# Patient Record
Sex: FEMALE | Race: WHITE | NOT HISPANIC OR LATINO | Employment: OTHER | URBAN - METROPOLITAN AREA
[De-identification: names, ages, dates, MRNs, and addresses within clinical notes are randomized per-mention and may not be internally consistent; named-entity substitution may affect disease eponyms.]

---

## 2018-11-25 PROBLEM — E87.29 INCREASED ANION GAP METABOLIC ACIDOSIS: Status: ACTIVE | Noted: 2018-11-25

## 2018-11-25 PROBLEM — J45.998 SEASONAL ASTHMA: Status: ACTIVE | Noted: 2018-11-25

## 2018-11-25 PROBLEM — J18.9 PNEUMONIA OF RIGHT LUNG DUE TO INFECTIOUS ORGANISM: Status: ACTIVE | Noted: 2018-11-25

## 2018-12-03 ENCOUNTER — NURSE TRIAGE (OUTPATIENT)
Dept: ADMINISTRATIVE | Facility: CLINIC | Age: 48
End: 2018-12-03

## 2018-12-03 ENCOUNTER — PATIENT OUTREACH (OUTPATIENT)
Dept: ADMINISTRATIVE | Facility: CLINIC | Age: 48
End: 2018-12-03

## 2018-12-03 NOTE — PATIENT INSTRUCTIONS
Treating Pneumonia  Pneumonia is an infection of one or both of the lungs. Pneumonia:  · Is usually caused by either a virus or a bacteria  · Can be very serious, especially in infants, young children, and older adults. Its also serious for those with other long-term health problems or weakened immune systems.  · Is sometimes treated at home and sometimes in the hospital    Antibiotic medicines  Antibiotics may be prescribed for pneumonia caused by bacteria. They may be pills (oral medicines), or shots (injections). Or they may be given by IV (intravenously) into a vein. If you are taking oral medicines at home:  · Fill your prescription and start taking your medicine as soon as you can.  · You will likely start to feel better in a day or 2, but dont stop taking the antibiotic.  · Use a pill organizer to help you remember to take your medicine.  · Let your healthcare provider know if you have side effects.  · Take your medicine exactly as directed on the label. Talk to your provider or pharmacist if you have any questions.  Antiviral medicines  Antiviral medicine may be prescribed for pneumonia caused by a virus. For example, antiviral medicine may be prescribed for pneumonia caused by the flu virus. Antibiotics do not work against viruses. If you are taking antiviral medicine at home:  · Fill your prescription and start taking your medicine as soon as you can.  · Talk with your provider or pharmacist about possible side effects.  · Take the medicine exactly as instructed.  To relieve symptoms  There are many medicines that can help relieve symptoms of pneumonia. Some are prescription and some are over-the-counter.  Your healthcare provider may recommend:  · Acetaminophen or ibuprofen to lower your fever and to lessen headache or other pain  · Cough medicine to loosen mucus or to reduce coughing  Make sure you check with your healthcare provider or pharmacist before taking any over-the-counter medicines.  Special  treatments  If you are hospitalized for pneumonia, you may have other therapies, including:  · Inhaled medicines to help with breathing or chest congestion  · Supplemental oxygen to increase low oxygen levels  Drink fluids and eat healthy  You should eat healthy to help your body fight the infection. Drinking a lot of fluids helps to replace fluids lost from fever and to loosen mucus in your chest.  · Diet. Make healthy food choices, including fruits and vegetables, lean meats and other proteins, 100% whole grain and low- or no-fat dairy products.  · Fluids. Drink at least 6 to 8 tall glasses a day. Water and 100% fruit or vegetable juice are best.  Get plenty of rest and sleep  You may be more tired than usual for a while. It is important to get enough sleep at night. Its also important to rest during the day. Talk with your healthcare provider if coughing or other symptoms are interfering with your sleep.  Preventing the spread of germs  The best thing you can do to prevent spreading germs is to wash your hands often. You should:  · Rub your hand with soap and water for 20 to 30 seconds.  · Clean in between your fingers, the backs of your hands, and around your nails.  · Dry your hands on a separate towel or use paper towels.  You should also:  · Keep alcohol-based hand  nearby.  · Make sure you also clean surfaces that you touch. Use a product that kills all types of germs.  · Stay away from others until you are feeling better.  When to call your healthcare provider  Call your healthcare provider if you have any of the following:  · Symptoms get worse  · Fever continues  · Shortness of breath gets worse  · Increased mucus or mucus that is darker in color  · Coughing gets worse  · Lips or fingers are bluish in color  · Side effects from your medicine   Date Last Reviewed: 12/1/2016  © 9343-1561 [employer]. [address] [place] [address], [place] [address]. All rights reserved. This information is  not intended as a substitute for professional medical care. Always follow your healthcare professional's instructions.

## 2018-12-03 NOTE — PROGRESS NOTES
[phone] [facility]  [phone] [facility]  C3 nurse attempted to contact patient. No answer. The following message was left for the patient to return the call:  Good morning  I am a nurse calling on behalf of [facility] from the [facility].  This is a Transitional Care Call for Gina Coto. When you have a moment please contact us at [phone] or [phone] Monday through Friday, between the hours of 8 am to 4 pm. We look forward to speaking with you. On behalf of [facility] have a nice day.    The patient has a scheduled HOSFU appointment with Dr Tang on 12/11/18 @ 1310 hrs.

## 2018-12-03 NOTE — TELEPHONE ENCOUNTER
Reason for Disposition   Nursing judgment   Pharmacy calling with prescription questions and triager unable to answer question    Protocols used: ST NO PROTOCOL AVAILABLE - INFORMATION ONLY-A-OH, ST MEDICATION QUESTION CALL-A-    Pt states she was told she would need to reschedule appointment with Dr. Prescott. Pt states she was supposed to be told if she needs port placement that is scheduled for Wednesday. Pt also states she needs cough medication refill. Pt advised per protocol and pt verbalizes understanding.

## 2018-12-04 NOTE — TELEPHONE ENCOUNTER
Patient called to report the following:     -patient states she is returning a call  -want to know if she will have port placed on 12/5  -patient needs a refill on acetaminophen with codeine   -advised to f/u with provider     Reason for Disposition   [1] Caller requesting NON-URGENT health information AND [2] PCP's office is the best resource    Protocols used: ST INFORMATION ONLY CALL-A-AH

## 2018-12-04 NOTE — TELEPHONE ENCOUNTER
LM appt is to scheduled port placement. Will sign consent, pick sx date, have pre-op testing done(if needed), and pre-admit appt will be scheduled. Will not be given pain medication until after procedure, need to FU with PCP. Any questions please call [phone].

## 2018-12-11 ENCOUNTER — TELEPHONE (OUTPATIENT)
Dept: CARDIOTHORACIC SURGERY | Facility: CLINIC | Age: 48
End: 2018-12-11

## 2018-12-11 NOTE — TELEPHONE ENCOUNTER
Spoke with patient regarding referral to Thoracic surgery for endobronchial lesion.  Appointment scheduled for 12/19/2018.  Reminder mailed.    ----- Message from Juan Jose Miles MD sent at 12/11/2018  8:51 AM CST -----  Regarding: RE: Carcinoid  My office will contact her to schedule an appointment.    p  ----- Message -----  From: Omar Prescott MD  Sent: 12/10/2018   9:46 AM  To: Juan Jose Miles MD  Subject: RE: Carcinoid                                    Yes sir    Thank you kindly     I will schedule the complete PFTs and Dobutamine ASAP    I will speak to Radiology today regarding the VQ scan     If we can get the studies this week, may I ask for an appt next week?    Unfortunately she has no insurance so again, any guidance is appreciated.    Regards,     AE  ----- Message -----  From: Juan Jose Miles MD  Sent: 12/10/2018   7:51 AM  To: Omar Prescott MD, #  Subject: RE: Carcinoid                                    The patient needs a right lower bilobectomy.  This  is a carcinoid tumor.  Resection with mediastinal LN dissection is the mainstay form of treatment.  I can have my office set up an appointment.  She will need PFTs, split lung V/Q scan and a dobutamine stress test.  I agree that she does NOT need to have a port placed.  I have included my clinical coordinator on this thread.  She can facilitate an office visit.    [facility]  ----- Message -----  From: Omar Prescott MD  Sent: 12/7/2018   9:55 AM  To: Juan Jose Miles MD, #  Subject: Carcinoid                                          Hi Dr. Miles,    I am wondering if I can ask your thoughts regarding this case of endobronchial carcinoid..    I will forward the case to the surgical team who saw her last week in anticipation of port placement..       Path has returned:  SPECIMEN  1) Right Main Stem; Lung Bx     Endobronchial lesion with post obstructive pneumonia             FINAL PATHOLOGIC DIAGNOSIS  Right main stem,  lung, biopsy:  -Low grade neuroendocrine tumor, most consistent with carcinoid (See comment)  Comment: Due to sampling and heterogeneity of the tumor, a higher grade component of the tumor may be  present which cannot be ruled out.     Microscopic Examination  Immunohistochemical stain results in the tumor cells are as follows:  CD45- negative  CD68 negative  CD56 positive  Synaptophysin positive  Ki-67 - ~2%  1 H & E, 1 CD45, 1 CD56, 1 CD68, 1 synaptophysin, 1 KI67     Called patient last night (12/6) LONG discussion with her and her      Both verbalize understanding and plan to keep Surgery appt to plan for evaluation for resection  Obviously, will NOT need plan for lidya [facility] Board discussion 12/10, consideration of resection either here or main campus  Patient has no insurance, unable to obtain PETCT  Will plan staging CT abd/pelvis       Thanks,     AE

## 2018-12-20 ENCOUNTER — TELEPHONE (OUTPATIENT)
Dept: HEMATOLOGY/ONCOLOGY | Facility: CLINIC | Age: 48
End: 2018-12-20

## 2018-12-20 NOTE — TELEPHONE ENCOUNTER
Called and spoke with pt to inquire about application for medicaid.  Pt has not applied for medicaid yet and will do so once she has all needed information at hand.  Instructed to seek out  while here on 12/21, agreeable.

## 2018-12-21 ENCOUNTER — OFFICE VISIT (OUTPATIENT)
Dept: CARDIOTHORACIC SURGERY | Facility: CLINIC | Age: 48
End: 2018-12-21

## 2018-12-21 VITALS
OXYGEN SATURATION: 97 % | HEIGHT: 67 IN | WEIGHT: 185.63 LBS | SYSTOLIC BLOOD PRESSURE: 130 MMHG | HEART RATE: 71 BPM | BODY MASS INDEX: 29.13 KG/M2 | DIASTOLIC BLOOD PRESSURE: 84 MMHG

## 2018-12-21 DIAGNOSIS — C7A.090: Primary | ICD-10-CM

## 2018-12-21 PROCEDURE — 99204 OFFICE O/P NEW MOD 45 MIN: CPT | Mod: S$PBB,,, | Performed by: THORACIC SURGERY (CARDIOTHORACIC VASCULAR SURGERY)

## 2018-12-21 PROCEDURE — 99214 OFFICE O/P EST MOD 30 MIN: CPT | Mod: PBBFAC | Performed by: THORACIC SURGERY (CARDIOTHORACIC VASCULAR SURGERY)

## 2018-12-21 PROCEDURE — 99999 PR PBB SHADOW E&M-EST. PATIENT-LVL IV: CPT | Mod: PBBFAC,,, | Performed by: THORACIC SURGERY (CARDIOTHORACIC VASCULAR SURGERY)

## 2018-12-21 NOTE — PROGRESS NOTES
History & Physical    SUBJECTIVE:     History of Present Illness:  Patient is a 48 y.o. 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 Presented to ED for 10 days of fever, chills, cough, and body aches. CXR prompted chest CT which showed endobronchial BI lesion. Work-up also included PFTS, V/Q, and DSE. Active at home. Recently quit smoking. Fatigue and weight loss. Denies fever, chills, CP, SOB, hemoptysis, changes in bowel function. Not on blood thinners.     Quit smoking 2 weeks ago. 15 pack years. Occasional etoh and marijuana.    PSH: diagnostic laparoscopy, R shoulder cyst excision   Self employed     Chief Complaint   Patient presents with    Consult       Review of patient's allergies indicates:  No Known Allergies    Current Outpatient Medications   Medication Sig Dispense Refill    acetaminophen-codeine 120-12 mg/5 mL suspension Take 5 mLs by mouth every 6 (six) hours as needed for Pain.      albuterol-ipratropium (DUO-NEB) 2.5 mg-0.5 mg/3 mL nebulizer solution Take 3 mLs by nebulization every 6 (six) hours as needed for Wheezing. 1 Box 11    ALPRAZolam (XANAX) 0.5 MG tablet Take 1 tablet (0.5 mg total) by mouth 3 (three) times daily as needed for Anxiety. 90 tablet 0     Current Facility-Administered Medications   Medication Dose Route Frequency Provider Last Rate Last Dose    0.9%  NaCl infusion   Intravenous Continuous Gavino Muñoz MD   Stopped at 12/19/18 1213    atropine injection 0.2 mg  0.2 mg Intravenous PRN Gavino Muñoz MD   0.2 mg at 12/19/18 1202    DOBUTamine 500mg in D5W 250mL infusion (premix)  10 mcg/kg/min Intravenous Continuous Gavino Muñoz MD   Stopped at 12/19/18 1207    esmolol injection 20 mg  20 mg Intravenous PRN Gavino Muñoz MD   10 mg at 12/19/18 1209    nitroGLYCERIN SL tablet 0.4 mg  0.4 mg Sublingual Q5 Min PRN Gavino Muñoz MD           Past Medical History:   Diagnosis Date    Seasonal asthma      Past  "Surgical History:   Procedure Laterality Date    BRONCHOSCOPY N/A 2018    Performed by Omar Prescott MD at [facility]     Family History   Problem Relation Age of Onset    Cancer Maternal Grandmother     Cancer Paternal Grandfather      Social History     Tobacco Use    Smoking status: Former Smoker     Packs/day: 0.25     Types: Cigarettes     Last attempt to quit: 2018     Years since quittin.0    Smokeless tobacco: Former User   Substance Use Topics    Alcohol use: No    Drug use: No        Review of Systems:  Review of Systems   Constitutional: Negative for activity change and fatigue.   HENT: Negative for congestion.    Respiratory: Positive for cough. Negative for shortness of breath.    Cardiovascular: Negative for chest pain and palpitations.   Gastrointestinal: Negative for abdominal distention and abdominal pain.   Genitourinary: Negative for difficulty urinating.   Musculoskeletal: Negative for arthralgias.   Skin: Negative for color change and rash.   Neurological: Negative for dizziness and syncope.   Psychiatric/Behavioral: Negative for agitation. The patient is not nervous/anxious.        OBJECTIVE:     Vital Signs (Most Recent)  Pulse: 71 (18 1107)  BP: 130/84 (18 1107)  SpO2: 97 % (18 1107)  5' 7" (1.702 m)  84.2 kg (185 lb 10 oz)     Physical Exam:  Physical Exam   Constitutional: She is oriented to person, place, and time. She appears well-developed and well-nourished.   HENT:   Head: Normocephalic and atraumatic.   Eyes: EOM are normal.   Neck: Normal range of motion. Neck supple. No tracheal deviation present.   Cardiovascular: Normal rate, regular rhythm, normal heart sounds and intact distal pulses.   Pulmonary/Chest: Effort normal and breath sounds normal.   Abdominal: Soft.   Musculoskeletal: Normal range of motion. She exhibits no edema.   Neurological: She is alert and oriented to person, place, and time.   Skin: Skin is warm and dry. "   Psychiatric: She has a normal mood and affect. Thought content normal.   Vitals reviewed.      PFTS  FEV 1 1.98L 67%    VQ 12/12/18  Differential perfusion of 70.4% to the left lung and 29.6% to the right lung.    Chest CT 11/26/18:  1.  1.3 cm mass within the bronchus intermedius with associated right middle and lower lobe atelectasis and regions of consolidation, moderate size right pleural effusion, and multiple bilateral focal regions of ground-glass opacification raising possibility of embolic foci    Pathology 11/28/18:  Right main stem, lung, biopsy:  -Low grade neuroendocrine tumor, most consistent with carcinoid    DSE 12/19/18:   · Negative dobutamine stress echocardiogram for myocardial ischemia.   · The left ventricle (LV) is normal in size, thickness and function.   · Resting LV ejection fraction is 55%.  · The right ventricle is normal in size and function.  · The EKG portion of this study is negative for myocardial ischemia.  · Arrhythmia during stress: none  · Hemodynamic data during stress: appropriate blood pressure and heart rate response to dobutamine.  · No symptoms reported.    ASSESSMENT/PLAN:     Patient is a 48 y.o. 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     PLAN:Plan     Need Chest CT with IV contrast. Stress reviewed.   Pending above proceed to OR for bronchoscopy and right thoracotomy with lower bilobectomy, MLND, possible pneumonectomy on 1/24/18. APS consult for assistance with neal-operative pain management - epidural vs erector spinae catheter  Pre-operative octreotide.   Appropriate patient education regarding the neal-operative period as well as intraoperative details were discussed. Risks, including but not limited to, bleeding, infection, pain and anesthetic complication were discussed. Patient was given the opportunity to ask questions and to have those questions answered to their satisfaction. Patient verbalized understanding to  both procedure and associated risks. Consent was obtained.

## 2019-01-02 ENCOUNTER — ANESTHESIA EVENT (OUTPATIENT)
Dept: SURGERY | Facility: HOSPITAL | Age: 49
End: 2019-01-02

## 2019-01-02 DIAGNOSIS — Z01.818 PREOP TESTING: Primary | ICD-10-CM

## 2019-01-02 NOTE — ANESTHESIA PREPROCEDURE EVALUATION
Anesthesia Assessment: Preoperative EQUATION    Planned Procedure: Procedure(s) (LRB):  THORACOTOMY (Right)  VATS, WITH WEDGE RESECTION, LUNG (Right)  BRONCHOSCOPY, RIGID (N/A)  LYMPHADENECTOMY (Right)  Requested Anesthesia Type:General  Surgeon: Juan Jose Miles MD  Service: Thoracic  Known or anticipated Date of Surgery:1/24/2019    Surgeon notes: reviewed and Bronchial adenoma, carcinoid, right    Previous anesthesia records:None-no anesthesia records found in Epic system    Last PCP note: No PCP @ present  Subspecialty notes: GYN  Tests already available:  Results have been reviewed. Labs- 12/12/18-Lipid Panel/ 12/5/18-Toxicology screen, urine/ 11/29/18-POCT TB CMP/CBC/Phosphorus/Uayytphza08/28/18-CMP/CBC/Phosphorus/Magnesium/11/25/18-UA/HIV-1 & HIV-2 Antibodies Quant./CXR-11/25/18/EKG-11/25/18/Stress Test-12/19/18              Plan:    Testing:  T&S       Patient  has previously scheduled Medical Appointment:    Navigation: Tests Scheduled. Lab-T&S on 1/16/19 @ 12:30p             Consults scheduled.POC & Perioperative IM Consult on 1/16/19 @ 10a & 11a             Results will be tracked by Preop Clinic.                Amy Dan RN  1/2/19 01/02/2019  Gina Coto is a 48 y.o., female.    Pre-op Assessment         Review of Systems  Anesthesia Hx:  History of prior surgery of interest to airway management or planning: Previous anesthesia: General laprascopic procedure 1989 with general anesthesia.  Denies Family Hx of Anesthesia complications.   Denies Personal Hx of Anesthesia complications.   Social:  Former Smoker Quit 12/2018; 0.5 ppd x 10 yrs; occasional drink socially  Last marijuana 11/2018; currently uses CBD oil under tongue   Hematology/Oncology:         -- Anemia: Current/Recent Cancer. (bronchus NET carcinoid)   EENT/Dental:   Reading glasses   Cardiovascular:   Denies  Hypertension.   Functional Capacity good / => 4 METS, walking; climbs stairs [place] [employer] without SOB, denies CP    Pulmonary:   Pneumonia (11/2018) Asthma (seasonal; first event at age 33; never used inhaler) asymptomatic Chest CT 11/26/18:  1.3 cm mass within the bronchus intermedius with associated right middle and lower lobe atelectasis and regions of consolidation, moderate size right pleural effusion, and multiple bilateral focal regions of ground-glass opacification raising possibility of embolic foci    Repeat CT done 12/31/18  See report    Pathology 11/28/18:  Right main stem, lung, biopsy:  -Low grade neuroendocrine tumor, most consistent with carcinoid     Renal/:  Renal/ Normal     Hepatic/GI:   Denies GERD.    Musculoskeletal:  Musculoskeletal Normal    OB/GYN/PEDS:  LMP: 12/24/18   Neurological:   Denies CVA. Denies Seizures.    Endocrine:   Denies Diabetes.        Physical Exam  General:  Well nourished    Airway/Jaw/Neck:  Airway Findings: Mouth Opening: Normal Tongue: Normal  General Airway Assessment: Adult  Jaw/Neck Findings:     Neck ROM: Normal ROM      Dental:  Dental Findings: In tact         Mental Status:  Mental Status Findings:  Cooperative, Alert and Oriented       Pt was seen in POC 1/16/19; per surgeon - epidural vs erector spinal catheter for pain management; PRE-OP OCTREOTIDE to be given (refer to anesthesia guidelines-carcinoid crisis prophylaxis); discussed with Dr Shelby;  Medical optimization: please see EPIC notes for recommendations of pre-op medical consultant, Dr Sanz, for perioperative medical management. /Clarisse Duckworth RN

## 2019-01-03 ENCOUNTER — TELEPHONE (OUTPATIENT)
Dept: PREADMISSION TESTING | Facility: HOSPITAL | Age: 49
End: 2019-01-03

## 2019-01-03 NOTE — TELEPHONE ENCOUNTER
----- Message from Amy Dan RN sent at 1/2/2019  5:12 PM CST -----  Regarding: Preop appts.  Jace Centeno, this patient will need a POC/OPOC/Lab-T&S(ordered) prior to surgery date of 1/24/19. Patient is having  a Thoracic surgery with Dr. Miles. Thank you. MIKO

## 2019-01-07 ENCOUNTER — PATIENT MESSAGE (OUTPATIENT)
Dept: SURGERY | Facility: HOSPITAL | Age: 49
End: 2019-01-07

## 2019-01-11 PROBLEM — D3A.00 CARCINOID TUMOR: Status: ACTIVE | Noted: 2019-01-11

## 2019-01-16 ENCOUNTER — INITIAL CONSULT (OUTPATIENT)
Dept: INTERNAL MEDICINE | Facility: CLINIC | Age: 49
End: 2019-01-16

## 2019-01-16 ENCOUNTER — HOSPITAL ENCOUNTER (OUTPATIENT)
Dept: PREADMISSION TESTING | Facility: HOSPITAL | Age: 49
Discharge: HOME | End: 2019-01-16
Attending: ANESTHESIOLOGY

## 2019-01-16 VITALS
TEMPERATURE: 98 F | BODY MASS INDEX: 29.72 KG/M2 | SYSTOLIC BLOOD PRESSURE: 163 MMHG | DIASTOLIC BLOOD PRESSURE: 79 MMHG | HEIGHT: 67 IN | HEART RATE: 91 BPM | SYSTOLIC BLOOD PRESSURE: 163 MMHG | BODY MASS INDEX: 29.72 KG/M2 | OXYGEN SATURATION: 97 % | HEIGHT: 67 IN | WEIGHT: 189.38 LBS | OXYGEN SATURATION: 97 % | WEIGHT: 189.38 LBS | DIASTOLIC BLOOD PRESSURE: 79 MMHG | HEART RATE: 91 BPM | TEMPERATURE: 98 F

## 2019-01-16 DIAGNOSIS — Z01.818 PREOP EXAMINATION: Primary | ICD-10-CM

## 2019-01-16 DIAGNOSIS — Z87.01 HISTORY OF PNEUMONIA: ICD-10-CM

## 2019-01-16 DIAGNOSIS — J45.998 SEASONAL ASTHMA: ICD-10-CM

## 2019-01-16 DIAGNOSIS — D3A.00 CARCINOID TUMOR: ICD-10-CM

## 2019-01-16 PROBLEM — E87.29 INCREASED ANION GAP METABOLIC ACIDOSIS: Status: RESOLVED | Noted: 2018-11-25 | Resolved: 2019-01-16

## 2019-01-16 PROCEDURE — 99214 OFFICE O/P EST MOD 30 MIN: CPT | Mod: S$PBB,,, | Performed by: HOSPITALIST

## 2019-01-16 PROCEDURE — 99213 OFFICE O/P EST LOW 20 MIN: CPT | Mod: PBBFAC | Performed by: HOSPITALIST

## 2019-01-16 PROCEDURE — 99214 PR OFFICE/OUTPT VISIT, EST, LEVL IV, 30-39 MIN: ICD-10-PCS | Mod: S$PBB,,, | Performed by: HOSPITALIST

## 2019-01-16 PROCEDURE — 99999 PR PBB SHADOW E&M-EST. PATIENT-LVL III: CPT | Mod: PBBFAC,,, | Performed by: HOSPITALIST

## 2019-01-16 PROCEDURE — 99999 PR PBB SHADOW E&M-EST. PATIENT-LVL III: ICD-10-PCS | Mod: PBBFAC,,, | Performed by: HOSPITALIST

## 2019-01-16 NOTE — OUTPATIENT SUBJECTIVE & OBJECTIVE
Outpatient Subjective & Objective     Chief complaint-Preoperative evaluation, Perioperative Medical management, complication reduction plan     Active cardiac conditions- none    Revised cardiac risk index predictors- high-risk type of surgery    Functional capacity -Examples of physical activity , was very active , and 2 weeks before she Was sick, went up the stadium at [place] ,  house work, can take 1 flight of stairs, cutting the grass with a mower, raking lawn, painting, weeding garden, swimming, dancing and digging----- She can undertake all the above activities without  chest pain,chest tightness, Shortness of breath ,dizziness,lightheadedness making her exercise tolerance more than 4 Mets.   Winded on heavier exertion, not new ,stable over time     Review of Systems   Constitutional: Negative for chills and fever.          Weight loss-12-- pounds -with pneumonia --gained 4 back   Cut back on white sugar       HENT:        STOPBANG score  1/ 8      Age over 50      Eyes:        No new visual changes   Respiratory:        No longer having cough  Mostly non productive     No Hemoptysis   Cardiovascular:        As noted   Gastrointestinal:        No overt GI/ blood losses  Bowel movements- Regular   LMP- 12 /24 / 2018   Cannot have children( Had infertility resting , both her fallopian tubes are blocked )   Not on contraception   Sexually active   Endocrine:        Prednisone use > 20 mg daily for 3 weeks- None   Genitourinary: Negative for dysuria.        No urinary hesitancy    Musculoskeletal:          No unusual, muscle, joint pains   Skin: Negative for rash.   Neurological: Negative for syncope.        No unilateral weakness   Hematological:        Current use of Anticoagulants  Current use of Antiplatelet agents  None   Psychiatric/Behavioral:        Bipolar , Major Depressive disorder   Not on Medication   On Xanax, since cancer diagnosis, using mostly once a day   No SI/HI   no vascular stenting  "    No past medical history pertinent negatives.        No anesthesia, bleeding, cardiac problems, PONV with previous surgeries/procedures.  Medications and Allergies reviewed in epic.   FH- No anesthesia,bleeding / venous thrombosis ,  in family     Physical Exam  Blood pressure (!) 163/79, pulse 91, temperature 98.1 °F (36.7 °C), temperature source Oral, height 5' 7" (1.702 m), weight 85.9 kg (189 lb 6.4 oz), last menstrual period 12/25/2018, SpO2 97 %.  BP usually 130-140/80's  Due for colonoscopy   Lives with  , 2 pets       Physical Exam  Constitutional- Vitals - Body mass index is 29.66 kg/m².,   Vitals:    01/16/19 1039   BP: (!) 163/79   Pulse: 91   Temp: 98.1 °F (36.7 °C)     General appearance-Conscious,Coherent  Eyes- No conjunctival icterus,pupils  round  and reactive to light   ENT-Oral cavity- moist  , Hearing grossly normal   Neck- No thyromegaly ,Trachea -central, No jugular venous distension,   No Carotid Bruit   Cardiovascular -Heart Sounds-sitting, reclining ,  Normal  and  no murmur   , No gallop rhythm   Respiratory - Decreased vocal fremitus, resonance Rt lower zone posteriorly . Normal percussion note , Normal Respiratory Effort,  no wheeze  and  no forced expiratory wheeze    Peripheral pitting pedal edema-- none , no calf pain   Gastrointestinal -Soft abdomen, No palpable masses, Non Tender,Liver,Spleen not palpable. No-- free fluid and shifting dullness  Musculoskeletal- No finger Clubbing. Strength grossly normal   Lymphatic-No Palpable cervical, axillary,Inguinal lymphadenopathy   Psychiatric - normal effect,Orientation  Rt Dorsalis pedis pulses-palpable    Lt Dorsalis pedis pulses- palpable   Rt Posterior tibial pulses -palpable   Left posterior tibial pulses -palpable   Miscellaneous -  no renal bruit  Investigations  Lab and Imaging have been reviewed in [facility].    Review of Medicine tests    EKG- I had independently reviewed the EKG from--11/25/2018   It was reported to be " showing     Normal sinus rhythm  Biatrial enlargement  Right bundle branch block  Left anterior fascicular block  LVH  No previous ECGs available    Dec 2019     · Negative dobutamine stress echocardiogram for myocardial ischemia.   · The left ventricle (LV) is normal in size, thickness and function.   · Resting LV ejection fraction is 55%.  · The right ventricle is normal in size and function.  · The EKG portion of this study is negative for myocardial ischemia.  · Arrhythmia during stress: none  · Hemodynamic data during stress: appropriate blood pressure and heart rate response to dobutamine.  · No symptoms reported.    11/29/2019     · Normal left ventricular systolic function. The estimated ejection fraction is 55%  · No wall motion abnormalities.  · Normal LV diastolic function.  · Normal right ventricular systolic function.  · Normal atrial size.  · Trace mitral regurgitation.  · Trace tricuspid regurgitation.    Dec 2018     DATE OF PROCEDURE:  12/11/2018     1.  Study meets validity.  2.  Spirometry reveals moderate obstruction.  3.  No response to bronchodilation; however, this does not preclude clinical   utility.  4.  Lung volumes reveal moderate restriction.  5.  DLCO mildly decreased.  6.  ABG reveals mild hypoxemia based on the patient's age.  Please correlate   clinically in this patient with history of endobronchial carcinoid.        Review of clinical lab tests:  Lab Results   Component Value Date    CREATININE 0.6 11/29/2018    HGB 11.0 (L) 11/29/2018     (H) 11/29/2018         Review of old records- Was done and information gathered regards to events leading to surgery and health conditions of significance in the perioperative period.    Outpatient Subjective & Objective

## 2019-01-16 NOTE — ASSESSMENT & PLAN NOTE
For surgery    I suggest that the perioperative team be aware of this so that appropriate intra preventive care can be planned

## 2019-01-16 NOTE — ASSESSMENT & PLAN NOTE
History of asthmatic bronchitis , about once a year   Has 2 pets, one dog, cat   Dog sheds   Care suggested     Currently Duo neb Breathing treatment twice a day due to up coming surgery   No recent wheezing   asthma is controlled . I suggest consideration of inhaled bronchodilator use if the patient has perioperative bronchospasm

## 2019-01-16 NOTE — HPI
History of present illness- I had the pleasure of meeting this pleasant 48 y.o. lady in the pre op clinic prior to her elective  chest surgery. The patient is new to me .     I have obtained the history by speaking to the patient and by reviewing the electronic health records.    Events leading up to surgery / History of presenting illness -    Bronchial adenoma, carcinoid, right  Was well until 11/15 /2018   Took Mother in law out one day ( 2 days prior )to get her things done ( [employer], Social security office, Court house )   [place] feverish 2 days later . Given that she was out , felt that he had flu   Had fever for about 10 days   Tried her Mother's inhaler ( who has COPD )  , stayed hydrated   Had cough , started vomiting, lost appetite ( lost 12 pounds )    Went to ER   Was diagnosed with Pneumonia, collapsed lung ,Consolidation   11/26/2018 - 1.3 cm mass within the bronchus intermedius with associated right middle and lower lobe atelectasis and regions of consolidation, moderate size right pleural effusion, and multiple bilateral focal regions of ground-glass opacification raising possibility of embolic foci  · Echo showed -Normal left ventricular systolic function. The estimated ejection fraction is 55%.No wall motion abnormalities.Normal LV diastolic function.Normal right ventricular systolic function.Normal atrial size.Trace mitral regurgitation.  Trace tricuspid regurgitation.   Not known to have bacteremia   Furter evaluation lead to the diagnosis of Carcinoid   No flushing or diarrhea  CT from 12/31 showed - 1.8 cm soft tissue density mass in the bronchus intermedius compatible with an obstructing mass and consistent with the patient's history of a carcinoid tumor  No pain from this .No aggravating factors. No relieving factors   No SOB, No cough , phlegm    Relevant health conditions of significance for the perioperative period/ History of presenting illness -    Subjectively describes health as excellent     Health conditions of significance for the perioperative period asthma, Carcinoid    Patient Active Problem List    Diagnosis Date Noted    Carcinoid tumor 01/11/2019         History of pneumonia- Nov 2018 11/25/2018    Seasonal asthma 11/25/2018     Not known to have heart disease , HTN,Diabetes Mellitus

## 2019-01-16 NOTE — DISCHARGE INSTRUCTIONS
Your surgery has been scheduled for:__________________________________________    You should report to:  ____[facility], located on the [place] side of the first floor of the           [facility] ([phone])  ____[facility], located on the [facility] side of the            Second floor of the [facility] ([phone])  ____3rd Floor [facility] located on the [facility] side of the [facility] [phone]  Please Note   - Tell your doctor if you take Aspirin, products containing Aspirin, herbal medications  or blood thinners, such as Coumadin, Ticlid, or Plavix.  (Consult your provider regarding holding or stopping before surgery).  - Arrange for someone to drive you home following surgery.  You will not be allowed to leave the surgical facility alone or drive yourself home following sedation and anesthesia.  Before Surgery  - Stop taking all herbal medications 14days prior to surgery  - No Motrin/Advil (Ibuprofen) 7 days before surgery  - No Aleve (Naproxen) 7 days before surgery  - Stop Taking Asprin, products containing Asprin _____days before surgery  - Stop taking blood thinners_______days before surgery  - No Goody's/BC  Powder 7 days before surgery  - Refrain from drinking alcoholic beverages for 24hours before and after surgery  - Stop or limit smoking _________days before surgery  - You may take Tylenol for pain    Night before Surgery  NOTHING TO EAT OR DRINK AFTER MIDNIGHT OR FOLLOW SURGEON'S INSTRUCTIONS  - Take a shower or bath (shower is recommended).  Bathe with Hibiclens soap or an antibacterial soap from the neck down.  If not supplied by your surgeon, hibiclens soap will need to be purchased over the counter in pharmacy.  Rinse soap off thoroughly.  - Shampoo your hair with your regular shampoo  The Day of Surgery  ·  If you are told to take medication on the morning of surgery, it may be taken with  a sip of water.   - Take another bath or shower with hibiclens or any antibacterial soap, to reduce the chance of infection.  - Take heart and blood pressure medications with a small sip of water, as advised by the perioperative team.  - Do not take fluid pills  - You may brush your teeth and rinse your mouth, but do not swall any additional water.   - Do not apply perfumes, powder, body lotions or deodorant on the day of surgery.  - Nail polish should be removed.  - Do not wear makeup or moisturizer  - Wear comfortable clothes, such as a button front shirt and loose fitting pants.  - Leave all jewelry, including body piercings, and valuables at home.    - Bring any devices you will neeed after surgery such as crutches or canes.  - If you have sleep apnea, please bring your CPAP machine  In the event that your physical condition changes including the onset of a cold or respiratory illness, or if you have to delay or cancel your surgery, please notify your surgeon.      Anesthesia: General Anesthesia  Youre due to have surgery. During surgery, youll be given medication called anesthesia. (It is also called anesthetic.) This will keep you comfortable and pain-free. Your anesthesia provider will use general anesthesia. This sheet tells you more about it.  What is general anesthesia?     You are watched continuously during your procedure by the anesthesia provider   General anesthesia puts you into a state like deep sleep. It goes into the bloodstream (IV anesthetics), into the lungs (gas anesthetics), or both. You feel nothing during the procedure. You will not remember it. During the procedure, the anesthesia provider monitors you continuously. He or she checks your heart rate and rhythm, blood pressure, breathing, and blood oxygen.  · IV Anesthetics. IV anesthetics are given through an IV line in your arm. Theyre often given first. This is so you are asleep before a gas anesthetic is started. Some kinds of IV  anesthetics relieve pain. Others relax you. Your doctor will decide which kind is best in your case.  · Gas Anesthetics. Gas anesthetics are breathed into the lungs. They are often used to keep you asleep. They can be given through a facemask or a tube placed in your larynx or trachea (breathing tube).  ? If you have a facemask, your anesthesia provider will most likely place it over your nose and mouth while youre still awake. Youll breathe oxygen through the mask as your IV anesthetic is started. Gas anesthetic may be added through the mask.  ? If you have a tube in the larynx or trachea, it will be inserted into your throat after youre asleep.  Anesthesia tools and medications  You will likely have:  · IV anesthetics. These are put into an IV line into your bloodstream.  · Gas anesthetics. You breathe these anesthetics into your lungs, where they pass into your bloodstream.  · Pulse oximeter. This is a small clip that is attached to the end of your finger. This measures your blood oxygen level.  · Electrocardiography leads (electrodes). These are small sticky pads that are placed on your chest. They record your heart rate and rhythm.  · Blood pressure cuff. This reads your blood pressure.  Risks and possible complications  General anesthesia has some risks. These include:  · Breathing problems  · Nausea and vomiting  · Sore throat or hoarseness (usually temporary)  · Allergic reaction to the anesthetic  · Irregular heartbeat (rare)  · Cardiac arrest (rare)   Anesthesia safety  · Follow all instructions you are given for how long not to eat or drink before your procedure.  · Be sure your doctor knows what medications and drugs you take. This includes over-the-counter medications, herbs, supplements, alcohol or other drugs. You will be asked when those were last taken.  · Have an adult family member or friend drive you home after the procedure.  · For the first 24 hours after your surgery:  ? Do not drive or use  heavy equipment.  ? Have a trusted family member or spouse make important decisions or sign documents.  ? Avoid alcohol.  ? Have a responsible adult stay with you. He or she can watch for problems and help keep you safe.  Date Last Reviewed: 10/16/2014  © 8439-9214 [employer]. [address] [place] [id number]. All rights reserved. This information is not intended as a substitute for professional medical care. Always follow your healthcare professional's instructions.

## 2019-01-16 NOTE — ASSESSMENT & PLAN NOTE
Had mild chest discomfort , rt posterior after she returned home from Pneumonia , Dec 1 st 2018  Lasted one day, resolved with heating pad. No Radiation. No associated Nausea, vomiting , diarrhea , SOB  No recurrence . No calf pains, calf swelling      Given the endobronchial lesion, likely post obstructive   clinically no suggestion of recurrence

## 2019-01-16 NOTE — PROGRESS NOTES
[facility] - Pre Op Consult  Progress Note    Patient Name: Gina Coto  MRN: [id number]  Date of Evaluation- 01/23/2019  PCP- Claire Frias MD    Future cases for Gina Coto [[id number]]     Case ID Status Date Time Trey Procedure Provider Location    [id number] [facility] 1/24/2019  9:30  THORACOTOMY Juan Jose Miles MD [[id number]] [facility] OR 2ND FLR          HPI:  History of present illness- I had the pleasure of meeting this pleasant 48 y.o. lady in the pre op clinic prior to her elective  chest surgery. The patient is new to me .     I have obtained the history by speaking to the patient and by reviewing the electronic health records.    Events leading up to surgery / History of presenting illness -    Bronchial adenoma, carcinoid, right  Was well until 11/15 /2018   Took Mother in law out one day ( 2 days prior )to get her things done ( DMV, Social security office, Court house )   [place] feverish 2 days later . Given that she was out , felt that he had flu   Had fever for about 10 days   Tried her Mother's inhaler ( who has COPD )  , stayed hydrated   Had cough , started vomiting, lost appetite ( lost 12 pounds )    Went to ER   Was diagnosed with Pneumonia, collapsed lung ,Consolidation   11/26/2018 - 1.3 cm mass within the bronchus intermedius with associated right middle and lower lobe atelectasis and regions of consolidation, moderate size right pleural effusion, and multiple bilateral focal regions of ground-glass opacification raising possibility of embolic foci  · Echo showed -Normal left ventricular systolic function. The estimated ejection fraction is 55%.No wall motion abnormalities.Normal LV diastolic function.Normal right ventricular systolic function.Normal atrial size.Trace mitral regurgitation.  Trace tricuspid regurgitation.   Not known to have bacteremia   Furter evaluation lead to the diagnosis of Carcinoid   No flushing or diarrhea  CT from 12/31 showed - 1.8 cm soft tissue density mass in the  bronchus intermedius compatible with an obstructing mass and consistent with the patient's history of a carcinoid tumor  No pain from this .No aggravating factors. No relieving factors   No SOB, No cough , phlegm    Relevant health conditions of significance for the perioperative period/ History of presenting illness -    Subjectively describes health as excellent    Health conditions of significance for the perioperative period asthma, Carcinoid    Patient Active Problem List    Diagnosis Date Noted    Carcinoid tumor 01/11/2019         History of pneumonia- Nov 2018 11/25/2018    Seasonal asthma 11/25/2018     Not known to have heart disease , HTN,Diabetes Mellitus      Subjective/ Objective:          Chief complaint-Preoperative evaluation, Perioperative Medical management, complication reduction plan     Active cardiac conditions- none    Revised cardiac risk index predictors- high-risk type of surgery    Functional capacity -Examples of physical activity , was very active , and 2 weeks before she Was sick, went up the stadium at [place] ,  house work, can take 1 flight of stairs, cutting the grass with a mower, raking lawn, painting, weeding garden, swimming, dancing and digging----- She can undertake all the above activities without  chest pain,chest tightness, Shortness of breath ,dizziness,lightheadedness making her exercise tolerance more than 4 Mets.   Winded on heavier exertion, not new ,stable over time     Review of Systems   Constitutional: Negative for chills and fever.          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        No urinary hesitancy    Musculoskeletal:          No unusual, muscle, joint pains   Skin: Negative for rash.   Neurological: Negative for syncope.        No unilateral weakness   Hematological:        Current use of Anticoagulants  Current use of Antiplatelet agents  None   Psychiatric/Behavioral:        Bipolar , Major Depressive disorder   Not on Medication   On Xanax, since cancer diagnosis, using mostly once a day   No SI/HI   no vascular stenting     No past medical history pertinent negatives.        No anesthesia, bleeding, cardiac problems, PONV with previous surgeries/procedures.  Medications and Allergies reviewed in epic.   FH- No anesthesia,bleeding / venous thrombosis ,  in family     Physical Exam  Blood pressure (!) 163/79, pulse 91, temperature 98.1 °F (36.7 °C), temperature source Oral, height 5' 7" (1.702 m), weight 85.9 kg (189 lb 6.4 oz), last menstrual period 12/25/2018, SpO2 97 %.  BP usually 130-140/80's  Due for colonoscopy   Lives with  , 2 pets       Physical Exam  Constitutional- Vitals - Body mass index is 29.66 kg/m².,   Vitals:    01/16/19 1039   BP: (!) 163/79   Pulse: 91   Temp: 98.1 °F (36.7 °C)     General appearance-Conscious,Coherent  Eyes- No conjunctival icterus,pupils  round  and reactive to light   ENT-Oral cavity- moist  , Hearing grossly normal   Neck- No thyromegaly ,Trachea -central, No jugular venous distension,   No Carotid Bruit   Cardiovascular -Heart Sounds-sitting, reclining ,  Normal  and  no murmur   , No gallop rhythm   Respiratory - Decreased vocal fremitus, resonance Rt lower zone posteriorly . Normal percussion note , Normal Respiratory Effort,  no wheeze  and  no forced expiratory wheeze    Peripheral pitting pedal edema-- none " , no calf pain   Gastrointestinal -Soft abdomen, No palpable masses, Non Tender,Liver,Spleen not palpable. No-- free fluid and shifting dullness  Musculoskeletal- No finger Clubbing. Strength grossly normal   Lymphatic-No Palpable cervical, axillary,Inguinal lymphadenopathy   Psychiatric - normal effect,Orientation  Rt Dorsalis pedis pulses-palpable    Lt Dorsalis pedis pulses- palpable   Rt Posterior tibial pulses -palpable   Left posterior tibial pulses -palpable   Miscellaneous -  no renal bruit  Investigations  Lab and Imaging have been reviewed in epic.    Review of Medicine tests    EKG- I had independently reviewed the EKG from--11/25/2018   It was reported to be showing     Normal sinus rhythm  Biatrial enlargement  Right bundle branch block  Left anterior fascicular block  LVH  No previous ECGs available    Dec 2019     · Negative dobutamine stress echocardiogram for myocardial ischemia.   · The left ventricle (LV) is normal in size, thickness and function.   · Resting LV ejection fraction is 55%.  · The right ventricle is normal in size and function.  · The EKG portion of this study is negative for myocardial ischemia.  · Arrhythmia during stress: none  · Hemodynamic data during stress: appropriate blood pressure and heart rate response to dobutamine.  · No symptoms reported.    11/29/2019     · Normal left ventricular systolic function. The estimated ejection fraction is 55%  · No wall motion abnormalities.  · Normal LV diastolic function.  · Normal right ventricular systolic function.  · Normal atrial size.  · Trace mitral regurgitation.  · Trace tricuspid regurgitation.    Dec 2018     DATE OF PROCEDURE:  12/11/2018     1.  Study meets validity.  2.  Spirometry reveals moderate obstruction.  3.  No response to bronchodilation; however, this does not preclude clinical   utility.  4.  Lung volumes reveal moderate restriction.  5.  DLCO mildly decreased.  6.  ABG reveals mild hypoxemia based on the  patient's age.  Please correlate   clinically in this patient with history of endobronchial carcinoid.        Review of clinical lab tests:  Lab Results   Component Value Date    CREATININE 0.6 11/29/2018    HGB 11.0 (L) 11/29/2018     (H) 11/29/2018         Review of old records- Was done and information gathered regards to events leading to surgery and health conditions of significance in the perioperative period.        Preoperative cardiac risk assessment-  The patient does not have any active cardiac conditions . Revised cardiac risk index predictors- 1---.Functional capacity is more than 4 Mets. She will be undergoing a chest  procedure that carries a high risk     The estimated risk of the rate of adverse cardiac outcomes  0.9%    No further cardiac work up is indicated prior to proceeding with the surgery          American Society of Anesthesiologists Physical status classification ( ASA ) class: 3     Postoperative pulmonary complication risk assessment:      ARISCAT ( [place]) risk index- risk class -  Low, if duration of surgery is under 2 hours, intermediate, if duration of surgery is 2- 3 hours , higher, if over 3 hours          Assessment/Plan:     Carcinoid tumor  For surgery    I suggest that the perioperative team be aware of this so that appropriate intra preventive care can be planned       Seasonal asthma  History of asthmatic bronchitis , about once a year   Has 2 pets, one dog, cat   Dog sheds   Care suggested     Currently Duo neb Breathing treatment twice a day due to up coming surgery   No recent wheezing   asthma is controlled . I suggest consideration of inhaled bronchodilator use if the patient has perioperative bronchospasm       History of pneumonia- Nov 2018   Had mild chest discomfort , rt posterior after she returned home from Pneumonia , Dec 1 st 2018  Lasted one day, resolved with heating pad. No Radiation. No associated Nausea, vomiting , diarrhea , SOB  No recurrence . No  calf pains, calf swelling      Given the endobronchial lesion, likely post obstructive   clinically no suggestion of recurrence         Preventive perioperative care    Thromboembolic prophylaxis:  Her risk factors for thrombosis include surgical procedure and age.I suggest  thromboembolic prophylaxis ( mechanical/pharmacological, weighing the risk benefits of pharmacological agent use considering neal procedural bleeding )  during the perioperative period.I suggested being active in the post operative period.      Postoperative pulmonary complication prophylaxis-Risk factors for post operative pulmonary complications include ASA class >2 and proximity of the surgical site to the lungs- I suggest incentive spirometry use, early ambulation, end tidal carbon dioxide monitoring and pain control so as to avoid diaphragmatic splinting  , oral care , head end of bed elevation     Renal complication prophylaxis . I suggest keeping her well hydrated .I suggested drinking 2 litre's of water a day      Surgical site Infection Prophylaxis-I  suggest appropriate antibiotic for Prophylaxis against Surgical site infections     Delirium prophylaxis-Risk factors - benzodiazepine use - I suggest avoidance / minimizing the use of  Benzodiazepines ( unless the patient has been taking it on a regular basis ),Anticholinergic medication,Antihistamines ( like  Benadryl).I suggest minimizing the use of opioid medication and use of IV tylenol,if it is appropriate. I suggest using the lowest possible dose of opioids for the shortest duration possible in the perioperative period. I suggest to Keep shades/blinds open during the day, lights off and shades closed at night to encourage normal sleep/wake cycle.I encourage the presence of the family member with the patient at all times, if at all possible as mental status changes can be picked up early by the family members and they help with reorientation. I encouraged the presence of family to  help with orientation in the perioperative period. Benadryl avoidance suggested     This visit was focused on Preoperative evaluation, Perioperative Medical management, complication reduction plans. I suggest that the patient follows up with primary care or relevant sub specialists for ongoing health care.    I appreciate the opportunity to be involved in this patients care. Please feel free to contact me if there were any questions about this consultation.    Patient is optimized     Patient  was instructed to call and update me about any changes to health,  medication, office visits ,testing out side of the neal operative care center , hospitalizations between now and surgery     Goldie Sanz MD  Perioperative Medicine  [facility]   Pager [phone]  ---  1/16    13 03     Tattoariana brown  ---  1/23- 7 48     Called to follow up , to address any concerns with the up coming surgery or any questions on Medication instructions   Unable to speak ,Left a message to call, if needed

## 2019-01-23 ENCOUNTER — PATIENT MESSAGE (OUTPATIENT)
Dept: CARDIOTHORACIC SURGERY | Facility: CLINIC | Age: 49
End: 2019-01-23

## 2019-01-23 NOTE — TELEPHONE ENCOUNTER
Called to discuss rescheduling patient's surgery. Informed by the financial office patient is currently still uninsured. Per financial office the medicaid paperwork was never submitted by the patient. After speaking with patient she was going to attempt to complete and submit paperwork today. Unfortunately, we will need to reschedule her elective surgery but will offer her multiple dates in the upcoming weeks once insurance status is updated.     KI

## 2019-01-24 ENCOUNTER — ANESTHESIA (OUTPATIENT)
Dept: SURGERY | Facility: HOSPITAL | Age: 49
End: 2019-01-24

## 2019-02-01 ENCOUNTER — PATIENT MESSAGE (OUTPATIENT)
Dept: CARDIOTHORACIC SURGERY | Facility: HOSPITAL | Age: 49
End: 2019-02-01

## 2019-03-15 ENCOUNTER — PATIENT MESSAGE (OUTPATIENT)
Dept: CARDIOTHORACIC SURGERY | Facility: HOSPITAL | Age: 49
End: 2019-03-15

## 2019-04-03 ENCOUNTER — PATIENT MESSAGE (OUTPATIENT)
Dept: CARDIOTHORACIC SURGERY | Facility: HOSPITAL | Age: 49
End: 2019-04-03

## 2019-04-03 DIAGNOSIS — D3A.00 CARCINOID TUMOR: Primary | ICD-10-CM

## 2019-04-09 ENCOUNTER — PATIENT OUTREACH (OUTPATIENT)
Dept: ADMINISTRATIVE | Facility: HOSPITAL | Age: 49
End: 2019-04-09

## 2019-04-16 NOTE — H&P (VIEW-ONLY)
History & Physical    SUBJECTIVE:     History of Present Illness:  49 year old female with PMH of asthma returns to schedule bronchus intermedius carcinoid resection. Previously schedule for surgery in January 2019; however, she was uninsured and it took her until early February to secure Medicaid. History dates to November 2018 when she resented to ED for 10 days of fever, chills, cough, and body aches. CXR prompted chest CT which showed endobronchial BI lesion. Work-up also included PFTS, V/Q, and DSE. Active at home. No history of prior cardiac or thoracic procedures. Not on blood thinners. Today she reports no major complaints.  Denies fever, chills, CP, SOB, hemoptysis, changes in bowel function. Returns today with updated chest CT and PFTs.     Quit smoking December 2018. 15 pack years. Occasional etoh and marijuana. Self employed.   PSH: diagnostic laparoscopy, R shoulder cyst excision     No chief complaint on file.    Review of patient's allergies indicates:  No Known Allergies    Current Outpatient Medications   Medication Sig Dispense Refill    albuterol-ipratropium (DUO-NEB) 2.5 mg-0.5 mg/3 mL nebulizer solution Take 3 mLs by nebulization every 6 (six) hours as needed for Wheezing. 1 Box 11    ALPRAZolam (XANAX) 0.5 MG tablet Take 1 tablet (0.5 mg total) by mouth nightly as needed for Anxiety. 20 tablet 0    ginkgo biloba 40 mg Tab Take by mouth once daily.       multivitamin (ONE DAILY MULTIVITAMIN) per tablet Take 1 tablet by mouth once daily.      MAXX'S WORT ORAL Take by mouth.      TURMERIC ORAL Take by mouth.      vitamin E 200 UNIT capsule Take 200 Units by mouth once daily.       No current facility-administered medications for this visit.        Past Medical History:   Diagnosis Date    Seasonal asthma      Past Surgical History:   Procedure Laterality Date    BRONCHOSCOPY N/A 11/28/2018    [facility]    PELVIC LAPAROSCOPY       Family  History   Problem Relation Age of Onset    Cancer Maternal Grandmother         colon     Cancer Paternal Grandfather         lung    Arthritis Mother     COPD Mother     Hypertension Mother     Stroke Mother         vascular dementia    Hypertension Father     Pacemaker/defibrilator Father         sleep apnea    Anesthesia problems Neg Hx      Social History     Tobacco Use    Smoking status: Former Smoker     Packs/day: 0.50     Years: 15.00     Pack years: 7.50     Types: Cigarettes     Last attempt to quit: 2018     Years since quittin.4    Smokeless tobacco: Never Used   Substance Use Topics    Alcohol use: No    Drug use: No        Review of Systems:  Review of Systems   Constitutional: Negative for activity change and fatigue.   HENT: Negative for congestion.    Respiratory: Positive for cough. Negative for shortness of breath.    Cardiovascular: Negative for chest pain and palpitations.   Gastrointestinal: Negative for abdominal distention and abdominal pain.   Genitourinary: Negative for difficulty urinating.   Musculoskeletal: Negative for arthralgias.   Skin: Negative for color change and rash.   Neurological: Negative for dizziness and syncope.   Psychiatric/Behavioral: Negative for agitation. The patient is not nervous/anxious.        OBJECTIVE:     Vital Signs (Most Recent)              Physical Exam:  Physical Exam   Constitutional: She is oriented to person, place, and time. She appears well-developed and well-nourished.   HENT:   Head: Normocephalic and atraumatic.   Eyes: EOM are normal.   Neck: Normal range of motion. Neck supple. No tracheal deviation present.   Cardiovascular: Normal rate, regular rhythm, normal heart sounds and intact distal pulses.   Pulmonary/Chest: Effort normal and breath sounds normal.   Abdominal: Soft.   Musculoskeletal: Normal range of motion. She exhibits no edema.   Neurological: She is alert and oriented to person, place, and time.   Skin: Skin  is warm and dry.   Psychiatric: She has a normal mood and affect. Thought content normal.   Vitals reviewed.      PFTS  12/11/19- FEV 1 1.98L 67%  4/15/19-     VQ 12/12/18  Differential perfusion of 70.4% to the left lung and 29.6% to the right lung.    Chest CT   11/26/18:  1.  1.3 cm mass within the bronchus intermedius with associated right middle and lower lobe atelectasis and regions of consolidation, moderate size right pleural effusion, and multiple bilateral focal regions of ground-glass opacification raising possibility of embolic foci    4/15/19:   Show stable chronic changes in right apical region.  The central airways remain widely patent.  Calcified mass is again identified in the bronchus intermedius with associated right middle and lower lobe regions of atelectasis.  There is improved aeration in both right middle and lower lobes in comparison to 12/30 January 2018.  The left lung is stable. Mediastinum: Mediastinal windows show no mediastinal adenopathy.  The heart size is normal and there are no pleural effusions. Scans of the upper abdomen show both adrenal glands to be normal in size.    Pathology 11/28/18:  Right main stem, lung, biopsy:  -Low grade neuroendocrine tumor, most consistent with carcinoid    DSE 12/19/18:   · Negative dobutamine stress echocardiogram for myocardial ischemia.   · The left ventricle (LV) is normal in size, thickness and function.   · Resting LV ejection fraction is 55%.  · The right ventricle is normal in size and function.  · The EKG portion of this study is negative for myocardial ischemia.  · Arrhythmia during stress: none  · Hemodynamic data during stress: appropriate blood pressure and heart rate response to dobutamine.  · No symptoms reported.    ASSESSMENT/PLAN:     Patient is a 48 y.o. 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     PLAN:Plan     Proceed to OR for bronchoscopy and right thoracotomy with lower  bilobectomy, MLND, possible pneumonectomy on 1/24/18. APS consult for assistance with neal-operative pain management - epidural vs erector spinae catheter  Pre-operative octreotide.   Appropriate patient education regarding the neal-operative period as well as intraoperative details were discussed. Risks, including but not limited to, bleeding, infection, pain and anesthetic complication were discussed. Patient was given the opportunity to ask questions and to have those questions answered to their satisfaction. Patient verbalized understanding to both procedure and associated risks. Consent was obtained.

## 2019-04-16 NOTE — H&P (VIEW-ONLY)
History & Physical    SUBJECTIVE:     History of Present Illness:  49 year old female with PMH of asthma returns to schedule bronchus intermedius carcinoid resection. Previously schedule for surgery in January 2019; however, she was uninsured and it took her until early February to secure Medicaid. History dates to November 2018 when she resented to ED for 10 days of fever, chills, cough, and body aches. CXR prompted chest CT which showed endobronchial BI lesion. Work-up also included PFTS, V/Q, and DSE. Active at home. No history of prior cardiac or thoracic procedures. Not on blood thinners. Today she reports no major complaints.  Denies fever, chills, CP, SOB, hemoptysis, changes in bowel function. Returns today with updated chest CT and PFTs.     Quit smoking December 2018. 15 pack years. Occasional etoh and marijuana. Self employed.   PSH: diagnostic laparoscopy, R shoulder cyst excision     No chief complaint on file.    Review of patient's allergies indicates:  No Known Allergies    Current Outpatient Medications   Medication Sig Dispense Refill    albuterol-ipratropium (DUO-NEB) 2.5 mg-0.5 mg/3 mL nebulizer solution Take 3 mLs by nebulization every 6 (six) hours as needed for Wheezing. 1 Box 11    ALPRAZolam (XANAX) 0.5 MG tablet Take 1 tablet (0.5 mg total) by mouth nightly as needed for Anxiety. 20 tablet 0    ginkgo biloba 40 mg Tab Take by mouth once daily.       multivitamin (ONE DAILY MULTIVITAMIN) per tablet Take 1 tablet by mouth once daily.      MAXX'S WORT ORAL Take by mouth.      TURMERIC ORAL Take by mouth.      vitamin E 200 UNIT capsule Take 200 Units by mouth once daily.       No current facility-administered medications for this visit.        Past Medical History:   Diagnosis Date    Seasonal asthma      Past Surgical History:   Procedure Laterality Date    BRONCHOSCOPY N/A 11/28/2018    [facility]    PELVIC LAPAROSCOPY       Family  History   Problem Relation Age of Onset    Cancer Maternal Grandmother         colon     Cancer Paternal Grandfather         lung    Arthritis Mother     COPD Mother     Hypertension Mother     Stroke Mother         vascular dementia    Hypertension Father     Pacemaker/defibrilator Father         sleep apnea    Anesthesia problems Neg Hx      Social History     Tobacco Use    Smoking status: Former Smoker     Packs/day: 0.50     Years: 15.00     Pack years: 7.50     Types: Cigarettes     Last attempt to quit: 2018     Years since quittin.4    Smokeless tobacco: Never Used   Substance Use Topics    Alcohol use: No    Drug use: No        Review of Systems:  Review of Systems   Constitutional: Negative for activity change and fatigue.   HENT: Negative for congestion.    Respiratory: Positive for cough. Negative for shortness of breath.    Cardiovascular: Negative for chest pain and palpitations.   Gastrointestinal: Negative for abdominal distention and abdominal pain.   Genitourinary: Negative for difficulty urinating.   Musculoskeletal: Negative for arthralgias.   Skin: Negative for color change and rash.   Neurological: Negative for dizziness and syncope.   Psychiatric/Behavioral: Negative for agitation. The patient is not nervous/anxious.        OBJECTIVE:     Vital Signs (Most Recent)              Physical Exam:  Physical Exam   Constitutional: She is oriented to person, place, and time. She appears well-developed and well-nourished.   HENT:   Head: Normocephalic and atraumatic.   Eyes: EOM are normal.   Neck: Normal range of motion. Neck supple. No tracheal deviation present.   Cardiovascular: Normal rate, regular rhythm, normal heart sounds and intact distal pulses.   Pulmonary/Chest: Effort normal and breath sounds normal.   Abdominal: Soft.   Musculoskeletal: Normal range of motion. She exhibits no edema.   Neurological: She is alert and oriented to person, place, and time.   Skin: Skin  is warm and dry.   Psychiatric: She has a normal mood and affect. Thought content normal.   Vitals reviewed.      PFTS  12/11/19- FEV 1 1.98L 67%  4/15/19-     VQ 12/12/18  Differential perfusion of 70.4% to the left lung and 29.6% to the right lung.    Chest CT   11/26/18:  1.  1.3 cm mass within the bronchus intermedius with associated right middle and lower lobe atelectasis and regions of consolidation, moderate size right pleural effusion, and multiple bilateral focal regions of ground-glass opacification raising possibility of embolic foci    4/15/19:   Show stable chronic changes in right apical region.  The central airways remain widely patent.  Calcified mass is again identified in the bronchus intermedius with associated right middle and lower lobe regions of atelectasis.  There is improved aeration in both right middle and lower lobes in comparison to 12/30 January 2018.  The left lung is stable. Mediastinum: Mediastinal windows show no mediastinal adenopathy.  The heart size is normal and there are no pleural effusions. Scans of the upper abdomen show both adrenal glands to be normal in size.    Pathology 11/28/18:  Right main stem, lung, biopsy:  -Low grade neuroendocrine tumor, most consistent with carcinoid    DSE 12/19/18:   · Negative dobutamine stress echocardiogram for myocardial ischemia.   · The left ventricle (LV) is normal in size, thickness and function.   · Resting LV ejection fraction is 55%.  · The right ventricle is normal in size and function.  · The EKG portion of this study is negative for myocardial ischemia.  · Arrhythmia during stress: none  · Hemodynamic data during stress: appropriate blood pressure and heart rate response to dobutamine.  · No symptoms reported.    ASSESSMENT/PLAN:     Patient is a 48 y.o. 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     PLAN:Plan     Proceed to OR for bronchoscopy and right thoracotomy with lower  bilobectomy, MLND, possible pneumonectomy on 1/24/18. APS consult for assistance with neal-operative pain management - epidural vs erector spinae catheter  Pre-operative octreotide.   Appropriate patient education regarding the neal-operative period as well as intraoperative details were discussed. Risks, including but not limited to, bleeding, infection, pain and anesthetic complication were discussed. Patient was given the opportunity to ask questions and to have those questions answered to their satisfaction. Patient verbalized understanding to both procedure and associated risks. Consent was obtained.

## 2019-04-16 NOTE — PROGRESS NOTES
History & Physical    SUBJECTIVE:     History of Present Illness:  49 year old female with PMH of asthma returns to schedule bronchus intermedius carcinoid resection. Previously schedule for surgery in January 2019; however, she was uninsured and it took her until early February to secure Medicaid. History dates to November 2018 when she resented to ED for 10 days of fever, chills, cough, and body aches. CXR prompted chest CT which showed endobronchial BI lesion. Work-up also included PFTS, V/Q, and DSE. Active at home. No history of prior cardiac or thoracic procedures. Not on blood thinners. Today she reports no major complaints.  Denies fever, chills, CP, SOB, hemoptysis, changes in bowel function. Returns today with updated chest CT and PFTs.     Quit smoking December 2018. 15 pack years. Occasional etoh and marijuana. Self employed.   PSH: diagnostic laparoscopy, R shoulder cyst excision     No chief complaint on file.    Review of patient's allergies indicates:  No Known Allergies    Current Outpatient Medications   Medication Sig Dispense Refill    albuterol-ipratropium (DUO-NEB) 2.5 mg-0.5 mg/3 mL nebulizer solution Take 3 mLs by nebulization every 6 (six) hours as needed for Wheezing. 1 Box 11    ALPRAZolam (XANAX) 0.5 MG tablet Take 1 tablet (0.5 mg total) by mouth nightly as needed for Anxiety. 20 tablet 0    ginkgo biloba 40 mg Tab Take by mouth once daily.       multivitamin (ONE DAILY MULTIVITAMIN) per tablet Take 1 tablet by mouth once daily.      MAXX'S WORT ORAL Take by mouth.      TURMERIC ORAL Take by mouth.      vitamin E 200 UNIT capsule Take 200 Units by mouth once daily.       No current facility-administered medications for this visit.        Past Medical History:   Diagnosis Date    Seasonal asthma      Past Surgical History:   Procedure Laterality Date    BRONCHOSCOPY N/A 11/28/2018    [facility] at [facility]    PELVIC LAPAROSCOPY       Family  History   Problem Relation Age of Onset    Cancer Maternal Grandmother         colon     Cancer Paternal Grandfather         lung    Arthritis Mother     COPD Mother     Hypertension Mother     Stroke Mother         vascular dementia    Hypertension Father     Pacemaker/defibrilator Father         sleep apnea    Anesthesia problems Neg Hx      Social History     Tobacco Use    Smoking status: Former Smoker     Packs/day: 0.50     Years: 15.00     Pack years: 7.50     Types: Cigarettes     Last attempt to quit: 2018     Years since quittin.4    Smokeless tobacco: Never Used   Substance Use Topics    Alcohol use: No    Drug use: No        Review of Systems:  Review of Systems   Constitutional: Negative for activity change and fatigue.   HENT: Negative for congestion.    Respiratory: Positive for cough. Negative for shortness of breath.    Cardiovascular: Negative for chest pain and palpitations.   Gastrointestinal: Negative for abdominal distention and abdominal pain.   Genitourinary: Negative for difficulty urinating.   Musculoskeletal: Negative for arthralgias.   Skin: Negative for color change and rash.   Neurological: Negative for dizziness and syncope.   Psychiatric/Behavioral: Negative for agitation. The patient is not nervous/anxious.        OBJECTIVE:     Vital Signs (Most Recent)              Physical Exam:  Physical Exam   Constitutional: She is oriented to person, place, and time. She appears well-developed and well-nourished.   HENT:   Head: Normocephalic and atraumatic.   Eyes: EOM are normal.   Neck: Normal range of motion. Neck supple. No tracheal deviation present.   Cardiovascular: Normal rate, regular rhythm, normal heart sounds and intact distal pulses.   Pulmonary/Chest: Effort normal and breath sounds normal.   Abdominal: Soft.   Musculoskeletal: Normal range of motion. She exhibits no edema.   Neurological: She is alert and oriented to person, place, and time.   Skin: Skin  is warm and dry.   Psychiatric: She has a normal mood and affect. Thought content normal.   Vitals reviewed.      PFTS  12/11/19- FEV 1 1.98L 67%  4/15/19-     VQ 12/12/18  Differential perfusion of 70.4% to the left lung and 29.6% to the right lung.    Chest CT   11/26/18:  1.  1.3 cm mass within the bronchus intermedius with associated right middle and lower lobe atelectasis and regions of consolidation, moderate size right pleural effusion, and multiple bilateral focal regions of ground-glass opacification raising possibility of embolic foci    4/15/19:   Show stable chronic changes in right apical region.  The central airways remain widely patent.  Calcified mass is again identified in the bronchus intermedius with associated right middle and lower lobe regions of atelectasis.  There is improved aeration in both right middle and lower lobes in comparison to 12/30 January 2018.  The left lung is stable. Mediastinum: Mediastinal windows show no mediastinal adenopathy.  The heart size is normal and there are no pleural effusions. Scans of the upper abdomen show both adrenal glands to be normal in size.    Pathology 11/28/18:  Right main stem, lung, biopsy:  -Low grade neuroendocrine tumor, most consistent with carcinoid    DSE 12/19/18:   · Negative dobutamine stress echocardiogram for myocardial ischemia.   · The left ventricle (LV) is normal in size, thickness and function.   · Resting LV ejection fraction is 55%.  · The right ventricle is normal in size and function.  · The EKG portion of this study is negative for myocardial ischemia.  · Arrhythmia during stress: none  · Hemodynamic data during stress: appropriate blood pressure and heart rate response to dobutamine.  · No symptoms reported.    ASSESSMENT/PLAN:     Patient is a 48 y.o. 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     PLAN:Plan     Proceed to OR for bronchoscopy and right thoracotomy with lower  bilobectomy, MLND, possible pneumonectomy on 1/24/18. APS consult for assistance with neal-operative pain management - epidural vs erector spinae catheter  Pre-operative octreotide.   Appropriate patient education regarding the neal-operative period as well as intraoperative details were discussed. Risks, including but not limited to, bleeding, infection, pain and anesthetic complication were discussed. Patient was given the opportunity to ask questions and to have those questions answered to their satisfaction. Patient verbalized understanding to both procedure and associated risks. Consent was obtained.

## 2019-04-17 ENCOUNTER — OFFICE VISIT (OUTPATIENT)
Dept: CARDIOTHORACIC SURGERY | Facility: CLINIC | Age: 49
End: 2019-04-17
Payer: MEDICAID

## 2019-04-17 ENCOUNTER — ANESTHESIA EVENT (OUTPATIENT)
Dept: SURGERY | Facility: HOSPITAL | Age: 49
DRG: 165 | End: 2019-04-17
Payer: MEDICAID

## 2019-04-17 VITALS
BODY MASS INDEX: 30.93 KG/M2 | HEIGHT: 67 IN | SYSTOLIC BLOOD PRESSURE: 144 MMHG | HEART RATE: 80 BPM | OXYGEN SATURATION: 98 % | WEIGHT: 197.06 LBS | DIASTOLIC BLOOD PRESSURE: 87 MMHG

## 2019-04-17 DIAGNOSIS — C7A.090 CARCINOID BRONCHIAL ADENOMA OF RIGHT LUNG: Primary | ICD-10-CM

## 2019-04-17 DIAGNOSIS — D3A.00 CARCINOID TUMOR: Primary | ICD-10-CM

## 2019-04-17 DIAGNOSIS — C34.31 MALIGNANT NEOPLASM OF LOWER LOBE OF RIGHT LUNG: ICD-10-CM

## 2019-04-17 PROCEDURE — 99213 PR OFFICE/OUTPT VISIT, EST, LEVL III, 20-29 MIN: ICD-10-PCS | Mod: S$PBB,,, | Performed by: THORACIC SURGERY (CARDIOTHORACIC VASCULAR SURGERY)

## 2019-04-17 PROCEDURE — 99213 OFFICE O/P EST LOW 20 MIN: CPT | Mod: PBBFAC | Performed by: THORACIC SURGERY (CARDIOTHORACIC VASCULAR SURGERY)

## 2019-04-17 PROCEDURE — 99213 OFFICE O/P EST LOW 20 MIN: CPT | Mod: S$PBB,,, | Performed by: THORACIC SURGERY (CARDIOTHORACIC VASCULAR SURGERY)

## 2019-04-17 PROCEDURE — 99999 PR PBB SHADOW E&M-EST. PATIENT-LVL III: ICD-10-PCS | Mod: PBBFAC,,, | Performed by: THORACIC SURGERY (CARDIOTHORACIC VASCULAR SURGERY)

## 2019-04-17 PROCEDURE — 99999 PR PBB SHADOW E&M-EST. PATIENT-LVL III: CPT | Mod: PBBFAC,,, | Performed by: THORACIC SURGERY (CARDIOTHORACIC VASCULAR SURGERY)

## 2019-04-18 ENCOUNTER — TELEPHONE (OUTPATIENT)
Dept: CARDIOTHORACIC SURGERY | Facility: CLINIC | Age: 49
End: 2019-04-18

## 2019-04-18 RX ORDER — GARLIC 200 MG
200 TABLET ORAL DAILY
COMMUNITY
End: 2022-08-11

## 2019-04-21 NOTE — ANESTHESIA PREPROCEDURE EVALUATION
"                                                                                                             04/21/2019  Pre-operative evaluation for Procedure(s) (LRB):  THORACOTOMY (Right)  LOBECTOMY, SLEEVE, LUNG, THORACOTOMY APPROACH (Right)  BRONCHOSCOPY, WITH BIOPSY (N/A)    Gina Coto is a 49 y.o. female former smoker (15 pack years; quit December 2018) and diagnosis of Right Bronchus Intermedius Carcinoid Tumor who is scheduled for above procedure. Chest CT shows "1.3 cm mass within the bronchus intermedius with associated right middle and lower lobe atelectasis and regions of consolidation, moderate size right pleural effusion, and multiple bilateral focal regions of ground-glass opacification raising possibility of embolic foci."     Prev airway: None on file    Patient Active Problem List   Diagnosis    History of pneumonia- Nov 2018     Seasonal asthma    Abnormal chest CT    Carcinoid tumor       Review of patient's allergies indicates:  No Known Allergies     No current facility-administered medications on file prior to encounter.      Current Outpatient Medications on File Prior to Encounter   Medication Sig Dispense Refill    albuterol-ipratropium (DUO-NEB) 2.5 mg-0.5 mg/3 mL nebulizer solution Take 3 mLs by nebulization every 6 (six) hours as needed for Wheezing. 1 Box 11    ALPRAZolam (XANAX) 0.5 MG tablet Take 1 tablet (0.5 mg total) by mouth nightly as needed for Anxiety. 20 tablet 0    garlic 200 mg Tab Take 200 mg by mouth once daily.      ginkgo biloba 40 mg Tab Take by mouth once daily.       multivitamin (ONE DAILY MULTIVITAMIN) per tablet Take 1 tablet by mouth once daily.      MAXX'S WORT ORAL Take by mouth.      TURMERIC ORAL Take by mouth.      vitamin E 200 UNIT capsule Take 200 Units by mouth once daily.         Past Surgical History:   Procedure Laterality Date    BRONCHOSCOPY N/A 11/28/2018    Performed by [facility] at [facility] C[facility]    PELVIC " LAPAROSCOPY         Social History     Socioeconomic History    Marital status:      Spouse name: Not on file    Number of children: Not on file    Years of education: Not on file    Highest education level: Not on file   Occupational History    Not on file   Social Needs    Financial resource strain: Not on file    Food insecurity:     Worry: Not on file     Inability: Not on file    Transportation needs:     Medical: Not on file     Non-medical: Not on file   Tobacco Use    Smoking status: Former Smoker     Packs/day: 0.50     Years: 15.00     Pack years: 7.50     Types: Cigarettes     Last attempt to quit: 2018     Years since quittin.4    Smokeless tobacco: Never Used   Substance and Sexual Activity    Alcohol use: No    Drug use: No    Sexual activity: Not Currently   Lifestyle    Physical activity:     Days per week: Not on file     Minutes per session: Not on file    Stress: Not on file   Relationships    Social connections:     Talks on phone: Not on file     Gets together: Not on file     Attends Christianity service: Not on file     Active member of club or organization: Not on file     Attends meetings of clubs or organizations: Not on file     Relationship status: Not on file   Other Topics Concern    Not on file   Social History Narrative    Not on file         Vital Signs Range (Last 24H):         CBC: No results for input(s): WBC, RBC, HGB, HCT, PLT, MCV, MCH, MCHC in the last 72 hours.    CMP: No results for input(s): NA, K, CL, CO2, BUN, CREATININE, GLU, MG, PHOS, CALCIUM, ALBUMIN, PROT, ALKPHOS, ALT, AST, BILITOT in the last 72 hours.    INR  No results for input(s): PT, INR, PROTIME, APTT in the last 72 hours.    EKG:  Normal sinus rhythm  Biatrial enlargement  Right bundle branch block  Left anterior fascicular block  LVH  No previous ECGs available  Confirmed by Esteban Young MD ([id number]) on 2018 8:45:33 AM    2D Echo 18:  · Normal left ventricular  "systolic function. The estimated ejection fraction is 55%  · No wall motion abnormalities.  · Normal LV diastolic function.  · Normal right ventricular systolic function.  · Normal atrial size.  · Trace mitral regurgitation.  · Trace tricuspid regurgitation.    Last 3 sets of Vitals    Vitals - 1 value per visit 4/15/2019 4/17/2019 4/22/2019   SYSTOLIC - 144 150   DIASTOLIC - 87 74   PULSE - 80 88   TEMPERATURE - - 98.6   RESPIRATIONS - - 16   SPO2 99 98 100   Weight (lb) - 197.09 194   Weight (kg) - 89.4 87.998   HEIGHT - 5' 7" 5' 7"   BODY MASS INDEX - 30.87 30.38   VISIT REPORT - - -   Waist Measurements - - -   Pain Score  - 0 -         Anesthesia Evaluation    I have reviewed the Patient Summary Reports.    I have reviewed the Nursing Notes.   I have reviewed the Medications.     Review of Systems  Anesthesia Hx:  No problems with previous Anesthesia  Denies Family Hx of Anesthesia complications.   Denies Personal Hx of Anesthesia complications.   Social:  Non-Smoker, No Alcohol Use    Hematology/Oncology:        Denies Current/Recent Cancer   EENT/Dental:   denies chronic allergic rhinitis   Cardiovascular:   Denies Hypertension.  Denies MI.  Denies CAD.    Denies CABG/stent.  Denies Dysrhythmias.             Pulmonary:   Denies COPD.  Denies Asthma.  Denies Recent URI.  Denies Sleep Apnea.    Renal/:   Denies Chronic Renal Disease.     Hepatic/GI:   Denies GERD. Denies Liver Disease.    Neurological:   Denies TIA. Denies CVA. Denies Seizures.    Endocrine:   Denies Diabetes.    Psych:   Denies Psychiatric History.          Physical Exam  General:  Well nourished    Airway/Jaw/Neck:  Airway Findings: Mouth Opening: Normal Tongue: Normal  General Airway Assessment: Adult  Mallampati: II  Improves to II with phonation.  TM Distance: Normal, at least 6 cm  Jaw/Neck Findings:  Neck ROM: Normal ROM      Dental:  Dental Findings: In tact   Chest/Lungs:  Chest/Lungs Findings: Clear to auscultation, Normal " Respiratory Rate     Heart/Vascular:  Heart Findings: Rate: Normal  Rhythm: Regular Rhythm  Sounds: Normal     Abdomen:  Abdomen Findings:  Normal, Soft, Nontender     Musculoskeletal:  Musculoskeletal Findings: Normal   Skin:  Skin Findings: Normal    Mental Status:  Mental Status Findings:  Cooperative, Alert and Oriented         Anesthesia Plan  Type of Anesthesia, risks & benefits discussed:  Anesthesia Type:  general  Patient's Preference: GETA  Intra-op Monitoring Plan: standard ASA monitors and arterial line  Intra-op Monitoring Plan Comments:   Post Op Pain Control Plan: per primary service following discharge from PACU, multimodal analgesia, IV/PO Opioids PRN and peripheral nerve block  Post Op Pain Control Plan Comments:   Induction:   IV  Beta Blocker:  Patient is not currently on a Beta-Blocker (No further documentation required).       Informed Consent: Patient understands risks and agrees with Anesthesia plan.  Questions answered. Anesthesia consent signed with patient.  ASA Score: 3     Day of Surgery Review of History & Physical:    H&P update referred to the surgeon.     Anesthesia Plan Notes: Plan for arterial line, double lumen ETT         Ready For Surgery From Anesthesia Perspective.

## 2019-04-22 ENCOUNTER — HOSPITAL ENCOUNTER (INPATIENT)
Facility: HOSPITAL | Age: 49
LOS: 5 days | Discharge: HOME | DRG: 165 | End: 2019-04-27
Attending: THORACIC SURGERY (CARDIOTHORACIC VASCULAR SURGERY) | Admitting: THORACIC SURGERY (CARDIOTHORACIC VASCULAR SURGERY)
Payer: MEDICAID

## 2019-04-22 ENCOUNTER — ANESTHESIA (OUTPATIENT)
Dept: SURGERY | Facility: HOSPITAL | Age: 49
DRG: 165 | End: 2019-04-22
Payer: MEDICAID

## 2019-04-22 DIAGNOSIS — D3A.090 CARCINOID TUMOR OF RIGHT LUNG: Primary | ICD-10-CM

## 2019-04-22 LAB
ABO + RH BLD: NORMAL
B-HCG UR QL: NEGATIVE
BLD GP AB SCN CELLS X3 SERPL QL: NORMAL
CTP QC/QA: YES

## 2019-04-22 PROCEDURE — 63600175 PHARM REV CODE 636 W HCPCS: Performed by: ANESTHESIOLOGY

## 2019-04-22 PROCEDURE — C1751 CATH, INF, PER/CENT/MIDLINE: HCPCS | Performed by: ANESTHESIOLOGY

## 2019-04-22 PROCEDURE — 38746 REMOVE THORACIC LYMPH NODES: CPT | Mod: ,,, | Performed by: THORACIC SURGERY (CARDIOTHORACIC VASCULAR SURGERY)

## 2019-04-22 PROCEDURE — 25000003 PHARM REV CODE 250: Performed by: THORACIC SURGERY (CARDIOTHORACIC VASCULAR SURGERY)

## 2019-04-22 PROCEDURE — 27000221 HC OXYGEN, UP TO 24 HOURS

## 2019-04-22 PROCEDURE — 88331 TISSUE SPECIMEN TO PATHOLOGY - SURGERY: ICD-10-PCS | Mod: 26,,, | Performed by: PATHOLOGY

## 2019-04-22 PROCEDURE — 63600175 PHARM REV CODE 636 W HCPCS: Performed by: STUDENT IN AN ORGANIZED HEALTH CARE EDUCATION/TRAINING PROGRAM

## 2019-04-22 PROCEDURE — 94761 N-INVAS EAR/PLS OXIMETRY MLT: CPT

## 2019-04-22 PROCEDURE — 63600175 PHARM REV CODE 636 W HCPCS: Performed by: PHYSICIAN ASSISTANT

## 2019-04-22 PROCEDURE — 88305 TISSUE EXAM BY PATHOLOGIST: CPT | Mod: 26,,, | Performed by: PATHOLOGY

## 2019-04-22 PROCEDURE — 88342 IMHCHEM/IMCYTCHM 1ST ANTB: CPT | Mod: 26,,, | Performed by: PATHOLOGY

## 2019-04-22 PROCEDURE — 25000003 PHARM REV CODE 250: Performed by: NURSE ANESTHETIST, CERTIFIED REGISTERED

## 2019-04-22 PROCEDURE — 86901 BLOOD TYPING SEROLOGIC RH(D): CPT

## 2019-04-22 PROCEDURE — 71000033 HC RECOVERY, INTIAL HOUR: Performed by: THORACIC SURGERY (CARDIOTHORACIC VASCULAR SURGERY)

## 2019-04-22 PROCEDURE — 32501: ICD-10-PCS | Mod: ,,, | Performed by: THORACIC SURGERY (CARDIOTHORACIC VASCULAR SURGERY)

## 2019-04-22 PROCEDURE — 88309 TISSUE EXAM BY PATHOLOGIST: CPT | Mod: 26,,, | Performed by: PATHOLOGY

## 2019-04-22 PROCEDURE — 88331 PATH CONSLTJ SURG 1 BLK 1SPC: CPT | Performed by: PATHOLOGY

## 2019-04-22 PROCEDURE — 63600175 PHARM REV CODE 636 W HCPCS: Performed by: NURSE ANESTHETIST, CERTIFIED REGISTERED

## 2019-04-22 PROCEDURE — 25000003 PHARM REV CODE 250: Performed by: ANESTHESIOLOGY

## 2019-04-22 PROCEDURE — 86920 COMPATIBILITY TEST SPIN: CPT

## 2019-04-22 PROCEDURE — 88342 CHG IMMUNOCYTOCHEMISTRY: ICD-10-PCS | Mod: 26,,, | Performed by: PATHOLOGY

## 2019-04-22 PROCEDURE — 88309 TISSUE SPECIMEN TO PATHOLOGY - SURGERY: ICD-10-PCS | Mod: 26,,, | Performed by: PATHOLOGY

## 2019-04-22 PROCEDURE — 88341 IMHCHEM/IMCYTCHM EA ADD ANTB: CPT | Mod: 26,,, | Performed by: PATHOLOGY

## 2019-04-22 PROCEDURE — 64463 PVB THORACIC CONT INFUSION: CPT | Mod: 59,RT,, | Performed by: ANESTHESIOLOGY

## 2019-04-22 PROCEDURE — 36620 INSERTION CATHETER ARTERY: CPT | Mod: 59,,, | Performed by: ANESTHESIOLOGY

## 2019-04-22 PROCEDURE — 32482 PR REMOVAL OF LUNG,BILOBECTOMY: ICD-10-PCS | Mod: ,,, | Performed by: THORACIC SURGERY (CARDIOTHORACIC VASCULAR SURGERY)

## 2019-04-22 PROCEDURE — 37000009 HC ANESTHESIA EA ADD 15 MINS: Performed by: THORACIC SURGERY (CARDIOTHORACIC VASCULAR SURGERY)

## 2019-04-22 PROCEDURE — 25000242 PHARM REV CODE 250 ALT 637 W/ HCPCS: Performed by: THORACIC SURGERY (CARDIOTHORACIC VASCULAR SURGERY)

## 2019-04-22 PROCEDURE — 25000003 PHARM REV CODE 250: Performed by: STUDENT IN AN ORGANIZED HEALTH CARE EDUCATION/TRAINING PROGRAM

## 2019-04-22 PROCEDURE — 32482 BILOBECTOMY: CPT | Mod: ,,, | Performed by: THORACIC SURGERY (CARDIOTHORACIC VASCULAR SURGERY)

## 2019-04-22 PROCEDURE — 25000003 PHARM REV CODE 250: Performed by: PHYSICIAN ASSISTANT

## 2019-04-22 PROCEDURE — 37000008 HC ANESTHESIA 1ST 15 MINUTES: Performed by: THORACIC SURGERY (CARDIOTHORACIC VASCULAR SURGERY)

## 2019-04-22 PROCEDURE — 76942 ECHO GUIDE FOR BIOPSY: CPT | Performed by: STUDENT IN AN ORGANIZED HEALTH CARE EDUCATION/TRAINING PROGRAM

## 2019-04-22 PROCEDURE — 27201423 OPTIME MED/SURG SUP & DEVICES STERILE SUPPLY: Performed by: THORACIC SURGERY (CARDIOTHORACIC VASCULAR SURGERY)

## 2019-04-22 PROCEDURE — 38746 PR REMOVE THOR LYMPH NODES RAD REGNL: ICD-10-PCS | Mod: ,,, | Performed by: THORACIC SURGERY (CARDIOTHORACIC VASCULAR SURGERY)

## 2019-04-22 PROCEDURE — S0020 INJECTION, BUPIVICAINE HYDRO: HCPCS | Performed by: STUDENT IN AN ORGANIZED HEALTH CARE EDUCATION/TRAINING PROGRAM

## 2019-04-22 PROCEDURE — 27201037 HC PRESSURE MONITORING SET UP

## 2019-04-22 PROCEDURE — 27800517 HC TRAY,EPIDURAL-CONTINUOUS: Performed by: STUDENT IN AN ORGANIZED HEALTH CARE EDUCATION/TRAINING PROGRAM

## 2019-04-22 PROCEDURE — P9021 RED BLOOD CELLS UNIT: HCPCS

## 2019-04-22 PROCEDURE — 36000710: Performed by: THORACIC SURGERY (CARDIOTHORACIC VASCULAR SURGERY)

## 2019-04-22 PROCEDURE — 71000039 HC RECOVERY, EACH ADD'L HOUR: Performed by: THORACIC SURGERY (CARDIOTHORACIC VASCULAR SURGERY)

## 2019-04-22 PROCEDURE — 27200651 HC AIRWAY, LMA: Performed by: ANESTHESIOLOGY

## 2019-04-22 PROCEDURE — 81025 URINE PREGNANCY TEST: CPT | Performed by: THORACIC SURGERY (CARDIOTHORACIC VASCULAR SURGERY)

## 2019-04-22 PROCEDURE — 63600175 PHARM REV CODE 636 W HCPCS: Performed by: THORACIC SURGERY (CARDIOTHORACIC VASCULAR SURGERY)

## 2019-04-22 PROCEDURE — D9220A PRA ANESTHESIA: ICD-10-PCS | Mod: CRNA,,, | Performed by: NURSE ANESTHETIST, CERTIFIED REGISTERED

## 2019-04-22 PROCEDURE — 36620 PR INSERT CATH,ART,PERCUT,SHORTTERM: ICD-10-PCS | Mod: 59,,, | Performed by: ANESTHESIOLOGY

## 2019-04-22 PROCEDURE — 27100025 HC TUBING, SET FLUID WARMER: Performed by: ANESTHESIOLOGY

## 2019-04-22 PROCEDURE — 88331 PATH CONSLTJ SURG 1 BLK 1SPC: CPT | Mod: 26,,, | Performed by: PATHOLOGY

## 2019-04-22 PROCEDURE — 20600001 HC STEP DOWN PRIVATE ROOM

## 2019-04-22 PROCEDURE — D9220A PRA ANESTHESIA: Mod: ANES,,, | Performed by: ANESTHESIOLOGY

## 2019-04-22 PROCEDURE — 94640 AIRWAY INHALATION TREATMENT: CPT

## 2019-04-22 PROCEDURE — 32501 REPAIR BRONCHUS ADD-ON: CPT | Mod: ,,, | Performed by: THORACIC SURGERY (CARDIOTHORACIC VASCULAR SURGERY)

## 2019-04-22 PROCEDURE — 36000711: Performed by: THORACIC SURGERY (CARDIOTHORACIC VASCULAR SURGERY)

## 2019-04-22 PROCEDURE — 88305 TISSUE EXAM BY PATHOLOGIST: CPT | Performed by: PATHOLOGY

## 2019-04-22 PROCEDURE — D9220A PRA ANESTHESIA: Mod: CRNA,,, | Performed by: NURSE ANESTHETIST, CERTIFIED REGISTERED

## 2019-04-22 PROCEDURE — D9220A PRA ANESTHESIA: ICD-10-PCS | Mod: ANES,,, | Performed by: ANESTHESIOLOGY

## 2019-04-22 PROCEDURE — 64463 ERECTOR SPINAE PLANE CONTINUOUS CATHETER: ICD-10-PCS | Mod: 59,RT,, | Performed by: ANESTHESIOLOGY

## 2019-04-22 PROCEDURE — 88341 PR IHC OR ICC EACH ADD'L SINGLE ANTIBODY  STAINPR: ICD-10-PCS | Mod: 26,,, | Performed by: PATHOLOGY

## 2019-04-22 PROCEDURE — 88305 TISSUE SPECIMEN TO PATHOLOGY - SURGERY: ICD-10-PCS | Mod: 26,,, | Performed by: PATHOLOGY

## 2019-04-22 RX ORDER — LIDOCAINE HYDROCHLORIDE 10 MG/ML
1 INJECTION, SOLUTION EPIDURAL; INFILTRATION; INTRACAUDAL; PERINEURAL ONCE
Status: DISCONTINUED | OUTPATIENT
Start: 2019-04-22 | End: 2019-04-22

## 2019-04-22 RX ORDER — FENTANYL CITRATE 50 UG/ML
25 INJECTION, SOLUTION INTRAMUSCULAR; INTRAVENOUS EVERY 5 MIN PRN
Status: DISCONTINUED | OUTPATIENT
Start: 2019-04-22 | End: 2019-04-22 | Stop reason: HOSPADM

## 2019-04-22 RX ORDER — BISACODYL 10 MG
10 SUPPOSITORY, RECTAL RECTAL DAILY PRN
Status: DISCONTINUED | OUTPATIENT
Start: 2019-04-22 | End: 2019-04-27 | Stop reason: HOSPADM

## 2019-04-22 RX ORDER — MORPHINE SULFATE 2 MG/ML
2 INJECTION, SOLUTION INTRAMUSCULAR; INTRAVENOUS
Status: DISCONTINUED | OUTPATIENT
Start: 2019-04-22 | End: 2019-04-23

## 2019-04-22 RX ORDER — SODIUM CHLORIDE 9 MG/ML
INJECTION, SOLUTION INTRAVENOUS CONTINUOUS PRN
Status: DISCONTINUED | OUTPATIENT
Start: 2019-04-22 | End: 2019-04-22

## 2019-04-22 RX ORDER — CEFAZOLIN SODIUM 1 G/3ML
2 INJECTION, POWDER, FOR SOLUTION INTRAMUSCULAR; INTRAVENOUS
Status: COMPLETED | OUTPATIENT
Start: 2019-04-22 | End: 2019-04-22

## 2019-04-22 RX ORDER — CEFAZOLIN SODIUM 1 G/3ML
2 INJECTION, POWDER, FOR SOLUTION INTRAMUSCULAR; INTRAVENOUS
Status: DISCONTINUED | OUTPATIENT
Start: 2019-04-22 | End: 2019-04-23

## 2019-04-22 RX ORDER — METHOCARBAMOL 500 MG/1
500 TABLET, FILM COATED ORAL 3 TIMES DAILY
Status: COMPLETED | OUTPATIENT
Start: 2019-04-22 | End: 2019-04-23

## 2019-04-22 RX ORDER — MIDAZOLAM HYDROCHLORIDE 1 MG/ML
0.5 INJECTION INTRAMUSCULAR; INTRAVENOUS
Status: DISCONTINUED | OUTPATIENT
Start: 2019-04-22 | End: 2019-04-22

## 2019-04-22 RX ORDER — POLYETHYLENE GLYCOL 3350 17 G/17G
17 POWDER, FOR SOLUTION ORAL DAILY
Status: DISCONTINUED | OUTPATIENT
Start: 2019-04-23 | End: 2019-04-27 | Stop reason: HOSPADM

## 2019-04-22 RX ORDER — ROPIVACAINE HYDROCHLORIDE 2 MG/ML
2 INJECTION, SOLUTION EPIDURAL; INFILTRATION; PERINEURAL CONTINUOUS
Status: DISCONTINUED | OUTPATIENT
Start: 2019-04-22 | End: 2019-04-25

## 2019-04-22 RX ORDER — ONDANSETRON 2 MG/ML
4 INJECTION INTRAMUSCULAR; INTRAVENOUS EVERY 12 HOURS PRN
Status: DISCONTINUED | OUTPATIENT
Start: 2019-04-22 | End: 2019-04-26

## 2019-04-22 RX ORDER — PANTOPRAZOLE SODIUM 40 MG/1
40 TABLET, DELAYED RELEASE ORAL
Status: DISCONTINUED | OUTPATIENT
Start: 2019-04-23 | End: 2019-04-27 | Stop reason: HOSPADM

## 2019-04-22 RX ORDER — PREGABALIN 150 MG/1
150 CAPSULE ORAL NIGHTLY
Status: DISCONTINUED | OUTPATIENT
Start: 2019-04-22 | End: 2019-04-27 | Stop reason: HOSPADM

## 2019-04-22 RX ORDER — ONDANSETRON 2 MG/ML
INJECTION INTRAMUSCULAR; INTRAVENOUS
Status: DISCONTINUED | OUTPATIENT
Start: 2019-04-22 | End: 2019-04-22

## 2019-04-22 RX ORDER — LIDOCAINE HYDROCHLORIDE 10 MG/ML
1 INJECTION, SOLUTION EPIDURAL; INFILTRATION; INTRACAUDAL; PERINEURAL ONCE
Status: COMPLETED | OUTPATIENT
Start: 2019-04-22 | End: 2019-04-22

## 2019-04-22 RX ORDER — LACTULOSE 10 G/15ML
20 SOLUTION ORAL EVERY 6 HOURS PRN
Status: DISCONTINUED | OUTPATIENT
Start: 2019-04-22 | End: 2019-04-27 | Stop reason: HOSPADM

## 2019-04-22 RX ORDER — OXYCODONE HYDROCHLORIDE 10 MG/1
10 TABLET ORAL
Status: DISCONTINUED | OUTPATIENT
Start: 2019-04-22 | End: 2019-04-24

## 2019-04-22 RX ORDER — PROPOFOL 10 MG/ML
VIAL (ML) INTRAVENOUS
Status: DISCONTINUED | OUTPATIENT
Start: 2019-04-22 | End: 2019-04-22

## 2019-04-22 RX ORDER — ACETAMINOPHEN 500 MG
1000 TABLET ORAL EVERY 6 HOURS
Status: DISCONTINUED | OUTPATIENT
Start: 2019-04-22 | End: 2019-04-24

## 2019-04-22 RX ORDER — LIDOCAINE HCL/PF 100 MG/5ML
SYRINGE (ML) INTRAVENOUS
Status: DISCONTINUED | OUTPATIENT
Start: 2019-04-22 | End: 2019-04-22

## 2019-04-22 RX ORDER — HYDROMORPHONE HYDROCHLORIDE 1 MG/ML
0.2 INJECTION, SOLUTION INTRAMUSCULAR; INTRAVENOUS; SUBCUTANEOUS EVERY 5 MIN PRN
Status: COMPLETED | OUTPATIENT
Start: 2019-04-22 | End: 2019-04-22

## 2019-04-22 RX ORDER — IPRATROPIUM BROMIDE AND ALBUTEROL SULFATE 2.5; .5 MG/3ML; MG/3ML
3 SOLUTION RESPIRATORY (INHALATION) EVERY 6 HOURS
Status: DISCONTINUED | OUTPATIENT
Start: 2019-04-22 | End: 2019-04-27 | Stop reason: HOSPADM

## 2019-04-22 RX ORDER — AMOXICILLIN 250 MG
1 CAPSULE ORAL 2 TIMES DAILY
Status: DISCONTINUED | OUTPATIENT
Start: 2019-04-22 | End: 2019-04-27 | Stop reason: HOSPADM

## 2019-04-22 RX ORDER — OXYCODONE HYDROCHLORIDE 5 MG/1
5 TABLET ORAL
Status: DISCONTINUED | OUTPATIENT
Start: 2019-04-22 | End: 2019-04-24

## 2019-04-22 RX ORDER — ACETAMINOPHEN 10 MG/ML
INJECTION, SOLUTION INTRAVENOUS
Status: DISCONTINUED | OUTPATIENT
Start: 2019-04-22 | End: 2019-04-22

## 2019-04-22 RX ORDER — CELECOXIB 200 MG/1
200 CAPSULE ORAL DAILY
Status: DISCONTINUED | OUTPATIENT
Start: 2019-04-23 | End: 2019-04-22

## 2019-04-22 RX ORDER — ACETAMINOPHEN 500 MG
1000 TABLET ORAL EVERY 6 HOURS
Status: DISCONTINUED | OUTPATIENT
Start: 2019-04-22 | End: 2019-04-22

## 2019-04-22 RX ORDER — DEXAMETHASONE SODIUM PHOSPHATE 4 MG/ML
INJECTION, SOLUTION INTRA-ARTICULAR; INTRALESIONAL; INTRAMUSCULAR; INTRAVENOUS; SOFT TISSUE
Status: DISCONTINUED | OUTPATIENT
Start: 2019-04-22 | End: 2019-04-22

## 2019-04-22 RX ORDER — KETAMINE HCL IN 0.9 % NACL 50 MG/5 ML
SYRINGE (ML) INTRAVENOUS
Status: DISCONTINUED | OUTPATIENT
Start: 2019-04-22 | End: 2019-04-22

## 2019-04-22 RX ORDER — ONDANSETRON 2 MG/ML
8 INJECTION INTRAMUSCULAR; INTRAVENOUS ONCE
Status: DISCONTINUED | OUTPATIENT
Start: 2019-04-22 | End: 2019-04-25

## 2019-04-22 RX ORDER — CELECOXIB 200 MG/1
400 CAPSULE ORAL ONCE
Status: DISCONTINUED | OUTPATIENT
Start: 2019-04-22 | End: 2019-04-22

## 2019-04-22 RX ORDER — MIDAZOLAM HYDROCHLORIDE 1 MG/ML
INJECTION, SOLUTION INTRAMUSCULAR; INTRAVENOUS
Status: DISCONTINUED | OUTPATIENT
Start: 2019-04-22 | End: 2019-04-22

## 2019-04-22 RX ORDER — BUPIVACAINE HYDROCHLORIDE 7.5 MG/ML
INJECTION, SOLUTION EPIDURAL; RETROBULBAR
Status: COMPLETED | OUTPATIENT
Start: 2019-04-22 | End: 2019-04-22

## 2019-04-22 RX ORDER — CEFAZOLIN SODIUM 1 G/3ML
2 INJECTION, POWDER, FOR SOLUTION INTRAMUSCULAR; INTRAVENOUS
Status: DISCONTINUED | OUTPATIENT
Start: 2019-04-22 | End: 2019-04-22

## 2019-04-22 RX ORDER — BUPIVACAINE HYDROCHLORIDE AND EPINEPHRINE 5; 5 MG/ML; UG/ML
INJECTION, SOLUTION EPIDURAL; INTRACAUDAL; PERINEURAL CONTINUOUS PRN
Status: DISCONTINUED | OUTPATIENT
Start: 2019-04-22 | End: 2019-04-22

## 2019-04-22 RX ORDER — OCTREOTIDE ACETATE 1000 UG/ML
500 INJECTION INTRAVENOUS ONCE
Status: DISCONTINUED | OUTPATIENT
Start: 2019-04-22 | End: 2019-04-22

## 2019-04-22 RX ORDER — GLYCOPYRROLATE 0.2 MG/ML
INJECTION INTRAMUSCULAR; INTRAVENOUS
Status: DISCONTINUED | OUTPATIENT
Start: 2019-04-22 | End: 2019-04-22

## 2019-04-22 RX ORDER — ROCURONIUM BROMIDE 10 MG/ML
INJECTION, SOLUTION INTRAVENOUS
Status: DISCONTINUED | OUTPATIENT
Start: 2019-04-22 | End: 2019-04-22

## 2019-04-22 RX ORDER — NEOSTIGMINE METHYLSULFATE 1 MG/ML
INJECTION, SOLUTION INTRAVENOUS
Status: DISCONTINUED | OUTPATIENT
Start: 2019-04-22 | End: 2019-04-22

## 2019-04-22 RX ORDER — SODIUM CHLORIDE 0.9 % (FLUSH) 0.9 %
10 SYRINGE (ML) INJECTION
Status: DISCONTINUED | OUTPATIENT
Start: 2019-04-22 | End: 2019-04-22

## 2019-04-22 RX ORDER — FENTANYL CITRATE 50 UG/ML
INJECTION, SOLUTION INTRAMUSCULAR; INTRAVENOUS
Status: DISCONTINUED | OUTPATIENT
Start: 2019-04-22 | End: 2019-04-22

## 2019-04-22 RX ORDER — ACETAMINOPHEN 10 MG/ML
1000 INJECTION, SOLUTION INTRAVENOUS ONCE
Status: DISCONTINUED | OUTPATIENT
Start: 2019-04-22 | End: 2019-04-22

## 2019-04-22 RX ADMIN — PROMETHAZINE HYDROCHLORIDE 6.25 MG: 25 INJECTION INTRAMUSCULAR; INTRAVENOUS at 09:04

## 2019-04-22 RX ADMIN — ROCURONIUM BROMIDE 50 MG: 10 INJECTION, SOLUTION INTRAVENOUS at 09:04

## 2019-04-22 RX ADMIN — HYDROMORPHONE HYDROCHLORIDE 0.2 MG: 1 INJECTION, SOLUTION INTRAMUSCULAR; INTRAVENOUS; SUBCUTANEOUS at 06:04

## 2019-04-22 RX ADMIN — ROCURONIUM BROMIDE 20 MG: 10 INJECTION, SOLUTION INTRAVENOUS at 12:04

## 2019-04-22 RX ADMIN — LIDOCAINE HYDROCHLORIDE 10 MG: 10 INJECTION, SOLUTION EPIDURAL; INFILTRATION; INTRACAUDAL; PERINEURAL at 08:04

## 2019-04-22 RX ADMIN — CEFAZOLIN 2 G: 330 INJECTION, POWDER, FOR SOLUTION INTRAMUSCULAR; INTRAVENOUS at 02:04

## 2019-04-22 RX ADMIN — Medication 10 MG: at 01:04

## 2019-04-22 RX ADMIN — HYDROMORPHONE HYDROCHLORIDE 0.2 MG: 1 INJECTION, SOLUTION INTRAMUSCULAR; INTRAVENOUS; SUBCUTANEOUS at 04:04

## 2019-04-22 RX ADMIN — STANDARDIZED SENNA CONCENTRATE AND DOCUSATE SODIUM 1 TABLET: 8.6; 5 TABLET, FILM COATED ORAL at 09:04

## 2019-04-22 RX ADMIN — PROPOFOL 20 MG: 10 INJECTION, EMULSION INTRAVENOUS at 03:04

## 2019-04-22 RX ADMIN — FENTANYL CITRATE 50 MCG: 50 INJECTION, SOLUTION INTRAMUSCULAR; INTRAVENOUS at 03:04

## 2019-04-22 RX ADMIN — SODIUM CHLORIDE, SODIUM GLUCONATE, SODIUM ACETATE, POTASSIUM CHLORIDE, MAGNESIUM CHLORIDE, SODIUM PHOSPHATE, DIBASIC, AND POTASSIUM PHOSPHATE: .53; .5; .37; .037; .03; .012; .00082 INJECTION, SOLUTION INTRAVENOUS at 09:04

## 2019-04-22 RX ADMIN — Medication 30 MG: at 09:04

## 2019-04-22 RX ADMIN — SODIUM CHLORIDE, SODIUM GLUCONATE, SODIUM ACETATE, POTASSIUM CHLORIDE, MAGNESIUM CHLORIDE, SODIUM PHOSPHATE, DIBASIC, AND POTASSIUM PHOSPHATE: .53; .5; .37; .037; .03; .012; .00082 INJECTION, SOLUTION INTRAVENOUS at 01:04

## 2019-04-22 RX ADMIN — OXYCODONE HYDROCHLORIDE 10 MG: 10 TABLET ORAL at 04:04

## 2019-04-22 RX ADMIN — BUPIVACAINE HYDROCHLORIDE 15 ML: 7.5 INJECTION, SOLUTION EPIDURAL; RETROBULBAR at 08:04

## 2019-04-22 RX ADMIN — ROCURONIUM BROMIDE 10 MG: 10 INJECTION, SOLUTION INTRAVENOUS at 11:04

## 2019-04-22 RX ADMIN — FENTANYL CITRATE 100 MCG: 50 INJECTION INTRAMUSCULAR; INTRAVENOUS at 08:04

## 2019-04-22 RX ADMIN — CEFAZOLIN 2 G: 330 INJECTION, POWDER, FOR SOLUTION INTRAMUSCULAR; INTRAVENOUS at 10:04

## 2019-04-22 RX ADMIN — ACETAMINOPHEN 1000 MG: 10 INJECTION, SOLUTION INTRAVENOUS at 10:04

## 2019-04-22 RX ADMIN — PROPOFOL 200 MG: 10 INJECTION, EMULSION INTRAVENOUS at 09:04

## 2019-04-22 RX ADMIN — HYDROMORPHONE HYDROCHLORIDE 0.2 MG: 1 INJECTION, SOLUTION INTRAMUSCULAR; INTRAVENOUS; SUBCUTANEOUS at 05:04

## 2019-04-22 RX ADMIN — SODIUM CHLORIDE: 0.9 INJECTION, SOLUTION INTRAVENOUS at 08:04

## 2019-04-22 RX ADMIN — ACETAMINOPHEN 1000 MG: 500 TABLET ORAL at 04:04

## 2019-04-22 RX ADMIN — METHOCARBAMOL TABLETS 500 MG: 500 TABLET, COATED ORAL at 08:04

## 2019-04-22 RX ADMIN — ROCURONIUM BROMIDE 20 MG: 10 INJECTION, SOLUTION INTRAVENOUS at 02:04

## 2019-04-22 RX ADMIN — PREGABALIN 150 MG: 150 CAPSULE ORAL at 08:04

## 2019-04-22 RX ADMIN — MIDAZOLAM HYDROCHLORIDE 2 MG: 1 INJECTION, SOLUTION INTRAMUSCULAR; INTRAVENOUS at 09:04

## 2019-04-22 RX ADMIN — METHOCARBAMOL TABLETS 500 MG: 500 TABLET, COATED ORAL at 04:04

## 2019-04-22 RX ADMIN — MIDAZOLAM HYDROCHLORIDE 2 MG: 1 INJECTION, SOLUTION INTRAMUSCULAR; INTRAVENOUS at 08:04

## 2019-04-22 RX ADMIN — PROPOFOL 30 MG: 10 INJECTION, EMULSION INTRAVENOUS at 03:04

## 2019-04-22 RX ADMIN — ROPIVACAINE HYDROCHLORIDE 2 ML/HR: 2 INJECTION, SOLUTION EPIDURAL; INFILTRATION at 04:04

## 2019-04-22 RX ADMIN — OCTREOTIDE ACETATE 50 ML: 1000 INJECTION, SOLUTION INTRAVENOUS; SUBCUTANEOUS at 10:04

## 2019-04-22 RX ADMIN — MORPHINE SULFATE 2 MG: 2 INJECTION, SOLUTION INTRAMUSCULAR; INTRAVENOUS at 08:04

## 2019-04-22 RX ADMIN — BUPIVACAINE HYDROCHLORIDE AND EPINEPHRINE BITARTRATE 6 ML/HR: 5; .0091 INJECTION, SOLUTION EPIDURAL; INTRACAUDAL; PERINEURAL at 10:04

## 2019-04-22 RX ADMIN — ONDANSETRON 4 MG: 2 INJECTION INTRAMUSCULAR; INTRAVENOUS at 02:04

## 2019-04-22 RX ADMIN — GLYCOPYRROLATE 0.6 MG: 0.2 INJECTION, SOLUTION INTRAMUSCULAR; INTRAVENOUS at 03:04

## 2019-04-22 RX ADMIN — NEOSTIGMINE METHYLSULFATE 5 MG: 1 INJECTION INTRAVENOUS at 03:04

## 2019-04-22 RX ADMIN — Medication 10 MG: at 12:04

## 2019-04-22 RX ADMIN — PROPOFOL 5 MG: 10 INJECTION, EMULSION INTRAVENOUS at 09:04

## 2019-04-22 RX ADMIN — ONDANSETRON 4 MG: 2 INJECTION INTRAMUSCULAR; INTRAVENOUS at 08:04

## 2019-04-22 RX ADMIN — FENTANYL CITRATE 50 MCG: 50 INJECTION, SOLUTION INTRAMUSCULAR; INTRAVENOUS at 10:04

## 2019-04-22 RX ADMIN — ROCURONIUM BROMIDE 20 MG: 10 INJECTION, SOLUTION INTRAVENOUS at 01:04

## 2019-04-22 RX ADMIN — DEXAMETHASONE SODIUM PHOSPHATE 4 MG: 4 INJECTION, SOLUTION INTRAMUSCULAR; INTRAVENOUS at 12:04

## 2019-04-22 RX ADMIN — ROCURONIUM BROMIDE 10 MG: 10 INJECTION, SOLUTION INTRAVENOUS at 10:04

## 2019-04-22 RX ADMIN — LIDOCAINE HYDROCHLORIDE 100 MG: 20 INJECTION, SOLUTION INTRAVENOUS at 09:04

## 2019-04-22 RX ADMIN — IPRATROPIUM BROMIDE AND ALBUTEROL SULFATE 3 ML: .5; 3 SOLUTION RESPIRATORY (INHALATION) at 08:04

## 2019-04-22 NOTE — ANESTHESIA POSTPROCEDURE EVALUATION
Anesthesia Post Evaluation    Patient: Gina Coto    Procedure(s) Performed: Procedure(s) (LRB):  THORACOTOMY (Right)  LOBECTOMY, SLEEVE, LUNG, THORACOTOMY APPROACH (Right)  BRONCHOSCOPY (N/A)    Final Anesthesia Type: general  Patient location during evaluation: PACU  Patient participation: Yes- Able to Participate  Level of consciousness: awake and alert  Post-procedure vital signs: reviewed and stable  Pain management: adequate  Airway patency: patent  PONV status at discharge: No PONV  Anesthetic complications: no      Cardiovascular status: blood pressure returned to baseline  Respiratory status: spontaneous ventilation and nasal cannula  Hydration status: euvolemic  Follow-up not needed.          Vitals Value Taken Time   /81 4/22/2019  4:47 PM   Temp 37.2 °C (98.9 °F) 4/22/2019  4:12 PM   Pulse 72 4/22/2019  4:55 PM   Resp 19 4/22/2019  4:55 PM   SpO2 98 % 4/22/2019  4:55 PM   Vitals shown include unvalidated device data.      No case tracking events are documented in the log.      Pain/Saundra Score: Pain Rating Prior to Med Admin: 7 (4/22/2019  4:50 PM)  Saundra Score: 10 (4/22/2019  9:15 AM)

## 2019-04-22 NOTE — ANESTHESIA PROCEDURE NOTES
Arterial    Diagnosis: Carcinoid Tumor    Patient location during procedure: done in OR  Procedure start time: 4/22/2019 9:30 AM  Timeout: 4/22/2019 9:30 AM  Procedure end time: 4/22/2019 9:35 AM  Staffing  Anesthesiologist: RICCI Snyder MD  Resident/CRNA: Ranulfo Moya MD  Performed: resident/CRNA   Anesthesiologist was present at the time of the procedure.  Preanesthetic Checklist  Completed: patient identified, site marked, surgical consent, pre-op evaluation, timeout performed, IV checked, risks and benefits discussed, monitors and equipment checked and anesthesia consent givenArterial  Skin Prep: chlorhexidine gluconate  Local Infiltration: none  Orientation: left  Location: radial  Catheter Size: 20 G  Catheter placement by Anatomical landmarks. Heme positive aspiration all ports.Insertion Attempts: 1  Assessment  Dressing: secured with tape and tegaderm  Patient: Tolerated well

## 2019-04-22 NOTE — ANESTHESIA PROCEDURE NOTES
Erector Spinae Plane Continuous Catheter    Patient location during procedure: pre-op   Block not for primary anesthetic.  Reason for block: at surgeon's request and post-op pain management   Post-op Pain Location: Right chest pain  Start time: 4/22/2019 8:35 AM  Timeout: 4/22/2019 8:30 AM   End time: 4/22/2019 8:55 AM  Staffing  Anesthesiologist: Dk Flaherty MD  Resident/CRNA: Roberto Matias MD  Performed: resident/CRNA   Preanesthetic Checklist  Completed: patient identified, site marked, surgical consent, pre-op evaluation, timeout performed, IV checked, risks and benefits discussed and monitors and equipment checked  Peripheral Block  Patient position: prone  Prep: ChloraPrep  Patient monitoring: heart rate, cardiac monitor, continuous pulse ox, continuous capnometry and frequent blood pressure checks  Block type: erector spinae plane (Erector Spinae Plane)  Laterality: right  Injection technique: continuous  Location: T3-4  Needle  Needle type: Tuohy   Needle gauge: 17 G  Needle length: 3.5 in  Needle localization: anatomical landmarks and ultrasound guidance  Catheter type: spring wound  Catheter size: 19 G  Test dose: lidocaine 1.5% with Epi 1-to-200,000 and negative   -ultrasound image captured on disc.  Assessment  Injection assessment: negative aspiration, negative parasthesia and local visualized surrounding nerve  Paresthesia pain: none  Heart rate change: no  Slow fractionated injection: yes  Additional Notes  15 mL of 0.75% bupivacaine diluted to 30 mL of 0.375% bupivacaine w/ epinephrine. Patient tolerated very well. Vital signs stable. See [facility] RN charting for vital signs.

## 2019-04-22 NOTE — TRANSFER OF CARE
"Anesthesia Transfer of Care Note    Patient: Gina Coto    Procedure(s) Performed: Procedure(s) (LRB):  THORACOTOMY (Right)  LOBECTOMY, SLEEVE, LUNG, THORACOTOMY APPROACH (Right)  BRONCHOSCOPY (N/A)    Patient location: PACU    Anesthesia Type: general    Transport from OR: Transported from OR on 6-10 L/min O2 by face mask with adequate spontaneous ventilation    Post pain: adequate analgesia    Post assessment: no apparent anesthetic complications and tolerated procedure well    Post vital signs: stable    Level of consciousness: awake    Nausea/Vomiting: no nausea/vomiting    Complications: none    Transfer of care protocol was followed      Last vitals:   Visit Vitals  /76   Pulse 78   Temp 37.2 °C (98.9 °F) (Temporal)   Resp 16   Ht 5' 7" (1.702 m)   Wt 88 kg (194 lb)   LMP 04/01/2019   SpO2 100%   Breastfeeding? No   BMI 30.38 kg/m²     "

## 2019-04-22 NOTE — OP NOTE
[facility]  Cardiothoracic Surgery  Operative Note    SUMMARY     Patient Identification: Gina Coto; MRN: [id number]; : 1970    Date of Procedure: 2019     Procedure: Procedure(s) (LRB):  RIGHT THORACOTOMY WITH RIGHT LOWER BILOBECTOMY AND BRONCHOPLASTY  BRONCHOSCOPY  MEDIASTINAL LYMPH NODE DISSECTION  EXTENSIVE ADHESIOLYSIS    Surgeon(s) and Role:     * Juan Jose Miles MD - Primary     * Noah Malagon MD - Resident - Assisting     * Palemr May MD - Fellow     * Kayley Bennett MD - Fellow    Pre-Operative Diagnosis: Carcinoid tumor [D3A.00]    Post-Operative Diagnosis: Post-Op Diagnosis Codes:     * Carcinoid tumor [D3A.00]    Anesthesia: General    Medications: 2 grams IV Ancef, 500mcg IV octreotide    Indications: This is a 49 year-old woman with an 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  The tumor was discovered during workup for a cough.    Operative Findings: Carcinoid tumor in bronchus intermedius.  Right lower lobe bilobectomy.  Bronchial stump closed with 4-0 PDS under direct bronchoscopic vision.  Intercostal muscle flap between bronchus and PA.    Description of the Procedure: A preoperative erector spinae pain catheter was placed by the regional anesthesia pain service.  The patient was then brought to the operating room.  General anesthesia was induced.  A laryngeal mask airway was placed.  The bronchoscope was inserted.  The vocal cords were anesthetized with 4% lidocaine.  The left sided airways were normal.  The right upper lobe airways were normal.  The endobronchial mass nearly occluded the bronchus intermedius and appeared to originate just distal to the right upper lobe takeoff.  The LMA was then exchanged for a double lumen endotracheal tube.  The patient was placed in the left lateral decubitus position.  The right chest was prepped and draped in the usual sterile fashion.  A lateral thoracotomy was made.   Entry into the chest was gained through the 5th interspace.  A segment of 6th rib was removed.  An intercostal muscle flap was harvested.  There were extensive adhesions of the lung to the chest wall and the diaphragm.  These were lysed with a mix of blunt, sharp and electrocautery dissection.  There was a significant fibrotic reaction in the hilum.  The anterior and posterior mediastinal pleura was opened.  In the fissure we identified the interlobar pulmonary artery.  We were able to complete the horizontal fissure with a blue load echelon stapler.  Anteriorly the oblique fissure was completed in the same fashion.  The ongoing pulmonary artery to the lower lobe and the middle lobe pulmonary artery were circumferentially dissected, isolated and then divided with white load staplers.  The middle lobe vein and the inferior pulmonary vein were isolated and divided with white load staplers.  An extra vein branch to the middle lobe was clipped and divided.  Under direct bronchoscopic vision we placed a needle into the bronchus intermedius to identify the cut line.  The bronchus intermedius was then cut sharply taking care to not cut into the mass.  The lower bilobectomy was then passed off the table and sent to pathology for bronchial margins, which returned as negative.  The open bronchus was then closed with interrupted mattress and simple 4-0 PDS sutures also using bronchoscopic guidance to not narrow the right upper lobe bronchus.  At conclusion there was no air leak.  We opened the right paratracheal space.  There were no apparent nodes.  Nodes at level 7 and 9 were obtained.  The intercostal muscle flap was secured to the bronchus with 2-0 Vicryl placing the flap between the bronchus and PA.  Hemostasis was noted.  A 24Fr Alcides drain was directed to the apex.  A 28Fr angled chest tube was directed over the diaphragm.  The ribs were secured with #2 Vicryl.  The lung was reexpanded under direct vision.  The chest  was closed in multiple layers with absorbable suture.  Sterile dressings were applied.  The patient tolerated the procedure well and was transferred to the post anesthesia care unit in stable condition.    Wound Classification: 2, Clean contaminated    Complications: None apparent    Estimated Blood Loss (EBL): 50mL           Drains:  One 24Fr Alcides drain and one 28Fr angled chest tube    Implants: None    Specimens:   Specimen (12h ago, onward)    Start     Ordered    04/22/19 1429  Specimen to Pathology - Surgery  Once     Comments:  1. Right lower lobe bilobectomy(check bronchial margins) FS2. Level 11 Lymph node - Perm.3. Level 7 lymph node - Perm.4. Level 9 Lymph node - Perm.     Start Status     04/22/19 1429 Collected (04/22/19 1511) Order ID: [id number]       04/22/19 1500                Condition: Good    Disposition: PACU - hemodynamically stable.    Postoperative Plan: The patient will be admitted to the thoracic surgical bhandari.    Attestation: Dr. Miles was present and scrubbed for the critical portions of the case and otherwise immediately available.      Palmer May MD  Thoracic Surgery Resident, PGY7  General Thoracic Surgery  [facility]

## 2019-04-22 NOTE — BRIEF OP NOTE
[facility]  Brief Operative Note    SUMMARY     Surgery Date: 4/22/2019     Surgeon(s) and Role:     * Juan Jose Miles MD - Primary     * Noah Malagon MD - Resident - Assisting     * Palmer May MD - Fellow     * Kayley Bennett MD - Fellow    Pre-op Diagnosis:  Carcinoid tumor [D3A.00]    Post-op Diagnosis:  Post-Op Diagnosis Codes:     * Carcinoid tumor [D3A.00]    Procedure(s) (LRB):  RIGHT THORACOTOMY WITH RIGHT LOWER LOBE BILOBECTOMY AND BRONCHOPLASTY  BRONCHOSCOPY  MEDIASTINAL LYMPH NODE DISSECTION    Anesthesia: General    Description of Procedure: Carcinoid tumor in bronchus intermedius.  Right lower lobe bilobectomy.  Bronchial stump closed with 4-0 PDS under direct bronchoscopic vision.  Intercostal muscle flap between bronchus and PA.    Description of the findings of the procedure: One 24Fr Alcides drain and one 28Fr angled chest tube.    Estimated Blood Loss: 50mL         Specimens:   Specimen (12h ago, onward)    Start     Ordered    04/22/19 1429  Specimen to Pathology - Surgery  Once     Comments:  1. Right lower lobe bilobectomy(check bronchial margins) FS2. Level 11 Lymph node - Perm.3. Level 7 lymph node - Perm.4. Level 9 Lymph node - Perm.     Start Status     04/22/19 1429 Collected (04/22/19 1511) Order ID: [id number]       04/22/19 1500        Palmer May MD  Thoracic Surgery Resident, PGY7  General Thoracic Surgery  [facility]

## 2019-04-22 NOTE — INTERVAL H&P NOTE
The patient has been examined and the H&P has been reviewed:    I concur with the findings and no changes have occurred since H&P was written.    Anesthesia/Surgery risks, benefits and alternative options discussed and understood by patient/family.      Active Hospital Problems    Diagnosis  POA    Carcinoid tumor of right lung [D3A.090]  Yes      Resolved Hospital Problems   No resolved problems to display.     Palmer May MD  Thoracic Surgery Resident, PGY7  General Thoracic Surgery  [facility]

## 2019-04-23 PROCEDURE — 94640 AIRWAY INHALATION TREATMENT: CPT

## 2019-04-23 PROCEDURE — 99900035 HC TECH TIME PER 15 MIN (STAT)

## 2019-04-23 PROCEDURE — 25000003 PHARM REV CODE 250: Performed by: THORACIC SURGERY (CARDIOTHORACIC VASCULAR SURGERY)

## 2019-04-23 PROCEDURE — 25000242 PHARM REV CODE 250 ALT 637 W/ HCPCS: Performed by: THORACIC SURGERY (CARDIOTHORACIC VASCULAR SURGERY)

## 2019-04-23 PROCEDURE — 63600175 PHARM REV CODE 636 W HCPCS: Performed by: THORACIC SURGERY (CARDIOTHORACIC VASCULAR SURGERY)

## 2019-04-23 PROCEDURE — 20600001 HC STEP DOWN PRIVATE ROOM

## 2019-04-23 PROCEDURE — 63600175 PHARM REV CODE 636 W HCPCS: Performed by: PHYSICIAN ASSISTANT

## 2019-04-23 PROCEDURE — 94799 UNLISTED PULMONARY SVC/PX: CPT

## 2019-04-23 PROCEDURE — 94761 N-INVAS EAR/PLS OXIMETRY MLT: CPT

## 2019-04-23 PROCEDURE — 99231 PR SUBSEQUENT HOSPITAL CARE,LEVL I: ICD-10-PCS | Mod: ,,, | Performed by: ANESTHESIOLOGY

## 2019-04-23 PROCEDURE — 25000003 PHARM REV CODE 250: Performed by: PHYSICIAN ASSISTANT

## 2019-04-23 PROCEDURE — 99231 SBSQ HOSP IP/OBS SF/LOW 25: CPT | Mod: ,,, | Performed by: ANESTHESIOLOGY

## 2019-04-23 RX ORDER — MORPHINE SULFATE 2 MG/ML
2 INJECTION, SOLUTION INTRAMUSCULAR; INTRAVENOUS
Status: DISCONTINUED | OUTPATIENT
Start: 2019-04-23 | End: 2019-04-24

## 2019-04-23 RX ORDER — LIDOCAINE 50 MG/G
1 PATCH TOPICAL
Status: DISCONTINUED | OUTPATIENT
Start: 2019-04-23 | End: 2019-04-27 | Stop reason: HOSPADM

## 2019-04-23 RX ORDER — CEFAZOLIN SODIUM 1 G/3ML
2 INJECTION, POWDER, FOR SOLUTION INTRAMUSCULAR; INTRAVENOUS
Status: DISCONTINUED | OUTPATIENT
Start: 2019-04-23 | End: 2019-04-24

## 2019-04-23 RX ORDER — CEFAZOLIN SODIUM 1 G/3ML
2 INJECTION, POWDER, FOR SOLUTION INTRAMUSCULAR; INTRAVENOUS ONCE
Status: DISCONTINUED | OUTPATIENT
Start: 2019-04-23 | End: 2019-04-23

## 2019-04-23 RX ADMIN — OXYCODONE HYDROCHLORIDE 10 MG: 10 TABLET ORAL at 08:04

## 2019-04-23 RX ADMIN — IPRATROPIUM BROMIDE AND ALBUTEROL SULFATE 3 ML: .5; 3 SOLUTION RESPIRATORY (INHALATION) at 01:04

## 2019-04-23 RX ADMIN — OXYCODONE HYDROCHLORIDE 10 MG: 10 TABLET ORAL at 07:04

## 2019-04-23 RX ADMIN — ACETAMINOPHEN 1000 MG: 500 TABLET ORAL at 12:04

## 2019-04-23 RX ADMIN — MORPHINE SULFATE 2 MG: 2 INJECTION, SOLUTION INTRAMUSCULAR; INTRAVENOUS at 12:04

## 2019-04-23 RX ADMIN — IPRATROPIUM BROMIDE AND ALBUTEROL SULFATE 3 ML: .5; 3 SOLUTION RESPIRATORY (INHALATION) at 09:04

## 2019-04-23 RX ADMIN — ACETAMINOPHEN 1000 MG: 500 TABLET ORAL at 06:04

## 2019-04-23 RX ADMIN — OXYCODONE HYDROCHLORIDE 10 MG: 10 TABLET ORAL at 12:04

## 2019-04-23 RX ADMIN — ROPIVACAINE HYDROCHLORIDE 2 ML/HR: 2 INJECTION, SOLUTION EPIDURAL; INFILTRATION at 08:04

## 2019-04-23 RX ADMIN — ROPIVACAINE HYDROCHLORIDE 2 ML/HR: 2 INJECTION, SOLUTION EPIDURAL; INFILTRATION at 04:04

## 2019-04-23 RX ADMIN — ACETAMINOPHEN 1000 MG: 500 TABLET ORAL at 11:04

## 2019-04-23 RX ADMIN — MORPHINE SULFATE 2 MG: 2 INJECTION, SOLUTION INTRAMUSCULAR; INTRAVENOUS at 02:04

## 2019-04-23 RX ADMIN — MORPHINE SULFATE 2 MG: 2 INJECTION, SOLUTION INTRAMUSCULAR; INTRAVENOUS at 06:04

## 2019-04-23 RX ADMIN — MORPHINE SULFATE 2 MG: 2 INJECTION, SOLUTION INTRAMUSCULAR; INTRAVENOUS at 05:04

## 2019-04-23 RX ADMIN — PREGABALIN 150 MG: 150 CAPSULE ORAL at 08:04

## 2019-04-23 RX ADMIN — LIDOCAINE 1 PATCH: 50 PATCH TOPICAL at 02:04

## 2019-04-23 RX ADMIN — OXYCODONE HYDROCHLORIDE 10 MG: 10 TABLET ORAL at 10:04

## 2019-04-23 RX ADMIN — IPRATROPIUM BROMIDE AND ALBUTEROL SULFATE 3 ML: .5; 3 SOLUTION RESPIRATORY (INHALATION) at 08:04

## 2019-04-23 RX ADMIN — ACETAMINOPHEN 1000 MG: 500 TABLET ORAL at 05:04

## 2019-04-23 RX ADMIN — STANDARDIZED SENNA CONCENTRATE AND DOCUSATE SODIUM 1 TABLET: 8.6; 5 TABLET, FILM COATED ORAL at 08:04

## 2019-04-23 RX ADMIN — ROPIVACAINE HYDROCHLORIDE 2 ML/HR: 2 INJECTION, SOLUTION EPIDURAL; INFILTRATION at 12:04

## 2019-04-23 RX ADMIN — PANTOPRAZOLE SODIUM 40 MG: 40 TABLET, DELAYED RELEASE ORAL at 06:04

## 2019-04-23 RX ADMIN — MORPHINE SULFATE 2 MG: 2 INJECTION, SOLUTION INTRAMUSCULAR; INTRAVENOUS at 08:04

## 2019-04-23 RX ADMIN — CEFAZOLIN 2 G: 330 INJECTION, POWDER, FOR SOLUTION INTRAMUSCULAR; INTRAVENOUS at 03:04

## 2019-04-23 RX ADMIN — MORPHINE SULFATE 2 MG: 2 INJECTION, SOLUTION INTRAMUSCULAR; INTRAVENOUS at 03:04

## 2019-04-23 RX ADMIN — CEFAZOLIN 2 G: 330 INJECTION, POWDER, FOR SOLUTION INTRAMUSCULAR; INTRAVENOUS at 11:04

## 2019-04-23 RX ADMIN — METHOCARBAMOL TABLETS 500 MG: 500 TABLET, COATED ORAL at 08:04

## 2019-04-23 RX ADMIN — MORPHINE SULFATE 2 MG: 2 INJECTION, SOLUTION INTRAMUSCULAR; INTRAVENOUS at 11:04

## 2019-04-23 RX ADMIN — METHOCARBAMOL TABLETS 500 MG: 500 TABLET, COATED ORAL at 02:04

## 2019-04-23 RX ADMIN — OXYCODONE HYDROCHLORIDE 10 MG: 10 TABLET ORAL at 03:04

## 2019-04-23 RX ADMIN — POLYETHYLENE GLYCOL 3350 17 G: 17 POWDER, FOR SOLUTION ORAL at 08:04

## 2019-04-23 NOTE — ADDENDUM NOTE
Addendum  created 04/23/19 1004 by Adolph Clark MD    Charge Capture section accepted, Cosign clinical note with attestation

## 2019-04-23 NOTE — PLAN OF CARE
Problem: Adult Inpatient Plan of Care  Goal: Plan of Care Review  Outcome: Ongoing (interventions implemented as appropriate)  Pain controlled with ropivicaine infusion and PRN morphine ; VSS; pt is A x O x 4, MAEW; moving well - bedpan for urination; sats > 95 % on RA, lungs clear with some inspiratory weezing noted; pt refused neb treatments after midnight per respiratory therapy; reinforced chest tube insertion site dressing for minor leakage of serosanguinous fluid around site; see assessment for chest tube OP; instructed on POC and progression toward goals; tolerating diet well ; continue to monitor.

## 2019-04-23 NOTE — NURSING
Plan of care discussed with pt. Pt verbalizes understanding. Pt tolerating regular diet with no complaints of nausea. Pain managed with PRN pain medications, ropivacaine, and heat packs. Pt utilizing bedside commode. Educated Pt on importance of I.S. and splinting. Pt cont to perform I.S. RN changed CT dressing 2x this shift. Pt positions independently. VS stable. Pt remains free of falls and injury. No acute events this shift. Will continue to monitor.

## 2019-04-23 NOTE — SUBJECTIVE & OBJECTIVE
Interval History: NAEON. On room air, about 350 cc in atrium from each chest tube, no air leak without suction. Urinating on own. Pain controlled just on spinal catheter. No orals taken overnight.     Medications:  Continuous Infusions:   ropivacaine (PF) 2 mg/ml (0.2%) 2 mL/hr (04/23/19 0057)     Scheduled Meds:   acetaminophen  1,000 mg Oral Q6H    albuterol-ipratropium  3 mL Nebulization Q6H    ceFAZolin (ANCEF) IVPB  2 g Intravenous Q8H    methocarbamol  500 mg Oral TID    ondansetron  8 mg Intravenous Once    pantoprazole  40 mg Oral Before breakfast    polyethylene glycol  17 g Oral Daily    pregabalin  150 mg Oral QHS    senna-docusate 8.6-50 mg  1 tablet Oral BID     PRN Meds:bisacodyl, lactulose, morphine, ondansetron, oxyCODONE, oxyCODONE, promethazine (PHENERGAN) IVPB     Review of patient's allergies indicates:  No Known Allergies  Objective:     Vital Signs (Most Recent):  Temp: 97.8 °F (36.6 °C) (04/23/19 0541)  Pulse: 86 (04/23/19 0830)  Resp: 18 (04/23/19 0830)  BP: (!) 141/65 (04/23/19 0541)  SpO2: 98 % (04/23/19 0830) Vital Signs (24h Range):  Temp:  [97.8 °F (36.6 °C)-98.9 °F (37.2 °C)] 97.8 °F (36.6 °C)  Pulse:  [] 86  Resp:  [11-29] 18  SpO2:  [92 %-100 %] 98 %  BP: (123-156)/(52-90) 141/65     Intake/Output - Last 3 Shifts       04/21 0700 - 04/22 0659 04/22 0700 - 04/23 0659 04/23 0700 - 04/24 0659    P.O.  800     I.V. (mL/kg)  2177.8 (24.7)     IV Piggyback  50     Total Intake(mL/kg)  3027.8 (34.4)     Urine (mL/kg/hr)  300 (0.1)     Stool  0     Chest Tube  525     Total Output  825     Net  +2202.8            Urine Occurrence  5 x     Stool Occurrence  0 x           SpO2: 98 %  O2 Device (Oxygen Therapy): room air    Physical Exam   Well developed. Well nourished. NAD. On room air  RRR  Some wheezing on right. Right thoracotomy dressing is clean and dry. Chest tubes, placed to waterseal, serosanguinous output, no air leak when off suction      Significant  Labs:  None    Significant Diagnostics:  CXR: I have reviewed all pertinent results/findings within the past 24 hours    VTE Risk Mitigation (From admission, onward)        Ordered     Place PAM hose  Until discontinued      04/22/19 0735     Place sequential compression device  Until discontinued      04/22/19 0735

## 2019-04-23 NOTE — ASSESSMENT & PLAN NOTE
50 yo female with endobronchial carcinoid now POD1 right thoracotomy, right lower bilobectomy    - ERS in place. Continue per anesthesia. Oral oxy prn. Well pain controlled. Lyrica, tylenol and robaxin scheduled  - chest tubes placed to waterseal, continue both today. Repeat CXR  - daily CXR  - pulm toilet -  IS, monica murdock. On room air  - bowel regimen - colace/miralax  - regular diet  - ambulate  - dispo: progressing well, leave chest tubes to waterseal

## 2019-04-23 NOTE — PLAN OF CARE
Problem: Adult Inpatient Plan of Care  Goal: Plan of Care Review  Outcome: Ongoing (interventions implemented as appropriate)  Patient is alert and oriented. Able to make needs known to staff. PRN Morphine given for pain control. Ropivicaine drip remains. No s/s of respiratory/cardiac distress noted. Right chest incision dressing remains clean, dry, and intact. R chest tubes x 2 remain and are patent and remain to water seal. PIVs are patent and flush well. Patient remains on a regular diet and is tolerating it well. VS stable. No issues or concerns at this time. Continue to monitor.

## 2019-04-23 NOTE — PROGRESS NOTES
[facility]  Thoracic Surgery  Progress Note    Subjective:     History of Present Illness:  No notes on file    Post-Op Info:  Procedure(s) (LRB):  THORACOTOMY (Right)  LOBECTOMY, SLEEVE, LUNG, THORACOTOMY APPROACH (Right)  BRONCHOSCOPY (N/A)   1 Day Post-Op     Interval History: NAEON. On room air, about 350 cc in atrium from each chest tube, no air leak without suction. Urinating on own. Pain controlled just on spinal catheter. No orals taken overnight.     Medications:  Continuous Infusions:   ropivacaine (PF) 2 mg/ml (0.2%) 2 mL/hr (04/23/19 0057)     Scheduled Meds:   acetaminophen  1,000 mg Oral Q6H    albuterol-ipratropium  3 mL Nebulization Q6H    ceFAZolin (ANCEF) IVPB  2 g Intravenous Q8H    methocarbamol  500 mg Oral TID    ondansetron  8 mg Intravenous Once    pantoprazole  40 mg Oral Before breakfast    polyethylene glycol  17 g Oral Daily    pregabalin  150 mg Oral QHS    senna-docusate 8.6-50 mg  1 tablet Oral BID     PRN Meds:bisacodyl, lactulose, morphine, ondansetron, oxyCODONE, oxyCODONE, promethazine (PHENERGAN) IVPB     Review of patient's allergies indicates:  No Known Allergies  Objective:     Vital Signs (Most Recent):  Temp: 97.8 °F (36.6 °C) (04/23/19 0541)  Pulse: 86 (04/23/19 0830)  Resp: 18 (04/23/19 0830)  BP: (!) 141/65 (04/23/19 0541)  SpO2: 98 % (04/23/19 0830) Vital Signs (24h Range):  Temp:  [97.8 °F (36.6 °C)-98.9 °F (37.2 °C)] 97.8 °F (36.6 °C)  Pulse:  [] 86  Resp:  [11-29] 18  SpO2:  [92 %-100 %] 98 %  BP: (123-156)/(52-90) 141/65     Intake/Output - Last 3 Shifts       04/21 0700 - 04/22 0659 04/22 0700 - 04/23 0659 04/23 0700 - 04/24 0659    P.O.  800     I.V. (mL/kg)  2177.8 (24.7)     IV Piggyback  50     Total Intake(mL/kg)  3027.8 (34.4)     Urine (mL/kg/hr)  300 (0.1)     Stool  0     Chest Tube  525     Total Output  825     Net  +2202.8            Urine Occurrence  5 x     Stool Occurrence  0 x           SpO2: 98 %  O2 Device (Oxygen  Therapy): room air    Physical Exam   Well developed. Well nourished. NAD. On room air  RRR  Some wheezing on right. Right thoracotomy dressing is clean and dry. Chest tubes, placed to waterseal, serosanguinous output, no air leak when off suction      Significant Labs:  None    Significant Diagnostics:  CXR: I have reviewed all pertinent results/findings within the past 24 hours    VTE Risk Mitigation (From admission, onward)        Ordered     Place PAM hose  Until discontinued      04/22/19 0735     Place sequential compression device  Until discontinued      04/22/19 0735        Assessment/Plan:     * Carcinoid tumor of right lung  50 yo female with endobronchial carcinoid now POD1 right thoracotomy, right lower bilobectomy    - ERS in place. Continue per anesthesia. Oral oxy prn. Well pain controlled. Lyrica, tylenol and robaxin scheduled  - chest tubes placed to waterseal, continue both today. Repeat CXR  - daily CXR  - pulm toilet -  IS, acapella, duonebs. On room air  - bowel regimen - colace/miralax  - regular diet  - ambulate  - dispo: progressing well, leave chest tubes to waterseal         Noah Malagon MD  Thoracic Surgery  [facility]

## 2019-04-23 NOTE — ANESTHESIA POST-OP PAIN MANAGEMENT
Acute Pain Service Progress Note    Gina Coto is a 49 y.o., female, [id number].    Surgery:  Thoracotomy R, sleeve lobectomy     Post Op Day #: 1    Catheter type: perineural  WILFREDO    Infusion type: Ropivacaine 0.2%  2cc/hr with 10cc/hr intermittent    Problem List:    Active Hospital Problems    Diagnosis  POA    *Carcinoid tumor of right lung [D3A.090]  Yes      Resolved Hospital Problems   No resolved problems to display.       Subjective:     General appearance of alert, oriented, no complaints   Pain with rest: 6    Numbers   Pain with movement: 6    Numbers   Side Effects    1. Pruritis No    2. Nausea No    3. Motor Blockade No, 0=Ability to raise lower extremities off bed    4. Sedation No, 1=awake and alert    Objective:     Catheter site clean, dry, intact      Vitals   Vitals:    04/23/19 0541   BP: (!) 141/65   Pulse: 84   Resp: 18   Temp: 36.6 °C (97.8 °F)        Labs    Admission on 04/22/2019   Component Date Value Ref Range Status    Group & Rh 04/22/2019 A NEG   Final    Indirect Hector 04/22/2019 NEG   Final    UNIT NUMBER 04/22/2019 [id number]   Final    PRODUCT CODE 04/22/2019 [id number]   Final    DISPENSE STATUS 04/22/2019 TRANSFUSED   Final    CODING SYSTEM 04/22/2019 [id number]   Final    Unit Blood Type Code 04/22/2019 0600   Final    Unit Blood Type 04/22/2019 A NEG   Final    Unit Expiration 04/22/2019 [id number]   Final    UNIT NUMBER 04/22/2019 [id number]   Final    PRODUCT CODE 04/22/2019 [id number]   Final    DISPENSE STATUS 04/22/2019 TRANSFUSED   Final    CODING SYSTEM 04/22/2019 [id number]   Final    Unit Blood Type Code 04/22/2019 0600   Final    Unit Blood Type 04/22/2019 A NEG   Final    Unit Expiration 04/22/2019 [id number]   Final    POC Preg Test, Ur 04/22/2019 Negative  Negative Final     Acceptable 04/22/2019 Yes   Final    UNIT NUMBER 04/22/2019 [id number]   Preliminary    PRODUCT CODE 04/22/2019 [id number]   Preliminary    DISPENSE  STATUS 04/22/2019 CROSSMATCHED   Preliminary    CODING SYSTEM 04/22/2019 [id number]   Preliminary    Unit Blood Type Code 04/22/2019 0600   Preliminary    Unit Blood Type 04/22/2019 A NEG   Preliminary    Unit Expiration 04/22/2019 [id number]   Preliminary    UNIT NUMBER 04/22/2019 [id number]   Preliminary    PRODUCT CODE 04/22/2019 [id number]   Preliminary    DISPENSE STATUS 04/22/2019 CROSSMATCHED   Preliminary    CODING SYSTEM 04/22/2019 [id number]   Preliminary    Unit Blood Type Code 04/22/2019 0600   Preliminary    Unit Blood Type 04/22/2019 A NEG   Preliminary    Unit Expiration 04/22/2019 [id number]   Preliminary        Meds   Current Facility-Administered Medications   Medication Dose Route Frequency Provider Last Rate Last Dose    acetaminophen tablet 1,000 mg  1,000 mg Oral Q6H Palmer May MD   1,000 mg at 04/23/19 0615    albuterol-ipratropium 2.5 mg-0.5 mg/3 mL nebulizer solution 3 mL  3 mL Nebulization Q6H Palmer May MD   3 mL at 04/22/19 2010    bisacodyl suppository 10 mg  10 mg Rectal Daily PRN Palmer May MD        ceFAZolin injection 2 g  2 g Intravenous Q8H Palmer May MD        lactulose 20 gram/30 mL solution Soln 20 g  20 g Oral Q6H PRN Palmer May MD        methocarbamol tablet 500 mg  500 mg Oral TID Palmer May MD   500 mg at 04/22/19 2050    morphine injection 2 mg  2 mg Intravenous Q1H PRN Palmer May MD   2 mg at 04/23/19 0615    ondansetron injection 4 mg  4 mg Intravenous Q12H PRN Palmer May MD   4 mg at 04/22/19 2049    ondansetron injection 8 mg  8 mg Intravenous Once APARNA Key        oxyCODONE immediate release tablet 10 mg  10 mg Oral Q3H PRN Palmer May MD   10 mg at 04/22/19 1649    oxyCODONE immediate release tablet 5 mg  5 mg Oral Q3H PRN Palmer May MD        pantoprazole EC tablet 40 mg  40 mg Oral Before breakfast Palmer May MD   40 mg at  04/23/19 0613    polyethylene glycol packet 17 g  17 g Oral Daily Palmer May MD        pregabalin capsule 150 mg  150 mg Oral QHS Palmer May MD   150 mg at 04/22/19 2049    promethazine (PHENERGAN) 6.25 mg in dextrose 5 % 50 mL IVPB  6.25 mg Intravenous Q6H PRN Palmer May  mL/hr at 04/22/19 2156 6.25 mg at 04/22/19 2156    ropivacaine (PF) 2 mg/ml (0.2%) infusion  2 mL/hr Perineural Continuous Palmer May MD 2 mL/hr at 04/23/19 0057 2 mL/hr at 04/23/19 0057    senna-docusate 8.6-50 mg per tablet 1 tablet  1 tablet Oral BID Palmer May MD   1 tablet at 04/22/19 2100         Assessment:     Pain control adequate    Plan:     Patient doing well, continue present treatment. Recommend utilizing po oxycodone over IV morphine.     Evaluator Scottie Juan

## 2019-04-24 PROCEDURE — 63600175 PHARM REV CODE 636 W HCPCS: Performed by: THORACIC SURGERY (CARDIOTHORACIC VASCULAR SURGERY)

## 2019-04-24 PROCEDURE — 27000221 HC OXYGEN, UP TO 24 HOURS

## 2019-04-24 PROCEDURE — 25000003 PHARM REV CODE 250: Performed by: PHYSICIAN ASSISTANT

## 2019-04-24 PROCEDURE — 20600001 HC STEP DOWN PRIVATE ROOM

## 2019-04-24 PROCEDURE — 99900035 HC TECH TIME PER 15 MIN (STAT)

## 2019-04-24 PROCEDURE — 27100171 HC OXYGEN HIGH FLOW UP TO 24 HOURS

## 2019-04-24 PROCEDURE — 97165 OT EVAL LOW COMPLEX 30 MIN: CPT

## 2019-04-24 PROCEDURE — 94761 N-INVAS EAR/PLS OXIMETRY MLT: CPT

## 2019-04-24 PROCEDURE — 25000003 PHARM REV CODE 250: Performed by: THORACIC SURGERY (CARDIOTHORACIC VASCULAR SURGERY)

## 2019-04-24 PROCEDURE — 25000003 PHARM REV CODE 250: Performed by: STUDENT IN AN ORGANIZED HEALTH CARE EDUCATION/TRAINING PROGRAM

## 2019-04-24 PROCEDURE — 25000242 PHARM REV CODE 250 ALT 637 W/ HCPCS: Performed by: THORACIC SURGERY (CARDIOTHORACIC VASCULAR SURGERY)

## 2019-04-24 PROCEDURE — 63600175 PHARM REV CODE 636 W HCPCS: Performed by: PHYSICIAN ASSISTANT

## 2019-04-24 PROCEDURE — 94640 AIRWAY INHALATION TREATMENT: CPT

## 2019-04-24 PROCEDURE — 97161 PT EVAL LOW COMPLEX 20 MIN: CPT

## 2019-04-24 RX ORDER — OXYCODONE HYDROCHLORIDE 10 MG/1
10 TABLET ORAL
Status: DISCONTINUED | OUTPATIENT
Start: 2019-04-24 | End: 2019-04-25

## 2019-04-24 RX ORDER — ACETAMINOPHEN 500 MG
1000 TABLET ORAL EVERY 6 HOURS
Status: DISCONTINUED | OUTPATIENT
Start: 2019-04-24 | End: 2019-04-27 | Stop reason: HOSPADM

## 2019-04-24 RX ORDER — CELECOXIB 200 MG/1
400 CAPSULE ORAL ONCE
Status: COMPLETED | OUTPATIENT
Start: 2019-04-24 | End: 2019-04-24

## 2019-04-24 RX ORDER — OXYCODONE HYDROCHLORIDE 5 MG/1
5 TABLET ORAL
Status: DISCONTINUED | OUTPATIENT
Start: 2019-04-24 | End: 2019-04-25

## 2019-04-24 RX ORDER — CELECOXIB 200 MG/1
200 CAPSULE ORAL DAILY
Status: DISCONTINUED | OUTPATIENT
Start: 2019-04-25 | End: 2019-04-27 | Stop reason: HOSPADM

## 2019-04-24 RX ORDER — MORPHINE SULFATE 2 MG/ML
2 INJECTION, SOLUTION INTRAMUSCULAR; INTRAVENOUS EVERY 4 HOURS PRN
Status: DISCONTINUED | OUTPATIENT
Start: 2019-04-24 | End: 2019-04-24

## 2019-04-24 RX ADMIN — ACETAMINOPHEN 1000 MG: 500 TABLET ORAL at 12:04

## 2019-04-24 RX ADMIN — OXYCODONE HYDROCHLORIDE 10 MG: 10 TABLET ORAL at 06:04

## 2019-04-24 RX ADMIN — POLYETHYLENE GLYCOL 3350 17 G: 17 POWDER, FOR SOLUTION ORAL at 08:04

## 2019-04-24 RX ADMIN — OXYCODONE HYDROCHLORIDE 10 MG: 10 TABLET ORAL at 04:04

## 2019-04-24 RX ADMIN — CELECOXIB 400 MG: 200 CAPSULE ORAL at 12:04

## 2019-04-24 RX ADMIN — STANDARDIZED SENNA CONCENTRATE AND DOCUSATE SODIUM 1 TABLET: 8.6; 5 TABLET, FILM COATED ORAL at 08:04

## 2019-04-24 RX ADMIN — IPRATROPIUM BROMIDE AND ALBUTEROL SULFATE 3 ML: .5; 3 SOLUTION RESPIRATORY (INHALATION) at 07:04

## 2019-04-24 RX ADMIN — ROPIVACAINE HYDROCHLORIDE 2 ML/HR: 2 INJECTION, SOLUTION EPIDURAL; INFILTRATION at 09:04

## 2019-04-24 RX ADMIN — ACETAMINOPHEN 1000 MG: 500 TABLET ORAL at 06:04

## 2019-04-24 RX ADMIN — OXYCODONE HYDROCHLORIDE 10 MG: 10 TABLET ORAL at 08:04

## 2019-04-24 RX ADMIN — MORPHINE SULFATE 2 MG: 2 INJECTION, SOLUTION INTRAMUSCULAR; INTRAVENOUS at 12:04

## 2019-04-24 RX ADMIN — PANTOPRAZOLE SODIUM 40 MG: 40 TABLET, DELAYED RELEASE ORAL at 06:04

## 2019-04-24 RX ADMIN — IPRATROPIUM BROMIDE AND ALBUTEROL SULFATE 3 ML: .5; 3 SOLUTION RESPIRATORY (INHALATION) at 01:04

## 2019-04-24 RX ADMIN — ROPIVACAINE HYDROCHLORIDE 2 ML/HR: 2 INJECTION, SOLUTION EPIDURAL; INFILTRATION at 04:04

## 2019-04-24 RX ADMIN — OXYCODONE HYDROCHLORIDE 10 MG: 10 TABLET ORAL at 10:04

## 2019-04-24 RX ADMIN — PREGABALIN 150 MG: 150 CAPSULE ORAL at 08:04

## 2019-04-24 RX ADMIN — LIDOCAINE 1 PATCH: 50 PATCH TOPICAL at 03:04

## 2019-04-24 RX ADMIN — ROPIVACAINE HYDROCHLORIDE 2 ML/HR: 2 INJECTION, SOLUTION EPIDURAL; INFILTRATION at 12:04

## 2019-04-24 NOTE — ADDENDUM NOTE
Addendum  created 04/24/19 1341 by Adolph Clark MD    Charge Capture section accepted, Cosign clinical note with attestation

## 2019-04-24 NOTE — PLAN OF CARE
Problem: Physical Therapy Goal  Goal: Physical Therapy Goal  Outcome: Outcome(s) achieved Date Met: 04/24/19  PT evaluation complete. No goals established as pt is baseline with mobility and has no acute PT needs at this time. D/C from PT services.    Gina Kellogg, PT, DPT   4/24/2019  [phone]

## 2019-04-24 NOTE — ASSESSMENT & PLAN NOTE
50 yo female with endobronchial carcinoid now POD1 right thoracotomy, right lower bilobectomy    - ERS in place. Ideally would like breakthrough medications to be PO instead of IV Morphine. Continue multimodal pain control per anesthesia-  Lyrica, tylenol and robaxin scheduled  - Remove Alcides drain today. Angled chest tube to water seal.   - Daily CXR  - Pulm toilet -  IS, acapella, duonebs. On room air  - Bowel regimen - colace/miralax  - Regular diet  - Ambulate- PT/OT  - Dispo: Progressing well. PT/OT. Possibly home on Friday.

## 2019-04-24 NOTE — PLAN OF CARE
Problem: Occupational Therapy Goal  Goal: Occupational Therapy Goal  Outcome: Outcome(s) achieved Date Met: 04/24/19  Eval completed; no OT needs    Comments: D/C OT 4/24/2019    Gina Buckley, OT

## 2019-04-24 NOTE — PT/OT/SLP EVAL
Occupational Therapy   Evaluation and Discharge Note    Name: Gina Coto  MRN: [id number]  Admitting Diagnosis:  Carcinoid tumor of right lung 2 Days Post-Op    Recommendations:     Discharge Recommendations: home  Discharge Equipment Recommendations:  none  Barriers to discharge:  None    Assessment:     Gina Coto is a 49 y.o. female with a medical diagnosis of Carcinoid tumor of right lung. At this time, patient is functioning at their prior level of function and does not require further acute OT services.     Plan:     During this hospitalization, patient does not require further acute OT services.  Please re-consult if situation changes.    · Plan of Care Reviewed with: patient    Subjective     Chief Complaint: pain  Patient/Family Comments/goals: return to PLOF    Occupational Profile:  Living Environment: Pt lives with her  in 1SH with 0STE. Home has walk-in shower  Previous level of function: PTA, pt reports independence with all ADL and IADL, including driving. Pt was not using any AD for ambulation or ADL. 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  Roles and Routines: wife, self-employed  Equipment Used at home:  none  Assistance upon Discharge:  to assist as needed following d/c     Pain/Comfort:  · Pain Rating 1: 8/10  · Location - Side 1: Right  · Location 1: scapula  · Pain Addressed 1: Reposition, Distraction, Cessation of Activity, Nurse notified  · Pain Rating Post-Intervention 1: 8/10    Patients cultural, spiritual, Church conflicts given the current situation: no    Objective:     Communicated with: RN prior to session.  Patient found HOB elevated with perineural catheter, chest tube, SCD upon OT entry to room.    General Precautions: Standard, fall   Orthopedic Precautions:N/A   Braces: N/A     Occupational Performance:    Bed Mobility:    · Patient completed Supine to Sit with modified independence and with side rail    Functional  Mobility/Transfers:  · Patient completed Sit <> Stand Transfer with independence  with  no assistive device   · Patient completed Bed <> Chair Transfer using Step Transfer technique with supervision with no assistive device  · Functional Mobility: Pt ambulate 300 ft with supervision using no AD    Activities of Daily Living:  · Upper Body Dressing: setup assistance to don backward gown while standing  · Lower Body Dressing: setup assistance to don B socks while seated EOB    Cognitive/Visual Perceptual:  Cognitive/Psychosocial Skills:     -       Oriented to: Person, Place, Time and Situation   -       Follows Commands/attention:Follows multistep  commands  -       Communication: clear/fluent  -       Memory: No Deficits noted  -       Safety awareness/insight to disability: intact   -       Mood/Affect/Coping skills/emotional control: Appropriate to situation  Visual/Perceptual:      -Pt wears reading glasses; hearing intact    Physical Exam:  Balance:    -       good sitting/standing balance  Postural examination/scapula alignment:    -       No postural abnormalities identified  Skin integrity: Visible skin intact  Edema:  None noted  Sensation:    -       Intact  Dominant hand:    -       R hand  Upper Extremity Range of Motion:     -       Right Upper Extremity: WFL  -       Left Upper Extremity: WFL  Upper Extremity Strength:    -       Right Upper Extremity: WFL  -       Left Upper Extremity: WFL   Strength:    -       Right Upper Extremity: WFL  -       Left Upper Extremity: WFL  Fine Motor Coordination:    -       Intact  Gross motor coordination:   WFL    AMPAC 6 Click ADL:  AMPAC Total Score: 24    Treatment & Education:  Pt educated on role of OT/POC  Pt educated on importance of ambulation/UIC  Pt educated on and in agreement with d/c from OT  White board/communication board updated  Education:    Patient left up in chair with all lines intact, call button in reach and RN notified    GOALS:    Multidisciplinary Problems     Occupational Therapy Goals     Not on file          Multidisciplinary Problems (Resolved)        Problem: Occupational Therapy Goal    Goal Priority Disciplines Outcome Interventions   Occupational Therapy Goal   (Resolved)     OT, PT/OT Outcome(s) achieved                    History:     Past Medical History:   Diagnosis Date    Seasonal asthma        Past Surgical History:   Procedure Laterality Date    BRONCHOSCOPY N/A 4/22/2019    [facility] 2ND FLR    BRONCHOSCOPY N/A 11/28/2018    Performed by [facility] at [facility] CATH LAB    LOBECTOMY, SLEEVE, LUNG, THORACOTOMY APPROACH Right 4/22/2019    [facility]    PELVIC LAPAROSCOPY      THORACOTOMY Right 4/22/2019    Performed by [facility] OR [facility] FLR       Time Tracking:     OT Date of Treatment: 04/24/19  OT Start Time: 1028  OT Stop Time: 1050  OT Total Time (min): 22 min    Billable Minutes:Evaluation 22    Gina Buckley OT  4/24/2019

## 2019-04-24 NOTE — PROGRESS NOTES
[facility]  Thoracic Surgery  Progress Note    Subjective:     History of Present Illness:  49 year old female with PMH of asthma returns to schedule bronchus intermedius carcinoid resection. Previously schedule for surgery in January 2019; however, she was uninsured and it took her until early February to secure Medicaid. History dates to November 2018 when she resented to ED for 10 days of fever, chills, cough, and body aches. CXR prompted chest CT which showed endobronchial BI lesion. Work-up also included PFTS, V/Q, and DSE. Active at home. No history of prior cardiac or thoracic procedures. Not on blood thinners. Today she reports no major complaints.  Denies fever, chills, CP, SOB, hemoptysis, changes in bowel function. Returns today with updated chest CT and PFTs.      Quit smoking December 2018. 15 pack years. Occasional etoh and marijuana. Self employed.   PSH: diagnostic laparoscopy, R shoulder cyst excision         Post-Op Info:  Procedure(s) (LRB):  THORACOTOMY (Right)  LOBECTOMY, SLEEVE, LUNG, THORACOTOMY APPROACH (Right)  BRONCHOSCOPY (N/A)   2 Days Post-Op     Interval History: NAEON. Stable on room air. Pain well controlled but she is requiring 2mg IV Morphine every 3 hours. Not seen by PT/OT yesterday. Tolerating diet.     Medications:  Continuous Infusions:   ropivacaine (PF) 2 mg/ml (0.2%) 2 mL/hr (04/24/19 0026)     Scheduled Meds:   acetaminophen  1,000 mg Oral Q6H    albuterol-ipratropium  3 mL Nebulization Q6H    ceFAZolin (ANCEF) IVPB  2 g Intravenous Q8H    lidocaine  1 patch Transdermal Q24H    ondansetron  8 mg Intravenous Once    pantoprazole  40 mg Oral Before breakfast    polyethylene glycol  17 g Oral Daily    pregabalin  150 mg Oral QHS    senna-docusate 8.6-50 mg  1 tablet Oral BID     PRN Meds:bisacodyl, lactulose, morphine, ondansetron, oxyCODONE, oxyCODONE, promethazine (PHENERGAN) IVPB     Review of patient's allergies indicates:  No Known  Allergies  Objective:     Vital Signs (Most Recent):  Temp: 97.9 °F (36.6 °C) (04/24/19 0723)  Pulse: 87 (04/24/19 0723)  Resp: 18 (04/24/19 0723)  BP: 125/73 (04/24/19 0723)  SpO2: 95 % (04/24/19 0723) Vital Signs (24h Range):  Temp:  [96.9 °F (36.1 °C)-98.6 °F (37 °C)] 97.9 °F (36.6 °C)  Pulse:  [82-97] 87  Resp:  [14-20] 18  SpO2:  [93 %-100 %] 95 %  BP: (117-140)/(71-89) 125/73     Intake/Output - Last 3 Shifts       04/22 0700 - 04/23 0659 04/23 0700 - 04/24 0659 04/24 0700 - 04/25 0659    P.O. 800 1940 480    I.V. (mL/kg) 2177.8 (24.7) 21.5 (0.2) 28.1 (0.3)    IV Piggyback 50      Total Intake(mL/kg) 3027.8 (34.4) 1961.5 (22.3) 508.1 (5.8)    Urine (mL/kg/hr) 300 (0.1) 0 (0)     Stool 0      Chest Tube 525 390 330    Total Output 825 390 330    Net +2202.8 +1571.5 +178.1           Urine Occurrence 5 x 6 x     Stool Occurrence 0 x            SpO2: 95 %  O2 Device (Oxygen Therapy): room air(Simultaneous filing. User may not have seen previous data.)    Physical Exam   Constitutional: She is oriented to person, place, and time. She appears well-developed and well-nourished.   HENT:   Head: Normocephalic.   Eyes: Pupils are equal, round, and reactive to light.   Neck: Normal range of motion. Neck supple. No tracheal deviation present.   Cardiovascular: Normal rate, regular rhythm, normal heart sounds and intact distal pulses.   Pulmonary/Chest: Effort normal and breath sounds normal. She exhibits no tenderness.   Chest tubes with ss output to water seal. Airleak in angled chest tube.      Abdominal: Soft. Bowel sounds are normal. She exhibits no distension.   Musculoskeletal: She exhibits no edema.   Neurological: She is alert and oriented to person, place, and time.   Skin: Skin is warm and dry.   Psychiatric: She has a normal mood and affect.     Significant Labs:  None    Significant Diagnostics:  CXR: I have reviewed all pertinent results/findings within the past 24 hours    VTE Risk Mitigation (From  admission, onward)        Ordered     Place PAM hose  Until discontinued      04/22/19 0735     Place sequential compression device  Until discontinued      04/22/19 0735        Assessment/Plan:     * Carcinoid tumor of right lung  50 yo female with endobronchial carcinoid now POD1 right thoracotomy, right lower bilobectomy    - ERS in place. Ideally would like breakthrough medications to be PO instead of IV Morphine. Continue multimodal pain control per anesthesia-  Lyrica, tylenol and robaxin scheduled  - Remove Alcides drain today. Angled chest tube to water seal.   - Daily CXR  - Pulm toilet -  IS, monica murdock. On room air  - Bowel regimen - colace/miralax  - Regular diet  - Ambulate- PT/OT  - Dispo: Progressing well. PT/OT. Possibly home on Friday.         APARNA Fleming  Thoracic Surgery  [facility]

## 2019-04-24 NOTE — PT/OT/SLP EVAL
Physical Therapy Evaluation and Discharge Note    Patient Name:  Gina Coto   MRN:  [id number]    Recommendations:     Discharge Recommendations:  home   Discharge Equipment Recommendations: none   Barriers to discharge: None    Assessment:     Gina Coto is a 49 y.o. female admitted with a medical diagnosis of Carcinoid tumor of right lung. Pt completing functional mobility without physical assist or use of DME. Ambulated greater than household distance without LOB or SOB. Mild dizziness reported x1 during gait, but quickly resolved with standing rest.  At this time, patient is functioning at their prior level of function and does not require further acute PT services.     Recent Surgery: Procedure(s) (LRB):  THORACOTOMY (Right)  LOBECTOMY, SLEEVE, LUNG, THORACOTOMY APPROACH (Right)  BRONCHOSCOPY (N/A) 2 Days Post-Op    Plan:     During this hospitalization, patient does not require further acute PT services.  Please re-consult if situation changes.      Subjective     Chief Complaint: back/scapular pain  Patient/Family Comments/goals: return home  Pain/Comfort:  · Pain Rating 1: 8/10  · Location - Side 1: Right  · Location 1: (scapula and thoracic area)  · Pain Addressed 1: Reposition, Distraction, Nurse notified, pain meds given by RN, heat packs applied    Patients cultural, spiritual, Yarsanism conflicts given the current situation: no    Living Environment:  Pt lives with her  in a 1SH with 0 CIARRA.   Prior to admission, patients level of function was independent.  Equipment used at home: none.  DME owned (not currently used): none.  Upon discharge, patient will have assistance from .    Objective:     Communicated with RN prior to session.  Patient found supine with chest tube, PCA upon PT entry to room.    General Precautions: Standard,     Orthopedic Precautions:N/A   Braces: N/A     Exams:  · Cognitive Exam:  Patient is oriented to Person, Place, Time and Situation  · Sensation:     · -       Intact  · RLE ROM: WFL  · RLE Strength: WFL  · LLE ROM: WFL  · LLE Strength: WFL    Functional Mobility:  · Bed Mobility:     · Supine to Sit: modified independence with HOB elevated  · Transfers:     · Sit to Stand:  independence with no AD  · Gait: ~300 ft. with no AD with supervision  · No major gait deviations noted  · Episode of dizziness x1; improved with standing rest and cues for breathing technique     AM-PAC 6 CLICK MOBILITY  Total Score:23       Therapeutic Activities and Exercises:   Pt educated on role of PT and PT POC, including plan to d/c IP PT services at this time. Pt verbalized understanding.   Pt educated on continuing ambulation with nursing assist. Pt v/u.     AM-PAC 6 CLICK MOBILITY  Total Score:23     Patient left up in chair with all lines intact, call button in reach and RN notified.    GOALS:   Multidisciplinary Problems     Physical Therapy Goals     Not on file          Multidisciplinary Problems (Resolved)        Problem: Physical Therapy Goal    Goal Priority Disciplines Outcome Goal Variances Interventions   Physical Therapy Goal   (Resolved)     PT, PT/OT Outcome(s) achieved                     History:     Past Medical History:   Diagnosis Date    Seasonal asthma        Past Surgical History:   Procedure Laterality Date    BRONCHOSCOPY N/A 4/22/2019    [facility] 2ND FLR    BRONCHOSCOPY N/A 11/28/2018    Performed by [facility] CATH LAB    LOBECTOMY, SLEEVE, LUNG, THORACOTOMY APPROACH Right 4/22/2019    [facility] OR 2ND FLR    PELVIC LAPAROSCOPY      THORACOTOMY Right 4/22/2019    Per[facility] by [facility] at [facility] OR 2ND FLR       Time Tracking:     PT Received On: 04/24/19  PT Start Time: 1031     PT Stop Time: 1053  PT Total Time (min): 22 min     Billable Minutes: Evaluation 22  (co-eval with OT)    Gina Kellogg, PT, DPT   4/24/2019   [phone]

## 2019-04-24 NOTE — SUBJECTIVE & OBJECTIVE
Interval History: NAEON. Stable on room air. Pain well controlled but she is requiring 2mg IV Morphine every 3 hours. Not seen by PT/OT yesterday. Tolerating diet.     Medications:  Continuous Infusions:   ropivacaine (PF) 2 mg/ml (0.2%) 2 mL/hr (04/24/19 0026)     Scheduled Meds:   acetaminophen  1,000 mg Oral Q6H    albuterol-ipratropium  3 mL Nebulization Q6H    ceFAZolin (ANCEF) IVPB  2 g Intravenous Q8H    lidocaine  1 patch Transdermal Q24H    ondansetron  8 mg Intravenous Once    pantoprazole  40 mg Oral Before breakfast    polyethylene glycol  17 g Oral Daily    pregabalin  150 mg Oral QHS    senna-docusate 8.6-50 mg  1 tablet Oral BID     PRN Meds:bisacodyl, lactulose, morphine, ondansetron, oxyCODONE, oxyCODONE, promethazine (PHENERGAN) IVPB     Review of patient's allergies indicates:  No Known Allergies  Objective:     Vital Signs (Most Recent):  Temp: 97.9 °F (36.6 °C) (04/24/19 0723)  Pulse: 87 (04/24/19 0723)  Resp: 18 (04/24/19 0723)  BP: 125/73 (04/24/19 0723)  SpO2: 95 % (04/24/19 0723) Vital Signs (24h Range):  Temp:  [96.9 °F (36.1 °C)-98.6 °F (37 °C)] 97.9 °F (36.6 °C)  Pulse:  [82-97] 87  Resp:  [14-20] 18  SpO2:  [93 %-100 %] 95 %  BP: (117-140)/(71-89) 125/73     Intake/Output - Last 3 Shifts       04/22 0700 - 04/23 0659 04/23 0700 - 04/24 0659 04/24 0700 - 04/25 0659    P.O. 800 1940 480    I.V. (mL/kg) 2177.8 (24.7) 21.5 (0.2) 28.1 (0.3)    IV Piggyback 50      Total Intake(mL/kg) 3027.8 (34.4) 1961.5 (22.3) 508.1 (5.8)    Urine (mL/kg/hr) 300 (0.1) 0 (0)     Stool 0      Chest Tube 525 390 330    Total Output 825 390 330    Net +2202.8 +1571.5 +178.1           Urine Occurrence 5 x 6 x     Stool Occurrence 0 x            SpO2: 95 %  O2 Device (Oxygen Therapy): room air(Simultaneous filing. User may not have seen previous data.)    Physical Exam   Constitutional: She is oriented to person, place, and time. She appears well-developed and well-nourished.   HENT:   Head:  Normocephalic.   Eyes: Pupils are equal, round, and reactive to light.   Neck: Normal range of motion. Neck supple. No tracheal deviation present.   Cardiovascular: Normal rate, regular rhythm, normal heart sounds and intact distal pulses.   Pulmonary/Chest: Effort normal and breath sounds normal. She exhibits no tenderness.   Chest tubes with ss output to water seal. Airleak in angled chest tube.      Abdominal: Soft. Bowel sounds are normal. She exhibits no distension.   Musculoskeletal: She exhibits no edema.   Neurological: She is alert and oriented to person, place, and time.   Skin: Skin is warm and dry.   Psychiatric: She has a normal mood and affect.     Significant Labs:  None    Significant Diagnostics:  CXR: I have reviewed all pertinent results/findings within the past 24 hours    VTE Risk Mitigation (From admission, onward)        Ordered     Place PAM hose  Until discontinued      04/22/19 0735     Place sequential compression device  Until discontinued      04/22/19 0765

## 2019-04-24 NOTE — HOSPITAL COURSE
4/22/19-  Bronchoscopy, Right Thoracotomy with Right Lower Bilobectomy with Bronchoplasty, Adhesiolysis and MLND, 4/23/19-  Regular diet. RA. Chest tubes to water seal. Erector spinae for pain control.   4/24/19 - rachelle removed. Regular diet. D/c morphine, celecoxib added. Ambulated in halls. BM x 1.   4/25/19 - erector spinae removed. Lasix for high chest tube output. Regular diet. RA.   4/26/19 -

## 2019-04-24 NOTE — HPI
49 year old female with PMH of asthma returns to schedule bronchus intermedius carcinoid resection. Previously schedule for surgery in January 2019; however, she was uninsured and it took her until early February to secure Medicaid. History dates to November 2018 when she resented to ED for 10 days of fever, chills, cough, and body aches. CXR prompted chest CT which showed endobronchial BI lesion. Work-up also included PFTS, V/Q, and DSE. Active at home. No history of prior cardiac or thoracic procedures. Not on blood thinners. Today she reports no major complaints.  Denies fever, chills, CP, SOB, hemoptysis, changes in bowel function. Returns today with updated chest CT and PFTs.      Quit smoking December 2018. 15 pack years. Occasional etoh and marijuana. Self employed.   PSH: diagnostic laparoscopy, R shoulder cyst excision

## 2019-04-24 NOTE — ANESTHESIA POST-OP PAIN MANAGEMENT
Acute Pain Service Progress Note    Gina Coto is a 49 y.o., female, [id number].    Surgery:  Thoracotomy R, sleeve lobectomy     Post Op Day #: 2    Catheter type: perineural  WILFREDO    Infusion type: Ropivacaine 0.2%  2cc/hr with 10cc/hr intermittent    Problem List:    Active Hospital Problems    Diagnosis  POA    *Carcinoid tumor of right lung [D3A.090]  Yes      Resolved Hospital Problems   No resolved problems to display.       Subjective:     General appearance of alert, oriented, no complaints   Pain with rest: 5   Pain with movement: 5   Side Effects    1. Pruritis No    2. Nausea No    3. Motor Blockade No, 0=Ability to raise lower extremities off bed    4. Sedation No, 1=awake and alert    Objective:     Catheter site clean, dry, intact      Vitals   Vitals:    04/24/19 0723   BP: 125/73   Pulse: 87   Resp: 18   Temp: 36.6 °C (97.9 °F)        Labs    Admission on 04/22/2019   Component Date Value Ref Range Status    Group & Rh 04/22/2019 A NEG   Final    Indirect Hector 04/22/2019 NEG   Final    UNIT NUMBER 04/22/2019 [id number]   Preliminary    PRODUCT CODE 04/22/2019 [id number]   Preliminary    DISPENSE STATUS 04/22/2019 CROSSMATCHED   Preliminary    CODING SYSTEM 04/22/2019 [id number]   Preliminary    Unit Blood Type Code 04/22/2019 0600   Preliminary    Unit Blood Type 04/22/2019 A NEG   Preliminary    Unit Expiration 04/22/2019 [id number]   Preliminary    UNIT NUMBER 04/22/2019 [id number]   Preliminary    PRODUCT CODE 04/22/2019 [id number]   Preliminary    DISPENSE STATUS 04/22/2019 CROSSMATCHED   Preliminary    CODING SYSTEM 04/22/2019 [id number]   Preliminary    Unit Blood Type Code 04/22/2019 0600   Preliminary    Unit Blood Type 04/22/2019 A NEG   Preliminary    Unit Expiration 04/22/2019 [id number]   Preliminary    POC Preg Test, Ur 04/22/2019 Negative  Negative Final     Acceptable 04/22/2019 Yes   Final    UNIT NUMBER 04/22/2019 [id number]   Preliminary     PRODUCT CODE 04/22/2019 [id number]   Preliminary    DISPENSE STATUS 04/22/2019 CROSSMATCHED   Preliminary    CODING SYSTEM 04/22/2019 [id number]   Preliminary    Unit Blood Type Code 04/22/2019 0600   Preliminary    Unit Blood Type 04/22/2019 A NEG   Preliminary    Unit Expiration 04/22/2019 [id number]   Preliminary    UNIT NUMBER 04/22/2019 [id number]   Preliminary    PRODUCT CODE 04/22/2019 [id number]   Preliminary    DISPENSE STATUS 04/22/2019 CROSSMATCHED   Preliminary    CODING SYSTEM 04/22/2019 [id number]   Preliminary    Unit Blood Type Code 04/22/2019 0600   Preliminary    Unit Blood Type 04/22/2019 A NEG   Preliminary    Unit Expiration 04/22/2019 [id number]   Preliminary        Meds   Current Facility-Administered Medications   Medication Dose Route Frequency Provider Last Rate Last Dose    acetaminophen tablet 1,000 mg  1,000 mg Oral Q6H Scottie Juan MD        albuterol-ipratropium 2.5 mg-0.5 mg/3 mL nebulizer solution 3 mL  3 mL Nebulization Q6H Palmer May MD   3 mL at 04/24/19 0723    bisacodyl suppository 10 mg  10 mg Rectal Daily PRN Palmer May MD        celecoxib capsule 400 mg  400 mg Oral Once Scottie Juan MD        And    [START ON 4/25/2019] celecoxib capsule 200 mg  200 mg Oral Daily Scottie Juan MD        lactulose 20 gram/30 mL solution Soln 20 g  20 g Oral Q6H PRN Palmer May MD        lidocaine 5 % patch 1 patch  1 patch Transdermal Q24H Norma Tucker PA-C   1 patch at 04/23/19 1412    morphine injection 2 mg  2 mg Intravenous Q4H PRN Scottie Juan MD        ondansetron injection 4 mg  4 mg Intravenous Q12H PRN Palmer May MD   4 mg at 04/22/19 2049    ondansetron injection 8 mg  8 mg Intravenous Once APARNA Key        oxyCODONE immediate release tablet 5 mg  5 mg Oral Q3H PRN Scottie Juan MD        oxyCODONE immediate release tablet Tab 10 mg  10 mg Oral Q3H PRN Scottie uJan MD         pantoprazole EC tablet 40 mg  40 mg Oral Before breakfast Palmer May MD   40 mg at 04/24/19 0615    polyethylene glycol packet 17 g  17 g Oral Daily Palmer May MD   17 g at 04/24/19 0817    pregabalin capsule 150 mg  150 mg Oral QHS Palmer May MD   150 mg at 04/23/19 2045    promethazine (PHENERGAN) 6.25 mg in dextrose 5 % 50 mL IVPB  6.25 mg Intravenous Q6H PRN Palmer May  mL/hr at 04/22/19 2156 6.25 mg at 04/22/19 2156    ropivacaine (PF) 2 mg/ml (0.2%) infusion  2 mL/hr Perineural Continuous Palmer May MD 2 mL/hr at 04/24/19 0926 2 mL/hr at 04/24/19 0926    senna-docusate 8.6-50 mg per tablet 1 tablet  1 tablet Oral BID Palmer May MD   1 tablet at 04/24/19 0817         Assessment:     Pain control adequate    Plan:     Pain control improved today. Will discourage IV morphine, encourage po pain meds.   Continue po lyrica, celebrex and tylenol    Evaluator Scottie Juan

## 2019-04-24 NOTE — PLAN OF CARE
04/23/19 1419   Discharge Assessment   Assessment Type Discharge Planning Assessment   Confirmed/corrected address and phone number on facesheet? Yes   Assessment information obtained from? Medical Record   Expected Length of Stay (days) 2   Communicated expected length of stay with patient/caregiver yes   Prior to hospitilization cognitive status: Alert/Oriented   Prior to hospitalization functional status: Independent   Current cognitive status: Alert/Oriented   Current Functional Status: Assistive Equipment;Needs Assistance   Facility Arrived From: home for planned surgery   Lives With spouse   Able to Return to Prior Arrangements yes   Is patient able to care for self after discharge? Yes   Who are your caregiver(s) and their phone number(s)? spouse Michael Sun [phone]   Patient's perception of discharge disposition home or selfcare   Readmission Within the Last 30 Days no previous admission in last 30 days   Patient currently being followed by outpatient case management? No   Equipment Currently Used at Home none   Do you have any problems affording any of your prescribed medications? No   Is the patient taking medications as prescribed? yes   Does the patient have transportation home? Yes   Does the patient receive services at the [facility]? No   Discharge Plan A Home with family   Discharge Plan B Home with family;Home Health   DME Needed Upon Discharge  none   Patient/Family in Agreement with Plan yes     Pt admitted for Thoracotomy R, sleeve lobectomy. Pain controlled w/epidural and CT to water seal. PT/OT consulted. recs pending. Possible discharge in the next two days. Lives w/spouse in [place], [place]. Independent with ADL's and has transportation.     .  OREN CLAYTON  [facility] - [address] [facility]  [address] [facility]  [place] [address]  Phone: [phone] Fax: [phone]    [address] [facility]  [address] [facility] [address]  Phone:  [phone] Fax: [phone]    [id number] - [place] - [address]  [address]  [place] [address]  Phone: [phone] Fax: [phone]      Claire Frias MD  [phone] phone  [phone] fax  Payor: MEDICAID / Plan: [facility] ([facility]) / Product Type: Managed Medicaid /

## 2019-04-24 NOTE — PLAN OF CARE
Problem: Adult Inpatient Plan of Care  Goal: Plan of Care Review  Outcome: Ongoing (interventions implemented as appropriate)  Pt alert and oriented medicated for pain management x3 with prn medications Pt also continues ropivacaine for pain control. Changed dressing to chest tubes and pt tolerated it well up to [facility] SCDs in place Pt encouraged to use IS while awake to help expand her lungs and she was also encouraged to be more active

## 2019-04-25 LAB — POTASSIUM SERPL-SCNC: 4.4 MMOL/L (ref 3.5–5.1)

## 2019-04-25 PROCEDURE — 25000242 PHARM REV CODE 250 ALT 637 W/ HCPCS: Performed by: THORACIC SURGERY (CARDIOTHORACIC VASCULAR SURGERY)

## 2019-04-25 PROCEDURE — 36415 COLL VENOUS BLD VENIPUNCTURE: CPT

## 2019-04-25 PROCEDURE — 20600001 HC STEP DOWN PRIVATE ROOM

## 2019-04-25 PROCEDURE — 63600175 PHARM REV CODE 636 W HCPCS: Performed by: PHYSICIAN ASSISTANT

## 2019-04-25 PROCEDURE — 84132 ASSAY OF SERUM POTASSIUM: CPT

## 2019-04-25 PROCEDURE — 94640 AIRWAY INHALATION TREATMENT: CPT

## 2019-04-25 PROCEDURE — 94761 N-INVAS EAR/PLS OXIMETRY MLT: CPT

## 2019-04-25 PROCEDURE — 99900035 HC TECH TIME PER 15 MIN (STAT)

## 2019-04-25 PROCEDURE — 94799 UNLISTED PULMONARY SVC/PX: CPT

## 2019-04-25 PROCEDURE — 25000003 PHARM REV CODE 250: Performed by: STUDENT IN AN ORGANIZED HEALTH CARE EDUCATION/TRAINING PROGRAM

## 2019-04-25 PROCEDURE — 63600175 PHARM REV CODE 636 W HCPCS: Performed by: THORACIC SURGERY (CARDIOTHORACIC VASCULAR SURGERY)

## 2019-04-25 PROCEDURE — 25000003 PHARM REV CODE 250: Performed by: THORACIC SURGERY (CARDIOTHORACIC VASCULAR SURGERY)

## 2019-04-25 PROCEDURE — 25000003 PHARM REV CODE 250: Performed by: PHYSICIAN ASSISTANT

## 2019-04-25 RX ORDER — ENOXAPARIN SODIUM 100 MG/ML
40 INJECTION SUBCUTANEOUS DAILY
Status: DISCONTINUED | OUTPATIENT
Start: 2019-04-25 | End: 2019-04-27 | Stop reason: HOSPADM

## 2019-04-25 RX ORDER — SYRING-NEEDL,DISP,INSUL,0.3 ML 29 G X1/2"
296 SYRINGE, EMPTY DISPOSABLE MISCELLANEOUS ONCE AS NEEDED
Status: DISCONTINUED | OUTPATIENT
Start: 2019-04-25 | End: 2019-04-27 | Stop reason: HOSPADM

## 2019-04-25 RX ORDER — FUROSEMIDE 10 MG/ML
40 INJECTION INTRAMUSCULAR; INTRAVENOUS ONCE
Status: COMPLETED | OUTPATIENT
Start: 2019-04-25 | End: 2019-04-25

## 2019-04-25 RX ORDER — OXYCODONE HYDROCHLORIDE 10 MG/1
10 TABLET ORAL EVERY 4 HOURS PRN
Status: DISCONTINUED | OUTPATIENT
Start: 2019-04-25 | End: 2019-04-27 | Stop reason: HOSPADM

## 2019-04-25 RX ORDER — OXYCODONE HYDROCHLORIDE 5 MG/1
5 TABLET ORAL EVERY 4 HOURS PRN
Status: DISCONTINUED | OUTPATIENT
Start: 2019-04-25 | End: 2019-04-27 | Stop reason: HOSPADM

## 2019-04-25 RX ORDER — ENOXAPARIN SODIUM 100 MG/ML
40 INJECTION SUBCUTANEOUS EVERY 24 HOURS
Status: DISCONTINUED | OUTPATIENT
Start: 2019-04-25 | End: 2019-04-25

## 2019-04-25 RX ORDER — POTASSIUM CHLORIDE 20 MEQ/15ML
20 SOLUTION ORAL ONCE
Status: COMPLETED | OUTPATIENT
Start: 2019-04-25 | End: 2019-04-25

## 2019-04-25 RX ADMIN — ENOXAPARIN SODIUM 40 MG: 100 INJECTION SUBCUTANEOUS at 09:04

## 2019-04-25 RX ADMIN — ACETAMINOPHEN 1000 MG: 500 TABLET ORAL at 05:04

## 2019-04-25 RX ADMIN — OXYCODONE HYDROCHLORIDE 5 MG: 5 TABLET ORAL at 12:04

## 2019-04-25 RX ADMIN — POTASSIUM CHLORIDE 20 MEQ: 20 SOLUTION ORAL at 09:04

## 2019-04-25 RX ADMIN — STANDARDIZED SENNA CONCENTRATE AND DOCUSATE SODIUM 1 TABLET: 8.6; 5 TABLET, FILM COATED ORAL at 09:04

## 2019-04-25 RX ADMIN — IPRATROPIUM BROMIDE AND ALBUTEROL SULFATE 3 ML: .5; 3 SOLUTION RESPIRATORY (INHALATION) at 09:04

## 2019-04-25 RX ADMIN — ROPIVACAINE HYDROCHLORIDE 2 ML/HR: 2 INJECTION, SOLUTION EPIDURAL; INFILTRATION at 12:04

## 2019-04-25 RX ADMIN — ACETAMINOPHEN 1000 MG: 500 TABLET ORAL at 01:04

## 2019-04-25 RX ADMIN — OXYCODONE HYDROCHLORIDE 5 MG: 5 TABLET ORAL at 09:04

## 2019-04-25 RX ADMIN — ACETAMINOPHEN 1000 MG: 500 TABLET ORAL at 12:04

## 2019-04-25 RX ADMIN — FUROSEMIDE 40 MG: 10 INJECTION, SOLUTION INTRAMUSCULAR; INTRAVENOUS at 09:04

## 2019-04-25 RX ADMIN — POLYETHYLENE GLYCOL 3350 17 G: 17 POWDER, FOR SOLUTION ORAL at 09:04

## 2019-04-25 RX ADMIN — IPRATROPIUM BROMIDE AND ALBUTEROL SULFATE 3 ML: .5; 3 SOLUTION RESPIRATORY (INHALATION) at 01:04

## 2019-04-25 RX ADMIN — OXYCODONE HYDROCHLORIDE 10 MG: 10 TABLET ORAL at 03:04

## 2019-04-25 RX ADMIN — LIDOCAINE 1 PATCH: 50 PATCH TOPICAL at 03:04

## 2019-04-25 RX ADMIN — PREGABALIN 150 MG: 150 CAPSULE ORAL at 09:04

## 2019-04-25 RX ADMIN — PANTOPRAZOLE SODIUM 40 MG: 40 TABLET, DELAYED RELEASE ORAL at 05:04

## 2019-04-25 RX ADMIN — OXYCODONE HYDROCHLORIDE 10 MG: 10 TABLET ORAL at 07:04

## 2019-04-25 RX ADMIN — OXYCODONE HYDROCHLORIDE 5 MG: 5 TABLET ORAL at 05:04

## 2019-04-25 RX ADMIN — CELECOXIB 200 MG: 200 CAPSULE ORAL at 09:04

## 2019-04-25 RX ADMIN — OXYCODONE HYDROCHLORIDE 10 MG: 10 TABLET ORAL at 10:04

## 2019-04-25 NOTE — PROGRESS NOTES
Pt resting comfortably.  Right WILFREDO PNC has been paused. Dressing CDI.  Catheter discontinued, tip intact.  Pt tolerated well.  Educated regarding continued pain management.  Understanding verbalized.

## 2019-04-25 NOTE — ADDENDUM NOTE
Addendum  created 04/25/19 1046 by Gisselle Castanon RN    LDA properties accepted, Order list changed, Sign clinical note

## 2019-04-25 NOTE — ASSESSMENT & PLAN NOTE
48 yo female with endobronchial carcinoid now POD1 right thoracotomy, right lower bilobectomy    - ERS in place. Aim for removal today. PO pain meds. Continue multimodal pain control per anesthesia-  Lyrica, tylenol and robaxin scheduled  - Continue Angled chest tube to water seal for high output. If output decreased tomorrow with perform clamp trial.   - K today. Lasix 40mg IV x 1 for high CT output.   - Daily CXR  - Pulm toilet -  IS, monica murdock. On room air  - Bowel regimen - colace/miralax  - Regular diet  - Ambulate- PT/OT  - lovenox  - Dispo: Progressing well. Needs to ambulate in halls 4+ times today. Possibly home on Friday.

## 2019-04-25 NOTE — PLAN OF CARE
Problem: Adult Inpatient Plan of Care  Goal: Plan of Care Review  Outcome: Ongoing (interventions implemented as appropriate)  Pt alert and oriented medicated with both scheduled and prn medications for pain management. Chest tube remains intact  To water seal with small amount of output the incision is free of infection. The pt uses her inspirometer at will and continues to cough and do breathing exercises independently she's also ambulating to the BR and had a lg BM yesterday she's hoping to go home soon and her independence is encouraged

## 2019-04-25 NOTE — PROGRESS NOTES
[facility]  Thoracic Surgery  Progress Note    Subjective:     History of Present Illness:  49 year old female with PMH of asthma returns to schedule bronchus intermedius carcinoid resection. Previously schedule for surgery in January 2019; however, she was uninsured and it took her until early February to secure Medicaid. History dates to November 2018 when she resented to ED for 10 days of fever, chills, cough, and body aches. CXR prompted chest CT which showed endobronchial BI lesion. Work-up also included PFTS, V/Q, and DSE. Active at home. No history of prior cardiac or thoracic procedures. Not on blood thinners. Today she reports no major complaints.  Denies fever, chills, CP, SOB, hemoptysis, changes in bowel function. Returns today with updated chest CT and PFTs.      Quit smoking December 2018. 15 pack years. Occasional etoh and marijuana. Self employed.   PSH: diagnostic laparoscopy, R shoulder cyst excision         Post-Op Info:  Procedure(s) (LRB):  THORACOTOMY (Right)  LOBECTOMY, SLEEVE, LUNG, THORACOTOMY APPROACH (Right)  BRONCHOSCOPY (N/A)   3 Days Post-Op     Interval History: NAEON. Stable on RA. Voiding. Walked with PT yesterday. bm x 1. Chest tube output not recorded but apx 600 cc ss output, on water seal. Turbulence in CT. Urinating.      Medications:  Continuous Infusions:   ropivacaine (PF) 2 mg/ml (0.2%) 2 mL/hr (04/25/19 0034)     Scheduled Meds:   acetaminophen  1,000 mg Oral Q6H    albuterol-ipratropium  3 mL Nebulization Q6H    celecoxib  200 mg Oral Daily    enoxaparin  40 mg Subcutaneous Daily    lidocaine  1 patch Transdermal Q24H    pantoprazole  40 mg Oral Before breakfast    polyethylene glycol  17 g Oral Daily    pregabalin  150 mg Oral QHS    senna-docusate 8.6-50 mg  1 tablet Oral BID     PRN Meds:bisacodyl, lactulose, ondansetron, oxyCODONE, oxyCODONE, promethazine (PHENERGAN) IVPB     Review of patient's allergies indicates:  No Known  Allergies  Objective:     Vital Signs (Most Recent):  Temp: 98.2 °F (36.8 °C) (04/25/19 0723)  Pulse: 87 (04/25/19 0723)  Resp: 17 (04/25/19 0723)  BP: (!) 141/75 (04/25/19 0723)  SpO2: 96 % (04/25/19 0723) Vital Signs (24h Range):  Temp:  [96.2 °F (35.7 °C)-98.4 °F (36.9 °C)] 98.2 °F (36.8 °C)  Pulse:  [81-94] 87  Resp:  [16-20] 17  SpO2:  [94 %-100 %] 96 %  BP: (112-141)/(60-75) 141/75     Intake/Output - Last 3 Shifts       04/23 0700 - 04/24 0659 04/24 0700 - 04/25 0659 04/25 0700 - 04/26 0659    P.O. 1940 1470     I.V. (mL/kg) 21.5 (0.2) 28.1 (0.3) 47.9 (0.5)    IV Piggyback       Total Intake(mL/kg) 1961.5 (22.3) 1498.1 (17) 47.9 (0.5)    Urine (mL/kg/hr) 0 (0) 0 (0)     Stool       Chest Tube 390 330     Total Output 390 330     Net +1571.5 +1168.1 +47.9           Urine Occurrence 6 x 3 x     Stool Occurrence  1 x           SpO2: 96 %  O2 Device (Oxygen Therapy): room air    Physical Exam   Constitutional: She is oriented to person, place, and time. She appears well-developed and well-nourished.   HENT:   Head: Normocephalic.   Eyes: Pupils are equal, round, and reactive to light.   Neck: Normal range of motion. Neck supple. No tracheal deviation present.   Cardiovascular: Normal rate, regular rhythm, normal heart sounds and intact distal pulses.   Pulmonary/Chest: Effort normal and breath sounds normal. She exhibits no tenderness.   Chest tube with ss output to water seal. Air leak vs turbulence in chest tube   Abdominal: Soft. Bowel sounds are normal. She exhibits no distension.   Musculoskeletal: She exhibits no edema.   Neurological: She is alert and oriented to person, place, and time.   Skin: Skin is warm and dry.   Psychiatric: She has a normal mood and affect.       Significant Labs:  None    Significant Diagnostics:  CXR: I have reviewed all pertinent results/findings within the past 24 hours    VTE Risk Mitigation (From admission, onward)        Ordered     enoxaparin injection 40 mg  Daily       04/25/19 0724     Place PAM hose  Until discontinued      04/22/19 0735     Place sequential compression device  Until discontinued      04/22/19 0735        Assessment/Plan:     * Carcinoid tumor of right lung  48 yo female with endobronchial carcinoid now POD1 right thoracotomy, right lower bilobectomy    - ERS in place. Aim for removal today. PO pain meds. Continue multimodal pain control per anesthesia-  Lyrica, tylenol and robaxin scheduled  - Continue Angled chest tube to water seal for high output. If output decreased tomorrow with perform clamp trial.   - K today. Lasix 40mg IV x 1 for high CT output.   - Daily CXR  - Pulm toilet -  IS, acapella, duonebs. On room air  - Bowel regimen - colace/miralax  - Regular diet  - Ambulate- PT/OT  - lovenox  - Dispo: Progressing well. Needs to ambulate in halls 4+ times today. Possibly home on Friday.         Norma Tucker PA-C  Thoracic Surgery  [facility]

## 2019-04-25 NOTE — ANESTHESIA POST-OP PAIN MANAGEMENT
Acute Pain Service Progress Note    Gina Coto is a 49 y.o., female, [id number].    Surgery:  Thoracotomy R, sleeve lobectomy     Post Op Day #: 3    Catheter type: perineural  WILFREDO    Infusion type: Ropivacaine 0.2%  2cc/hr with 10cc/hr intermittent    Problem List:    Active Hospital Problems    Diagnosis  POA    *Carcinoid tumor of right lung [D3A.090]  Yes      Resolved Hospital Problems   No resolved problems to display.       Subjective:     General appearance of alert, oriented, no complaints   Pain with rest: 5   Pain with movement: 5   Side Effects    1. Pruritis No    2. Nausea No    3. Motor Blockade No, 0=Ability to raise lower extremities off bed    4. Sedation No, 1=awake and alert    Objective:     Catheter site clean, dry, intact      Vitals   Vitals:    04/25/19 0723   BP: (!) 141/75   Pulse: 87   Resp: 17   Temp: 36.8 °C (98.2 °F)        Labs    Admission on 04/22/2019   Component Date Value Ref Range Status    Group & Rh 04/22/2019 A NEG   Final    Indirect Hector 04/22/2019 NEG   Final    UNIT NUMBER 04/22/2019 [id number]   Preliminary    PRODUCT CODE 04/22/2019 [id number]   Preliminary    DISPENSE STATUS 04/22/2019 CROSSMATCHED   Preliminary    CODING SYSTEM 04/22/2019 [id number]   Preliminary    Unit Blood Type Code 04/22/2019 0600   Preliminary    Unit Blood Type 04/22/2019 A NEG   Preliminary    Unit Expiration 04/22/2019 [id number]   Preliminary    UNIT NUMBER 04/22/2019 [id number]   Preliminary    PRODUCT CODE 04/22/2019 [id number]   Preliminary    DISPENSE STATUS 04/22/2019 CROSSMATCHED   Preliminary    CODING SYSTEM 04/22/2019 [id number]   Preliminary    Unit Blood Type Code 04/22/2019 0600   Preliminary    Unit Blood Type 04/22/2019 A NEG   Preliminary    Unit Expiration 04/22/2019 [id number]   Preliminary    POC Preg Test, Ur 04/22/2019 Negative  Negative Final     Acceptable 04/22/2019 Yes   Final    UNIT NUMBER 04/22/2019 [id number]    Preliminary    PRODUCT CODE 04/22/2019 [id number]   Preliminary    DISPENSE STATUS 04/22/2019 CROSSMATCHED   Preliminary    CODING SYSTEM 04/22/2019 [id number]   Preliminary    Unit Blood Type Code 04/22/2019 0600   Preliminary    Unit Blood Type 04/22/2019 A NEG   Preliminary    Unit Expiration 04/22/2019 [id number]   Preliminary    UNIT NUMBER 04/22/2019 [id number]   Preliminary    PRODUCT CODE 04/22/2019 [id number]   Preliminary    DISPENSE STATUS 04/22/2019 CROSSMATCHED   Preliminary    CODING SYSTEM 04/22/2019 [id number]   Preliminary    Unit Blood Type Code 04/22/2019 0600   Preliminary    Unit Blood Type 04/22/2019 A NEG   Preliminary    Unit Expiration 04/22/2019 [id number]   Preliminary    Potassium 04/25/2019 4.4  3.5 - 5.1 mmol/L Final        Meds   Current Facility-Administered Medications   Medication Dose Route Frequency Provider Last Rate Last Dose    acetaminophen tablet 1,000 mg  1,000 mg Oral Q6H Scottie Juan MD   1,000 mg at 04/25/19 0522    albuterol-ipratropium 2.5 mg-0.5 mg/3 mL nebulizer solution 3 mL  3 mL Nebulization Q6H Palmer May MD   3 mL at 04/24/19 1919    bisacodyl suppository 10 mg  10 mg Rectal Daily PRN Palmer May MD        celecoxib capsule 200 mg  200 mg Oral Daily Scottie Juan MD        enoxaparin injection 40 mg  40 mg Subcutaneous Daily Norma Tucker PA-C        furosemide injection 40 mg  40 mg Intravenous Once Norma Tucker PA-C        lactulose 20 gram/30 mL solution Soln 20 g  20 g Oral Q6H PRN Palmer May MD        lidocaine 5 % patch 1 patch  1 patch Transdermal Q24H Norma Tucker PA-C   1 patch at 04/24/19 1524    magnesium citrate solution 296 mL  296 mL Oral Once PRN Norma Tucker PA-C        ondansetron injection 4 mg  4 mg Intravenous Q12H PRN Palmer May MD   4 mg at 04/22/19 2049    oxyCODONE immediate release tablet 5 mg  5 mg Oral Q3H PRN Scottie Juan MD   5 mg  at 04/25/19 0033    oxyCODONE immediate release tablet Tab 10 mg  10 mg Oral Q3H PRN Scottie Juan MD   10 mg at 04/25/19 0717    pantoprazole EC tablet 40 mg  40 mg Oral Before breakfast Palmer May MD   40 mg at 04/25/19 0523    polyethylene glycol packet 17 g  17 g Oral Daily Palmer May MD   17 g at 04/24/19 0817    potassium chloride 10% oral solution 20 mEq  20 mEq Oral Once Norma Tucker PA-C        pregabalin capsule 150 mg  150 mg Oral QHS Palmer May MD   150 mg at 04/24/19 2034    promethazine (PHENERGAN) 6.25 mg in dextrose 5 % 50 mL IVPB  6.25 mg Intravenous Q6H PRN Palmer May  mL/hr at 04/22/19 2156 6.25 mg at 04/22/19 2156    ropivacaine (PF) 2 mg/ml (0.2%) infusion  2 mL/hr Perineural Continuous Palmer May MD 2 mL/hr at 04/25/19 0034 2 mL/hr at 04/25/19 0034    senna-docusate 8.6-50 mg per tablet 1 tablet  1 tablet Oral BID Palmer May MD   1 tablet at 04/24/19 2034         Assessment:     Pain control adequate    Plan:     Pain well controlled. PNC paused this am with plan to pull later this morning if pain control remains adequate.     Evaluator Scottie Juan      I have reviewed and concur with the resident's history, physical, assessment, and plan.  I have personally interviewed and examined the patient at bedside.  See below addendum for my evaluation and additional findings.  Pain tolerable with PNC paused.  Up walking around room comfortably.  PNC discontinued per primary team.  Continue multimodal meds and prn opioids. Will sign off.  Please call with questions.

## 2019-04-25 NOTE — ADDENDUM NOTE
Addendum  created 04/25/19 1113 by Adolph Clark MD    Charge Capture section accepted, Intraprocedure Event edited, Sign clinical note

## 2019-04-26 LAB
BLD PROD TYP BPU: NORMAL
BLOOD UNIT EXPIRATION DATE: NORMAL
BLOOD UNIT TYPE CODE: 600
BLOOD UNIT TYPE: NORMAL
CODING SYSTEM: NORMAL
DISPENSE STATUS: NORMAL
TRANS ERYTHROCYTES VOL PATIENT: NORMAL ML

## 2019-04-26 PROCEDURE — 25000242 PHARM REV CODE 250 ALT 637 W/ HCPCS: Performed by: THORACIC SURGERY (CARDIOTHORACIC VASCULAR SURGERY)

## 2019-04-26 PROCEDURE — 25000003 PHARM REV CODE 250: Performed by: PHYSICIAN ASSISTANT

## 2019-04-26 PROCEDURE — 25000003 PHARM REV CODE 250: Performed by: STUDENT IN AN ORGANIZED HEALTH CARE EDUCATION/TRAINING PROGRAM

## 2019-04-26 PROCEDURE — 94640 AIRWAY INHALATION TREATMENT: CPT

## 2019-04-26 PROCEDURE — 94799 UNLISTED PULMONARY SVC/PX: CPT

## 2019-04-26 PROCEDURE — 94761 N-INVAS EAR/PLS OXIMETRY MLT: CPT

## 2019-04-26 PROCEDURE — 25000003 PHARM REV CODE 250: Performed by: THORACIC SURGERY (CARDIOTHORACIC VASCULAR SURGERY)

## 2019-04-26 PROCEDURE — 63600175 PHARM REV CODE 636 W HCPCS: Performed by: THORACIC SURGERY (CARDIOTHORACIC VASCULAR SURGERY)

## 2019-04-26 PROCEDURE — 20600001 HC STEP DOWN PRIVATE ROOM

## 2019-04-26 RX ORDER — DEXTROMETHORPHAN HYDROBROMIDE, GUAIFENESIN 5; 100 MG/5ML; MG/5ML
650 LIQUID ORAL EVERY 8 HOURS
Refills: 0 | COMMUNITY
Start: 2019-04-26 | End: 2019-05-02 | Stop reason: DRUGHIGH

## 2019-04-26 RX ORDER — ONDANSETRON 4 MG/1
4 TABLET, ORALLY DISINTEGRATING ORAL EVERY 8 HOURS PRN
Status: DISCONTINUED | OUTPATIENT
Start: 2019-04-26 | End: 2019-04-27 | Stop reason: HOSPADM

## 2019-04-26 RX ORDER — OXYCODONE HYDROCHLORIDE 10 MG/1
5-10 TABLET ORAL
Qty: 28 TABLET | Refills: 0 | Status: SHIPPED | OUTPATIENT
Start: 2019-04-26 | End: 2019-05-02 | Stop reason: ALTCHOICE

## 2019-04-26 RX ORDER — IBUPROFEN 400 MG/1
400 TABLET ORAL EVERY 6 HOURS PRN
COMMUNITY
Start: 2019-04-26 | End: 2019-05-02

## 2019-04-26 RX ADMIN — OXYCODONE HYDROCHLORIDE 10 MG: 10 TABLET ORAL at 01:04

## 2019-04-26 RX ADMIN — OXYCODONE HYDROCHLORIDE 10 MG: 10 TABLET ORAL at 09:04

## 2019-04-26 RX ADMIN — OXYCODONE HYDROCHLORIDE 10 MG: 10 TABLET ORAL at 05:04

## 2019-04-26 RX ADMIN — ACETAMINOPHEN 1000 MG: 500 TABLET ORAL at 12:04

## 2019-04-26 RX ADMIN — PREGABALIN 150 MG: 150 CAPSULE ORAL at 09:04

## 2019-04-26 RX ADMIN — STANDARDIZED SENNA CONCENTRATE AND DOCUSATE SODIUM 1 TABLET: 8.6; 5 TABLET, FILM COATED ORAL at 09:04

## 2019-04-26 RX ADMIN — IPRATROPIUM BROMIDE AND ALBUTEROL SULFATE 3 ML: .5; 3 SOLUTION RESPIRATORY (INHALATION) at 08:04

## 2019-04-26 RX ADMIN — PANTOPRAZOLE SODIUM 40 MG: 40 TABLET, DELAYED RELEASE ORAL at 05:04

## 2019-04-26 RX ADMIN — ONDANSETRON 4 MG: 2 INJECTION INTRAMUSCULAR; INTRAVENOUS at 02:04

## 2019-04-26 RX ADMIN — OXYCODONE HYDROCHLORIDE 5 MG: 5 TABLET ORAL at 05:04

## 2019-04-26 RX ADMIN — ACETAMINOPHEN 500 MG: 500 TABLET ORAL at 05:04

## 2019-04-26 RX ADMIN — IPRATROPIUM BROMIDE AND ALBUTEROL SULFATE 3 ML: .5; 3 SOLUTION RESPIRATORY (INHALATION) at 02:04

## 2019-04-26 RX ADMIN — IPRATROPIUM BROMIDE AND ALBUTEROL SULFATE 3 ML: .5; 3 SOLUTION RESPIRATORY (INHALATION) at 09:04

## 2019-04-26 RX ADMIN — ACETAMINOPHEN 1000 MG: 500 TABLET ORAL at 05:04

## 2019-04-26 RX ADMIN — POLYETHYLENE GLYCOL 3350 17 G: 17 POWDER, FOR SOLUTION ORAL at 09:04

## 2019-04-26 RX ADMIN — CELECOXIB 200 MG: 200 CAPSULE ORAL at 09:04

## 2019-04-26 RX ADMIN — LIDOCAINE 1 PATCH: 50 PATCH TOPICAL at 01:04

## 2019-04-26 RX ADMIN — OXYCODONE HYDROCHLORIDE 10 MG: 10 TABLET ORAL at 12:04

## 2019-04-26 NOTE — PROGRESS NOTES
[facility]  Thoracic Surgery  Progress Note    Subjective:     History of Present Illness:  49 year old female with PMH of asthma returns to schedule bronchus intermedius carcinoid resection. Previously schedule for surgery in January 2019; however, she was uninsured and it took her until early February to secure Medicaid. History dates to November 2018 when she resented to ED for 10 days of fever, chills, cough, and body aches. CXR prompted chest CT which showed endobronchial BI lesion. Work-up also included PFTS, V/Q, and DSE. Active at home. No history of prior cardiac or thoracic procedures. Not on blood thinners. Today she reports no major complaints.  Denies fever, chills, CP, SOB, hemoptysis, changes in bowel function. Returns today with updated chest CT and PFTs.      Quit smoking December 2018. 15 pack years. Occasional etoh and marijuana. Self employed.   PSH: diagnostic laparoscopy, R shoulder cyst excision         Post-Op Info:  Procedure(s) (LRB):  THORACOTOMY (Right)  LOBECTOMY, SLEEVE, LUNG, THORACOTOMY APPROACH (Right)  BRONCHOSCOPY (N/A)   4 Days Post-Op     Interval History: NAEON. Pain controlled on oral meds, only 200 cc from chest tube, serous. VSS. On room air.   ERS removed yesterday by anesthesia     Medications:  Continuous Infusions:  Scheduled Meds:   acetaminophen  1,000 mg Oral Q6H    albuterol-ipratropium  3 mL Nebulization Q6H    celecoxib  200 mg Oral Daily    enoxaparin  40 mg Subcutaneous Daily    lidocaine  1 patch Transdermal Q24H    pantoprazole  40 mg Oral Before breakfast    polyethylene glycol  17 g Oral Daily    pregabalin  150 mg Oral QHS    senna-docusate 8.6-50 mg  1 tablet Oral BID     PRN Meds:bisacodyl, lactulose, magnesium citrate, ondansetron, oxyCODONE, oxyCODONE, promethazine (PHENERGAN) IVPB     Review of patient's allergies indicates:  No Known Allergies  Objective:     Vital Signs (Most Recent):  Temp: 98.3 °F (36.8 °C) (04/26/19  0459)  Pulse: 81 (04/26/19 0459)  Resp: 18 (04/26/19 0459)  BP: (!) 118/58 (04/26/19 0459)  SpO2: (!) 91 % (04/26/19 0459) Vital Signs (24h Range):  Temp:  [97.7 °F (36.5 °C)-99.2 °F (37.3 °C)] 98.3 °F (36.8 °C)  Pulse:  [] 81  Resp:  [12-20] 18  SpO2:  [91 %-99 %] 91 %  BP: (118-141)/(58-76) 118/58     Intake/Output - Last 3 Shifts       04/24 0700 - 04/25 0659 04/25 0700 - 04/26 0659 04/26 0700 - 04/27 0659    P.O. 1470      I.V. (mL/kg) 28.1 (0.3) 47.9 (0.5)     Total Intake(mL/kg) 1498.1 (17) 47.9 (0.5)     Urine (mL/kg/hr) 0 (0)      Chest Tube 330 125     Total Output 330 125     Net +1168.1 -77.1            Urine Occurrence 3 x      Stool Occurrence 1 x            SpO2: (!) 91 %  O2 Device (Oxygen Therapy): room air    Physical Exam   Constitutional: She is oriented to person, place, and time. She appears well-developed and well-nourished.   HENT:   Head: Normocephalic.   Eyes: Pupils are equal, round, and reactive to light.   Neck: Normal range of motion. Neck supple. No tracheal deviation present.   Cardiovascular: Normal rate, regular rhythm, normal heart sounds and intact distal pulses.   Pulmonary/Chest: Effort normal and breath sounds normal. She exhibits no tenderness.   Chest tube with ss output to water seal. Air leak vs turbulence in chest tube   Abdominal: Soft. Bowel sounds are normal. She exhibits no distension.   Musculoskeletal: She exhibits no edema.   Neurological: She is alert and oriented to person, place, and time.   Skin: Skin is warm and dry.   Psychiatric: She has a normal mood and affect.         Significant Labs:  None    Significant Diagnostics:  CXR: pending     VTE Risk Mitigation (From admission, onward)        Ordered     enoxaparin injection 40 mg  Daily      04/25/19 0724     Place PAM hose  Until discontinued      04/22/19 0735     Place sequential compression device  Until discontinued      04/22/19 0735        Assessment/Plan:     * Carcinoid tumor of right lung  50 yo  female with endobronchial carcinoid now POD4 right thoracotomy, right lower bilobectomy    - ERS removed 4/25. PO pain meds. Continue multimodal pain control per anesthesia-  Oxycodone, Lyrica, tylenol and robaxin and celebrex.   - clamp chest tube this morning.   - Daily CXR  - Pulm toilet -  IS, acapemonica [place]. On room air  - Bowel regimen - colace/miralax  - Regular diet  - Ambulate- PT/OT  - lovenox  - Dispo: clamp trial, possible removal of chest tube today and d/c home          Noah Malagon MD  Thoracic Surgery  [facility]

## 2019-04-26 NOTE — PLAN OF CARE
Problem: Adult Inpatient Plan of Care  Goal: Plan of Care Review  Outcome: Ongoing (interventions implemented as appropriate)  POC reviewed with pt and family.  Pt A & O x 4, adequate urine output via commode. Vital signs stable on room air. Pain controlled with prn pain meds until this afternoon, then was uncontrolled. Ropivacaine discontinued this morning.  Pt NSR on telemetry.

## 2019-04-26 NOTE — PLAN OF CARE
Problem: Adult Inpatient Plan of Care  Goal: Plan of Care Review  Outcome: Ongoing (interventions implemented as appropriate)  Pt alert and oriented medicated with scheduled and prn medications with moderate results chest tube to the right chest remains in place to water seal her  remained at the bedside during the night

## 2019-04-26 NOTE — SUBJECTIVE & OBJECTIVE
Interval History: NAEON. Pain controlled on oral meds, only 200 cc from chest tube, serous. VSS. On room air.   ERS removed yesterday by anesthesia     Medications:  Continuous Infusions:  Scheduled Meds:   acetaminophen  1,000 mg Oral Q6H    albuterol-ipratropium  3 mL Nebulization Q6H    celecoxib  200 mg Oral Daily    enoxaparin  40 mg Subcutaneous Daily    lidocaine  1 patch Transdermal Q24H    pantoprazole  40 mg Oral Before breakfast    polyethylene glycol  17 g Oral Daily    pregabalin  150 mg Oral QHS    senna-docusate 8.6-50 mg  1 tablet Oral BID     PRN Meds:bisacodyl, lactulose, magnesium citrate, ondansetron, oxyCODONE, oxyCODONE, promethazine (PHENERGAN) IVPB     Review of patient's allergies indicates:  No Known Allergies  Objective:     Vital Signs (Most Recent):  Temp: 98.3 °F (36.8 °C) (04/26/19 0459)  Pulse: 81 (04/26/19 0459)  Resp: 18 (04/26/19 0459)  BP: (!) 118/58 (04/26/19 0459)  SpO2: (!) 91 % (04/26/19 0459) Vital Signs (24h Range):  Temp:  [97.7 °F (36.5 °C)-99.2 °F (37.3 °C)] 98.3 °F (36.8 °C)  Pulse:  [] 81  Resp:  [12-20] 18  SpO2:  [91 %-99 %] 91 %  BP: (118-141)/(58-76) 118/58     Intake/Output - Last 3 Shifts       04/24 0700 - 04/25 0659 04/25 0700 - 04/26 0659 04/26 0700 - 04/27 0659    P.O. 1470      I.V. (mL/kg) 28.1 (0.3) 47.9 (0.5)     Total Intake(mL/kg) 1498.1 (17) 47.9 (0.5)     Urine (mL/kg/hr) 0 (0)      Chest Tube 330 125     Total Output 330 125     Net +1168.1 -77.1            Urine Occurrence 3 x      Stool Occurrence 1 x            SpO2: (!) 91 %  O2 Device (Oxygen Therapy): room air    Physical Exam   Constitutional: She is oriented to person, place, and time. She appears well-developed and well-nourished.   HENT:   Head: Normocephalic.   Eyes: Pupils are equal, round, and reactive to light.   Neck: Normal range of motion. Neck supple. No tracheal deviation present.   Cardiovascular: Normal rate, regular rhythm, normal heart sounds and intact distal  pulses.   Pulmonary/Chest: Effort normal and breath sounds normal. She exhibits no tenderness.   Chest tube with ss output to water seal. Air leak vs turbulence in chest tube   Abdominal: Soft. Bowel sounds are normal. She exhibits no distension.   Musculoskeletal: She exhibits no edema.   Neurological: She is alert and oriented to person, place, and time.   Skin: Skin is warm and dry.   Psychiatric: She has a normal mood and affect.         Significant Labs:  None    Significant Diagnostics:  CXR: pending     VTE Risk Mitigation (From admission, onward)        Ordered     enoxaparin injection 40 mg  Daily      04/25/19 0724     Place PAM hose  Until discontinued      04/22/19 0735     Place sequential compression device  Until discontinued      04/22/19 0735

## 2019-04-26 NOTE — ASSESSMENT & PLAN NOTE
48 yo female with endobronchial carcinoid now POD4 right thoracotomy, right lower bilobectomy    - ERS removed 4/25. PO pain meds. Continue multimodal pain control per anesthesia-  Oxycodone, Lyrica, tylenol and robaxin and celebrex.   - clamp chest tube this morning.   - Daily CXR  - Pulm toilet -  IS, monica murdock. On room air  - Bowel regimen - colace/miralax  - Regular diet  - Ambulate- PT/OT  - lovenox  - Dispo: clamp trial, possible removal of chest tube today and d/c home

## 2019-04-27 VITALS
RESPIRATION RATE: 20 BRPM | OXYGEN SATURATION: 95 % | WEIGHT: 194 LBS | HEIGHT: 67 IN | DIASTOLIC BLOOD PRESSURE: 79 MMHG | SYSTOLIC BLOOD PRESSURE: 136 MMHG | BODY MASS INDEX: 30.45 KG/M2 | TEMPERATURE: 98 F | HEART RATE: 112 BPM

## 2019-04-27 PROCEDURE — 25000003 PHARM REV CODE 250: Performed by: STUDENT IN AN ORGANIZED HEALTH CARE EDUCATION/TRAINING PROGRAM

## 2019-04-27 PROCEDURE — 94640 AIRWAY INHALATION TREATMENT: CPT

## 2019-04-27 PROCEDURE — 99900035 HC TECH TIME PER 15 MIN (STAT)

## 2019-04-27 PROCEDURE — 25000242 PHARM REV CODE 250 ALT 637 W/ HCPCS: Performed by: THORACIC SURGERY (CARDIOTHORACIC VASCULAR SURGERY)

## 2019-04-27 PROCEDURE — 94799 UNLISTED PULMONARY SVC/PX: CPT

## 2019-04-27 PROCEDURE — 25000003 PHARM REV CODE 250: Performed by: PHYSICIAN ASSISTANT

## 2019-04-27 PROCEDURE — 25000003 PHARM REV CODE 250: Performed by: THORACIC SURGERY (CARDIOTHORACIC VASCULAR SURGERY)

## 2019-04-27 RX ADMIN — ACETAMINOPHEN 1000 MG: 500 TABLET ORAL at 05:04

## 2019-04-27 RX ADMIN — CELECOXIB 200 MG: 200 CAPSULE ORAL at 09:04

## 2019-04-27 RX ADMIN — OXYCODONE HYDROCHLORIDE 10 MG: 10 TABLET ORAL at 05:04

## 2019-04-27 RX ADMIN — OXYCODONE HYDROCHLORIDE 10 MG: 10 TABLET ORAL at 09:04

## 2019-04-27 RX ADMIN — IPRATROPIUM BROMIDE AND ALBUTEROL SULFATE 3 ML: .5; 3 SOLUTION RESPIRATORY (INHALATION) at 07:04

## 2019-04-27 RX ADMIN — OXYCODONE HYDROCHLORIDE 10 MG: 10 TABLET ORAL at 01:04

## 2019-04-27 RX ADMIN — PANTOPRAZOLE SODIUM 40 MG: 40 TABLET, DELAYED RELEASE ORAL at 05:04

## 2019-04-27 RX ADMIN — ACETAMINOPHEN 1000 MG: 500 TABLET ORAL at 12:04

## 2019-04-27 RX ADMIN — STANDARDIZED SENNA CONCENTRATE AND DOCUSATE SODIUM 1 TABLET: 8.6; 5 TABLET, FILM COATED ORAL at 09:04

## 2019-04-27 RX ADMIN — POLYETHYLENE GLYCOL 3350 17 G: 17 POWDER, FOR SOLUTION ORAL at 09:04

## 2019-04-27 NOTE — DISCHARGE SUMMARY
[facility]  General Surgery  Discharge Summary      Patient Name: Gina Coto  MRN: [id number]  Admission Date: 4/22/2019  Hospital Length of Stay: 5 days  Discharge Date and Time:  04/27/2019 9:04 AM  Attending Physician: Juan Jose Miles MD   Discharging Provider: Noah Malagon MD  Primary Care Provider: Claire Frias MD     HPI: 49 year old female with PMH of asthma returns to schedule bronchus intermedius carcinoid resection. Previously schedule for surgery in January 2019; however, she was uninsured and it took her until early February to secure Medicaid. History dates to November 2018 when she resented to ED for 10 days of fever, chills, cough, and body aches. CXR prompted chest CT which showed endobronchial BI lesion. Work-up also included PFTS, V/Q, and DSE. Active at home. No history of prior cardiac or thoracic procedures. Not on blood thinners. Today she reports no major complaints.  Denies fever, chills, CP, SOB, hemoptysis, changes in bowel function. Returns today with updated chest CT and PFTs.      Quit smoking December 2018. 15 pack years. Occasional etoh and marijuana. Self employed.   PSH: diagnostic laparoscopy, R shoulder cyst excision         Procedure(s) (LRB):  THORACOTOMY (Right)  LOBECTOMY, SLEEVE, LUNG, THORACOTOMY APPROACH (Right)  BRONCHOSCOPY (N/A)     Hospital Course:  4/22/19-  Bronchoscopy, Right Thoracotomy with Right Lower Bilobectomy with Bronchoplasty, Adhesiolysis and MLND, 4/23/19-  Regular diet. RA. Chest tubes to water seal. Erector spinae for pain control.   4/24/19 - rachelle removed. Regular diet. D/c morphine, celecoxib added. Ambulated in halls. BM x 1.   4/25/19 - erector spinae removed. Lasix for high chest tube output. Regular diet. RA.   4/26/19 - tube clamped, repeat CXR showing no increasing pneumothorax. 2 BMs. Remains on room air.   4/27/19: when tube unclamped, air leak remained and >100cc serous fluid drained. 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    Specialty:  Cardiothoracic Surgery  Why:  For wound re-check, remove chest tube  Contact information:  [address] [facility] [address]  [phone]                 Patient Instructions:      Diet Adult Regular     Notify your health care provider if you experience any of the following:  increased confusion or weakness     Notify your health care provider if you experience any of the following:  persistent dizziness, light-headedness, or visual disturbances     Notify your health care provider if you experience any of the following:  worsening rash     Notify your health care provider if you experience any of the following:  severe persistent headache     Notify your health care provider if you experience any of the following:  difficulty breathing or increased cough     Notify your health care provider if you experience any of the following:  redness, tenderness, or signs of infection (pain, swelling, redness, odor or green/yellow discharge around incision site)     Notify your health care provider if you experience any of the following:  severe uncontrolled pain     Notify your health care provider if you experience any of the following:  persistent nausea and vomiting or diarrhea     Notify your health care provider if you experience any of the following:   temperature >100.4     Change dressing (specify)   Order Comments: Dressing change: Change chest tube dressing every day with dry gauze and tape.     Okay to shower but leave chest tube atrium outside of shower. Cover chest tube site with occlusive dressing such as tegaderm to prevent water around site. All other sites are okay to get wet.     No driving until:   Order Comments: Off narcotics     Change dressing (specify)   Order Comments: Chest tube instructions:  Keep unclamped. Keep mini atrium upright as much as possible. If tubing becomes disconnected, please reconnect and call office.     Activity as tolerated     Medications:  Reconciled Home Medications:      Medication List      START taking these medications    acetaminophen 650 MG Tbsr  Commonly known as:  TYLENOL  Take 1 tablet (650 mg total) by mouth every 8 (eight) hours.     ibuprofen 400 MG tablet  Commonly known as:  ADVIL,MOTRIN  Take 1 tablet (400 mg total) by mouth every 6 (six) hours as needed for Other.     oxyCODONE 10 mg Tab immediate release tablet  Commonly known as:  ROXICODONE  Take 0.5-1 tablets (5-10 mg total) by mouth every 4 to 6 hours as needed for Pain (pain related to thoracotomy, No xanax while taking oxycodone).        CONTINUE taking these medications    albuterol-ipratropium 2.5 mg-0.5 mg/3 mL nebulizer solution  Commonly known as:  DUO-NEB  Take 3 mLs by nebulization every 6 (six) hours as needed for Wheezing.     ALPRAZolam 0.5 MG tablet  Commonly known as:  XANAX  Take 1 tablet (0.5 mg total) by mouth nightly as needed for Anxiety.     garlic 200 mg Tab  Take 200 mg by mouth once daily.     ginkgo biloba 40 mg Tab  Take by mouth once daily.     ONE DAILY MULTIVITAMIN per tablet  Generic drug:  multivitamin  Take 1 tablet by mouth once daily.     MAXX'S WORT ORAL  Take by mouth.     TURMERIC ORAL  Take by mouth.     vitamin E 200 UNIT capsule  Take 200 Units by mouth once daily.            Noah Malagon,  MD  General Surgery  [facility]

## 2019-04-27 NOTE — ASSESSMENT & PLAN NOTE
50 yo female with endobronchial carcinoid now POD5 right thoracotomy, right lower bilobectomy    - ERS removed 4/25. PO pain meds. Continue multimodal pain control per anesthesia-  Oxycodone, Lyrica, tylenol and robaxin and celebrex.   - chest tube clamped >24 hours. Possible d/c today and repeat CXR post pull.   - Daily CXR  - Pulm toilet -  IS, monica murdock. On room air  - Bowel regimen - colace/miralax. Had 2 BMs yesterday.   - Regular diet  - Ambulate- PT/OT  - lovenox  - Dispo: d/c chest tube and hopeful discharge home in PM

## 2019-04-27 NOTE — NURSING
Discharge instructions reviewed with patient. Dressing to R. Chest tube changed. PIV removed. Pt. Left unit via wheelchair with transport personnel and family.

## 2019-04-27 NOTE — SUBJECTIVE & OBJECTIVE
Interval History: NAEON. VSS. Clamped right chest tube for 24 hours. She unclamped herself due to pain after xray in PM. Education with patient, and patient placed back to clamped. Otherwise, having BMs, ambulating, voiding and having a regular diet. On room air.     Medications:  Continuous Infusions:  Scheduled Meds:   acetaminophen  1,000 mg Oral Q6H    albuterol-ipratropium  3 mL Nebulization Q6H    celecoxib  200 mg Oral Daily    enoxaparin  40 mg Subcutaneous Daily    lidocaine  1 patch Transdermal Q24H    pantoprazole  40 mg Oral Before breakfast    polyethylene glycol  17 g Oral Daily    pregabalin  150 mg Oral QHS    senna-docusate 8.6-50 mg  1 tablet Oral BID     PRN Meds:bisacodyl, lactulose, magnesium citrate, ondansetron, oxyCODONE, oxyCODONE, promethazine (PHENERGAN) IVPB     Review of patient's allergies indicates:  No Known Allergies  Objective:     Vital Signs (Most Recent):  Temp: 98.3 °F (36.8 °C) (04/27/19 0500)  Pulse: 85 (04/27/19 0721)  Resp: 18 (04/27/19 0721)  BP: (!) 140/76 (04/27/19 0500)  SpO2: 98 % (04/27/19 0721) Vital Signs (24h Range):  Temp:  [98.1 °F (36.7 °C)-99.4 °F (37.4 °C)] 98.3 °F (36.8 °C)  Pulse:  [85-95] 85  Resp:  [16-20] 18  SpO2:  [95 %-98 %] 98 %  BP: (108-140)/(59-76) 140/76     Intake/Output - Last 3 Shifts       04/25 0700 - 04/26 0659 04/26 0700 - 04/27 0659 04/27 0700 - 04/28 0659    P.O.  720     I.V. (mL/kg) 47.9 (0.5)      Total Intake(mL/kg) 47.9 (0.5) 720 (8.2)     Urine (mL/kg/hr)  0 (0)     Chest Tube 125 0     Total Output 125 0     Net -77.1 +720            Urine Occurrence  5 x     Stool Occurrence  2 x           SpO2: 98 %  O2 Device (Oxygen Therapy): room air    Physical Exam   Constitutional: She is oriented to person, place, and time. She appears well-developed and well-nourished.   HENT:   Head: Normocephalic.   Eyes: Pupils are equal, round, and reactive to light.   Neck: Normal range of motion. Neck supple. No tracheal deviation present.    Cardiovascular: Normal rate, regular rhythm, normal heart sounds and intact distal pulses.   Pulmonary/Chest: Effort normal and breath sounds normal. She exhibits no tenderness.   Chest tube clamped, minimal serous fluid in tubing  Abdominal: Soft. Bowel sounds are normal. She exhibits no distension.   Musculoskeletal: She exhibits no edema.   Neurological: She is alert and oriented to person, place, and time.   Skin: Skin is warm and dry.   Psychiatric: She has a normal mood and affect.            Significant Labs:  None    Significant Diagnostics:  CXR: Right sided pneumothorax consistent with space present s/p bilobectomy, appears slightly increased since yesterday.     VTE Risk Mitigation (From admission, onward)        Ordered     enoxaparin injection 40 mg  Daily      04/25/19 0724     Place PAM hose  Until discontinued      04/22/19 0735     Place sequential compression device  Until discontinued      04/22/19 0735

## 2019-04-27 NOTE — PLAN OF CARE
Problem: Adult Inpatient Plan of Care  Goal: Plan of Care Review  Outcome: Ongoing (interventions implemented as appropriate)  POC is reviewed and understood by patient. VS stable while on room air. Oriented x4. Pt on a regular diet. Up ad kriss to the toilet. Receives oxycodone IR PRN, and scheduled tylenol for pain control. No signs of distress noted. Call bell in reach.

## 2019-04-27 NOTE — PROGRESS NOTES
[facility]  Thoracic Surgery  Progress Note    Subjective:     History of Present Illness:  49 year old female with PMH of asthma returns to schedule bronchus intermedius carcinoid resection. Previously schedule for surgery in January 2019; however, she was uninsured and it took her until early February to secure Medicaid. History dates to November 2018 when she resented to ED for 10 days of fever, chills, cough, and body aches. CXR prompted chest CT which showed endobronchial BI lesion. Work-up also included PFTS, V/Q, and DSE. Active at home. No history of prior cardiac or thoracic procedures. Not on blood thinners. Today she reports no major complaints.  Denies fever, chills, CP, SOB, hemoptysis, changes in bowel function. Returns today with updated chest CT and PFTs.      Quit smoking December 2018. 15 pack years. Occasional etoh and marijuana. Self employed.   PSH: diagnostic laparoscopy, R shoulder cyst excision         Post-Op Info:  Procedure(s) (LRB):  THORACOTOMY (Right)  LOBECTOMY, SLEEVE, LUNG, THORACOTOMY APPROACH (Right)  BRONCHOSCOPY (N/A)   5 Days Post-Op     Interval History: ARTURO. VSS. Clamped right chest tube for 24 hours. She unclamped herself due to pain after xray in PM. Education with patient, and patient placed back to clamped. Otherwise, having BMs, ambulating, voiding and having a regular diet. On room air.     Medications:  Continuous Infusions:  Scheduled Meds:   acetaminophen  1,000 mg Oral Q6H    albuterol-ipratropium  3 mL Nebulization Q6H    celecoxib  200 mg Oral Daily    enoxaparin  40 mg Subcutaneous Daily    lidocaine  1 patch Transdermal Q24H    pantoprazole  40 mg Oral Before breakfast    polyethylene glycol  17 g Oral Daily    pregabalin  150 mg Oral QHS    senna-docusate 8.6-50 mg  1 tablet Oral BID     PRN Meds:bisacodyl, lactulose, magnesium citrate, ondansetron, oxyCODONE, oxyCODONE, promethazine (PHENERGAN) IVPB     Review of patient's  allergies indicates:  No Known Allergies  Objective:     Vital Signs (Most Recent):  Temp: 98.3 °F (36.8 °C) (04/27/19 0500)  Pulse: 85 (04/27/19 0721)  Resp: 18 (04/27/19 0721)  BP: (!) 140/76 (04/27/19 0500)  SpO2: 98 % (04/27/19 0721) Vital Signs (24h Range):  Temp:  [98.1 °F (36.7 °C)-99.4 °F (37.4 °C)] 98.3 °F (36.8 °C)  Pulse:  [85-95] 85  Resp:  [16-20] 18  SpO2:  [95 %-98 %] 98 %  BP: (108-140)/(59-76) 140/76     Intake/Output - Last 3 Shifts       04/25 0700 - 04/26 0659 04/26 0700 - 04/27 0659 04/27 0700 - 04/28 0659    P.O.  720     I.V. (mL/kg) 47.9 (0.5)      Total Intake(mL/kg) 47.9 (0.5) 720 (8.2)     Urine (mL/kg/hr)  0 (0)     Chest Tube 125 0     Total Output 125 0     Net -77.1 +720            Urine Occurrence  5 x     Stool Occurrence  2 x           SpO2: 98 %  O2 Device (Oxygen Therapy): room air    Physical Exam   Constitutional: She is oriented to person, place, and time. She appears well-developed and well-nourished.   HENT:   Head: Normocephalic.   Eyes: Pupils are equal, round, and reactive to light.   Neck: Normal range of motion. Neck supple. No tracheal deviation present.   Cardiovascular: Normal rate, regular rhythm, normal heart sounds and intact distal pulses.   Pulmonary/Chest: Effort normal and breath sounds normal. She exhibits no tenderness.   Chest tube clamped, minimal serous fluid in tubing  Abdominal: Soft. Bowel sounds are normal. She exhibits no distension.   Musculoskeletal: She exhibits no edema.   Neurological: She is alert and oriented to person, place, and time.   Skin: Skin is warm and dry.   Psychiatric: She has a normal mood and affect.            Significant Labs:  None    Significant Diagnostics:  CXR: stable.     VTE Risk Mitigation (From admission, onward)        Ordered     enoxaparin injection 40 mg  Daily      04/25/19 0724     Place PAM hose  Until discontinued      04/22/19 0735     Place sequential compression device  Until discontinued      04/22/19 0735         Assessment/Plan:     * Carcinoid tumor of right lung  48 yo female with endobronchial carcinoid now POD5 right thoracotomy, right lower bilobectomy    - ERS removed 4/25. PO pain meds. Continue multimodal pain control per anesthesia-  Oxycodone, Lyrica, tylenol and robaxin and celebrex.   - chest tube clamped >24 hours. When unclamped, continued to have leak and >100 cc drainage.   - Daily CXR  - Pulm toilet -  IS, monica murdock. On room air  - Bowel regimen - colace/miralax. Had 2 BMs yesterday.   - Regular diet  - Ambulate- PT/OT  - lovenox  - Dispo: will discharge home today with mini atrium and chest tube in place.         Noah Malagon MD  Thoracic Surgery  [facility]

## 2019-04-27 NOTE — PLAN OF CARE
Problem: Adult Inpatient Plan of Care  Goal: Plan of Care Review  Outcome: Ongoing (interventions implemented as appropriate)  Patient reports pain is not well-controlled with current PRN analgesics.  She also reports fever-like symptoms throughout this shift, and she estimates 2 hours of sleep overnight.  VSS and max temp=99.4 oral.  Use of heat packs to assist with pain control with moderate relief obtained.  Patient uses incentive spirometer frequently and able to teach back appropriate technique.  Chest tube remains clamped overnight, and dressing remains dry and intact with minimal serous drainage noted.  Plans for discharge discussed, including description of signs and symptoms that indicate she should seek immediate medical attention.  She expressed anxiety and apprehension regarding discharge today, given lacking pain control and mild fever-like symptoms.  Will continue to monitor and give bedside report to oncoming shift.

## 2019-04-28 ENCOUNTER — TELEPHONE (OUTPATIENT)
Dept: SURGERY | Facility: HOSPITAL | Age: 49
End: 2019-04-28

## 2019-04-28 NOTE — TELEPHONE ENCOUNTER
Received phone call from patient; recently discharged home on 4/27 with chest tube/mini atrium in place. Patient now noting new onset shortness of breath. Feeling as if there is a new pressure on her chest making it difficult to breath. Can only get volumes of 500 on IS when before she was able to get >1000. States she she is also have new pains radiating under right breast that she has not experienced before, but her greatest concern is her new onset shortness of breath. States she has not had any new fevers/chills/coughing episodes. Has had 300 cc of output that is sanguinous in character to mini atrium since discharge; states that this character is different from CT character before discharge (as it was previously more serosanguinous). I discussed that in the setting of new onset shortness of breath patient would benefit from getting a chest xray. She currently lives out of town and is closer to [facility]. I therefore advised her to present to [facility] for chest xray and further evaluation. Patient acknowledged understanding.       Gina Nair MD  General Surgery, PGYI [facility]

## 2019-04-29 ENCOUNTER — TELEPHONE (OUTPATIENT)
Dept: CARDIOTHORACIC SURGERY | Facility: CLINIC | Age: 49
End: 2019-04-29

## 2019-04-29 DIAGNOSIS — D3A.00 CARCINOID TUMOR: Primary | ICD-10-CM

## 2019-04-29 NOTE — TELEPHONE ENCOUNTER
Made post discharge phone call to patient.  Patient states that she had labored breathing. Went to the ER over the week end.  Bronchodilar given in the ER.   Patient states that she is doing well.  Follow-up discussed.  Patient will be seen by Dr. Miles Wednesday, 05/01/2019.

## 2019-05-01 ENCOUNTER — PATIENT MESSAGE (OUTPATIENT)
Dept: ADMINISTRATIVE | Facility: OTHER | Age: 49
End: 2019-05-01

## 2019-05-01 ENCOUNTER — TELEPHONE (OUTPATIENT)
Dept: SURGERY | Facility: HOSPITAL | Age: 49
End: 2019-05-01

## 2019-05-01 DIAGNOSIS — C34.31 MALIGNANT NEOPLASM OF LOWER LOBE OF RIGHT LUNG: Primary | ICD-10-CM

## 2019-05-01 NOTE — TELEPHONE ENCOUNTER
Gina called. Her chest tube came out. She immediately covered it with tegaderm. She is due to get CXR today and see Dr. Miles in clinic.    No shortness of breath. No signs that air entered into the chest.     I advised her to go to the nearest emergency room to be evaluated and have CXR. She lives in [place] [facility] [place], and it would be a 45 minute drive to [facility].    EMMA Pvaon MD   General Surgery, PGY-1  Pager: [phone]

## 2019-05-02 ENCOUNTER — TELEPHONE (OUTPATIENT)
Dept: CARDIOTHORACIC SURGERY | Facility: CLINIC | Age: 49
End: 2019-05-02

## 2019-05-02 DIAGNOSIS — M62.838 MUSCLE SPASM: ICD-10-CM

## 2019-05-02 DIAGNOSIS — Z98.890 HISTORY OF THORACOTOMY: ICD-10-CM

## 2019-05-02 DIAGNOSIS — M79.2 NEUROPATHIC PAIN: Primary | ICD-10-CM

## 2019-05-02 RX ORDER — GABAPENTIN 300 MG/1
300 CAPSULE ORAL 3 TIMES DAILY
Qty: 90 CAPSULE | Refills: 1 | Status: SHIPPED | OUTPATIENT
Start: 2019-05-02 | End: 2021-08-11

## 2019-05-02 RX ORDER — OXYCODONE HYDROCHLORIDE 5 MG/1
5 TABLET ORAL EVERY 4 HOURS PRN
Qty: 28 TABLET | Refills: 0 | Status: ON HOLD | OUTPATIENT
Start: 2019-05-02 | End: 2019-05-06 | Stop reason: SDUPTHER

## 2019-05-02 RX ORDER — CYCLOBENZAPRINE HCL 5 MG
5 TABLET ORAL 3 TIMES DAILY PRN
Qty: 21 TABLET | Refills: 1 | Status: SHIPPED | OUTPATIENT
Start: 2019-05-02 | End: 2019-05-12

## 2019-05-02 RX ORDER — DEXTROMETHORPHAN HYDROBROMIDE, GUAIFENESIN 5; 100 MG/5ML; MG/5ML
650 LIQUID ORAL EVERY 8 HOURS
Qty: 30 TABLET | Refills: 1 | Status: SHIPPED | OUTPATIENT
Start: 2019-05-02 | End: 2021-08-11

## 2019-05-02 RX ORDER — IBUPROFEN 400 MG/1
400 TABLET ORAL EVERY 6 HOURS PRN
Qty: 28 TABLET | Refills: 1 | Status: SHIPPED | OUTPATIENT
Start: 2019-05-02 | End: 2019-10-08 | Stop reason: SDUPTHER

## 2019-05-02 NOTE — TELEPHONE ENCOUNTER
Patient instructed to not lift greater than 10 lbs.  Also instructed to not overexert herself.  CXR scheduled for [facility]/03/2019 at [facility].    ----- Message from Vidhya Frey sent at 5/2/2019  8:13 AM CDT -----  Contact: Patient   Needs Advice    Reason for call: Patient is asking to speak to a nurse regarding her physical restrictions and her pain management for her surgery        Communication Preference: [phone]     Additional Information: please contact the patient

## 2019-05-03 DIAGNOSIS — J94.8 HYDROPNEUMOTHORAX: Primary | ICD-10-CM

## 2019-05-03 DIAGNOSIS — D3A.090 CARCINOID TUMOR OF RIGHT LUNG: Primary | ICD-10-CM

## 2019-05-03 DIAGNOSIS — C34.31 MALIGNANT NEOPLASM OF LOWER LOBE OF RIGHT LUNG: Primary | ICD-10-CM

## 2019-05-05 ENCOUNTER — ANESTHESIA EVENT (OUTPATIENT)
Dept: SURGERY | Facility: HOSPITAL | Age: 49
End: 2019-05-05
Payer: MEDICAID

## 2019-05-05 NOTE — ANESTHESIA PREPROCEDURE EVALUATION
05/05/2019  [facility]  Anesthesia Pre-Operative Evaluation         Patient Name: Gina Coto  YOB: 1970  MRN: [id number]    SUBJECTIVE:     Pre-operative evaluation for Procedure(s) (LRB):  BRONCHOSCOPY, WITH BIOPSY (N/A)     05/05/2019    Gina Coto is a 49 y.o. female w/ a significant PMHx of asthma, carcinoid tumor s/p resection presenting for bronchoscopy who presents for the above procedure.      LDA: None documented.    Prev airway: None documented.    Drips: None documented.      Patient Active Problem List   Diagnosis    History of pneumonia- Nov 2018     Seasonal asthma    Abnormal chest CT    Carcinoid tumor    Carcinoid tumor of right lung       Review of patient's allergies indicates:  No Known Allergies    Current Inpatient Medications:      No current facility-administered medications on file prior to encounter.      Current Outpatient Medications on File Prior to Encounter   Medication Sig Dispense Refill    acetaminophen (TYLENOL) 650 MG TbSR Take 1 tablet (650 mg total) by mouth every 8 (eight) hours. 30 tablet 1    albuterol (PROVENTIL/VENTOLIN HFA) 90 mcg/actuation inhaler Inhale 1-2 puffs into the lungs every 6 (six) hours as needed for Wheezing or Shortness of Breath. Rescue 1 Inhaler 6    albuterol-ipratropium (DUO-NEB) 2.5 mg-0.5 mg/3 mL nebulizer solution Take 3 mLs by nebulization every 6 (six) hours as needed for Wheezing. 1 Box 6    ALPRAZolam (XANAX) 0.5 MG tablet Take 1 tablet (0.5 mg total) by mouth nightly as needed for Anxiety. 20 tablet 0    cyclobenzaprine (FLEXERIL) 5 MG tablet Take 1 tablet (5 mg total) by mouth 3 (three) times daily as needed for Muscle spasms. 21 tablet 1    gabapentin (NEURONTIN) 300 MG capsule Take 1 capsule (300 mg total) by mouth 3 (three) times daily. Start by taking 1 pill nightly then slowly  increase to two pills 90 capsule 1    garlic 200 mg Tab Take 200 mg by mouth once daily.      ginkgo biloba 40 mg Tab Take by mouth once daily.       ibuprofen (ADVIL,MOTRIN) 400 MG tablet Take 1 tablet (400 mg total) by mouth every 6 (six) hours as needed for Other. 28 tablet 1    multivitamin (ONE DAILY MULTIVITAMIN) per tablet Take 1 tablet by mouth once daily.      oxyCODONE (ROXICODONE) 5 MG immediate release tablet Take 1 tablet (5 mg total) by mouth every 4 (four) hours as needed for Pain. 28 tablet 0    MAXX'S WORT ORAL Take by mouth.      TURMERIC ORAL Take by mouth.      vitamin E 200 UNIT capsule Take 200 Units by mouth once daily.         Past Surgical History:   Procedure Laterality Date    BRONCHOSCOPY N/A 2019    Performed by [facility] at [facility] OR 2ND FLR    BRONCHOSCOPY N/A 2018    Performed by Omar [facility]cott [facility] at [facility] CATH LAB    LOBECTOMY, SLEEVE, LUNG, THORACOTOMY APPROACH Right 2019    Performed by [facility] at [facility] OR 2ND FLR    PELVIC LAPAROSCOPY      THORACOTOMY Right 2019    Performed by [facility] at [facility] OR 2ND FLR       Social History     Socioeconomic History    Marital status:      Spouse name: Not on file    Number of children: Not on file    Years of education: Not on file    Highest education level: Not on file   Occupational History    Not on file   Social Needs    Financial resource strain: Not on file    Food insecurity:     Worry: Not on file     Inability: Not on file    Transportation needs:     Medical: Not on file     Non-medical: Not on file   Tobacco Use    Smoking status: Former Smoker     Packs/day: 0.50     Years: 15.00     Pack years: 7.50     Types: Cigarettes     Last attempt to quit: 2018     Years since quittin.4    Smokeless tobacco: Never Used   Substance and Sexual Activity    Alcohol use: No    Drug use: No    Sexual activity: Not Currently   Lifestyle     Physical activity:     Days per week: Not on file     Minutes per session: Not on file    Stress: Not on file   Relationships    Social connections:     Talks on phone: Not on file     Gets together: Not on file     Attends [employer] service: Not on file     Active member of club or organization: Not on file     Attends meetings of clubs or organizations: Not on file     Relationship status: Not on file   Other Topics Concern    Not on file   Social History Narrative    Not on file       OBJECTIVE:     Vital Signs Range (Last 24H):  BP: ()/()   Arterial Line BP: ()/()       CBC:   No results for input(s): WBC, RBC, HGB, HCT, PLT, MCV, MCH, MCHC in the last 72 hours.    CMP: No results for input(s): NA, K, CL, CO2, BUN, CREATININE, GLU, MG, PHOS, CALCIUM, ALBUMIN, PROT, ALKPHOS, ALT, AST, BILITOT in the last 72 hours.    INR:  No results for input(s): PT, INR, PROTIME, APTT in the last 72 hours.    Diagnostic Studies: No relevant studies.     EKG:   Normal sinus rhythm  Biatrial enlargement  Right bundle branch block  Left anterior fascicular block  LVH  No previous ECGs available  Confirmed by Esteban Young MD ([id number]) on 11/27/2018 8:45:33 AM     Dobutamine Stress ECHO:  · Negative dobutamine stress echocardiogram for myocardial ischemia.   · The left ventricle (LV) is normal in size, thickness and function.   · Resting LV ejection fraction is 55%.  · The right ventricle is normal in size and function.  · The EKG portion of this study is negative for myocardial ischemia.  · Arrhythmia during stress: none  · Hemodynamic data during stress: appropriate blood pressure and heart rate response to dobutamine.  · No symptoms reported.    ASSESSMENT/PLAN:     Anesthesia Evaluation    I have reviewed the Patient Summary Reports.    I have reviewed the Nursing Notes.      Review of Systems  Anesthesia Hx:  Denies Hx of Anesthetic complications  History of prior surgery of interest to airway management or planning:  lung surgery. Previous anesthesia: General Denies Family Hx of Anesthesia complications.   Denies Personal Hx of Anesthesia complications.   Social:  Former Smoker    Hematology/Oncology:         -- Anemia: Current/Recent Cancer. surgery   Cardiovascular:  Cardiovascular Normal  ECG has been reviewed.    Pulmonary:   Asthma mild    Renal/:  Renal/ Normal     Hepatic/GI:  Hepatic/GI Normal    Musculoskeletal:  Musculoskeletal Normal    Neurological:  Neurology Normal    Endocrine:  Endocrine Normal    Dermatological:  Skin Normal        Physical Exam  General:  Well nourished    Airway/Jaw/Neck:  Airway Findings: Mouth Opening: Normal Tongue: Normal  Mallampati: II  TM Distance: Normal, at least 6 cm       Chest/Lungs:  Chest/Lungs Findings: Normal Respiratory Rate     Heart/Vascular:  Heart Findings: Rate: Normal  Rhythm: Regular Rhythm             Anesthesia Plan  Type of Anesthesia, risks & benefits discussed:  Anesthesia Type:  general  Patient's Preference:   Intra-op Monitoring Plan: standard ASA monitors  Intra-op Monitoring Plan Comments:   Post Op Pain Control Plan: multimodal analgesia and IV/PO Opioids PRN  Post Op Pain Control Plan Comments:   Induction:   IV  Beta Blocker:         Informed Consent: Patient understands risks and agrees with Anesthesia plan.  Questions answered. Anesthesia consent signed with patient.  ASA Score: 3     Day of Surgery Review of History & Physical:            Ready For Surgery From Anesthesia Perspective.

## 2019-05-06 ENCOUNTER — HOSPITAL ENCOUNTER (OUTPATIENT)
Facility: HOSPITAL | Age: 49
Discharge: HOME | End: 2019-05-06
Attending: THORACIC SURGERY (CARDIOTHORACIC VASCULAR SURGERY) | Admitting: THORACIC SURGERY (CARDIOTHORACIC VASCULAR SURGERY)
Payer: MEDICAID

## 2019-05-06 ENCOUNTER — ANESTHESIA (OUTPATIENT)
Dept: SURGERY | Facility: HOSPITAL | Age: 49
End: 2019-05-06
Payer: MEDICAID

## 2019-05-06 VITALS
HEART RATE: 77 BPM | DIASTOLIC BLOOD PRESSURE: 74 MMHG | BODY MASS INDEX: 30.45 KG/M2 | RESPIRATION RATE: 17 BRPM | WEIGHT: 194 LBS | TEMPERATURE: 99 F | HEIGHT: 67 IN | SYSTOLIC BLOOD PRESSURE: 131 MMHG | OXYGEN SATURATION: 95 %

## 2019-05-06 DIAGNOSIS — D3A.090 CARCINOID TUMOR OF RIGHT LUNG: Primary | ICD-10-CM

## 2019-05-06 DIAGNOSIS — Z98.890 HISTORY OF THORACOTOMY: ICD-10-CM

## 2019-05-06 LAB
B-HCG UR QL: NEGATIVE
CTP QC/QA: YES

## 2019-05-06 PROCEDURE — 71000033 HC RECOVERY, INTIAL HOUR: Performed by: THORACIC SURGERY (CARDIOTHORACIC VASCULAR SURGERY)

## 2019-05-06 PROCEDURE — 63600175 PHARM REV CODE 636 W HCPCS: Performed by: ANESTHESIOLOGY

## 2019-05-06 PROCEDURE — 37000008 HC ANESTHESIA 1ST 15 MINUTES: Performed by: THORACIC SURGERY (CARDIOTHORACIC VASCULAR SURGERY)

## 2019-05-06 PROCEDURE — 71000039 HC RECOVERY, EACH ADD'L HOUR: Performed by: THORACIC SURGERY (CARDIOTHORACIC VASCULAR SURGERY)

## 2019-05-06 PROCEDURE — 25000003 PHARM REV CODE 250: Performed by: STUDENT IN AN ORGANIZED HEALTH CARE EDUCATION/TRAINING PROGRAM

## 2019-05-06 PROCEDURE — 27200651 HC AIRWAY, LMA: Performed by: STUDENT IN AN ORGANIZED HEALTH CARE EDUCATION/TRAINING PROGRAM

## 2019-05-06 PROCEDURE — 36000707: Performed by: THORACIC SURGERY (CARDIOTHORACIC VASCULAR SURGERY)

## 2019-05-06 PROCEDURE — 37000009 HC ANESTHESIA EA ADD 15 MINS: Performed by: THORACIC SURGERY (CARDIOTHORACIC VASCULAR SURGERY)

## 2019-05-06 PROCEDURE — 31622 DX BRONCHOSCOPE/WASH: CPT | Mod: 78,,, | Performed by: THORACIC SURGERY (CARDIOTHORACIC VASCULAR SURGERY)

## 2019-05-06 PROCEDURE — 25000003 PHARM REV CODE 250: Performed by: ANESTHESIOLOGY

## 2019-05-06 PROCEDURE — D9220A PRA ANESTHESIA: ICD-10-PCS | Mod: ,,, | Performed by: ANESTHESIOLOGY

## 2019-05-06 PROCEDURE — 00520 ANES CLOSED CHEST PX NOS: CPT | Performed by: THORACIC SURGERY (CARDIOTHORACIC VASCULAR SURGERY)

## 2019-05-06 PROCEDURE — 36000706: Performed by: THORACIC SURGERY (CARDIOTHORACIC VASCULAR SURGERY)

## 2019-05-06 PROCEDURE — 31622 PR BRONCHOSCOPY,DIAGNOSTIC: ICD-10-PCS | Mod: 78,,, | Performed by: THORACIC SURGERY (CARDIOTHORACIC VASCULAR SURGERY)

## 2019-05-06 PROCEDURE — 63600175 PHARM REV CODE 636 W HCPCS: Performed by: STUDENT IN AN ORGANIZED HEALTH CARE EDUCATION/TRAINING PROGRAM

## 2019-05-06 PROCEDURE — 25000003 PHARM REV CODE 250: Performed by: THORACIC SURGERY (CARDIOTHORACIC VASCULAR SURGERY)

## 2019-05-06 PROCEDURE — 71000015 HC POSTOP RECOV 1ST HR: Performed by: THORACIC SURGERY (CARDIOTHORACIC VASCULAR SURGERY)

## 2019-05-06 PROCEDURE — 94761 N-INVAS EAR/PLS OXIMETRY MLT: CPT | Mod: 59

## 2019-05-06 PROCEDURE — 25000242 PHARM REV CODE 250 ALT 637 W/ HCPCS: Performed by: STUDENT IN AN ORGANIZED HEALTH CARE EDUCATION/TRAINING PROGRAM

## 2019-05-06 PROCEDURE — 27000221 HC OXYGEN, UP TO 24 HOURS

## 2019-05-06 PROCEDURE — D9220A PRA ANESTHESIA: Mod: ,,, | Performed by: ANESTHESIOLOGY

## 2019-05-06 PROCEDURE — 25000003 PHARM REV CODE 250: Performed by: PHYSICIAN ASSISTANT

## 2019-05-06 RX ORDER — SUCCINYLCHOLINE CHLORIDE 20 MG/ML
INJECTION INTRAMUSCULAR; INTRAVENOUS
Status: DISCONTINUED | OUTPATIENT
Start: 2019-05-06 | End: 2019-05-06

## 2019-05-06 RX ORDER — ONDANSETRON 2 MG/ML
INJECTION INTRAMUSCULAR; INTRAVENOUS
Status: DISCONTINUED | OUTPATIENT
Start: 2019-05-06 | End: 2019-05-06

## 2019-05-06 RX ORDER — ALBUTEROL SULFATE 90 UG/1
AEROSOL, METERED RESPIRATORY (INHALATION)
Status: DISCONTINUED | OUTPATIENT
Start: 2019-05-06 | End: 2019-05-06

## 2019-05-06 RX ORDER — OXYCODONE HYDROCHLORIDE 5 MG/1
5 TABLET ORAL EVERY 6 HOURS PRN
Qty: 28 TABLET | Refills: 0 | Status: SHIPPED | OUTPATIENT
Start: 2019-05-06 | End: 2019-05-22 | Stop reason: SDUPTHER

## 2019-05-06 RX ORDER — ROCURONIUM BROMIDE 10 MG/ML
INJECTION, SOLUTION INTRAVENOUS
Status: DISCONTINUED | OUTPATIENT
Start: 2019-05-06 | End: 2019-05-06

## 2019-05-06 RX ORDER — FENTANYL CITRATE 50 UG/ML
25 INJECTION, SOLUTION INTRAMUSCULAR; INTRAVENOUS
Status: DISCONTINUED | OUTPATIENT
Start: 2019-05-06 | End: 2019-05-06 | Stop reason: HOSPADM

## 2019-05-06 RX ORDER — OXYCODONE HYDROCHLORIDE 5 MG/1
5 TABLET ORAL EVERY 6 HOURS PRN
Qty: 28 TABLET | Refills: 0 | Status: SHIPPED | OUTPATIENT
Start: 2019-05-06 | End: 2019-05-06 | Stop reason: SDUPTHER

## 2019-05-06 RX ORDER — LIDOCAINE HYDROCHLORIDE 10 MG/ML
INJECTION, SOLUTION EPIDURAL; INFILTRATION; INTRACAUDAL; PERINEURAL
Status: DISCONTINUED | OUTPATIENT
Start: 2019-05-06 | End: 2019-05-06 | Stop reason: HOSPADM

## 2019-05-06 RX ORDER — FENTANYL CITRATE 50 UG/ML
INJECTION, SOLUTION INTRAMUSCULAR; INTRAVENOUS
Status: DISCONTINUED | OUTPATIENT
Start: 2019-05-06 | End: 2019-05-06

## 2019-05-06 RX ORDER — LIDOCAINE HYDROCHLORIDE 10 MG/ML
1 INJECTION, SOLUTION EPIDURAL; INFILTRATION; INTRACAUDAL; PERINEURAL ONCE
Status: DISCONTINUED | OUTPATIENT
Start: 2019-05-06 | End: 2019-05-06

## 2019-05-06 RX ORDER — SODIUM CHLORIDE 0.9 % (FLUSH) 0.9 %
3 SYRINGE (ML) INJECTION
Status: DISCONTINUED | OUTPATIENT
Start: 2019-05-06 | End: 2019-05-06 | Stop reason: HOSPADM

## 2019-05-06 RX ORDER — LIDOCAINE HCL/PF 100 MG/5ML
SYRINGE (ML) INTRAVENOUS
Status: DISCONTINUED | OUTPATIENT
Start: 2019-05-06 | End: 2019-05-06

## 2019-05-06 RX ORDER — OXYCODONE HYDROCHLORIDE 5 MG/1
5 TABLET ORAL ONCE
Status: COMPLETED | OUTPATIENT
Start: 2019-05-06 | End: 2019-05-06

## 2019-05-06 RX ORDER — FENTANYL CITRATE 50 UG/ML
25 INJECTION, SOLUTION INTRAMUSCULAR; INTRAVENOUS EVERY 5 MIN PRN
Status: DISCONTINUED | OUTPATIENT
Start: 2019-05-06 | End: 2019-05-06 | Stop reason: HOSPADM

## 2019-05-06 RX ORDER — LIDOCAINE HYDROCHLORIDE 10 MG/ML
1 INJECTION, SOLUTION EPIDURAL; INFILTRATION; INTRACAUDAL; PERINEURAL ONCE
Status: COMPLETED | OUTPATIENT
Start: 2019-05-06 | End: 2019-05-06

## 2019-05-06 RX ORDER — SODIUM CHLORIDE 9 MG/ML
INJECTION, SOLUTION INTRAVENOUS CONTINUOUS
Status: DISCONTINUED | OUTPATIENT
Start: 2019-05-06 | End: 2019-05-06 | Stop reason: HOSPADM

## 2019-05-06 RX ORDER — PROPOFOL 10 MG/ML
VIAL (ML) INTRAVENOUS CONTINUOUS PRN
Status: DISCONTINUED | OUTPATIENT
Start: 2019-05-06 | End: 2019-05-06

## 2019-05-06 RX ORDER — PROPOFOL 10 MG/ML
VIAL (ML) INTRAVENOUS
Status: DISCONTINUED | OUTPATIENT
Start: 2019-05-06 | End: 2019-05-06

## 2019-05-06 RX ORDER — MIDAZOLAM HYDROCHLORIDE 1 MG/ML
INJECTION, SOLUTION INTRAMUSCULAR; INTRAVENOUS
Status: DISCONTINUED | OUTPATIENT
Start: 2019-05-06 | End: 2019-05-06

## 2019-05-06 RX ORDER — HYDROMORPHONE HYDROCHLORIDE 1 MG/ML
0.2 INJECTION, SOLUTION INTRAMUSCULAR; INTRAVENOUS; SUBCUTANEOUS EVERY 5 MIN PRN
Status: DISCONTINUED | OUTPATIENT
Start: 2019-05-06 | End: 2019-05-06 | Stop reason: HOSPADM

## 2019-05-06 RX ADMIN — FENTANYL CITRATE 25 MCG: 50 INJECTION INTRAMUSCULAR; INTRAVENOUS at 12:05

## 2019-05-06 RX ADMIN — LIDOCAINE HYDROCHLORIDE 100 MG: 20 INJECTION, SOLUTION INTRAVENOUS at 11:05

## 2019-05-06 RX ADMIN — LIDOCAINE HYDROCHLORIDE 10 MG: 10 INJECTION, SOLUTION EPIDURAL; INFILTRATION; INTRACAUDAL; PERINEURAL at 08:05

## 2019-05-06 RX ADMIN — FENTANYL CITRATE 50 MCG: 50 INJECTION, SOLUTION INTRAMUSCULAR; INTRAVENOUS at 11:05

## 2019-05-06 RX ADMIN — MIDAZOLAM HYDROCHLORIDE 2 MG: 1 INJECTION, SOLUTION INTRAMUSCULAR; INTRAVENOUS at 11:05

## 2019-05-06 RX ADMIN — OXYCODONE HYDROCHLORIDE 5 MG: 5 TABLET ORAL at 09:05

## 2019-05-06 RX ADMIN — ONDANSETRON 4 MG: 2 INJECTION INTRAMUSCULAR; INTRAVENOUS at 11:05

## 2019-05-06 RX ADMIN — SODIUM CHLORIDE: 0.9 INJECTION, SOLUTION INTRAVENOUS at 08:05

## 2019-05-06 RX ADMIN — PROPOFOL 50 MG: 10 INJECTION, EMULSION INTRAVENOUS at 11:05

## 2019-05-06 RX ADMIN — ROCURONIUM BROMIDE 150 MG: 10 INJECTION, SOLUTION INTRAVENOUS at 11:05

## 2019-05-06 RX ADMIN — PROPOFOL 150 MCG/KG/MIN: 10 INJECTION, EMULSION INTRAVENOUS at 11:05

## 2019-05-06 RX ADMIN — ALBUTEROL SULFATE 2 PUFF: 90 AEROSOL, METERED RESPIRATORY (INHALATION) at 11:05

## 2019-05-06 RX ADMIN — PROPOFOL 150 MG: 10 INJECTION, EMULSION INTRAVENOUS at 11:05

## 2019-05-06 RX ADMIN — SUCCINYLCHOLINE CHLORIDE 100 MG: 20 INJECTION, SOLUTION INTRAMUSCULAR; INTRAVENOUS at 11:05

## 2019-05-06 NOTE — PROGRESS NOTES
Kayley Benntet paged and asked about chest xray if needed? MD said no. Patient said a chest xray was done in pacu however, no results are available in chart.  Dr. Bennett verified that no xray was needed patient is cleared to go home.

## 2019-05-06 NOTE — INTERVAL H&P NOTE
The patient has been examined and the H&P has been reviewed:      I concur with the findings and changes have been noted since the H&P was written: Discharged with chest tube on 4/27/19 s/p Bronchoscopy, Right Thoracotomy with Right Lower Bilobectomy with Bronchoplasty, Adhesiolysis and MLND on 4/22/19. Chest tube fell out at home on 5/1/19 and presented to ED. Returns today for surveillance bronchoscopy and chest CT.     Anesthesia/Surgery risks, benefits and alternative options discussed and understood by patient/family.          Active Hospital Problems    Diagnosis  POA    Carcinoid tumor of right lung [D3A.090]  Yes      Resolved Hospital Problems   No resolved problems to display.

## 2019-05-06 NOTE — ANESTHESIA POSTPROCEDURE EVALUATION
Anesthesia Post Evaluation    Patient: Gina Coto    Procedure(s) Performed: Procedure(s) (LRB):  BRONCHOSCOPY (N/A)    Final Anesthesia Type: general  Patient location during evaluation: PACU  Patient participation: Yes- Able to Participate  Level of consciousness: awake and alert  Post-procedure vital signs: reviewed and stable  Pain management: adequate  Airway patency: patent  PONV status at discharge: No PONV  Anesthetic complications: no      Cardiovascular status: blood pressure returned to baseline  Respiratory status: unassisted  Hydration status: euvolemic  Follow-up not needed.          Vitals Value Taken Time   /69 5/6/2019  1:17 PM   Temp 36.8 °C (98.2 °F) 5/6/2019 12:06 PM   Pulse 79 5/6/2019  1:27 PM   Resp 19 5/6/2019  1:27 PM   SpO2 94 % 5/6/2019  1:27 PM   Vitals shown include unvalidated device data.      Event Time     Out of Recovery 13:27:15          Pain/Saundra Score: Pain Rating Prior to Med Admin: 6 (5/6/2019 12:43 PM)  Pain Rating Post Med Admin: 5 (5/6/2019 10:00 AM)  Saundra Score: 10 (5/6/2019  1:00 PM)

## 2019-05-06 NOTE — DISCHARGE INSTRUCTIONS
PATIENT INSTRUCTIONS  POST-ANESTHESIA    IMMEDIATELY FOLLOWING SURGERY:  Do not drive or operate machinery for the first twenty four hours after surgery.  Do not make any important decisions for twenty four hours after surgery or while taking narcotic pain medications or sedatives.  If you develop intractable nausea and vomiting or a severe headache please notify your doctor immediately.    FOLLOW-UP:  Please make an appointment with your surgeon as instructed. You do not need to follow up with anesthesia unless specifically instructed to do so.    WOUND CARE INSTRUCTIONS (if applicable):  Keep a dry clean dressing on the anesthesia/puncture wound site if there is drainage.  Once the wound has quit draining you may leave it open to air.  Generally you should leave the bandage intact for twenty four hours unless there is drainage.  If the epidural site drains for more than 36-48 hours please call the anesthesia department.    QUESTIONS?:  Please feel free to call your physician or the hospital  if you have any questions, and they will be happy to assist you.       [place] [facility]  [address] [facility]  [phone]          Flexible Bronchoscopy  A flexible bronchoscopy is an exam of the airways of your lungs. A thin, flexible tube called a bronchoscope is used. It has a light and small camera that allow the healthcare provider to view your airways.    Before your test  · Follow your healthcare provider's instructions carefully. If you dont, the exam may be canceled. Or you may need to take it again.  · If you are taking blood-thinning medicine, ask your healthcare provider if you should stop taking the medicine before this test.  · Have no food or drink for at least 8 hours before the test. Also, avoid smoking for 24 hours before the test.  · You will need to remove any dentures or removable devices from your mouth.  · Right before the test, you will be given sedating  medicines to help you relax. The medicine may be given by an IV (intravenously) into one of your veins. In addition, your nose and throat may be numbed with a special spray to help prevent gagging and coughing.  · If you are having this test as an outpatient, make sure you have an adult friend or family member to drive you home.  During your test  Bronchoscopy takes 45 to 60 minutes and includes the following steps:  · You may be given medicine (anesthesia) so that you are unconscious or asleep during the procedure.  · The healthcare provider inserts the tube into your nose or mouth.  · If you have not been given anesthesia, you might feel a gagging sensation. To help ease this feeling, you will be told to swallow or take deep breaths. Your airway will remain open even with the tube in place. But you wont be able to talk.  · The provider checks your breathing passage. He or she may also remove tiny tissue samples for biopsy.  After your test  · You may have a mild sore throat or cough. Your voice may also be hoarse.  · Don't eat or drink until the anesthesia wears off.  · If you had a biopsy, you might see traces of blood being coughed up.  When to call your healthcare provider  Call your healthcare provider right away if you have any of the following:  · Shortness of breath  · Chest pain  · Bleeding from your nose or throat  · Coughing up a large amount of blood  · A fever above 100.4°F (38°C) for more than 24 hours  Call 911  Call 911 if you have:  · Chest pain  · Severe shortness of breath   Date Last Reviewed: 12/1/2016 © 2000-2017 [employer]. [address] [place] [address], [place] [address]. All rights reserved. This information is not intended as a substitute for professional medical care. Always follow your healthcare professional's instructions.

## 2019-05-06 NOTE — OP NOTE
[facility]  Cardiothoracic Surgery  Operative Note    SUMMARY     Date of Procedure: 5/6/2019     Procedure: Procedure(s) (LRB):  BRONCHOSCOPY (N/A)    Surgeon(s) and Role:     * Juan Jose Miles MD - Primary     * Kayley Bennett MD - Fellow    Assisting Surgeon: None    Pre-Operative Diagnosis: Hydropneumothorax [J94.8]    Post-Operative Diagnosis: Post-Op Diagnosis Codes:     * Hydropneumothorax [J94.8]    Anesthesia: Local MAC    Medications: 10mL 1% lidocaine topically to vocal cords    Indications:   48yo F discharged with chest tube on 4/27/19 s/p Bronchoscopy, Right Thoracotomy with Right Lower Bilobectomy with Bronchoplasty, Adhesiolysis and MLND on 4/22/19. Chest tube fell out at home on 5/1/19 and presented to ED. Returns today for surveillance bronchoscopy and chest CT. CT revealed intact bronchial stump. thus risks, benefits, and alternatives have been discussed and she elected to proceed.    Operative Findings:   R bronchus intermedius stump intact, RUL airway patent    Description of the Procedure:   The patient was brought to the operating room.  General anesthesia was induced.  A laryngeal mask airway was placed by anesthesia. The bronchoscope was inserted. The vocal cords were anesthetized with 10mL 1% Lidocaine.  The bronchus intermedius stump was intact and the RUL bronchus was patent. No copious mucus, purulence, or blood was visualized bilaterally.  The patient tolerated the procedure well and was taken to the post-anesthesia care unit in stable condition.    Wound Classification: 2, Clean contaminated    Complications: None apparent    Estimated Blood Loss (EBL): 0 mL           Drains: None    Implants: * No implants in log *    Specimens:   Specimen (12h ago, onward)    None                  Condition: Good    Disposition: PACU - hemodynamically stable.    Postoperative Plan:   Discharge to home    Attestation: Dr. Miles was present for the entirety of the  case.  Kayley Bennett MD  Thoracic Surgery Resident, PGY6  General Thoracic Surgery  [facility]

## 2019-05-06 NOTE — PROGRESS NOTES
Dr No team paged to get consent signed so that was can admin pain medication. Dr Berger at bedside, getting consents signed and ordering meds.

## 2019-05-06 NOTE — BRIEF OP NOTE
[facility]  Brief Operative Note     SUMMARY     Surgery Date: 5/6/2019     Surgeon(s) and Role:     * Juan Jose Miles MD - Primary     * Kayley Bennett MD - Fellow    Assisting Surgeon: None    Pre-op Diagnosis:  Hydropneumothorax [J94.8]    Post-op Diagnosis:  Post-Op Diagnosis Codes:     * Hydropneumothorax [J94.8]    Procedure(s) (LRB):  BRONCHOSCOPY (N/A)    Anesthesia: Local MAC    Description of the findings of the procedure:   Bronchoscopy    Findings/Key Components:   R bronchial stump intact, RUL airway patent    Estimated Blood Loss: 0mL         Specimens:   Specimen (12h ago, onward)    None          Discharge Note    SUMMARY     Admit Date: 5/6/2019    Discharge Date and Time:  05/06/2019 12:27 PM    Hospital Course (synopsis of major diagnoses, care, treatment, and services provided during the course of the hospital stay):   Bronchoscopy     Final Diagnosis: Post-Op Diagnosis Codes:     * Hydropneumothorax [J94.8]    Disposition: Home or Self Care    Follow Up/Patient Instructions: f/u with Dr. Miles in clinic in 1 month with chest XR    Medications:  Reconciled Home Medications:      Medication List      CHANGE how you take these medications    oxyCODONE 5 MG immediate release tablet  Commonly known as:  ROXICODONE  Take 1 tablet (5 mg total) by mouth every 6 (six) hours as needed for Pain.  What changed:  when to take this        CONTINUE taking these medications    acetaminophen 650 MG Tbsr  Commonly known as:  TYLENOL  Take 1 tablet (650 mg total) by mouth every 8 (eight) hours.     albuterol 90 mcg/actuation inhaler  Commonly known as:  PROVENTIL/VENTOLIN HFA  Inhale 1-2 puffs into the lungs every 6 (six) hours as needed for Wheezing or Shortness of Breath. Rescue     albuterol-ipratropium 2.5 mg-0.5 mg/3 mL nebulizer solution  Commonly known as:  DUO-NEB  Take 3 mLs by nebulization every 6 (six) hours as needed for Wheezing.     ALPRAZolam 0.5 MG  tablet  Commonly known as:  XANAX  Take 1 tablet (0.5 mg total) by mouth nightly as needed for Anxiety.     cyclobenzaprine 5 MG tablet  Commonly known as:  FLEXERIL  Take 1 tablet (5 mg total) by mouth 3 (three) times daily as needed for Muscle spasms.     gabapentin 300 MG capsule  Commonly known as:  NEURONTIN  Take 1 capsule (300 mg total) by mouth 3 (three) times daily. Start by taking 1 pill nightly then slowly increase to two pills     garlic 200 mg Tab  Take 200 mg by mouth once daily.     ginkgo biloba 40 mg Tab  Take by mouth once daily.     ibuprofen 400 MG tablet  Commonly known as:  ADVIL,MOTRIN  Take 1 tablet (400 mg total) by mouth every 6 (six) hours as needed for Other.     ONE DAILY MULTIVITAMIN per tablet  Generic drug:  multivitamin  Take 1 tablet by mouth once daily.     MAXX'S WORT ORAL  Take by mouth.     TURMERIC ORAL  Take by mouth.     vitamin E 200 UNIT capsule  Take 200 Units by mouth once daily.          Discharge Procedure Orders   X-Ray Chest PA And Lateral   Standing Status: Future Standing Exp. Date: 05/06/20     Order Specific Question Answer Comments   Reason for Exam: s/p R VATS lobectomy    Is the patient pregnant? No    May the Radiologist modify the order per protocol to meet the clinical needs of the patient? Yes      Diet Adult Regular     Activity as tolerated     Follow-up Information     Juan Jose Miles MD In 1 month.    Specialty:  Cardiothoracic Surgery  Why:  Follow up after getting XR PA/Lat, s/p R VATS lobectomy  Contact information:  [address] [facility] [address]  [phone]

## 2019-05-18 ENCOUNTER — PATIENT MESSAGE (OUTPATIENT)
Dept: CARDIOTHORACIC SURGERY | Facility: HOSPITAL | Age: 49
End: 2019-05-18

## 2019-05-19 RX ORDER — CYCLOBENZAPRINE HCL 5 MG
5 TABLET ORAL 3 TIMES DAILY PRN
Qty: 31 TABLET | Refills: 0 | Status: CANCELLED | OUTPATIENT
Start: 2019-05-19 | End: 2019-05-29

## 2019-05-20 DIAGNOSIS — Z98.890 HISTORY OF THORACOTOMY: ICD-10-CM

## 2019-05-20 DIAGNOSIS — M62.838 MUSCLE SPASM: Primary | ICD-10-CM

## 2019-05-20 RX ORDER — CYCLOBENZAPRINE HCL 5 MG
5 TABLET ORAL 3 TIMES DAILY PRN
Qty: 30 TABLET | Refills: 1 | Status: SHIPPED | OUTPATIENT
Start: 2019-05-20 | End: 2019-05-30

## 2019-06-12 ENCOUNTER — OFFICE VISIT (OUTPATIENT)
Dept: CARDIOTHORACIC SURGERY | Facility: CLINIC | Age: 49
End: 2019-06-12
Payer: MEDICAID

## 2019-06-12 ENCOUNTER — HOSPITAL ENCOUNTER (OUTPATIENT)
Dept: RADIOLOGY | Facility: HOSPITAL | Age: 49
Discharge: HOME | End: 2019-06-12
Attending: THORACIC SURGERY (CARDIOTHORACIC VASCULAR SURGERY)
Payer: MEDICAID

## 2019-06-12 VITALS
DIASTOLIC BLOOD PRESSURE: 94 MMHG | BODY MASS INDEX: 29.72 KG/M2 | OXYGEN SATURATION: 98 % | HEART RATE: 111 BPM | HEIGHT: 67 IN | SYSTOLIC BLOOD PRESSURE: 141 MMHG | WEIGHT: 189.38 LBS

## 2019-06-12 DIAGNOSIS — D3A.090 CARCINOID TUMOR OF RIGHT LUNG: Primary | ICD-10-CM

## 2019-06-12 DIAGNOSIS — D3A.00 CARCINOID TUMOR: ICD-10-CM

## 2019-06-12 DIAGNOSIS — C34.31 MALIGNANT NEOPLASM OF LOWER LOBE OF RIGHT LUNG: ICD-10-CM

## 2019-06-12 DIAGNOSIS — Z90.2 S/P LOBECTOMY OF LUNG: ICD-10-CM

## 2019-06-12 PROCEDURE — 71046 XR CHEST PA AND LATERAL: ICD-10-PCS | Mod: 26,,, | Performed by: RADIOLOGY

## 2019-06-12 PROCEDURE — 71046 X-RAY EXAM CHEST 2 VIEWS: CPT | Mod: 26,,, | Performed by: RADIOLOGY

## 2019-06-12 PROCEDURE — 99024 POSTOP FOLLOW-UP VISIT: CPT | Mod: ,,, | Performed by: THORACIC SURGERY (CARDIOTHORACIC VASCULAR SURGERY)

## 2019-06-12 PROCEDURE — 99024 PR POST-OP FOLLOW-UP VISIT: ICD-10-PCS | Mod: ,,, | Performed by: THORACIC SURGERY (CARDIOTHORACIC VASCULAR SURGERY)

## 2019-06-12 PROCEDURE — 99999 PR PBB SHADOW E&M-EST. PATIENT-LVL IV: CPT | Mod: PBBFAC,,, | Performed by: THORACIC SURGERY (CARDIOTHORACIC VASCULAR SURGERY)

## 2019-06-12 PROCEDURE — 99214 OFFICE O/P EST MOD 30 MIN: CPT | Mod: PBBFAC,25 | Performed by: THORACIC SURGERY (CARDIOTHORACIC VASCULAR SURGERY)

## 2019-06-12 PROCEDURE — 71046 X-RAY EXAM CHEST 2 VIEWS: CPT | Mod: TC,FY

## 2019-06-12 PROCEDURE — 99999 PR PBB SHADOW E&M-EST. PATIENT-LVL IV: ICD-10-PCS | Mod: PBBFAC,,, | Performed by: THORACIC SURGERY (CARDIOTHORACIC VASCULAR SURGERY)

## 2019-06-12 RX ORDER — CYCLOBENZAPRINE HCL 5 MG
5 TABLET ORAL 3 TIMES DAILY PRN
COMMUNITY
End: 2019-08-21

## 2019-06-12 NOTE — PROGRESS NOTES
Subjective:       Patient ID: Gina Coto is a 49 y.o. female.    Chief Complaint: Post-op Evaluation    Diagnosis:  Carcinoid Tumor    Pre-operative therapy: NA    Procedure(s) and date(s):   4/22/19- Bronchoscopy, right thoracotomy for right lower bilobectomy for carcinoid tumor in bronchus intermedius and MLND.  Bronchial stump closed with 4-0 PDS under direct bronchoscopic vision.  Intercostal muscle flap between bronchus and PA.  5/6/19- Surveillance bronchoscopy     Pathology:  2.8cm well differentiated neuroendocrine tumor. Grade 1 typical carcinoid tumor. Ki-67 stains less than 2% of tumor cell nuclei. Negative margins, 0.5cm from closest margin. 3 benign LNs within the specimen. Levels 7 and 11 LN negative.   pT2 N0    Post-operative therapy: NA    HPI 49 year old female returns for follow up s/p right thoracotomy for right lower bilobectomy for resection of carcinoid tumor. Post op course complicated by persistent air leak. Discharged with chest tube on 4/27/19. Presented to outside ED after chest tube fell out at home with small but stable pneumothorax. We brought her in the next week for a chest CT and surveillance bronchoscopy on 5/6/19. Bronchial stump intact. Returns for 1 month follow up. Today she reports feeling well. She is off all opioids. Just taking one Gabapentin and one muscle relaxer at night. Returned to normal activity and appetite levels.    Review of Systems   Constitutional: Negative for activity change and fatigue.   HENT: Negative for congestion.    Respiratory: Negative for cough, choking and shortness of breath.    Cardiovascular: Negative for chest pain and palpitations.   Gastrointestinal: Negative for abdominal distention and abdominal pain.   Genitourinary: Negative for difficulty urinating.   Musculoskeletal: Negative for arthralgias.   Skin: Negative for color change and rash.   Neurological: Negative for dizziness and syncope.   Psychiatric/Behavioral: Negative for  "agitation. The patient is not nervous/anxious.          Objective:       Vitals:    06/12/19 0940   BP: (!) 141/94   Pulse: (!) 111   SpO2: 98%   Weight: 85.9 kg (189 lb 6 oz)   Height: 5' 7" (1.702 m)   PainSc: 0-No pain     Physical Exam   Constitutional: She is oriented to person, place, and time. She appears well-developed and well-nourished.   HENT:   Head: Normocephalic and atraumatic.   Eyes: EOM are normal.   Neck: Normal range of motion. Neck supple. No tracheal deviation present.   Cardiovascular: Normal rate, regular rhythm, normal heart sounds and intact distal pulses.   Pulmonary/Chest: Effort normal and breath sounds normal.   Right thoracotomy incision well healed. Mild, expected edema.   Abdominal: Soft.   Musculoskeletal: Normal range of motion. She exhibits no edema.   Neurological: She is alert and oriented to person, place, and time.   Skin: Skin is warm and dry.   Psychiatric: She has a normal mood and affect. Thought content normal.   Vitals reviewed.        6/12/19- CXR- Right-sided pleural effusion with elevation RIGHT hemidiaphragm.  LEFT lung is clear.  Volume loss RIGHT hemithorax.  Surgical clips.  S/P resection of RIGHT 5th rib.    Assessment:        49 year old female returns for follow up s/p right thoracotomy for right lower bilobectomy for resection of carcinoid tumor with MLND  Complete resection with negative LN and low Ki-67-->excellent 5 and 10 year prognosis    Plan:       Doing very well. RTC in 6 months with repeat noncontrast chest CT.   "
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
## 2019-07-22 ENCOUNTER — TELEPHONE (OUTPATIENT)
Dept: CARDIOTHORACIC SURGERY | Facility: CLINIC | Age: 49
End: 2019-07-22

## 2019-07-22 NOTE — TELEPHONE ENCOUNTER
Patient c/o pain that lessens but never goes away.  Says that gabapentin  300 mg 1 in am and -2 in the pm  helps with burning, not with throbbing pain.  Patient has a cold sensation to the back of her throat and radiating down center of back to right arm.  Little movements hurt.   Patient has an appointment on Wednesday with PCP at 1:00 pm. Patient instructed to try cool compress or OTC Salonpas.  Instructed to go to the ER if she felt like she couldn't wait until Wednesday.      ----- Message from Brooke Keane sent at 7/22/2019  1:21 PM CDT -----  Contact: Pt.Self  Needs Advice    Reason for call:  Pt. States she would like to speak with nurse in reference to having a lot of pain post surgery and needs to find out what to do        Communication Preference:  [phone]    Additional Information:    Thank You

## 2019-08-08 ENCOUNTER — PATIENT MESSAGE (OUTPATIENT)
Dept: CARDIOTHORACIC SURGERY | Facility: CLINIC | Age: 49
End: 2019-08-08

## 2019-08-08 DIAGNOSIS — D3A.090 CARCINOID TUMOR OF RIGHT LUNG: Primary | ICD-10-CM

## 2019-08-19 ENCOUNTER — TELEPHONE (OUTPATIENT)
Dept: CARDIOTHORACIC SURGERY | Facility: CLINIC | Age: 49
End: 2019-08-19

## 2019-08-19 NOTE — TELEPHONE ENCOUNTER
Explained to patient that she is scheduled for a clinic visit.  ----- Message from Ricardo Altamirano sent at 8/19/2019  9:29 AM CDT -----  Contact: pt  Needs Advice    Reason for call: the pt is calling to speak with Paola. The pt states she has an upcoming on Wednesday. The pt wants to know if she needs someone to drive her to the appt on Wednesday as it will be a small procedure.         Communication Preference: [phone] (please leave a detailed message if pt does not answer)     Additional Information:

## 2019-08-21 ENCOUNTER — HOSPITAL ENCOUNTER (OUTPATIENT)
Dept: RADIOLOGY | Facility: HOSPITAL | Age: 49
Discharge: HOME | End: 2019-08-21
Attending: THORACIC SURGERY (CARDIOTHORACIC VASCULAR SURGERY)
Payer: MEDICAID

## 2019-08-21 ENCOUNTER — OFFICE VISIT (OUTPATIENT)
Dept: CARDIOTHORACIC SURGERY | Facility: CLINIC | Age: 49
End: 2019-08-21
Payer: MEDICAID

## 2019-08-21 VITALS
OXYGEN SATURATION: 97 % | SYSTOLIC BLOOD PRESSURE: 142 MMHG | HEART RATE: 63 BPM | BODY MASS INDEX: 31.31 KG/M2 | HEIGHT: 67 IN | DIASTOLIC BLOOD PRESSURE: 83 MMHG | WEIGHT: 199.5 LBS

## 2019-08-21 DIAGNOSIS — D3A.090 CARCINOID TUMOR OF RIGHT LUNG: ICD-10-CM

## 2019-08-21 DIAGNOSIS — G89.22 PAIN, CHRONIC POST-THORACOTOMY: Primary | ICD-10-CM

## 2019-08-21 DIAGNOSIS — D3A.00 CARCINOID TUMOR: Primary | ICD-10-CM

## 2019-08-21 DIAGNOSIS — S22.31XG: ICD-10-CM

## 2019-08-21 DIAGNOSIS — G89.22 PAIN, CHRONIC POST-THORACOTOMY: ICD-10-CM

## 2019-08-21 PROCEDURE — 71250 CT THORAX DX C-: CPT | Mod: 26,,, | Performed by: RADIOLOGY

## 2019-08-21 PROCEDURE — 71250 CT THORAX DX C-: CPT | Mod: TC

## 2019-08-21 PROCEDURE — 71250 CT CHEST WITHOUT CONTRAST: ICD-10-PCS | Mod: 26,,, | Performed by: RADIOLOGY

## 2019-08-21 PROCEDURE — 99213 PR OFFICE/OUTPT VISIT, EST, LEVL III, 20-29 MIN: ICD-10-PCS | Mod: S$PBB,,, | Performed by: THORACIC SURGERY (CARDIOTHORACIC VASCULAR SURGERY)

## 2019-08-21 PROCEDURE — 99999 PR PBB SHADOW E&M-EST. PATIENT-LVL III: CPT | Mod: PBBFAC,,, | Performed by: THORACIC SURGERY (CARDIOTHORACIC VASCULAR SURGERY)

## 2019-08-21 PROCEDURE — 99213 OFFICE O/P EST LOW 20 MIN: CPT | Mod: S$PBB,,, | Performed by: THORACIC SURGERY (CARDIOTHORACIC VASCULAR SURGERY)

## 2019-08-21 PROCEDURE — 99999 PR PBB SHADOW E&M-EST. PATIENT-LVL III: ICD-10-PCS | Mod: PBBFAC,,, | Performed by: THORACIC SURGERY (CARDIOTHORACIC VASCULAR SURGERY)

## 2019-08-21 PROCEDURE — 99213 OFFICE O/P EST LOW 20 MIN: CPT | Mod: PBBFAC,25 | Performed by: THORACIC SURGERY (CARDIOTHORACIC VASCULAR SURGERY)

## 2019-08-21 NOTE — PROGRESS NOTES
Subjective:       Patient ID: Gina Coto is a 49 y.o. female.    Chief Complaint: Follow-up    Diagnosis:  Carcinoid Tumor    Pre-operative therapy: NA    Procedure(s) and date(s):   4/22/19- Bronchoscopy, right thoracotomy for right lower bilobectomy for carcinoid tumor in bronchus intermedius and MLND.  Bronchial stump closed with 4-0 PDS under direct bronchoscopic vision.  Intercostal muscle flap between bronchus and PA.  5/6/19- Surveillance bronchoscopy     Pathology:  2.8cm well differentiated neuroendocrine tumor. Grade 1 typical carcinoid tumor. Ki-67 stains less than 2% of tumor cell nuclei. Negative margins, 0.5cm from closest margin. 3 benign LNs within the specimen. Levels 7 and 11 LN negative.   pT2 N0    Post-operative therapy: NA    HPI 49 year old female returns for follow up s/p right thoracotomy for right lower bilobectomy for resection of carcinoid tumor. Post op course complicated by persistent air leak. Discharged with chest tube on 4/27/19. She was doing well at 1 month follow-up however she returns today prior to 6 month surveillance follow-up complaining of persistent rib pain/clicking. Reports rolled in bed 2-3 weeks ago and felt acute rib pain and pop. Pain/clicking is exacerbated by reaching with right arm. Chest CT with 3D rib reconstructions today. She is off all opioids. Takes one gabapentin in am and 2 qHS. Difficulty getting established with pain management secondary to medicaid.     Review of Systems   Constitutional: Negative for activity change and fatigue.   HENT: Negative for congestion.    Respiratory: Negative for cough, choking and shortness of breath.         Rib pain, see hpi   Cardiovascular: Negative for chest pain and palpitations.   Gastrointestinal: Negative for abdominal distention and abdominal pain.   Genitourinary: Negative for difficulty urinating.   Musculoskeletal: Negative for arthralgias.   Skin: Negative for color change and rash.   Neurological:  "Negative for dizziness and syncope.   Psychiatric/Behavioral: Negative for agitation. The patient is not nervous/anxious.          Objective:       Vitals:    08/21/19 0914   BP: (!) 142/83   Pulse: 63   SpO2: 97%   Weight: 90.5 kg (199 lb 8.3 oz)   Height: 5' 7" (1.702 m)   PainSc: 0-No pain        Physical Exam   Constitutional: She is oriented to person, place, and time. She appears well-developed and well-nourished.   HENT:   Head: Normocephalic and atraumatic.   Eyes: EOM are normal.   Neck: Normal range of motion. Neck supple. No tracheal deviation present.   Cardiovascular: Normal rate, regular rhythm, normal heart sounds and intact distal pulses.   Pulmonary/Chest: Effort normal.   Right thoracotomy incision well healed.  No scapular winging, unable to reproduce  Tender to palpation superior to thoracotomy incision   Abdominal: Soft.   Musculoskeletal: Normal range of motion. She exhibits no edema.   Neurological: She is alert and oriented to person, place, and time.   Skin: Skin is warm and dry.   Psychiatric: She has a normal mood and affect. Thought content normal.   Vitals reviewed.        6/12/19- CXR- Right-sided pleural effusion with elevation RIGHT hemidiaphragm.  LEFT lung is clear.  Volume loss RIGHT hemithorax.  Surgical clips.  S/P resection of RIGHT 5th rib.    Chest CT 8/21/19:   Postsurgical change of right thoracotomy and right lower bilobectomy.  Small focus of fluid attenuation within the right costophrenic angle surrounded by high attenuation of the vessel and parietal margins.  Findings may represent normal postsurgical change with fluid filling the pleural space.   New 0.9 cm ground-glass opacity in the medial aspect of the right upper lobe and new left lower lobe subcentimeter pulmonary nodule.  Findings may represent inflammatory process, however in this patient with a history of malignancy continued follow-up is recommended with dedicated chest CT in 6-12 months for re-evaluation. "  Further surveillance recommendations may be provided at the time of follow-up examination.    Assessment:        49 year old female returns for follow up s/p right thoracotomy for right lower bilobectomy for resection of carcinoid tumor with MLND. Complete resection with negative LN and low Ki-67-->excellent 5 and 10 year prognosis. Twisted in bed and felt popping deep to prior right thoracotomy incision. CT today with medial displacement of cut 6th rib edge.     Plan:       The RUL nodule is of new onset in the setting of a recent typical carcinoid resection.  No indication for intervention.  Consider repeat chest CT in 6 months as part of a regular monitoring program.    To OR for redo right thoracotomy and rib plating. Appropriate patient education regarding the neal-operative period as well as intraoperative details were discussed. Risks, including but not limited to, bleeding, infection, pain and anesthetic complication were discussed. Patient was given the opportunity to ask questions and to have those questions answered to their satisfaction. Patient verbalized understanding to both procedure and associated risks. Consent was obtained.

## 2019-09-06 ENCOUNTER — DOCUMENTATION ONLY (OUTPATIENT)
Dept: CARDIOTHORACIC SURGERY | Facility: CLINIC | Age: 49
End: 2019-09-06

## 2019-09-06 ENCOUNTER — TELEPHONE (OUTPATIENT)
Dept: CARDIOTHORACIC SURGERY | Facility: CLINIC | Age: 49
End: 2019-09-06

## 2019-09-06 ENCOUNTER — ANESTHESIA EVENT (OUTPATIENT)
Dept: SURGERY | Facility: HOSPITAL | Age: 49
DRG: 168 | End: 2019-09-06
Payer: MEDICAID

## 2019-09-06 ENCOUNTER — PATIENT MESSAGE (OUTPATIENT)
Dept: SURGERY | Facility: CLINIC | Age: 49
End: 2019-09-06

## 2019-09-06 NOTE — PROGRESS NOTES
Hyun MCGINNIS Jd Brian Staff   Caller: Unspecified (Today,  2:07 PM)             Pt says she's received her surgery info and will be here at 6:40am on Monday.

## 2019-09-06 NOTE — TELEPHONE ENCOUNTER
Pre-op Call:   Called pt to informed of surgery start time (0840), arrival time (0640), and location ([address] [place] [address]). Reminded about NPO guidelines (no solids 8 hrs before surgery), clear liquids (water, Gatorade and Powerdade) allowed until 2 hrs before surgery. Reminded to wash with Hibiclens or gold dial soap. Making arranging for someone to drive her home upon discharge. Post-op appt will be mailed,in D/C instructions and available via [facility]. Requested a call back ([phone]) to confirm receipt. Will send message via [facility].

## 2019-09-08 NOTE — ANESTHESIA PREPROCEDURE EVALUATION
[facility]  Anesthesia Pre-Operative Evaluation         Patient Name: Gina oCto  YOB: 1970  MRN: [id number]    SUBJECTIVE:     Pre-operative evaluation for Procedure(s) (LRB):  THORACOTOMY (Right)     09/08/2019    Gina Coto is a 49 y.o. female w/ a significant PMHx of female former smoker (15 pack years; quit December 2018) and Right Bronchus Intermedius Carcinoid Tumor. She is s/p right sleeve lobectomy on 4/22/19 but now with persistent rib pain/clicking. Reports rolled in bed and felt acute rib pain and pop. Pain/clicking is exacerbated by reaching with right arm.    Patient now presents for the above procedure(s).      LDA: None documented.       Prev airway:    04/22/19; Placement Time: 0950; Method of Intubation: Direct laryngoscopy; Inserted by: Anesthesia Resident; Airway Device: OLIVIER 37; Mask Ventilation: Easy; Intubated: Postinduction; Blade: Samaniego #2; Airway Device Size:  (OLIVIER 37); Style: Cuffed; Cuff Inflation: Minimal occlusive pressure; Placement Verified By: Auscultation, Capnometry, ETT Condensation; Grade: Grade I; Complicating Factors: None; Intubation Findings: Positive EtCO2, Bilateral breath sounds    Drips: None documented.      Patient Active Problem List   Diagnosis    History of pneumonia- Nov 2018     Seasonal asthma    Abnormal chest CT    Carcinoid tumor    Carcinoid tumor of right lung       Review of patient's allergies indicates:  No Known Allergies    Current Inpatient Medications:      No current facility-administered medications on file prior to encounter.      Current Outpatient Medications on File Prior to Encounter   Medication Sig Dispense Refill    acetaminophen (TYLENOL) 650 MG TbSR Take 1 tablet (650 mg total) by mouth every 8 (eight) hours. 30 tablet 1    albuterol (PROVENTIL/VENTOLIN HFA) 90 mcg/actuation inhaler Inhale 1-2 puffs into the lungs every 6 (six) hours as needed for Wheezing or Shortness of Breath. Rescue 1  Inhaler 6    albuterol-ipratropium (DUO-NEB) 2.5 mg-0.5 mg/3 mL nebulizer solution Take 3 mLs by nebulization every 6 (six) hours as needed for Wheezing. 1 Box 6    gabapentin (NEURONTIN) 300 MG capsule Take 1 capsule (300 mg total) by mouth 3 (three) times daily. Start by taking 1 pill nightly then slowly increase to two pills 90 capsule 1    garlic 200 mg Tab Take 200 mg by mouth once daily.      ginkgo biloba 40 mg Tab Take by mouth once daily.       ibuprofen (ADVIL,MOTRIN) 400 MG tablet Take 1 tablet (400 mg total) by mouth every 6 (six) hours as needed for Other. 28 tablet 1    multivitamin (ONE DAILY MULTIVITAMIN) per tablet Take 1 tablet by mouth once daily.      MAXX'S WORT ORAL Take by mouth.      TURMERIC ORAL Take by mouth.      vitamin E 200 UNIT capsule Take 200 Units by mouth once daily.         Past Surgical History:   Procedure Laterality Date    BRONCHOSCOPY N/A 5/6/2019    [facility]formed [facility] 2ND F[facility]    BRONCHOSCOPY N/A 4/22/2019    [facility] at [facility] OR 2ND [facility]LR    BRONCHOSCOPY N/A 11/28/2018    Performed by Omar [facility] at [facility] CATH LAB    LOBECTOMY, SLEEVE, LUNG, THORACOTOMY APPROACH Right 4/22/2019    Per[facility] at [facility] OR 2ND FLR    PELVIC LAPAROSCOPY      THORACOTOMY Right 4/22/2019    Per[facility] by [facility] at [facility] OR 2ND FLR       Social History     Socioeconomic History    Marital status:      Spouse name: Not on file    Number of children: Not on file    Years of education: Not on file    Highest education level: Not on file   Occupational History    Not on file   Social Needs    Financial resource strain: Not on file    Food insecurity:     Worry: Not on file     Inability: Not on file    Transportation needs:     Medical: Not on file     Non-medical: Not on file   Tobacco Use    Smoking status: Former Smoker     Packs/day: 0.50     Years: 15.00     Pack  years: 7.50     Types: Cigarettes     Last attempt to quit: 2018     Years since quittin.8    Smokeless tobacco: Never Used   Substance and Sexual Activity    Alcohol use: No    Drug use: No    Sexual activity: Not Currently   Lifestyle    Physical activity:     Days per week: Not on file     Minutes per session: Not on file    Stress: Not on file   Relationships    Social connections:     Talks on phone: Not on file     Gets together: Not on file     Attends Christianity service: Not on file     Active member of club or organization: Not on file     Attends meetings of clubs or organizations: Not on file     Relationship status: Not on file   Other Topics Concern    Not on file   Social History Narrative    Not on file       OBJECTIVE:     Vital Signs Range (Last 24H):         Significant Labs:  Lab Results   Component Value Date    WBC 11.77 2019    HGB 10.9 (L) 2019    HCT 33.4 (L) 2019     (H) 2019    CHOL 249 (H) 2018    TRIG 164 (H) 2018    HDL 47 2018    ALT 14 2019    AST 16 2019     2019    K 3.9 2019    CL 99 2019    CREATININE 0.6 2019    BUN 9 2019    CO2 23 2019       Diagnostic Studies: No relevant studies.    EKD ECHO:  TTE:  Results for orders placed or performed during the hospital encounter of 18   Transthoracic echo (TTE) complete (Cupid Only)   Result Value Ref Range    BSA 1.99 m2    TDI SEPTAL 0.10     LV LATERAL E/E' RATIO 9.25     LV SEPTAL E/E' RATIO 11.10     LA WIDTH 3.20 cm    Right Atrial Pressure (from IVC) 8 mmHg    TDI LATERAL 0.12     PV PEAK VELOCITY 1.14 cm/s    LVIDD 4.54 3.5 - 6.0 cm    IVS 0.82 0.6 - 1.1 cm    PW 0.67 0.6 - 1.1 cm    LVIDS 3.13 2.1 - 4.0 cm    FS 31 28 - 44 %    IVC proximal 2.1 cm    Ascending aorta 3.00 cm    LV mass 105.17 g    LA size 3.78 cm    RVDD 3.06 cm    TAPSE 2.42 cm    RV S' 15.00 m/s    Left Ventricle Relative Wall  Thickness 0.30 cm    AV mean gradient 8.65 mmHg    AV valve area 2.20 cm2    AV index (prosthetic) 0.68     E/A ratio 1.17     Mean e' 0.11     E wave decelartion time 254.36 msec    Pulm vein S/D ratio 0.84     LVOT diameter 2.03 cm    LVOT area 3.23 cm2    LVOT peak VTI 27.63 cm    Ao peak xavi 1.90 m/s    Ao VTI 40.70 cm    LVOT stroke volume 89.38 cm3    AV peak gradient 14.44 mmHg    E/E' ratio 10.09     MV Peak E Xavi 1.11 m/s    MV Peak A Xavi 0.95 m/s    PV Peak S Xavi 0.43 m/s    PV Peak D Xavi 0.51 m/s    LV Systolic Volume 38.86 mL    LV Systolic Volume Index 19.5 mL/m2    LV Diastolic Volume 94.47 mL    LV Diastolic Volume Index 47.47 mL/m2    LV Mass Index 52.8 g/m2    RA Major Axis 4.54 cm    Left Atrium Major Axis 4.80 cm    RA Width 2.80 cm    Narrative    · Normal left ventricular systolic function. The estimated ejection   fraction is 55%  · No wall motion abnormalities.  · Normal LV diastolic function.  · Normal right ventricular systolic function.  · Normal atrial size.  · Trace mitral regurgitation.  · Trace tricuspid regurgitation.          PHAM:  No results found for this or any previous visit.    ASSESSMENT/PLAN:         Anesthesia Evaluation    I have reviewed the Patient Summary Reports.    I have reviewed the Nursing Notes.      Review of Systems  Anesthesia Hx:  Denies Hx of Anesthetic complications  History of prior surgery of interest to airway management or planning: lung surgery. Previous anesthesia: General Denies Family Hx of Anesthesia complications.   Denies Personal Hx of Anesthesia complications.   Social:  Former Smoker    Hematology/Oncology:         -- Anemia: Current/Recent Cancer. surgery   Cardiovascular:  Cardiovascular Normal  ECG has been reviewed.    Pulmonary:   Asthma mild    Renal/:  Renal/ Normal     Hepatic/GI:  Hepatic/GI Normal    Musculoskeletal:  Musculoskeletal Normal    Neurological:  Neurology Normal    Endocrine:  Endocrine Normal    Dermatological:  Skin  Normal        Physical Exam  General:  Well nourished    Airway/Jaw/Neck:  Airway Findings: Mouth Opening: Normal Tongue: Normal  Mallampati: II  TM Distance: Normal, at least 6 cm  Jaw/Neck Findings:  Neck ROM: Normal ROM      Dental:  Dental Findings: In tact   Chest/Lungs:  Chest/Lungs Findings: Normal Respiratory Rate     Heart/Vascular:  Heart Findings: Rate: Normal  Rhythm: Regular Rhythm             Anesthesia Plan  Type of Anesthesia, risks & benefits discussed:  Anesthesia Type:  general  Patient's Preference:   Intra-op Monitoring Plan: standard ASA monitors  Intra-op Monitoring Plan Comments:   Post Op Pain Control Plan: per primary service following discharge from PACU, IV/PO Opioids PRN and multimodal analgesia  Post Op Pain Control Plan Comments:   Induction:    Beta Blocker:         Informed Consent: Patient understands risks and agrees with Anesthesia plan.  Questions answered. Anesthesia consent signed with patient.  ASA Score: 3     Day of Surgery Review of History & Physical:            Ready For Surgery From Anesthesia Perspective.

## 2019-09-09 ENCOUNTER — ANESTHESIA (OUTPATIENT)
Dept: SURGERY | Facility: HOSPITAL | Age: 49
DRG: 168 | End: 2019-09-09
Payer: MEDICAID

## 2019-09-09 ENCOUNTER — HOSPITAL ENCOUNTER (INPATIENT)
Facility: HOSPITAL | Age: 49
LOS: 2 days | Discharge: HOME | DRG: 168 | End: 2019-09-11
Attending: THORACIC SURGERY (CARDIOTHORACIC VASCULAR SURGERY) | Admitting: THORACIC SURGERY (CARDIOTHORACIC VASCULAR SURGERY)
Payer: MEDICAID

## 2019-09-09 DIAGNOSIS — S22.39XA RIB FRACTURE: ICD-10-CM

## 2019-09-09 LAB
B-HCG UR QL: NEGATIVE
CTP QC/QA: YES

## 2019-09-09 PROCEDURE — 25000003 PHARM REV CODE 250: Performed by: PHYSICIAN ASSISTANT

## 2019-09-09 PROCEDURE — 63600175 PHARM REV CODE 636 W HCPCS: Performed by: STUDENT IN AN ORGANIZED HEALTH CARE EDUCATION/TRAINING PROGRAM

## 2019-09-09 PROCEDURE — 36000711: Performed by: THORACIC SURGERY (CARDIOTHORACIC VASCULAR SURGERY)

## 2019-09-09 PROCEDURE — 25000003 PHARM REV CODE 250: Performed by: STUDENT IN AN ORGANIZED HEALTH CARE EDUCATION/TRAINING PROGRAM

## 2019-09-09 PROCEDURE — 76942 ECHO GUIDE FOR BIOPSY: CPT | Performed by: STUDENT IN AN ORGANIZED HEALTH CARE EDUCATION/TRAINING PROGRAM

## 2019-09-09 PROCEDURE — 63600175 PHARM REV CODE 636 W HCPCS: Performed by: THORACIC SURGERY (CARDIOTHORACIC VASCULAR SURGERY)

## 2019-09-09 PROCEDURE — 63600175 PHARM REV CODE 636 W HCPCS

## 2019-09-09 PROCEDURE — 36000710: Performed by: THORACIC SURGERY (CARDIOTHORACIC VASCULAR SURGERY)

## 2019-09-09 PROCEDURE — 64463 ESP CATHETER: ICD-10-PCS | Mod: 59,RT,, | Performed by: ANESTHESIOLOGY

## 2019-09-09 PROCEDURE — 64463 PVB THORACIC CONT INFUSION: CPT | Mod: 59,RT,, | Performed by: ANESTHESIOLOGY

## 2019-09-09 PROCEDURE — 64463 PVB THORACIC CONT INFUSION: CPT | Performed by: STUDENT IN AN ORGANIZED HEALTH CARE EDUCATION/TRAINING PROGRAM

## 2019-09-09 PROCEDURE — 25000003 PHARM REV CODE 250

## 2019-09-09 PROCEDURE — 20600001 HC STEP DOWN PRIVATE ROOM

## 2019-09-09 PROCEDURE — 71000039 HC RECOVERY, EACH ADD'L HOUR: Performed by: THORACIC SURGERY (CARDIOTHORACIC VASCULAR SURGERY)

## 2019-09-09 PROCEDURE — 71000033 HC RECOVERY, INTIAL HOUR: Performed by: THORACIC SURGERY (CARDIOTHORACIC VASCULAR SURGERY)

## 2019-09-09 PROCEDURE — S0020 INJECTION, BUPIVICAINE HYDRO: HCPCS | Performed by: ANESTHESIOLOGY

## 2019-09-09 PROCEDURE — D9220A PRA ANESTHESIA: Mod: ,,, | Performed by: ANESTHESIOLOGY

## 2019-09-09 PROCEDURE — 27201423 OPTIME MED/SURG SUP & DEVICES STERILE SUPPLY: Performed by: THORACIC SURGERY (CARDIOTHORACIC VASCULAR SURGERY)

## 2019-09-09 PROCEDURE — 37000008 HC ANESTHESIA 1ST 15 MINUTES: Performed by: THORACIC SURGERY (CARDIOTHORACIC VASCULAR SURGERY)

## 2019-09-09 PROCEDURE — 27800517 HC TRAY,EPIDURAL-CONTINUOUS: Performed by: STUDENT IN AN ORGANIZED HEALTH CARE EDUCATION/TRAINING PROGRAM

## 2019-09-09 PROCEDURE — 94761 N-INVAS EAR/PLS OXIMETRY MLT: CPT

## 2019-09-09 PROCEDURE — C1713 ANCHOR/SCREW BN/BN,TIS/BN: HCPCS | Performed by: THORACIC SURGERY (CARDIOTHORACIC VASCULAR SURGERY)

## 2019-09-09 PROCEDURE — 21811 PR OPEN TX RIB FRACTURE(S) W/INT FIX UNILAT 1-3 RIBS: ICD-10-PCS | Mod: RT,,, | Performed by: THORACIC SURGERY (CARDIOTHORACIC VASCULAR SURGERY)

## 2019-09-09 PROCEDURE — D9220A PRA ANESTHESIA: ICD-10-PCS | Mod: ,,, | Performed by: ANESTHESIOLOGY

## 2019-09-09 PROCEDURE — 37000009 HC ANESTHESIA EA ADD 15 MINS: Performed by: THORACIC SURGERY (CARDIOTHORACIC VASCULAR SURGERY)

## 2019-09-09 PROCEDURE — 25000003 PHARM REV CODE 250: Performed by: ANESTHESIOLOGY

## 2019-09-09 PROCEDURE — 21811 OPTX OF RIB FX W/FIXJ SCOPE: CPT | Mod: RT,,, | Performed by: THORACIC SURGERY (CARDIOTHORACIC VASCULAR SURGERY)

## 2019-09-09 RX ORDER — OXYCODONE HYDROCHLORIDE 5 MG/1
10 TABLET ORAL
Status: DISCONTINUED | OUTPATIENT
Start: 2019-09-09 | End: 2019-09-09

## 2019-09-09 RX ORDER — OXYCODONE HYDROCHLORIDE 5 MG/1
5 TABLET ORAL
Status: DISCONTINUED | OUTPATIENT
Start: 2019-09-09 | End: 2019-09-09

## 2019-09-09 RX ORDER — CEFAZOLIN SODIUM 1 G/3ML
2 INJECTION, POWDER, FOR SOLUTION INTRAMUSCULAR; INTRAVENOUS
Status: DISCONTINUED | OUTPATIENT
Start: 2019-09-09 | End: 2019-09-09

## 2019-09-09 RX ORDER — OXYCODONE HYDROCHLORIDE 10 MG/1
10 TABLET ORAL
Status: DISCONTINUED | OUTPATIENT
Start: 2019-09-09 | End: 2019-09-11 | Stop reason: HOSPADM

## 2019-09-09 RX ORDER — CELECOXIB 200 MG/1
400 CAPSULE ORAL ONCE
Status: DISCONTINUED | OUTPATIENT
Start: 2019-09-09 | End: 2019-09-11 | Stop reason: HOSPADM

## 2019-09-09 RX ORDER — ROCURONIUM BROMIDE 10 MG/ML
INJECTION, SOLUTION INTRAVENOUS
Status: DISCONTINUED | OUTPATIENT
Start: 2019-09-09 | End: 2019-09-09

## 2019-09-09 RX ORDER — GLYCOPYRROLATE 0.2 MG/ML
INJECTION INTRAMUSCULAR; INTRAVENOUS
Status: DISCONTINUED | OUTPATIENT
Start: 2019-09-09 | End: 2019-09-09

## 2019-09-09 RX ORDER — SODIUM CHLORIDE 0.9 % (FLUSH) 0.9 %
10 SYRINGE (ML) INJECTION
Status: DISCONTINUED | OUTPATIENT
Start: 2019-09-09 | End: 2019-09-09

## 2019-09-09 RX ORDER — CELECOXIB 200 MG/1
200 CAPSULE ORAL DAILY
Status: DISCONTINUED | OUTPATIENT
Start: 2019-09-10 | End: 2019-09-11 | Stop reason: HOSPADM

## 2019-09-09 RX ORDER — OXYCODONE HYDROCHLORIDE 5 MG/1
10 TABLET ORAL EVERY 4 HOURS PRN
Status: DISCONTINUED | OUTPATIENT
Start: 2019-09-09 | End: 2019-09-09

## 2019-09-09 RX ORDER — DEXTROSE MONOHYDRATE, SODIUM CHLORIDE, AND POTASSIUM CHLORIDE 50; 1.49; 4.5 G/1000ML; G/1000ML; G/1000ML
INJECTION, SOLUTION INTRAVENOUS
Status: COMPLETED
Start: 2019-09-09 | End: 2019-09-09

## 2019-09-09 RX ORDER — HYDROMORPHONE HYDROCHLORIDE 1 MG/ML
INJECTION, SOLUTION INTRAMUSCULAR; INTRAVENOUS; SUBCUTANEOUS
Status: COMPLETED
Start: 2019-09-09 | End: 2019-09-09

## 2019-09-09 RX ORDER — DEXAMETHASONE SODIUM PHOSPHATE 4 MG/ML
INJECTION, SOLUTION INTRA-ARTICULAR; INTRALESIONAL; INTRAMUSCULAR; INTRAVENOUS; SOFT TISSUE
Status: DISCONTINUED | OUTPATIENT
Start: 2019-09-09 | End: 2019-09-09

## 2019-09-09 RX ORDER — ACETAMINOPHEN 500 MG
1000 TABLET ORAL EVERY 6 HOURS
Status: DISPENSED | OUTPATIENT
Start: 2019-09-09 | End: 2019-09-11

## 2019-09-09 RX ORDER — MIDAZOLAM HYDROCHLORIDE 1 MG/ML
0.5 INJECTION INTRAMUSCULAR; INTRAVENOUS
Status: DISCONTINUED | OUTPATIENT
Start: 2019-09-09 | End: 2019-09-09

## 2019-09-09 RX ORDER — ONDANSETRON 2 MG/ML
4 INJECTION INTRAMUSCULAR; INTRAVENOUS DAILY PRN
Status: DISCONTINUED | OUTPATIENT
Start: 2019-09-09 | End: 2019-09-09 | Stop reason: HOSPADM

## 2019-09-09 RX ORDER — POLYETHYLENE GLYCOL 3350 17 G/17G
17 POWDER, FOR SOLUTION ORAL DAILY
Status: DISCONTINUED | OUTPATIENT
Start: 2019-09-09 | End: 2019-09-11 | Stop reason: HOSPADM

## 2019-09-09 RX ORDER — AMOXICILLIN 250 MG
1 CAPSULE ORAL 2 TIMES DAILY
Status: DISCONTINUED | OUTPATIENT
Start: 2019-09-09 | End: 2019-09-11 | Stop reason: HOSPADM

## 2019-09-09 RX ORDER — FENTANYL CITRATE 50 UG/ML
25 INJECTION, SOLUTION INTRAMUSCULAR; INTRAVENOUS EVERY 5 MIN PRN
Status: COMPLETED | OUTPATIENT
Start: 2019-09-09 | End: 2019-09-09

## 2019-09-09 RX ORDER — ALBUTEROL SULFATE 90 UG/1
2 AEROSOL, METERED RESPIRATORY (INHALATION) EVERY 6 HOURS PRN
Status: DISCONTINUED | OUTPATIENT
Start: 2019-09-09 | End: 2019-09-11 | Stop reason: HOSPADM

## 2019-09-09 RX ORDER — SODIUM CHLORIDE 9 MG/ML
INJECTION, SOLUTION INTRAVENOUS CONTINUOUS
Status: DISCONTINUED | OUTPATIENT
Start: 2019-09-09 | End: 2019-09-09

## 2019-09-09 RX ORDER — LIDOCAINE HCL/PF 100 MG/5ML
SYRINGE (ML) INTRAVENOUS
Status: DISCONTINUED | OUTPATIENT
Start: 2019-09-09 | End: 2019-09-09

## 2019-09-09 RX ORDER — FAMOTIDINE 20 MG/1
20 TABLET, FILM COATED ORAL 2 TIMES DAILY
Status: DISCONTINUED | OUTPATIENT
Start: 2019-09-09 | End: 2019-09-11 | Stop reason: HOSPADM

## 2019-09-09 RX ORDER — MUPIROCIN 20 MG/G
1 OINTMENT TOPICAL 2 TIMES DAILY
Status: DISCONTINUED | OUTPATIENT
Start: 2019-09-09 | End: 2019-09-09

## 2019-09-09 RX ORDER — DEXTROSE MONOHYDRATE, SODIUM CHLORIDE, AND POTASSIUM CHLORIDE 50; 1.49; 9 G/1000ML; G/1000ML; G/1000ML
INJECTION, SOLUTION INTRAVENOUS CONTINUOUS
Status: DISCONTINUED | OUTPATIENT
Start: 2019-09-09 | End: 2019-09-10

## 2019-09-09 RX ORDER — CEFAZOLIN SODIUM 1 G/3ML
2 INJECTION, POWDER, FOR SOLUTION INTRAMUSCULAR; INTRAVENOUS
Status: COMPLETED | OUTPATIENT
Start: 2019-09-09 | End: 2019-09-10

## 2019-09-09 RX ORDER — FENTANYL CITRATE 50 UG/ML
INJECTION, SOLUTION INTRAMUSCULAR; INTRAVENOUS
Status: COMPLETED
Start: 2019-09-09 | End: 2019-09-09

## 2019-09-09 RX ORDER — OXYCODONE HYDROCHLORIDE 5 MG/1
15 TABLET ORAL
Status: DISCONTINUED | OUTPATIENT
Start: 2019-09-09 | End: 2019-09-09

## 2019-09-09 RX ORDER — FLUOXETINE HYDROCHLORIDE 20 MG/1
20 CAPSULE ORAL DAILY
Status: DISCONTINUED | OUTPATIENT
Start: 2019-09-09 | End: 2019-09-11 | Stop reason: HOSPADM

## 2019-09-09 RX ORDER — KETAMINE HCL IN 0.9 % NACL 50 MG/5 ML
SYRINGE (ML) INTRAVENOUS
Status: DISCONTINUED | OUTPATIENT
Start: 2019-09-09 | End: 2019-09-09

## 2019-09-09 RX ORDER — MORPHINE SULFATE 2 MG/ML
2 INJECTION, SOLUTION INTRAMUSCULAR; INTRAVENOUS
Status: DISCONTINUED | OUTPATIENT
Start: 2019-09-09 | End: 2019-09-09

## 2019-09-09 RX ORDER — BISACODYL 10 MG
10 SUPPOSITORY, RECTAL RECTAL DAILY PRN
Status: DISCONTINUED | OUTPATIENT
Start: 2019-09-09 | End: 2019-09-11 | Stop reason: HOSPADM

## 2019-09-09 RX ORDER — NEOSTIGMINE METHYLSULFATE 1 MG/ML
INJECTION, SOLUTION INTRAVENOUS
Status: DISCONTINUED | OUTPATIENT
Start: 2019-09-09 | End: 2019-09-09

## 2019-09-09 RX ORDER — OXYCODONE HYDROCHLORIDE 5 MG/1
5 TABLET ORAL
Status: DISCONTINUED | OUTPATIENT
Start: 2019-09-09 | End: 2019-09-11 | Stop reason: HOSPADM

## 2019-09-09 RX ORDER — LIDOCAINE HYDROCHLORIDE 10 MG/ML
1 INJECTION, SOLUTION EPIDURAL; INFILTRATION; INTRACAUDAL; PERINEURAL ONCE
Status: COMPLETED | OUTPATIENT
Start: 2019-09-09 | End: 2019-09-09

## 2019-09-09 RX ORDER — MORPHINE SULFATE 2 MG/ML
2 INJECTION, SOLUTION INTRAMUSCULAR; INTRAVENOUS
Status: DISCONTINUED | OUTPATIENT
Start: 2019-09-09 | End: 2019-09-10

## 2019-09-09 RX ORDER — ROPIVACAINE HYDROCHLORIDE 2 MG/ML
2 INJECTION, SOLUTION EPIDURAL; INFILTRATION; PERINEURAL CONTINUOUS
Status: DISCONTINUED | OUTPATIENT
Start: 2019-09-09 | End: 2019-09-10

## 2019-09-09 RX ORDER — BUPIVACAINE HYDROCHLORIDE 7.5 MG/ML
INJECTION, SOLUTION EPIDURAL; RETROBULBAR
Status: COMPLETED | OUTPATIENT
Start: 2019-09-09 | End: 2019-09-09

## 2019-09-09 RX ORDER — PROPOFOL 10 MG/ML
VIAL (ML) INTRAVENOUS
Status: DISCONTINUED | OUTPATIENT
Start: 2019-09-09 | End: 2019-09-09

## 2019-09-09 RX ORDER — ONDANSETRON 2 MG/ML
4 INJECTION INTRAMUSCULAR; INTRAVENOUS EVERY 12 HOURS PRN
Status: DISCONTINUED | OUTPATIENT
Start: 2019-09-09 | End: 2019-09-11 | Stop reason: HOSPADM

## 2019-09-09 RX ORDER — POLYETHYLENE GLYCOL 3350 17 G/17G
17 POWDER, FOR SOLUTION ORAL DAILY
Status: DISCONTINUED | OUTPATIENT
Start: 2019-09-09 | End: 2019-09-09

## 2019-09-09 RX ORDER — CEFAZOLIN SODIUM 1 G/3ML
INJECTION, POWDER, FOR SOLUTION INTRAMUSCULAR; INTRAVENOUS
Status: DISCONTINUED | OUTPATIENT
Start: 2019-09-09 | End: 2019-09-09

## 2019-09-09 RX ORDER — ACETAMINOPHEN 10 MG/ML
1000 INJECTION, SOLUTION INTRAVENOUS ONCE
Status: COMPLETED | OUTPATIENT
Start: 2019-09-09 | End: 2019-09-09

## 2019-09-09 RX ORDER — HYDROMORPHONE HYDROCHLORIDE 1 MG/ML
0.2 INJECTION, SOLUTION INTRAMUSCULAR; INTRAVENOUS; SUBCUTANEOUS EVERY 5 MIN PRN
Status: DISCONTINUED | OUTPATIENT
Start: 2019-09-09 | End: 2019-09-09 | Stop reason: HOSPADM

## 2019-09-09 RX ORDER — GABAPENTIN 300 MG/1
300 CAPSULE ORAL 3 TIMES DAILY
Status: DISCONTINUED | OUTPATIENT
Start: 2019-09-09 | End: 2019-09-10

## 2019-09-09 RX ADMIN — HYDROMORPHONE HYDROCHLORIDE 0.2 MG: 1 INJECTION, SOLUTION INTRAMUSCULAR; INTRAVENOUS; SUBCUTANEOUS at 03:09

## 2019-09-09 RX ADMIN — DEXAMETHASONE SODIUM PHOSPHATE 4 MG: 4 INJECTION, SOLUTION INTRAMUSCULAR; INTRAVENOUS at 10:09

## 2019-09-09 RX ADMIN — ROPIVACAINE HYDROCHLORIDE 2 ML/HR: 2 INJECTION, SOLUTION EPIDURAL; INFILTRATION at 01:09

## 2019-09-09 RX ADMIN — GLYCOPYRROLATE 0.6 MG: 0.2 INJECTION, SOLUTION INTRAMUSCULAR; INTRAVENOUS at 01:09

## 2019-09-09 RX ADMIN — PROPOFOL 150 MG: 10 INJECTION, EMULSION INTRAVENOUS at 10:09

## 2019-09-09 RX ADMIN — FENTANYL CITRATE 25 MCG: 50 INJECTION INTRAMUSCULAR; INTRAVENOUS at 02:09

## 2019-09-09 RX ADMIN — SENNOSIDES,DOCUSATE SODIUM 1 TABLET: 8.6; 5 TABLET, FILM COATED ORAL at 08:09

## 2019-09-09 RX ADMIN — GABAPENTIN 300 MG: 300 CAPSULE ORAL at 02:09

## 2019-09-09 RX ADMIN — CEFAZOLIN 2 G: 330 INJECTION, POWDER, FOR SOLUTION INTRAMUSCULAR; INTRAVENOUS at 10:09

## 2019-09-09 RX ADMIN — FENTANYL CITRATE 50 MCG: 50 INJECTION INTRAMUSCULAR; INTRAVENOUS at 09:09

## 2019-09-09 RX ADMIN — SODIUM CHLORIDE, SODIUM GLUCONATE, SODIUM ACETATE, POTASSIUM CHLORIDE, MAGNESIUM CHLORIDE, SODIUM PHOSPHATE, DIBASIC, AND POTASSIUM PHOSPHATE: .53; .5; .37; .037; .03; .012; .00082 INJECTION, SOLUTION INTRAVENOUS at 10:09

## 2019-09-09 RX ADMIN — SODIUM CHLORIDE: 0.9 INJECTION, SOLUTION INTRAVENOUS at 08:09

## 2019-09-09 RX ADMIN — OXYCODONE HYDROCHLORIDE 10 MG: 10 TABLET ORAL at 11:09

## 2019-09-09 RX ADMIN — GABAPENTIN 300 MG: 300 CAPSULE ORAL at 08:09

## 2019-09-09 RX ADMIN — POLYETHYLENE GLYCOL 3350 17 G: 17 POWDER, FOR SOLUTION ORAL at 02:09

## 2019-09-09 RX ADMIN — FENTANYL CITRATE 25 MCG: 50 INJECTION INTRAMUSCULAR; INTRAVENOUS at 01:09

## 2019-09-09 RX ADMIN — LIDOCAINE HYDROCHLORIDE 1 MG: 10 INJECTION, SOLUTION EPIDURAL; INFILTRATION; INTRACAUDAL; PERINEURAL at 08:09

## 2019-09-09 RX ADMIN — Medication 20 MG: at 01:09

## 2019-09-09 RX ADMIN — ACETAMINOPHEN 1000 MG: 500 TABLET ORAL at 05:09

## 2019-09-09 RX ADMIN — MORPHINE SULFATE 2 MG: 2 INJECTION, SOLUTION INTRAMUSCULAR; INTRAVENOUS at 04:09

## 2019-09-09 RX ADMIN — OXYCODONE HYDROCHLORIDE 10 MG: 10 TABLET ORAL at 04:09

## 2019-09-09 RX ADMIN — CEFAZOLIN 2 G: 1 INJECTION, POWDER, FOR SOLUTION INTRAMUSCULAR; INTRAVENOUS at 11:09

## 2019-09-09 RX ADMIN — Medication 10 MG: at 12:09

## 2019-09-09 RX ADMIN — ROCURONIUM BROMIDE 10 MG: 10 INJECTION, SOLUTION INTRAVENOUS at 12:09

## 2019-09-09 RX ADMIN — ROCURONIUM BROMIDE 30 MG: 10 INJECTION, SOLUTION INTRAVENOUS at 11:09

## 2019-09-09 RX ADMIN — PROPOFOL 50 MG: 10 INJECTION, EMULSION INTRAVENOUS at 01:09

## 2019-09-09 RX ADMIN — OXYCODONE HYDROCHLORIDE 10 MG: 10 TABLET ORAL at 01:09

## 2019-09-09 RX ADMIN — Medication 20 MG: at 11:09

## 2019-09-09 RX ADMIN — MORPHINE SULFATE 2 MG: 2 INJECTION, SOLUTION INTRAMUSCULAR; INTRAVENOUS at 09:09

## 2019-09-09 RX ADMIN — MIDAZOLAM HYDROCHLORIDE 2 MG: 1 INJECTION, SOLUTION INTRAMUSCULAR; INTRAVENOUS at 10:09

## 2019-09-09 RX ADMIN — FENTANYL CITRATE 50 MCG: 50 INJECTION INTRAMUSCULAR; INTRAVENOUS at 12:09

## 2019-09-09 RX ADMIN — ACETAMINOPHEN 1000 MG: 10 INJECTION, SOLUTION INTRAVENOUS at 11:09

## 2019-09-09 RX ADMIN — FAMOTIDINE 20 MG: 20 TABLET, FILM COATED ORAL at 08:09

## 2019-09-09 RX ADMIN — LIDOCAINE HYDROCHLORIDE 75 MG: 20 INJECTION, SOLUTION INTRAVENOUS at 10:09

## 2019-09-09 RX ADMIN — DEXTROSE, SODIUM CHLORIDE, AND POTASSIUM CHLORIDE: 5; .9; .15 INJECTION INTRAVENOUS at 02:09

## 2019-09-09 RX ADMIN — ACETAMINOPHEN 1000 MG: 500 TABLET ORAL at 11:09

## 2019-09-09 RX ADMIN — ROCURONIUM BROMIDE 50 MG: 10 INJECTION, SOLUTION INTRAVENOUS at 10:09

## 2019-09-09 RX ADMIN — NEOSTIGMINE METHYLSULFATE 5 MG: 1 INJECTION INTRAVENOUS at 01:09

## 2019-09-09 RX ADMIN — ROPIVACAINE HYDROCHLORIDE 2 ML/HR: 2 INJECTION, SOLUTION EPIDURAL; INFILTRATION at 08:09

## 2019-09-09 RX ADMIN — FENTANYL CITRATE 50 MCG: 50 INJECTION INTRAMUSCULAR; INTRAVENOUS at 10:09

## 2019-09-09 RX ADMIN — BUPIVACAINE HYDROCHLORIDE 15 ML: 7.5 INJECTION, SOLUTION EPIDURAL; RETROBULBAR at 10:09

## 2019-09-09 RX ADMIN — FLUOXETINE HYDROCHLORIDE 20 MG: 20 CAPSULE ORAL at 03:09

## 2019-09-09 RX ADMIN — POTASSIUM CHLORIDE, DEXTROSE MONOHYDRATE AND SODIUM CHLORIDE 1000 ML: 150; 5; 450 INJECTION, SOLUTION INTRAVENOUS at 01:09

## 2019-09-09 RX ADMIN — FENTANYL CITRATE 100 MCG: 50 INJECTION INTRAMUSCULAR; INTRAVENOUS at 10:09

## 2019-09-09 RX ADMIN — PROPOFOL 50 MG: 10 INJECTION, EMULSION INTRAVENOUS at 10:09

## 2019-09-09 RX ADMIN — PROPOFOL 30 MG: 10 INJECTION, EMULSION INTRAVENOUS at 01:09

## 2019-09-09 NOTE — SUBJECTIVE & OBJECTIVE
No current facility-administered medications on file prior to encounter.      Current Outpatient Medications on File Prior to Encounter   Medication Sig    acetaminophen (TYLENOL) 650 MG TbSR Take 1 tablet (650 mg total) by mouth every 8 (eight) hours.    albuterol (PROVENTIL/VENTOLIN HFA) 90 mcg/actuation inhaler Inhale 1-2 puffs into the lungs every 6 (six) hours as needed for Wheezing or Shortness of Breath. Rescue    gabapentin (NEURONTIN) 300 MG capsule Take 1 capsule (300 mg total) by mouth 3 (three) times daily. Start by taking 1 pill nightly then slowly increase to two pills    garlic 200 mg Tab Take 200 mg by mouth once daily.    ginkgo biloba 40 mg Tab Take by mouth once daily.     ibuprofen (ADVIL,MOTRIN) 400 MG tablet Take 1 tablet (400 mg total) by mouth every 6 (six) hours as needed for Other.    multivitamin (ONE DAILY MULTIVITAMIN) per tablet Take 1 tablet by mouth once daily.    MAXX'S WORT ORAL Take by mouth.    TURMERIC ORAL Take by mouth.    vitamin E 200 UNIT capsule Take 200 Units by mouth once daily.    albuterol-ipratropium (DUO-NEB) 2.5 mg-0.5 mg/3 mL nebulizer solution Take 3 mLs by nebulization every 6 (six) hours as needed for Wheezing.       Review of patient's allergies indicates:  No Known Allergies    Past Medical History:   Diagnosis Date    Seasonal asthma      Past Surgical History:   Procedure Laterality Date    BRONCHOSCOPY N/A 5/6/2019    Performed by [facility] at [facility]    BRONCHOSCOPY N/A 4/22/2019    Performed by [facility] at [facility] OR [facility]    BRONCHOSCOPY N/A 11/28/2018    Performed by [facility]cott [facility] at [facility] CATH LAB    LOBECTOMY, SLEEVE, LUNG, THORACOTOMY APPROACH Right 4/22/2019    Performed by Juan Jose Miles MD at [facility] OR [address] [place] [address]    PELVIC LAPAROSCOPY      THORACOTOMY Right 4/22/2019    Performed by [facility] Jose [facility] MD at [facility] OR [address] [place] [address]     Family History     Problem Relation (Age of Onset)     Arthritis Mother    COPD Mother    Cancer Maternal Grandmother, Paternal Grandfather    Hypertension Mother, Father    Pacemaker/defibrilator Father    Stroke Mother        Tobacco Use    Smoking status: Former Smoker     Packs/day: 0.50     Years: 15.00     Pack years: 7.50     Types: Cigarettes     Last attempt to quit: 2018     Years since quittin.8    Smokeless tobacco: Never Used   Substance and Sexual Activity    Alcohol use: No    Drug use: No    Sexual activity: Not Currently     Review of Systems   Constitutional: Positive for activity change. Negative for appetite change, fatigue and fever.   HENT: Negative for congestion, trouble swallowing and voice change.    Eyes: Negative for visual disturbance.   Respiratory: Positive for chest tightness. Negative for choking, shortness of breath and wheezing.    Cardiovascular: Negative for chest pain, palpitations and leg swelling.   Gastrointestinal: Negative for abdominal distention and abdominal pain.   Genitourinary: Negative for difficulty urinating.   Musculoskeletal: Positive for arthralgias (Right chest).   Neurological: Negative for light-headedness and headaches.     Objective:     Vital Signs (Most Recent):  Temp: 98.6 °F (37 °C) (19)  Pulse: 69 (19)  Resp: 18 (19)  BP: 123/73 (19)  SpO2: 97 % (19) Vital Signs (24h Range):  Temp:  [98.6 °F (37 °C)] 98.6 °F (37 °C)  Pulse:  [69] 69  Resp:  [18] 18  SpO2:  [97 %] 97 %  BP: (123)/(73) 123/73        Body mass index is 32.01 kg/m².      Intake/Output - Last 3 Shifts     None          SpO2: 97 %     Physical Exam   Constitutional: She is oriented to person, place, and time. She appears well-developed and well-nourished.   HENT:   Head: Normocephalic.   Mouth/Throat: Oropharynx is clear and moist.   Eyes: Pupils are equal, round, and reactive to light.   Neck: Normal range of motion. Neck supple.   Cardiovascular: Normal rate, regular rhythm,  normal heart sounds and intact distal pulses.   Pulmonary/Chest: Effort normal and breath sounds normal. She has no wheezes. She exhibits tenderness (Right thoracotomy incision).   Abdominal: Soft. Bowel sounds are normal. She exhibits no distension.   Musculoskeletal: She exhibits no edema.   Lymphadenopathy:     She has no cervical adenopathy.   Neurological: She is alert and oriented to person, place, and time.   Psychiatric: She has a normal mood and affect.       Significant Labs:  BMP: No results for input(s): GLU, NA, K, CL, CO2, BUN, CREATININE, CALCIUM, MG in the last 48 hours.  CBC: No results for input(s): WBC, RBC, HGB, HCT, PLT, MCV, MCH, MCHC in the last 48 hours.  CMP: No results for input(s): GLU, CALCIUM, ALBUMIN, PROT, NA, K, CO2, CL, BUN, CREATININE, ALKPHOS, ALT, AST, BILITOT in the last 48 hours.    Significant Diagnostics:  CXR: I have reviewed all pertinent results/findings within the past 24 hours    VTE Risk Mitigation (From admission, onward)        Ordered     IP VTE LOW RISK PATIENT  Once      09/09/19 0718     Place sequential compression device  Until discontinued      09/09/19 0718     Place PAM hose  Until discontinued      09/09/19 0718

## 2019-09-09 NOTE — ANESTHESIA PROCEDURE NOTES
WILFREDO Catheter    Patient location during procedure: pre-op   Block not for primary anesthetic.  Reason for block: at surgeon's request and post-op pain management   Post-op Pain Location: RIght Thorax  Start time: 9/9/2019 10:02 AM  Timeout: 9/9/2019 10:01 AM   End time: 9/9/2019 10:21 AM    Staffing  Authorizing Provider: Dk Flaherty MD  Performing Provider: Darci Amezquita MD    Preanesthetic Checklist  Completed: patient identified, site marked, surgical consent, pre-op evaluation, timeout performed, IV checked, risks and benefits discussed and monitors and equipment checked  Peripheral Block  Patient position: sitting  Prep: ChloraPrep  Patient monitoring: heart rate, cardiac monitor, continuous pulse ox, continuous capnometry and frequent blood pressure checks  Block type: erector spinae plane (Erector Spinae Plane)  Laterality: right  Injection technique: continuous  Location: T5-6  Needle  Needle type: Tuohy   Needle gauge: 17 G  Needle length: 3.5 in  Needle localization: anatomical landmarks and ultrasound guidance  Catheter type: spring wound  Catheter size: 19 G  Test dose: lidocaine 1.5% with Epi 1-to-200,000 and negative   -ultrasound image captured on disc.  Assessment  Injection assessment: negative aspiration, negative parasthesia and local visualized surrounding nerve  Paresthesia pain: none  Heart rate change: no  Slow fractionated injection: yes  Additional Notes  Patient tolerated very well.  Vital signs stable.  See [facility] RN charting for vital signs. Diluted to 0.375% Bupivacaine 30cc total.

## 2019-09-09 NOTE — HPI
Diagnosis:  Carcinoid Tumor  Pre-operative therapy: NA  Procedure(s) and date(s):   4/22/19- Bronchoscopy, right thoracotomy for right lower bilobectomy for carcinoid tumor in bronchus intermedius and MLND.  Bronchial stump closed with 4-0 PDS under direct bronchoscopic vision.  Intercostal muscle flap between bronchus and PA.  5/6/19- Surveillance bronchoscopy   Pathology:  2.8cm well differentiated neuroendocrine tumor. Grade 1 typical carcinoid tumor. Ki-67 stains less than 2% of tumor cell nuclei. Negative margins, 0.5cm from closest margin. 3 benign LNs within the specimen. Levels 7 and 11 LN negative.   pT2 N0  Post-operative therapy: NA     HPI 49 year old female returns for follow up s/p right thoracotomy for right lower bilobectomy for resection of carcinoid tumor. Post op course complicated by persistent air leak. Discharged with chest tube on 4/27/19. She was doing well at 1 month follow-up however she returns today prior to 6 month surveillance follow-up complaining of persistent rib pain/clicking. Reports rolled in bed 2-3 weeks ago and felt acute rib pain and pop. Pain/clicking is exacerbated by reaching with right arm. Chest CT with 3D rib reconstructions today. She is off all opioids. Takes one gabapentin in am and 2 qHS. Difficulty getting established with pain management secondary to medicaid.

## 2019-09-09 NOTE — ASSESSMENT & PLAN NOTE
To OR for redo right thoracotomy and rib plating. Appropriate patient education regarding the neal-operative period as well as intraoperative details were discussed. Risks, including but not limited to, bleeding, infection, pain and anesthetic complication were discussed. Patient was given the opportunity to ask questions and to have those questions answered to their satisfaction. Patient verbalized understanding to both procedure and associated risks. Consent was obtained.

## 2019-09-09 NOTE — H&P
[facility]  Thoracic Surgery  History & Physical    Patient Name: Gina Coto  MRN: [id number]  Admission Date: 9/9/2019  Attending Physician: Juan Jose Miles MD  Primary Care Provider: Claire Frias MD    Patient information was obtained from patient.     Subjective:     History of Present Illness: Diagnosis:  Carcinoid Tumor  Pre-operative therapy: NA  Procedure(s) and date(s):   4/22/19- Bronchoscopy, right thoracotomy for right lower bilobectomy for carcinoid tumor in bronchus intermedius and MLND.  Bronchial stump closed with 4-0 PDS under direct bronchoscopic vision.  Intercostal muscle flap between bronchus and PA.  5/6/19- Surveillance bronchoscopy   Pathology:  2.8cm well differentiated neuroendocrine tumor. Grade 1 typical carcinoid tumor. Ki-67 stains less than 2% of tumor cell nuclei. Negative margins, 0.5cm from closest margin. 3 benign LNs within the specimen. Levels 7 and 11 LN negative.   pT2 N0  Post-operative therapy: NA     HPI 49 year old female returns for follow up s/p right thoracotomy for right lower bilobectomy for resection of carcinoid tumor. Post op course complicated by persistent air leak. Discharged with chest tube on 4/27/19. She was doing well at 1 month follow-up however she returns today prior to 6 month surveillance follow-up complaining of persistent rib pain/clicking. Reports rolled in bed 2-3 weeks ago and felt acute rib pain and pop. Pain/clicking is exacerbated by reaching with right arm. Chest CT with 3D rib reconstructions today. She is off all opioids. Takes one gabapentin in am and 2 qHS. Difficulty getting established with pain management secondary to medicaid.      No current facility-administered medications on file prior to encounter.      Current Outpatient Medications on File Prior to Encounter   Medication Sig    acetaminophen (TYLENOL) 650 MG TbSR Take 1 tablet (650 mg total) by mouth every 8 (eight) hours.    albuterol  (PROVENTIL/VENTOLIN HFA) 90 mcg/actuation inhaler Inhale 1-2 puffs into the lungs every 6 (six) hours as needed for Wheezing or Shortness of Breath. Rescue    gabapentin (NEURONTIN) 300 MG capsule Take 1 capsule (300 mg total) by mouth 3 (three) times daily. Start by taking 1 pill nightly then slowly increase to two pills    garlic 200 mg Tab Take 200 mg by mouth once daily.    ginkgo biloba 40 mg Tab Take by mouth once daily.     ibuprofen (ADVIL,MOTRIN) 400 MG tablet Take 1 tablet (400 mg total) by mouth every 6 (six) hours as needed for Other.    multivitamin (ONE DAILY MULTIVITAMIN) per tablet Take 1 tablet by mouth once daily.    MAXX'S WORT ORAL Take by mouth.    TURMERIC ORAL Take by mouth.    vitamin E 200 UNIT capsule Take 200 Units by mouth once daily.    albuterol-ipratropium (DUO-NEB) 2.5 mg-0.5 mg/3 mL nebulizer solution Take 3 mLs by nebulization every 6 (six) hours as needed for Wheezing.       Review of patient's allergies indicates:  No Known Allergies    Past Medical History:   Diagnosis Date    Seasonal asthma      Past Surgical History:   Procedure Laterality Date    BRONCHOSCOPY N/A 5/6/2019    Performed by [facility] at [facility] OR [facility]    BRONCHOSCOPY N/A 4/22/2019    Performed by [facility] at [facility]    BRONCHOSCOPY N/A 11/28/2018    [facility] C[facility]    LOBECTOMY, SLEEVE, LUNG, THORACOTOMY APPROACH Right 4/22/2019    Performed by [facility] MD at [facility] OR [facility]    PELVIC LAPAROSCOPY      THORACOTOMY Right 4/22/2019    Performed by Juan Jose Miles MD at [facility] OR [facility]     Family History     Problem Relation (Age of Onset)    Arthritis Mother    COPD Mother    Cancer Maternal Grandmother, Paternal Grandfather    Hypertension Mother, Father    Pacemaker/defibrilator Father    Stroke Mother        Tobacco Use    Smoking status: Former Smoker     Packs/day: 0.50     Years: 15.00     Pack years:  7.50     Types: Cigarettes     Last attempt to quit: 2018     Years since quittin.8    Smokeless tobacco: Never Used   Substance and Sexual Activity    Alcohol use: No    Drug use: No    Sexual activity: Not Currently     Review of Systems   Constitutional: Positive for activity change. Negative for appetite change, fatigue and fever.   HENT: Negative for congestion, trouble swallowing and voice change.    Eyes: Negative for visual disturbance.   Respiratory: Positive for chest tightness. Negative for choking, shortness of breath and wheezing.    Cardiovascular: Negative for chest pain, palpitations and leg swelling.   Gastrointestinal: Negative for abdominal distention and abdominal pain.   Genitourinary: Negative for difficulty urinating.   Musculoskeletal: Positive for arthralgias (Right chest).   Neurological: Negative for light-headedness and headaches.     Objective:     Vital Signs (Most Recent):  Temp: 98.6 °F (37 °C) (19)  Pulse: 69 (19)  Resp: 18 (19)  BP: 123/73 (19)  SpO2: 97 % (19) Vital Signs (24h Range):  Temp:  [98.6 °F (37 °C)] 98.6 °F (37 °C)  Pulse:  [69] 69  Resp:  [18] 18  SpO2:  [97 %] 97 %  BP: (123)/(73) 123/73        Body mass index is 32.01 kg/m².      Intake/Output - Last 3 Shifts     None          SpO2: 97 %     Physical Exam   Constitutional: She is oriented to person, place, and time. She appears well-developed and well-nourished.   HENT:   Head: Normocephalic.   Mouth/Throat: Oropharynx is clear and moist.   Eyes: Pupils are equal, round, and reactive to light.   Neck: Normal range of motion. Neck supple.   Cardiovascular: Normal rate, regular rhythm, normal heart sounds and intact distal pulses.   Pulmonary/Chest: Effort normal and breath sounds normal. She has no wheezes. She exhibits tenderness (Right thoracotomy incision).   Abdominal: Soft. Bowel sounds are normal. She exhibits no distension.   Musculoskeletal:  She exhibits no edema.   Lymphadenopathy:     She has no cervical adenopathy.   Neurological: She is alert and oriented to person, place, and time.   Psychiatric: She has a normal mood and affect.       Significant Labs:  BMP: No results for input(s): GLU, NA, K, CL, CO2, BUN, CREATININE, CALCIUM, MG in the last 48 hours.  CBC: No results for input(s): WBC, RBC, HGB, HCT, PLT, MCV, MCH, MCHC in the last 48 hours.  CMP: No results for input(s): GLU, CALCIUM, ALBUMIN, PROT, NA, K, CO2, CL, BUN, CREATININE, ALKPHOS, ALT, AST, BILITOT in the last 48 hours.    Significant Diagnostics:  CXR: I have reviewed all pertinent results/findings within the past 24 hours    VTE Risk Mitigation (From admission, onward)        Ordered     IP VTE LOW RISK PATIENT  Once      09/09/19 0718     Place sequential compression device  Until discontinued      09/09/19 0718     Place PAM hose  Until discontinued      09/09/19 0718        Assessment/Plan:     Rib fracture  To OR for redo right thoracotomy and rib plating. Appropriate patient education regarding the neal-operative period as well as intraoperative details were discussed. Risks, including but not limited to, bleeding, infection, pain and anesthetic complication were discussed. Patient was given the opportunity to ask questions and to have those questions answered to their satisfaction. Patient verbalized understanding to both procedure and associated risks. Consent was obtained.      APARNA Fleming  Thoracic Surgery  [facility]

## 2019-09-09 NOTE — TRANSFER OF CARE
"Anesthesia Transfer of Care Note    Patient: Gina Coto    Procedure(s) Performed: Procedure(s) (LRB):  THORACOTOMY (Right)    Patient location: PACU    Anesthesia Type: general    Transport from OR: Transported from OR on 6-10 L/min O2 by face mask with adequate spontaneous ventilation    Post pain: adequate analgesia    Post assessment: no apparent anesthetic complications    Post vital signs: stable    Level of consciousness: awake    Nausea/Vomiting: no nausea/vomiting    Complications: none          Last vitals:   Visit Vitals  /80 (BP Location: Left arm, Patient Position: Lying)   Pulse 70   Temp 36.2 °C (97.2 °F) (Temporal)   Resp 18   Ht 5' 6" (1.676 m)   LMP 08/21/2019   SpO2 100%   Breastfeeding? No   BMI 32.01 kg/m²     "

## 2019-09-09 NOTE — PLAN OF CARE
09/09/19 1515   Discharge Assessment   Assessment Type Discharge Planning Assessment   Confirmed/corrected address and phone number on facesheet? Yes   Assessment information obtained from? Medical Record   Expected Length of Stay (days) 2   Communicated expected length of stay with patient/caregiver yes   Prior to hospitilization cognitive status: Alert/Oriented   Prior to hospitalization functional status: Independent   Current cognitive status: Unable to Assess  (Pt is currently in OR/PACU)   Current Functional Status: Needs Assistance;Assistive Equipment   Facility Arrived From: home for planned surgery   Lives With spouse   Able to Return to Prior Arrangements yes   Is patient able to care for self after discharge? No   Who are your caregiver(s) and their phone number(s)? spouse Michael Sun [phone]   Patient's perception of discharge disposition home or selfcare   Readmission Within the Last 30 Days no previous admission in last 30 days   Patient currently being followed by outpatient case management? No   Patient currently receives any other outside agency services? No   Equipment Currently Used at Home none   Do you have any problems affording any of your prescribed medications? No   Is the patient taking medications as prescribed? yes   Does the patient have transportation home? Yes   Transportation Anticipated family or friend will provide   Does the patient receive services at the Coumadin Clinic? No   Discharge Plan A Home with family   Discharge Plan B Home with family   DME Needed Upon Discharge  none   Patient/Family in Agreement with Plan unable to assess  (Pt is currently in the OR/PACU)     Pt admitted for THORACOTOMY (Right)   ORIF Right Rib #6. She is currently in the OR. The patient lives in [place], [place] w/her spouse and has transportation. Pt with Medicaid plan with limited home health benefits. Plan is to discharge to home w/family support and follow up appt in clinic. No anticipated  discharge needs.       [facility] [place] - [address]  [address]  [place] [address]  Phone: [phone] Fax: [phone]    [id number] [place] - [address]  [address]  [place] [address]  Phone: [phone] Fax: [phone]    PCP  Claire Frias MD   [phone] fax    Payor: MEDICAID / Plan: [facility] ([facility]) / Product Type: Managed Medicaid /

## 2019-09-09 NOTE — NURSING TRANSFER
Nursing Transfer Note      9/9/2019     Transfer To: 1058    Transfer via stretcher    Transfer with IVF, PNC    Transported by transport    Medicines sent: none    Chart send with patient: Yes    Notified: spouse

## 2019-09-09 NOTE — OP NOTE
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 Post op course complicated by persistent air leak. She presented prior to her 6 month surveillance follow-up complaining of persistent rib pain/clicking. Reports rolled in bed 2-3 weeks ago and felt acute rib pain and pop. Though she weaned off all opioids, requiring one gabapentin in am and 2 qHS, she has had difficulty getting established with pain management secondary to medicaid. As rib pain secondary to fracture continued to be an issue, she was councelled on rib plating of posterolateral 6th rib and elected to proceed.    Operative Findings:   Downward angulation and marked displacement of posterior segment 6th rib, and well as anterior segment, beneath 7th rib  Good bone quality    Description of the Procedure:   The patient was brought to the operating room and was transferred to the table in the supine position. She was intubated with a double lumen endotracheal tube. Position was confirmed with flexible bronchoscopy by anesthesia. The patient was placed in left lateral decubitus position with all pressure points padded. The right chest was prepped and draped. A time out was performed and all were in agreement.      A redo thoracotomy incision was made through the site of the previous thoracotomy over the 6-7th ribs. The patient was placed on single left lung  ventilation. Latissimus flaps were elevated and mobilized and retracting the serratus anteriorly and dividing it partially for visualization.      The 6 rib was encountered and revealed no healing and no callus formation at the bivalved ends.  Both ends were circumferentially dissected and exposed. Due to adhesions, the pleural space was entered. The rib was fixated using a Synthes titanium MatrixRIB # 6-7 pre-contoured plate.  Three 10mm screws were placed in a bicortical fashion on the anterior side of the fracture plane, and three 12mm screws were used on the posterior side.     Hemostasis was achieved. Pleural space air was evacuated under water seal but due to persistent small airleak, and 9Fr rachelle drain was inserted and sutured to the skin. The incision was closed in 4 layers using Stratafix, Vicryl, and Monocryl sutures. During the case, the patient maintained adequate oxygenation on single left lung ventilation. The incision and drain sites were dressed. The patient extubated uneventfully and was stable to PACU. Dr. Miles was present and scrubbed in for the entirety of the case.    Wound Classification: 1, Clean    Complications: None apparent    Estimated Blood Loss (EBL): 10mL           Drains:  One 9Fr Rachelle drain(s)    Implants:   Implant Name Type Inv. Item Serial No.  Lot No. LRB No. Used   Rib Plate Plate     Right 1   12mm Non-Locking Screws Screw   SYNTHES  Right 1   10mm Non-Locking Screws Screw   SYNTHES  Right 1       Specimens:   Specimen (12h ago, onward)    None                  Condition: Good    Disposition: PACU - hemodynamically stable.    Postoperative Plan:   Immediate PACU CXR  Resume diet when awake and alert without N/V  Continue ropivacaine catheter infusion per anesthesia  Transfer to [facility] when stable    Attestation: Dr. Miles was present and scrubbed for the critical portions of the case and otherwise immediately available.  Kayley Bennett MD  Thoracic  Surgery Resident, PGY7  General Thoracic Surgery  [facility]

## 2019-09-10 PROCEDURE — 25000003 PHARM REV CODE 250: Performed by: STUDENT IN AN ORGANIZED HEALTH CARE EDUCATION/TRAINING PROGRAM

## 2019-09-10 PROCEDURE — 99231 SBSQ HOSP IP/OBS SF/LOW 25: CPT | Mod: ,,, | Performed by: ANESTHESIOLOGY

## 2019-09-10 PROCEDURE — 63600175 PHARM REV CODE 636 W HCPCS: Performed by: STUDENT IN AN ORGANIZED HEALTH CARE EDUCATION/TRAINING PROGRAM

## 2019-09-10 PROCEDURE — 94799 UNLISTED PULMONARY SVC/PX: CPT

## 2019-09-10 PROCEDURE — 99231 PR SUBSEQUENT HOSPITAL CARE,LEVL I: ICD-10-PCS | Mod: ,,, | Performed by: ANESTHESIOLOGY

## 2019-09-10 PROCEDURE — 25000003 PHARM REV CODE 250: Performed by: PHYSICIAN ASSISTANT

## 2019-09-10 PROCEDURE — 20600001 HC STEP DOWN PRIVATE ROOM

## 2019-09-10 PROCEDURE — 25000003 PHARM REV CODE 250: Performed by: ANESTHESIOLOGY

## 2019-09-10 PROCEDURE — 94761 N-INVAS EAR/PLS OXIMETRY MLT: CPT

## 2019-09-10 PROCEDURE — 99900035 HC TECH TIME PER 15 MIN (STAT)

## 2019-09-10 RX ORDER — METHOCARBAMOL 750 MG/1
750 TABLET, FILM COATED ORAL 4 TIMES DAILY
Status: DISCONTINUED | OUTPATIENT
Start: 2019-09-10 | End: 2019-09-11 | Stop reason: HOSPADM

## 2019-09-10 RX ORDER — GABAPENTIN 400 MG/1
400 CAPSULE ORAL 3 TIMES DAILY
Status: DISCONTINUED | OUTPATIENT
Start: 2019-09-10 | End: 2019-09-11 | Stop reason: HOSPADM

## 2019-09-10 RX ADMIN — GABAPENTIN 400 MG: 400 CAPSULE ORAL at 09:09

## 2019-09-10 RX ADMIN — ACETAMINOPHEN 1000 MG: 500 TABLET ORAL at 10:09

## 2019-09-10 RX ADMIN — OXYCODONE HYDROCHLORIDE 10 MG: 10 TABLET ORAL at 07:09

## 2019-09-10 RX ADMIN — SENNOSIDES,DOCUSATE SODIUM 1 TABLET: 8.6; 5 TABLET, FILM COATED ORAL at 09:09

## 2019-09-10 RX ADMIN — OXYCODONE HYDROCHLORIDE 10 MG: 10 TABLET ORAL at 02:09

## 2019-09-10 RX ADMIN — METHOCARBAMOL TABLETS 750 MG: 750 TABLET, COATED ORAL at 02:09

## 2019-09-10 RX ADMIN — MORPHINE SULFATE 2 MG: 2 INJECTION, SOLUTION INTRAMUSCULAR; INTRAVENOUS at 05:09

## 2019-09-10 RX ADMIN — METHOCARBAMOL TABLETS 750 MG: 750 TABLET, COATED ORAL at 07:09

## 2019-09-10 RX ADMIN — CEFAZOLIN 2 G: 1 INJECTION, POWDER, FOR SOLUTION INTRAMUSCULAR; INTRAVENOUS at 08:09

## 2019-09-10 RX ADMIN — OXYCODONE HYDROCHLORIDE 10 MG: 10 TABLET ORAL at 08:09

## 2019-09-10 RX ADMIN — POLYETHYLENE GLYCOL 3350 17 G: 17 POWDER, FOR SOLUTION ORAL at 08:09

## 2019-09-10 RX ADMIN — OXYCODONE HYDROCHLORIDE 10 MG: 10 TABLET ORAL at 11:09

## 2019-09-10 RX ADMIN — GABAPENTIN 400 MG: 400 CAPSULE ORAL at 02:09

## 2019-09-10 RX ADMIN — FAMOTIDINE 20 MG: 20 TABLET, FILM COATED ORAL at 09:09

## 2019-09-10 RX ADMIN — ROPIVACAINE HYDROCHLORIDE 2 ML/HR: 2 INJECTION, SOLUTION EPIDURAL; INFILTRATION at 03:09

## 2019-09-10 RX ADMIN — MORPHINE SULFATE 2 MG: 2 INJECTION, SOLUTION INTRAMUSCULAR; INTRAVENOUS at 01:09

## 2019-09-10 RX ADMIN — CELECOXIB 200 MG: 200 CAPSULE ORAL at 09:09

## 2019-09-10 RX ADMIN — OXYCODONE HYDROCHLORIDE 10 MG: 10 TABLET ORAL at 10:09

## 2019-09-10 RX ADMIN — ACETAMINOPHEN 1000 MG: 500 TABLET ORAL at 05:09

## 2019-09-10 RX ADMIN — FLUOXETINE HYDROCHLORIDE 20 MG: 20 CAPSULE ORAL at 08:09

## 2019-09-10 RX ADMIN — ACETAMINOPHEN 1000 MG: 500 TABLET ORAL at 12:09

## 2019-09-10 RX ADMIN — OXYCODONE HYDROCHLORIDE 10 MG: 10 TABLET ORAL at 03:09

## 2019-09-10 NOTE — SUBJECTIVE & OBJECTIVE
Interval History:   NAEON. Pain increased this morning. CXR without pneumothorax and LIONEL drainage 75mL.      Medications:  Continuous Infusions:   ropivacaine (PF) 2 mg/ml (0.2%) 2 mL/hr (09/10/19 0325)     Scheduled Meds:   acetaminophen  1,000 mg Oral Q6H    celecoxib  400 mg Oral Once    And    celecoxib  200 mg Oral Daily    famotidine  20 mg Oral BID    FLUoxetine  20 mg Oral Daily    gabapentin  400 mg Oral TID    polyethylene glycol  17 g Oral Daily    senna-docusate 8.6-50 mg  1 tablet Oral BID     PRN Meds:albuterol, bisacodyl, ondansetron, oxyCODONE, oxyCODONE, promethazine (PHENERGAN) IVPB     Objective:     Vital Signs (Most Recent):  Temp: 98.3 °F (36.8 °C) (09/10/19 0832)  Pulse: 61 (09/10/19 0832)  Resp: 18 (09/10/19 0832)  BP: 131/83 (09/10/19 0832)  SpO2: 97 % (09/10/19 0832) Vital Signs (24h Range):  Temp:  [97.2 °F (36.2 °C)-98.4 °F (36.9 °C)] 98.3 °F (36.8 °C)  Pulse:  [48-81] 61  Resp:  [10-20] 18  SpO2:  [93 %-100 %] 97 %  BP: (108-158)/(59-91) 131/83        Body mass index is 32.01 kg/m².    SpO2: 97 %  O2 Device (Oxygen Therapy): room air    Intake/Output - Last 3 Shifts       09/08 0700 - 09/09 0659 09/09 0700 - 09/10 0659 09/10 0700 - 09/11 0659    I.V.  13.1     Total Intake  13.1     Urine  2450     Drains  75     Stool  0     Total Output  2525     Net  -2511.9            Stool Occurrence  1 x           Lines/Drains/Airways     Drain                 Closed/Suction Drain 09/09/19 1240 Right Chest Bulb 10 Fr. less than 1 day          Epidural Line                 Perineural Analgesia/Anesthesia Assessment (using dermatomes) 09/09/19 1002 less than 1 day          Peripheral Intravenous Line                 Peripheral IV - Single Lumen 09/09/19 0855 20 G Left 1 day         Peripheral IV - Single Lumen 09/09/19 1058 18 G Right Hand less than 1 day                Physical Exam   Constitutional: She is oriented to person, place, and time. She appears well-developed and well-nourished.  No distress.   HENT:   Head: Normocephalic and atraumatic.   Eyes: Pupils are equal, round, and reactive to light. EOM are normal.   Neck: Normal range of motion.   Cardiovascular: Normal rate and regular rhythm.   Pulmonary/Chest: Effort normal and breath sounds normal. No respiratory distress. She has no wheezes.   R posterolateral thoracotomy incision present with non-soiled dressings overlying. LIONEL drain with 75mL serosanguinous output since surgery. Holding suction. Dressing soiled and changed, LIONEL stripped.   Abdominal: Soft. She exhibits no distension. There is no tenderness.   Musculoskeletal: Normal range of motion. She exhibits no edema.   Neurological: She is alert and oriented to person, place, and time.   Skin: Skin is warm and dry. Capillary refill takes less than 2 seconds. She is not diaphoretic.   Psychiatric: She has a normal mood and affect. Her behavior is normal.       Significant Labs:  All pertinent labs from the last 24 hours have been reviewed.    Significant Diagnostics:  CXR: I have reviewed all pertinent results/findings within the past 24 hours

## 2019-09-10 NOTE — PROGRESS NOTES
[facility]  Cardiothoracic Surgery  Progress Note    Patient Name: Gina Coto  MRN: [id number]  Admission Date: 9/9/2019  Hospital Length of Stay: 1 days  Code Status: Full Code   Attending Physician: Juan Jose Miles MD   Referring Provider: Juan Jose Miles MD  Principal Problem:Rib fracture    Subjective:     Post-Op Info:  Procedure(s) (LRB):  THORACOTOMY (Right)   1 Day Post-Op     Interval History:   NAEON. Pain increased this morning. CXR without pneumothorax and LIONEL drainage 75mL.      Medications:  Continuous Infusions:   ropivacaine (PF) 2 mg/ml (0.2%) 2 mL/hr (09/10/19 0325)     Scheduled Meds:   acetaminophen  1,000 mg Oral Q6H    celecoxib  400 mg Oral Once    And    celecoxib  200 mg Oral Daily    famotidine  20 mg Oral BID    FLUoxetine  20 mg Oral Daily    gabapentin  400 mg Oral TID    polyethylene glycol  17 g Oral Daily    senna-docusate 8.6-50 mg  1 tablet Oral BID     PRN Meds:albuterol, bisacodyl, ondansetron, oxyCODONE, oxyCODONE, promethazine (PHENERGAN) IVPB     Objective:     Vital Signs (Most Recent):  Temp: 98.3 °F (36.8 °C) (09/10/19 0832)  Pulse: 61 (09/10/19 0832)  Resp: 18 (09/10/19 0832)  BP: 131/83 (09/10/19 0832)  SpO2: 97 % (09/10/19 0832) Vital Signs (24h Range):  Temp:  [97.2 °F (36.2 °C)-98.4 °F (36.9 °C)] 98.3 °F (36.8 °C)  Pulse:  [48-81] 61  Resp:  [10-20] 18  SpO2:  [93 %-100 %] 97 %  BP: (108-158)/(59-91) 131/83        Body mass index is 32.01 kg/m².    SpO2: 97 %  O2 Device (Oxygen Therapy): room air    Intake/Output - Last 3 Shifts       09/08 0700 - 09/09 0659 09/09 0700 - 09/10 0659 09/10 0700 - 09/11 0659    I.V.  13.1     Total Intake  13.1     Urine  2450     Drains  75     Stool  0     Total Output  2525     Net  -2511.9            Stool Occurrence  1 x           Lines/Drains/Airways     Drain                 Closed/Suction Drain 09/09/19 1240 Right Chest Bulb 10 Fr. less than 1 day          Epidural Line                  Perineural Analgesia/Anesthesia Assessment (using dermatomes) 09/09/19 1002 less than 1 day          Peripheral Intravenous Line                 Peripheral IV - Single Lumen 09/09/19 0855 20 G Left 1 day         Peripheral IV - Single Lumen 09/09/19 1058 18 G Right Hand less than 1 day                Physical Exam   Constitutional: She is oriented to person, place, and time. She appears well-developed and well-nourished. No distress.   HENT:   Head: Normocephalic and atraumatic.   Eyes: Pupils are equal, round, and reactive to light. EOM are normal.   Neck: Normal range of motion.   Cardiovascular: Normal rate and regular rhythm.   Pulmonary/Chest: Effort normal and breath sounds normal. No respiratory distress. She has no wheezes.   R posterolateral thoracotomy incision present with non-soiled dressings overlying. LIONEL drain with 75mL serosanguinous output since surgery. Holding suction. Dressing soiled and changed, LIONEL stripped.   Abdominal: Soft. She exhibits no distension. There is no tenderness.   Musculoskeletal: Normal range of motion. She exhibits no edema.   Neurological: She is alert and oriented to person, place, and time.   Skin: Skin is warm and dry. Capillary refill takes less than 2 seconds. She is not diaphoretic.   Psychiatric: She has a normal mood and affect. Her behavior is normal.       Significant Labs:  All pertinent labs from the last 24 hours have been reviewed.    Significant Diagnostics:  CXR: I have reviewed all pertinent results/findings within the past 24 hours    Assessment/Plan:     * Rib fracture  49 year old F s/p R thoracotomy R lower bilobectomy of carcinoid tumor now POD #1 s/p R Rib #6 ORIF 9/9/19.    -pain better controlled with staying on top of PO pain medications  -pain catheter d/c today  -clamp LIONEL, interval CXR, if no pneumothorax, will plan to remove  -advance diet to regular now  -restarted home meds  -ambulate TID  -SCDs, lovenox prophylaxis  -likely d/c  tomorrow        Kayley Bennett MD  Cardiothoracic Surgery  [facility]

## 2019-09-10 NOTE — ADDENDUM NOTE
Addendum  created 09/10/19 1204 by Shavonne Gibbs MD    Charge Capture section accepted, Order list changed

## 2019-09-10 NOTE — PLAN OF CARE
Problem: Adult Inpatient Plan of Care  Goal: Plan of Care Review  Outcome: Ongoing (interventions implemented as appropriate)  Plan of care reviewed with patient, all questions and concerns addressed. Patient VSS and within normal limits, no complaints of SOB, headaches, or dizziness. Patient urine output remains adequate, Patient is tolerating clear liquids diet with no complaints of nausea or vomiting. Patient main complaint is with nearly constant pain depressed only with prescribed pain meds per MAR and continuous rapivocaine infusion.  Patient is resting quietly with side rails up and call light with in reach. Will continue to monitor patient status.

## 2019-09-10 NOTE — ASSESSMENT & PLAN NOTE
49 year old F s/p R thoracotomy R lower bilobectomy of carcinoid tumor now POD #1 s/p R Rib #6 ORIF 9/9/19.    -pain better controlled with staying on top of PO pain medications  -pain catheter d/c today  -clamp LIONEL, interval CXR, if no pneumothorax, will plan to remove  -advance diet to regular now  -restarted home meds  -ambulate TID  -SCDs, lovenox prophylaxis  -likely d/c tomorrow

## 2019-09-10 NOTE — ANESTHESIA POSTPROCEDURE EVALUATION
Anesthesia Post Evaluation    Patient: Gina Coto    Procedure(s) Performed: Procedure(s) (LRB):  THORACOTOMY (Right)    Final Anesthesia Type: general  Patient location during evaluation: med/surg floor  Patient participation: Yes- Able to Participate  Level of consciousness: awake and alert and oriented  Post-procedure vital signs: reviewed and stable  Pain management: adequate (WILFREDO catheter)  Airway patency: patent  PONV status at discharge: No PONV  Anesthetic complications: no      Cardiovascular status: blood pressure returned to baseline and hemodynamically stable  Respiratory status: unassisted and spontaneous ventilation  Hydration status: euvolemic  Follow-up not needed.          Vitals Value Taken Time   /83 9/10/2019  8:32 AM   Temp 36.8 °C (98.3 °F) 9/10/2019  8:32 AM   Pulse 61 9/10/2019  8:32 AM   Resp 18 9/10/2019  8:32 AM   SpO2 97 % 9/10/2019  8:32 AM         Event Time     Out of Recovery 18:44:28          Pain/Saundra Score: Pain Rating Prior to Med Admin: 4 (9/10/2019  9:00 AM)  Saundra Score: 10 (9/9/2019  7:30 PM)

## 2019-09-10 NOTE — ADDENDUM NOTE
Addendum  created 09/10/19 1546 by Gisselle Castanon RN    Intraprocedure Event edited, LDA properties accepted, Order list changed, Sign clinical note

## 2019-09-10 NOTE — ANESTHESIA POST-OP PAIN MANAGEMENT
Acute Pain Service Progress Note    Gina Coto is a 49 y.o. female, [id number].    Surgery:  THORACOTOMY (Right Chest) - RIGHT REDO THORACOTOMY   RIB PLATING    Post Op Day #: 1    Catheter type: perineural  interscalene    Infusion type: Ropivacaine 0.2%  2 mL/hr basal with 10 mL/hr intermittent bolus    Problem List:    Active Hospital Problems    Diagnosis  POA    *Rib fracture [S22.39XA]  Yes      Resolved Hospital Problems    Diagnosis Date Resolved POA    Rib fracture [S22.39XA] 09/09/2019 Yes     Interval History:  Pt doing well this morning.  Pain well-controlled with PRN oxycodone and multimodal analgesia.  She c/o some shooting pains in RUE.    Subjective:     General appearance of alert, oriented, no complaints   Pain with rest: 3    Numbers   Pain with movement: 6    Numbers   Side Effects    1. Pruritis No    2. Nausea No    3. Motor Blockade No, 0=Ability to raise lower extremities off bed    4. Sedation No, 1=awake and alert    Objective:     Catheter site clean, dry, intact      Vitals   Vitals:    09/10/19 0832   BP: 131/83   Pulse: 61   Resp: 18   Temp: 36.8 °C (98.3 °F)        Labs    Admission on 09/09/2019   Component Date Value Ref Range Status    POC Preg Test, Ur 09/09/2019 Negative  Negative Final     Acceptable 09/09/2019 Yes   Final        Meds   Current Facility-Administered Medications   Medication Dose Route Frequency Provider Last Rate Last Dose    acetaminophen tablet 1,000 mg  1,000 mg Oral Q6H Kayley Bennett MD   1,000 mg at 09/09/19 2346    albuterol inhaler 2 puff  2 puff Inhalation Q6H PRN Kayley Bennett MD        bisacodyl suppository 10 mg  10 mg Rectal Daily PRN Kayley Bennett MD        celecoxib capsule 400 mg  400 mg Oral Once Kayley Bennett MD        And    celecoxib capsule 200 mg  200 mg Oral Daily Kayley Bennett MD   200 mg at 09/10/19 0900    famotidine tablet 20 mg  20 mg Oral BID Kayley Bennett MD    20 mg at 09/10/19 0900    FLUoxetine capsule 20 mg  20 mg Oral Daily Kayley Bennett MD   20 mg at 09/10/19 0859    gabapentin capsule 400 mg  400 mg Oral TID Norma Tucker PA-C   400 mg at 09/10/19 0920    ondansetron injection 4 mg  4 mg Intravenous Q12H PRN Kayley Bennett MD        oxyCODONE immediate release tablet 10 mg  10 mg Oral Q3H PRN Kayley Bennett MD   10 mg at 09/10/19 0802    oxyCODONE immediate release tablet 5 mg  5 mg Oral Q3H PRN Kayley Bennett MD        polyethylene glycol packet 17 g  17 g Oral Daily Kayley Bennett MD   17 g at 09/10/19 0859    promethazine (PHENERGAN) 6.25 mg in dextrose 5 % 50 mL IVPB  6.25 mg Intravenous Q6H PRN Kayley Bennett MD        ropivacaine (PF) 2 mg/ml (0.2%) infusion  2 mL/hr Perineural Continuous Kayley Bennett MD 2 mL/hr at 09/10/19 0325 2 mL/hr at 09/10/19 0325    senna-docusate 8.6-50 mg per tablet 1 tablet  1 tablet Oral BID Kayley Bennett MD   1 tablet at 09/10/19 0900        Anticoagulant dose N/A.    Assessment:     Pain control adequate.    Plan:    - Patient doing well, continue present treatment.   - Per primary team (Thoracic Surgery), plan for possible d/c later today.  WILFREDO PNC paused around 9 AM.  Will re-assess pt's pain later.  - Continue multimodal analgesia with Tylenol, Celebrex, gabapentin  - Titrate up gabapentin as tolerated  - Consider addition of lidocaine patches    Marlo Dorsey MD  PGY-2 / CA-1  [facility]             No

## 2019-09-11 VITALS
BODY MASS INDEX: 32.01 KG/M2 | HEIGHT: 66 IN | SYSTOLIC BLOOD PRESSURE: 144 MMHG | TEMPERATURE: 98 F | DIASTOLIC BLOOD PRESSURE: 71 MMHG | RESPIRATION RATE: 17 BRPM | OXYGEN SATURATION: 97 % | HEART RATE: 68 BPM

## 2019-09-11 PROCEDURE — 25000003 PHARM REV CODE 250: Performed by: STUDENT IN AN ORGANIZED HEALTH CARE EDUCATION/TRAINING PROGRAM

## 2019-09-11 PROCEDURE — 25000003 PHARM REV CODE 250: Performed by: ANESTHESIOLOGY

## 2019-09-11 PROCEDURE — 25000003 PHARM REV CODE 250: Performed by: PHYSICIAN ASSISTANT

## 2019-09-11 RX ORDER — METHOCARBAMOL 750 MG/1
750 TABLET, FILM COATED ORAL 4 TIMES DAILY
Qty: 40 TABLET | Refills: 0 | Status: SHIPPED | OUTPATIENT
Start: 2019-09-11 | End: 2019-09-21

## 2019-09-11 RX ORDER — METHOCARBAMOL 750 MG/1
750 TABLET, FILM COATED ORAL 4 TIMES DAILY
Qty: 40 TABLET | Refills: 0 | Status: SHIPPED | OUTPATIENT
Start: 2019-09-11 | End: 2019-09-11

## 2019-09-11 RX ORDER — OXYCODONE HYDROCHLORIDE 10 MG/1
10 TABLET ORAL
Qty: 28 TABLET | Refills: 0 | Status: SHIPPED | OUTPATIENT
Start: 2019-09-11 | End: 2019-09-24 | Stop reason: SDUPTHER

## 2019-09-11 RX ADMIN — METHOCARBAMOL TABLETS 750 MG: 750 TABLET, COATED ORAL at 12:09

## 2019-09-11 RX ADMIN — POLYETHYLENE GLYCOL 3350 17 G: 17 POWDER, FOR SOLUTION ORAL at 08:09

## 2019-09-11 RX ADMIN — OXYCODONE HYDROCHLORIDE 10 MG: 10 TABLET ORAL at 05:09

## 2019-09-11 RX ADMIN — GABAPENTIN 400 MG: 400 CAPSULE ORAL at 02:09

## 2019-09-11 RX ADMIN — CELECOXIB 200 MG: 200 CAPSULE ORAL at 08:09

## 2019-09-11 RX ADMIN — METHOCARBAMOL TABLETS 750 MG: 750 TABLET, COATED ORAL at 08:09

## 2019-09-11 RX ADMIN — OXYCODONE HYDROCHLORIDE 10 MG: 10 TABLET ORAL at 12:09

## 2019-09-11 RX ADMIN — OXYCODONE HYDROCHLORIDE 10 MG: 10 TABLET ORAL at 01:09

## 2019-09-11 RX ADMIN — ACETAMINOPHEN 1000 MG: 500 TABLET ORAL at 05:09

## 2019-09-11 RX ADMIN — OXYCODONE HYDROCHLORIDE 10 MG: 10 TABLET ORAL at 03:09

## 2019-09-11 RX ADMIN — OXYCODONE HYDROCHLORIDE 10 MG: 10 TABLET ORAL at 08:09

## 2019-09-11 RX ADMIN — GABAPENTIN 400 MG: 400 CAPSULE ORAL at 08:09

## 2019-09-11 RX ADMIN — SENNOSIDES,DOCUSATE SODIUM 1 TABLET: 8.6; 5 TABLET, FILM COATED ORAL at 08:09

## 2019-09-11 RX ADMIN — FAMOTIDINE 20 MG: 20 TABLET, FILM COATED ORAL at 08:09

## 2019-09-11 RX ADMIN — FLUOXETINE HYDROCHLORIDE 20 MG: 20 CAPSULE ORAL at 08:09

## 2019-09-11 NOTE — PLAN OF CARE
Pt is AAOx3.Pt is ambulatory and independent.Pt able to perform all ADL's without assistance. Pt is in good spirits.  NAD noted.Pt is afebrile, po fluids encouraged, TCBD encouraged, hand hygiene encouragedStandard precautions maintained.  No breakdown noted, po fluids encouraged. Pt turns independently, pt is aware of bony area and pressure reduction positions V/S stable, within normal limits.Pt remains injury and fall free, non skid footwear donned, call light within reach, personal items within reach, bed in low/locked position, pt able to voice needs all needs voiced have been met at this time.

## 2019-09-11 NOTE — HOSPITAL COURSE
9/9/19 - redo right thoracotomy for rib plating. Erector spinae place. CLD. PO pain. BM x 1  9/10/19 - clamped drain. Repeat CXR and subsequent tube removal. Regular diet. Erector spinae removed. Pain controlled.   9/11/19 - stable for discharge.

## 2019-09-11 NOTE — NURSING
Spoke to Norma Tucker PA-C regarding rx for Robaxin and that rx needed to be called into [facility] in [place]. Patient informed to  rx from [facility]. Voiced understanding.

## 2019-09-11 NOTE — DISCHARGE SUMMARY
[facility]  Thoracic Surgery  Discharge Summary    Patient Name: Gina Coto  MRN: [id number]  Admission Date: 9/9/2019  Hospital Length of Stay: 2 days  Discharge Date and Time:  09/11/2019 3:02 PM  Attending Physician: Juan Jose Miles MD   Discharging Provider: Norma Tucker PA-C  Primary Care Provider: Claire Frias MD    HPI:   Diagnosis:  Carcinoid Tumor  Pre-operative therapy: NA  Procedure(s) and date(s):   4/22/19- Bronchoscopy, right thoracotomy for right lower bilobectomy for carcinoid tumor in bronchus intermedius and MLND.  Bronchial stump closed with 4-0 PDS under direct bronchoscopic vision.  Intercostal muscle flap between bronchus and PA.  5/6/19- Surveillance bronchoscopy   Pathology:  2.8cm well differentiated neuroendocrine tumor. Grade 1 typical carcinoid tumor. Ki-67 stains less than 2% of tumor cell nuclei. Negative margins, 0.5cm from closest margin. 3 benign LNs within the specimen. Levels 7 and 11 LN negative.   pT2 N0  Post-operative therapy: NA     HPI 49 year old female returns for follow up s/p right thoracotomy for right lower bilobectomy for resection of carcinoid tumor. Post op course complicated by persistent air leak. Discharged with chest tube on 4/27/19. She was doing well at 1 month follow-up however she returns today prior to 6 month surveillance follow-up complaining of persistent rib pain/clicking. Reports rolled in bed 2-3 weeks ago and felt acute rib pain and pop. Pain/clicking is exacerbated by reaching with right arm. Chest CT with 3D rib reconstructions today. She is off all opioids. Takes one gabapentin in am and 2 qHS. Difficulty getting established with pain management secondary to medicaid.      Procedure(s) (LRB):  THORACOTOMY (Right)      Hospital Course: 9/9/19 - redo right thoracotomy for rib plating. Erector spinae place. CLD. PO pain. BM x 1  9/10/19 - clamped drain. Repeat CXR and subsequent tube removal. Regular diet. Erector  spinae removed. Pain controlled.   9/11/19 - stable for discharge.         Significant Diagnostic Studies: Radiology: X-Ray: CXR: X-Ray Chest 1 View (CXR):   Results for orders placed or performed during the hospital encounter of 09/09/19   X-Ray Chest AP Single View    Narrative    EXAMINATION:  XR CHEST 1 VIEW    CLINICAL HISTORY:  post op thoracic surgery, chest tubes;    TECHNIQUE:  Single frontal view of the chest was performed.    COMPARISON:  Radiograph 09/10/2019.    FINDINGS:  Postoperative change of partial right lower lobectomy and right 6th rib ORIF.  There is persistent volume loss throughout the right lung with stable pleural thickening and possible layering pleural fluid.  Left lung remains clear.  No pneumothorax.  No free air beneath the diaphragm.  Stable subcutaneous emphysema projects over the lateral aspect of the right chest.  No acute osseous abnormalities.      Impression    No interval detrimental change.      Electronically signed by: Dustin Jameson MD  Date:    09/11/2019  Time:    08:15       Pending Diagnostic Studies:     None        Final Active Diagnoses:    Diagnosis Date Noted POA    PRINCIPAL PROBLEM:  Rib fracture [S22.39XA] 09/09/2019 Yes      Problems Resolved During this Admission:    Diagnosis Date Noted Date Resolved POA    Rib fracture [S22.39XA] 09/09/2019 09/09/2019 Yes      Discharged Condition: good    Disposition: Home or Self Care    Follow Up:    Patient Instructions:      Diet Adult Regular     Call MD for:  temperature >100.4     Call MD for:  persistent nausea and vomiting or diarrhea     Call MD for:  severe uncontrolled pain     Call MD for:  redness, tenderness, or signs of infection (pain, swelling, redness, odor or green/yellow discharge around incision site)     Call MD for:  difficulty breathing or increased cough     Call MD for:  severe persistent headache     Call MD for:  worsening rash     Call MD for:  persistent dizziness, light-headedness, or  visual disturbances     Call MD for:  increased confusion or weakness     Remove dressing in 48 hours     Other restrictions (specify):   Order Comments: No lifting more than 10lbs     Activity as tolerated     Shower on day dressing removed (No bath)     Medications:  Reconciled Home Medications:      Medication List      START taking these medications    methocarbamol 750 MG Tab  Commonly known as:  ROBAXIN  Take 1 tablet (750 mg total) by mouth 4 (four) times daily. for 10 days     oxyCODONE 10 mg Tab immediate release tablet  Commonly known as:  ROXICODONE  Take 1 tablet (10 mg total) by mouth every 3 (three) hours as needed.        CONTINUE taking these medications    acetaminophen 650 MG Tbsr  Commonly known as:  TYLENOL  Take 1 tablet (650 mg total) by mouth every 8 (eight) hours.     albuterol 90 mcg/actuation inhaler  Commonly known as:  PROVENTIL/VENTOLIN HFA  Inhale 1-2 puffs into the lungs every 6 (six) hours as needed for Wheezing or Shortness of Breath. Rescue     albuterol-ipratropium 2.5 mg-0.5 mg/3 mL nebulizer solution  Commonly known as:  DUO-NEB  Take 3 mLs by nebulization every 6 (six) hours as needed for Wheezing.     ALPRAZolam 0.5 MG tablet  Commonly known as:  XANAX  Take 0.5 mg by mouth daily as needed.     FLUoxetine 20 MG capsule  Take 20 mg by mouth once daily.     gabapentin 300 MG capsule  Commonly known as:  NEURONTIN  Take 1 capsule (300 mg total) by mouth 3 (three) times daily. Start by taking 1 pill nightly then slowly increase to two pills     garlic 200 mg Tab  Take 200 mg by mouth once daily.     ginkgo biloba 40 mg Tab  Take by mouth once daily.     ibuprofen 400 MG tablet  Commonly known as:  ADVIL,MOTRIN  Take 1 tablet (400 mg total) by mouth every 6 (six) hours as needed for Other.     ONE DAILY MULTIVITAMIN per tablet  Generic drug:  multivitamin  Take 1 tablet by mouth once daily.     MAXX'S WORT ORAL  Take by mouth.     traZODone 50 MG tablet  Commonly known as:   DESYREL  TAKE 1 TABLET BY MOUTH EVERY DAY AT BEDTIME AS NEEDED     TURMERIC ORAL  Take by mouth.     vitamin E 200 UNIT capsule  Take 200 Units by mouth once daily.            Norma Tucker PA-C  Thoracic Surgery  [facility]

## 2019-09-11 NOTE — NURSING
Discharge instructions reviewed with patient, voiced understanding. Instructed to take medications as prescribed. Rx given to patient  for Oxycodone, pt voiced understanding of administration. 20 G  PIV removed from left hand, catheter intact. Patient tolerated well. Patient's sister present in room to transport patient home.

## 2019-09-11 NOTE — PLAN OF CARE
Problem: Adult Inpatient Plan of Care  Goal: Plan of Care Review  Plan of care reviewed with patient. Patient aaox4. Patient tolerated regualr diet well. Dressing clean dry and intact to right lateral chest. LIONEL drain discontinued. Pain managed with PRN pain meds. Bed in low and locked position, Call light in reach. Will continue to monitor.

## 2019-09-12 DIAGNOSIS — S22.31XK CLOSED FRACTURE OF ONE RIB OF RIGHT SIDE WITH NONUNION, SUBSEQUENT ENCOUNTER: Primary | ICD-10-CM

## 2019-09-13 ENCOUNTER — PATIENT OUTREACH (OUTPATIENT)
Dept: ADMINISTRATIVE | Facility: CLINIC | Age: 49
End: 2019-09-13

## 2019-09-13 NOTE — PROGRESS NOTES
C3 nurse attempted to contact patient. No answer. The following message was left for the patient to return the call:  Good Afternoon  I am a nurse calling on behalf of [facility] from the [facility].  This is a Transitional Care Call for Gina Coto . When you have a moment please contact us at [phone] or [phone] Monday through Friday, between the hours of 8 am to 4 pm. We look forward to speaking with you. On behalf of [facility] have a nice day.    The patient does not have a scheduled HOSFU appointment within 7-14 days post hospital discharge date 09/11/2019. Non [facility] PCP.

## 2019-09-27 ENCOUNTER — OFFICE VISIT (OUTPATIENT)
Dept: CARDIOTHORACIC SURGERY | Facility: CLINIC | Age: 49
End: 2019-09-27
Payer: MEDICAID

## 2019-09-27 ENCOUNTER — HOSPITAL ENCOUNTER (OUTPATIENT)
Dept: RADIOLOGY | Facility: HOSPITAL | Age: 49
Discharge: HOME | End: 2019-09-27
Attending: PHYSICIAN ASSISTANT
Payer: MEDICAID

## 2019-09-27 VITALS
HEART RATE: 83 BPM | DIASTOLIC BLOOD PRESSURE: 91 MMHG | WEIGHT: 201.06 LBS | OXYGEN SATURATION: 96 % | HEIGHT: 66 IN | SYSTOLIC BLOOD PRESSURE: 151 MMHG | BODY MASS INDEX: 32.31 KG/M2

## 2019-09-27 DIAGNOSIS — S22.31XG: ICD-10-CM

## 2019-09-27 DIAGNOSIS — C7A.090 CARCINOID BRONCHIAL ADENOMA OF RIGHT LUNG: Primary | ICD-10-CM

## 2019-09-27 DIAGNOSIS — S22.31XK CLOSED FRACTURE OF ONE RIB OF RIGHT SIDE WITH NONUNION, SUBSEQUENT ENCOUNTER: ICD-10-CM

## 2019-09-27 PROCEDURE — 99999 PR PBB SHADOW E&M-EST. PATIENT-LVL III: CPT | Mod: PBBFAC,,, | Performed by: THORACIC SURGERY (CARDIOTHORACIC VASCULAR SURGERY)

## 2019-09-27 PROCEDURE — 99024 POSTOP FOLLOW-UP VISIT: CPT | Mod: ,,, | Performed by: THORACIC SURGERY (CARDIOTHORACIC VASCULAR SURGERY)

## 2019-09-27 PROCEDURE — 99999 PR PBB SHADOW E&M-EST. PATIENT-LVL III: ICD-10-PCS | Mod: PBBFAC,,, | Performed by: THORACIC SURGERY (CARDIOTHORACIC VASCULAR SURGERY)

## 2019-09-27 PROCEDURE — 99024 PR POST-OP FOLLOW-UP VISIT: ICD-10-PCS | Mod: ,,, | Performed by: THORACIC SURGERY (CARDIOTHORACIC VASCULAR SURGERY)

## 2019-09-27 PROCEDURE — 99213 OFFICE O/P EST LOW 20 MIN: CPT | Mod: PBBFAC,25 | Performed by: THORACIC SURGERY (CARDIOTHORACIC VASCULAR SURGERY)

## 2019-09-27 PROCEDURE — 71046 X-RAY EXAM CHEST 2 VIEWS: CPT | Mod: TC,FY

## 2019-09-27 PROCEDURE — 71046 XR CHEST PA AND LATERAL: ICD-10-PCS | Mod: 26,,, | Performed by: RADIOLOGY

## 2019-09-27 PROCEDURE — 71046 X-RAY EXAM CHEST 2 VIEWS: CPT | Mod: 26,,, | Performed by: RADIOLOGY

## 2019-09-27 NOTE — PROGRESS NOTES
"Subjective:       Patient ID: Gina Coto is a 49 y.o. female.    Chief Complaint: Post-op Evaluation    Diagnosis:  Carcinoid Tumor    Pre-operative therapy: NA    Procedure(s) and date(s):   4/22/19- Bronchoscopy, right thoracotomy for right lower bilobectomy for carcinoid tumor in bronchus intermedius and MLND.  Bronchial stump closed with 4-0 PDS under direct bronchoscopic vision.  Intercostal muscle flap between bronchus and PA.  5/6/19- Surveillance bronchoscopy  9/9/19 - re do thoracotomy for rib plating     Pathology:  2.8cm well differentiated neuroendocrine tumor. Grade 1 typical carcinoid tumor. Ki-67 stains less than 2% of tumor cell nuclei. Negative margins, 0.5cm from closest margin. 3 benign LNs within the specimen. Levels 7 and 11 LN negative.   pT2 N0    Post-operative therapy: NA    HPI 49 year old female returns for follow up s/p right thoracotomy for right lower bilobectomy for resection of carcinoid tumor and is now s/p rib plating. Did well post-op. Discharged on gabapentin, tylenol, ibuprofen, flexeril and oxycodone. Weaning use of flexeril and oxycodone. Pain improving. Occasional muscle spasms. No breathing complaints.     Review of Systems   Constitutional: Negative for activity change and fatigue.   HENT: Negative for congestion.    Respiratory: Negative for cough, choking and shortness of breath.    Cardiovascular: Negative for chest pain and palpitations.   Gastrointestinal: Negative for abdominal distention and abdominal pain.   Genitourinary: Negative for difficulty urinating.   Musculoskeletal: Negative for arthralgias.   Skin: Negative for color change and rash.   Neurological: Negative for dizziness and syncope.   Psychiatric/Behavioral: Negative for agitation. The patient is not nervous/anxious.          Objective:       Vitals:    09/27/19 1107   BP: (!) 151/91   Pulse: 83   SpO2: 96%   Weight: 91.2 kg (201 lb 1 oz)   Height: 5' 6" (1.676 m)   PainSc: 0-No pain    "     Physical Exam   Constitutional: She is oriented to person, place, and time. She appears well-developed and well-nourished.   HENT:   Head: Normocephalic and atraumatic.   Eyes: EOM are normal.   Neck: Normal range of motion. Neck supple. No tracheal deviation present.   Cardiovascular: Normal rate, regular rhythm, normal heart sounds and intact distal pulses.   Pulmonary/Chest: Effort normal.   Right thoracotomy incision well healed.     Abdominal: Soft.   Musculoskeletal: Normal range of motion. She exhibits no edema.   Neurological: She is alert and oriented to person, place, and time.   Skin: Skin is warm and dry.   Psychiatric: She has a normal mood and affect. Thought content normal.   Vitals reviewed.        6/12/19- CXR- Right-sided pleural effusion with elevation RIGHT hemidiaphragm.  LEFT lung is clear.  Volume loss RIGHT hemithorax.  Surgical clips.  S/P resection of RIGHT 5th rib.    Chest CT 8/21/19:   Postsurgical change of right thoracotomy and right lower bilobectomy.  Small focus of fluid attenuation within the right costophrenic angle surrounded by high attenuation of the vessel and parietal margins.  Findings may represent normal postsurgical change with fluid filling the pleural space.   New 0.9 cm ground-glass opacity in the medial aspect of the right upper lobe and new left lower lobe subcentimeter pulmonary nodule.  Findings may represent inflammatory process, however in this patient with a history of malignancy continued follow-up is recommended with dedicated chest CT in 6-12 months for re-evaluation.  Further surveillance recommendations may be provided at the time of follow-up examination.    CXR 9/27/19:  There is a right chest wall deformity with a fixation plate of 1 of the ribs.  There is elevation of the right diaphragm, and right pleural thickening and chronic pleural and lung changes.  Left lung is clear.    Assessment:        49 year old female returns for follow up s/p right  thoracotomy for right lower bilobectomy for resection of carcinoid tumor with MLND. Complete resection with negative LN and low Ki-67-->excellent 5 and 10 year prognosis. Here today for post-op from rib plating.     Plan:       Doing well post rib plating. Wean narcotics. CXR reviewed. Incisions healing well.   RTC in 6 months with chest CT from last CT in Aug (apx Feb 2019).

## 2019-09-30 RX ORDER — CYCLOBENZAPRINE HCL 5 MG
5 TABLET ORAL 3 TIMES DAILY PRN
Qty: 31 TABLET | Refills: 0 | Status: SHIPPED | OUTPATIENT
Start: 2019-09-30 | End: 2019-10-10

## 2019-10-03 RX ORDER — HYDROCODONE BITARTRATE AND ACETAMINOPHEN 5; 325 MG/1; MG/1
1 TABLET ORAL EVERY 8 HOURS PRN
Qty: 21 TABLET | Refills: 0 | Status: SHIPPED | OUTPATIENT
Start: 2019-10-03 | End: 2021-08-11

## 2019-10-08 DIAGNOSIS — Z98.890 HISTORY OF THORACOTOMY: ICD-10-CM

## 2019-10-08 DIAGNOSIS — M62.838 MUSCLE SPASM: ICD-10-CM

## 2019-10-08 RX ORDER — IBUPROFEN 400 MG/1
400 TABLET ORAL EVERY 6 HOURS PRN
Qty: 28 TABLET | Refills: 1 | Status: SHIPPED | OUTPATIENT
Start: 2019-10-08 | End: 2020-10-09

## 2019-10-14 ENCOUNTER — PATIENT MESSAGE (OUTPATIENT)
Dept: CARDIOTHORACIC SURGERY | Facility: CLINIC | Age: 49
End: 2019-10-14

## 2019-10-14 DIAGNOSIS — M62.838 MUSCLE SPASM: Primary | ICD-10-CM

## 2019-10-14 DIAGNOSIS — Z87.81 HISTORY OF RIB FRACTURE: ICD-10-CM

## 2019-10-14 RX ORDER — CYCLOBENZAPRINE HCL 5 MG
5 TABLET ORAL 3 TIMES DAILY PRN
Qty: 31 TABLET | Refills: 0 | Status: SHIPPED | OUTPATIENT
Start: 2019-10-14 | End: 2019-10-15

## 2019-10-15 ENCOUNTER — PATIENT MESSAGE (OUTPATIENT)
Dept: CARDIOTHORACIC SURGERY | Facility: CLINIC | Age: 49
End: 2019-10-15

## 2019-10-15 DIAGNOSIS — Z87.81 HISTORY OF RIB FRACTURE: ICD-10-CM

## 2019-10-15 DIAGNOSIS — M62.838 MUSCLE SPASM: ICD-10-CM

## 2019-10-15 RX ORDER — CYCLOBENZAPRINE HCL 5 MG
5 TABLET ORAL 3 TIMES DAILY PRN
Qty: 31 TABLET | Refills: 0 | Status: SHIPPED | OUTPATIENT
Start: 2019-10-15 | End: 2019-10-25

## 2019-10-28 ENCOUNTER — PATIENT MESSAGE (OUTPATIENT)
Dept: CARDIOTHORACIC SURGERY | Facility: CLINIC | Age: 49
End: 2019-10-28

## 2019-10-28 RX ORDER — CYCLOBENZAPRINE HCL 5 MG
5 TABLET ORAL 3 TIMES DAILY PRN
Qty: 41 TABLET | Refills: 0 | Status: SHIPPED | OUTPATIENT
Start: 2019-10-28 | End: 2019-11-07

## 2019-11-06 ENCOUNTER — PATIENT OUTREACH (OUTPATIENT)
Dept: ADMINISTRATIVE | Facility: HOSPITAL | Age: 49
End: 2019-11-06

## 2019-12-02 ENCOUNTER — PATIENT MESSAGE (OUTPATIENT)
Dept: ADMINISTRATIVE | Facility: OTHER | Age: 49
End: 2019-12-02

## 2019-12-02 ENCOUNTER — PATIENT MESSAGE (OUTPATIENT)
Dept: CARDIOTHORACIC SURGERY | Facility: CLINIC | Age: 49
End: 2019-12-02

## 2019-12-02 RX ORDER — CYCLOBENZAPRINE HCL 5 MG
5 TABLET ORAL NIGHTLY
Qty: 30 TABLET | Refills: 0 | Status: SHIPPED | OUTPATIENT
Start: 2019-12-02 | End: 2020-01-01

## 2020-01-09 ENCOUNTER — PATIENT MESSAGE (OUTPATIENT)
Dept: CARDIOTHORACIC SURGERY | Facility: CLINIC | Age: 50
End: 2020-01-09

## 2020-01-09 ENCOUNTER — PATIENT MESSAGE (OUTPATIENT)
Dept: SURGERY | Facility: CLINIC | Age: 50
End: 2020-01-09

## 2020-01-09 ENCOUNTER — TELEPHONE (OUTPATIENT)
Dept: SURGERY | Facility: CLINIC | Age: 50
End: 2020-01-09

## 2020-01-09 DIAGNOSIS — R07.81 RIB PAIN: Primary | ICD-10-CM

## 2020-01-09 DIAGNOSIS — D3A.090 CARCINOID TUMOR OF RIGHT LUNG: Primary | ICD-10-CM

## 2020-01-09 DIAGNOSIS — Z98.890 S/P THORACOTOMY: ICD-10-CM

## 2020-01-09 NOTE — TELEPHONE ENCOUNTER
Called patient concerning scheduling appts.  Unable to contact.  Will send message through Portal.

## 2020-01-14 NOTE — PROGRESS NOTES
"Subjective:       Patient ID: Gina Coto is a 49 y.o. female.    Chief Complaint: Follow-up    Diagnosis:  Carcinoid Tumor    Pre-operative therapy: NA    Procedure(s) and date(s):   4/22/19- Bronchoscopy, right thoracotomy for right lower bilobectomy for carcinoid tumor in bronchus intermedius and MLND.  Bronchial stump closed with 4-0 PDS under direct bronchoscopic vision.  Intercostal muscle flap between bronchus and PA.  5/6/19- Surveillance bronchoscopy  9/9/19 - re do thoracotomy for rib plating     Pathology:  2.8cm well differentiated neuroendocrine tumor. Grade 1 typical carcinoid tumor. Ki-67 stains less than 2% of tumor cell nuclei. Negative margins, 0.5cm from closest margin. 3 benign LNs within the specimen. Levels 7 and 11 LN negative.   pT2 N0    Post-operative therapy: NA    HPI   49 y.o. female returns for follow up s/p right thoracotomy for right lower bilobectomy for resection of carcinoid tumor and is now s/p rib plating. Did well post-op. Discharged on gabapentin, tylenol, ibuprofen, flexeril and oxycodone. She started working at restaurant. Still taking Gabapentin and Ibuprofen. Messaged our office complaining of a "click" following reaching into a laundry hamper in December. At that time encouraged exercise modifications. Now reporting constant burning pain radiating from below right shoulder blade right axilla. Reports throbbing and aching pain peaks around 2 or 3pm. Pain relieved with muscle relaxer and heated pad. She has had to call into work due to pain. Only one or two episodes of clicking. No breathing complaints.     Review of Systems   Constitutional: Negative for activity change and fatigue.   HENT: Negative for congestion.    Respiratory: Negative for cough, choking and shortness of breath.    Cardiovascular: Negative for chest pain and palpitations.   Gastrointestinal: Negative for abdominal distention and abdominal pain.   Genitourinary: Negative for difficulty urinating. " "  Musculoskeletal: Negative for arthralgias.   Skin: Negative for color change and rash.   Neurological: Negative for dizziness and syncope.   Psychiatric/Behavioral: Negative for agitation. The patient is not nervous/anxious.          Objective:       Vitals:    01/15/20 0905   BP: 135/88   SpO2: 97%   Weight: 89 kg (196 lb 3.4 oz)   Height: 5' 6" (1.676 m)   PainSc: 0-No pain        Physical Exam   Constitutional: She is oriented to person, place, and time. She appears well-developed and well-nourished.   HENT:   Head: Normocephalic and atraumatic.   Eyes: EOM are normal.   Neck: Normal range of motion. Neck supple. No tracheal deviation present.   Cardiovascular: Normal rate, regular rhythm, normal heart sounds and intact distal pulses.   Pulmonary/Chest: Effort normal.   Right thoracotomy incision well healed.     Abdominal: Soft.   Musculoskeletal: Normal range of motion. She exhibits no edema.   Decreased ROM in right shoulder. Unable to illicit scapular clicking.    Neurological: She is alert and oriented to person, place, and time.   Skin: Skin is warm and dry.   Psychiatric: She has a normal mood and affect. Thought content normal.   Vitals reviewed.        6/12/19- CXR- Right-sided pleural effusion with elevation RIGHT hemidiaphragm.  LEFT lung is clear.  Volume loss RIGHT hemithorax.  Surgical clips.  S/P resection of RIGHT 5th rib.    Chest CT 8/21/19:   Postsurgical change of right thoracotomy and right lower bilobectomy.  Small focus of fluid attenuation within the right costophrenic angle surrounded by high attenuation of the vessel and parietal margins.  Findings may represent normal postsurgical change with fluid filling the pleural space.   New 0.9 cm ground-glass opacity in the medial aspect of the right upper lobe and new left lower lobe subcentimeter pulmonary nodule.  Findings may represent inflammatory process, however in this patient with a history of malignancy continued follow-up is " recommended with dedicated chest CT in 6-12 months for re-evaluation.  Further surveillance recommendations may be provided at the time of follow-up examination.    CXR 9/27/19:  There is a right chest wall deformity with a fixation plate of 1 of the ribs.  There is elevation of the right diaphragm, and right pleural thickening and chronic pleural and lung changes.  Left lung is clear.    Chest CT 1/15/20: Reviewed. Plate in good position. No evidence of recurrent carcinoid      Assessment:        49 year old female returns for follow up s/p right thoracotomy for right lower bilobectomy for resection of carcinoid tumor with MLND. Here today s/o clicking 6 month s/p rib plating.     Plan:       Refill Flexeril and 400mg Ibuprofen prescription. Will send prescription for topical pain cream.   RTC in 6 months with noncontrast chest CT

## 2020-01-15 ENCOUNTER — OFFICE VISIT (OUTPATIENT)
Dept: CARDIOTHORACIC SURGERY | Facility: CLINIC | Age: 50
End: 2020-01-15
Payer: MEDICAID

## 2020-01-15 ENCOUNTER — HOSPITAL ENCOUNTER (OUTPATIENT)
Dept: RADIOLOGY | Facility: HOSPITAL | Age: 50
Discharge: HOME | End: 2020-01-15
Attending: PHYSICIAN ASSISTANT
Payer: MEDICAID

## 2020-01-15 VITALS
DIASTOLIC BLOOD PRESSURE: 88 MMHG | WEIGHT: 196.19 LBS | HEART RATE: 69 BPM | BODY MASS INDEX: 31.53 KG/M2 | HEIGHT: 66 IN | OXYGEN SATURATION: 97 % | SYSTOLIC BLOOD PRESSURE: 135 MMHG

## 2020-01-15 DIAGNOSIS — Z98.890 S/P THORACOTOMY: Primary | ICD-10-CM

## 2020-01-15 DIAGNOSIS — S22.31XG: ICD-10-CM

## 2020-01-15 DIAGNOSIS — C7A.090 CARCINOID BRONCHIAL ADENOMA OF RIGHT LUNG: ICD-10-CM

## 2020-01-15 DIAGNOSIS — N63.10 BREAST MASS, RIGHT: Primary | ICD-10-CM

## 2020-01-15 DIAGNOSIS — Z98.890 S/P THORACOTOMY: ICD-10-CM

## 2020-01-15 DIAGNOSIS — R07.81 RIB PAIN: ICD-10-CM

## 2020-01-15 PROCEDURE — 71250 CT THORAX DX C-: CPT | Mod: 26,,, | Performed by: RADIOLOGY

## 2020-01-15 PROCEDURE — 99999 PR PBB SHADOW E&M-EST. PATIENT-LVL III: CPT | Mod: PBBFAC,,, | Performed by: THORACIC SURGERY (CARDIOTHORACIC VASCULAR SURGERY)

## 2020-01-15 PROCEDURE — 71250 CT THORAX DX C-: CPT | Mod: TC

## 2020-01-15 PROCEDURE — 99213 OFFICE O/P EST LOW 20 MIN: CPT | Mod: S$PBB,,, | Performed by: THORACIC SURGERY (CARDIOTHORACIC VASCULAR SURGERY)

## 2020-01-15 PROCEDURE — 99999 PR PBB SHADOW E&M-EST. PATIENT-LVL III: ICD-10-PCS | Mod: PBBFAC,,, | Performed by: THORACIC SURGERY (CARDIOTHORACIC VASCULAR SURGERY)

## 2020-01-15 PROCEDURE — 99213 PR OFFICE/OUTPT VISIT, EST, LEVL III, 20-29 MIN: ICD-10-PCS | Mod: S$PBB,,, | Performed by: THORACIC SURGERY (CARDIOTHORACIC VASCULAR SURGERY)

## 2020-01-15 PROCEDURE — 71250 CT CHEST WITHOUT CONTRAST: ICD-10-PCS | Mod: 26,,, | Performed by: RADIOLOGY

## 2020-01-15 PROCEDURE — 99213 OFFICE O/P EST LOW 20 MIN: CPT | Mod: PBBFAC,25 | Performed by: THORACIC SURGERY (CARDIOTHORACIC VASCULAR SURGERY)

## 2020-01-15 RX ORDER — CYCLOBENZAPRINE HCL 5 MG
5 TABLET ORAL 3 TIMES DAILY PRN
Qty: 91 TABLET | Refills: 3 | Status: SHIPPED | OUTPATIENT
Start: 2020-01-15 | End: 2020-06-01

## 2020-01-15 RX ORDER — CLONIDINE HYDROCHLORIDE 0.1 MG/1
0.1 TABLET ORAL EVERY 6 HOURS PRN
COMMUNITY
Start: 2020-01-07

## 2020-01-15 RX ORDER — IBUPROFEN 400 MG/1
400 TABLET ORAL 3 TIMES DAILY
Qty: 93 TABLET | Refills: 3 | Status: SHIPPED | OUTPATIENT
Start: 2020-01-15 | End: 2020-02-15

## 2020-01-24 ENCOUNTER — PATIENT MESSAGE (OUTPATIENT)
Dept: CARDIOTHORACIC SURGERY | Facility: CLINIC | Age: 50
End: 2020-01-24

## 2020-01-24 DIAGNOSIS — G89.12 POST-THORACOTOMY PAIN: Primary | ICD-10-CM

## 2020-06-01 RX ORDER — CYCLOBENZAPRINE HCL 5 MG
TABLET ORAL
Qty: 90 TABLET | Refills: 4 | Status: SHIPPED | OUTPATIENT
Start: 2020-06-01 | End: 2020-12-18

## 2020-06-14 ENCOUNTER — NURSE TRIAGE (OUTPATIENT)
Dept: ADMINISTRATIVE | Facility: CLINIC | Age: 50
End: 2020-06-14

## 2020-06-14 NOTE — TELEPHONE ENCOUNTER
"Speaking to Michael () on behalf of pt     reports PMH of tumor of right with partial lobe lung removal in 2019. It did not require radiation or chemo therapy.  states pt went to work today and then returned home c/o of sore throat, "swollen tonsil", SOB and dry cough with oral temp of  100.8. Unsure of exposure.  states he thinks SOB is related to anxiety, SOB has improved after pt calmed down. Per , pt speaking appropriately and in NAD. Advised  due to pt's history it is most appropriate to see a provider. Agreed to OAC.     Reason for Disposition   HIGH RISK patient (e.g., age > 64 years, diabetes, heart or lung disease, weak immune system)    Additional Information   Negative: SEVERE difficulty breathing (e.g., struggling for each breath, speaks in single words)   Negative: Difficult to awaken or acting confused (e.g., disoriented, slurred speech)   Negative: Bluish (or gray) lips or face now   Negative: Shock suspected (e.g., cold/pale/clammy skin, too weak to stand, low BP, rapid pulse)   Negative: Sounds like a life-threatening emergency to the triager   Negative: [1] COVID-19 exposure AND [2] NO symptoms   Negative: COVID-19 and Breastfeeding, questions about   Negative: [1] Adult with possible COVID-19 symptoms AND [2] triager concerned about severity of symptoms or other causes   Negative: SEVERE or constant chest pain or pressure (Exception: mild central chest pain, present only when coughing)   Negative: MODERATE difficulty breathing (e.g., speaks in phrases, SOB even at rest, pulse 100-120)   Negative: Patient sounds very sick or weak to the triager   Negative: MILD difficulty breathing (e.g., minimal/no SOB at rest, SOB with walking, pulse <100)   Negative: Chest pain or pressure   Negative: Fever > 103 F (39.4 C)   Negative: [1] Fever > 101 F (38.3 C) AND [2] age > 60   Negative: [1] Fever > 100.0 F (37.8 C) AND [2] bedridden (e.g., nursing home " patient, CVA, chronic illness, recovering from surgery)    Protocols used: CORONAVIRUS (COVID-19) DIAGNOSED OR [id number]

## 2020-06-15 ENCOUNTER — OFFICE VISIT (OUTPATIENT)
Dept: URGENT CARE | Facility: CLINIC | Age: 50
End: 2020-06-15
Payer: MEDICAID

## 2020-06-15 VITALS
OXYGEN SATURATION: 94 % | HEART RATE: 77 BPM | TEMPERATURE: 99 F | SYSTOLIC BLOOD PRESSURE: 135 MMHG | RESPIRATION RATE: 18 BRPM | DIASTOLIC BLOOD PRESSURE: 89 MMHG | WEIGHT: 190 LBS | BODY MASS INDEX: 30.67 KG/M2

## 2020-06-15 DIAGNOSIS — R05.9 COUGH: ICD-10-CM

## 2020-06-15 DIAGNOSIS — J18.9 PNEUMONIA OF LEFT LOWER LOBE DUE TO INFECTIOUS ORGANISM: Primary | ICD-10-CM

## 2020-06-15 DIAGNOSIS — R06.02 SHORTNESS OF BREATH: ICD-10-CM

## 2020-06-15 DIAGNOSIS — R06.2 WHEEZING: ICD-10-CM

## 2020-06-15 PROCEDURE — 99203 OFFICE O/P NEW LOW 30 MIN: CPT | Mod: S$GLB,,, | Performed by: PHYSICIAN ASSISTANT

## 2020-06-15 PROCEDURE — U0003 INFECTIOUS AGENT DETECTION BY NUCLEIC ACID (DNA OR RNA); SEVERE ACUTE RESPIRATORY SYNDROME CORONAVIRUS 2 (SARS-COV-2) (CORONAVIRUS DISEASE [COVID-19]), AMPLIFIED PROBE TECHNIQUE, MAKING USE OF HIGH THROUGHPUT TECHNOLOGIES AS DESCRIBED BY CMS-2020-01-R: HCPCS

## 2020-06-15 PROCEDURE — 71046 XR CHEST PA AND LATERAL: ICD-10-PCS | Mod: S$GLB,,, | Performed by: RADIOLOGY

## 2020-06-15 PROCEDURE — 71046 X-RAY EXAM CHEST 2 VIEWS: CPT | Mod: S$GLB,,, | Performed by: RADIOLOGY

## 2020-06-15 PROCEDURE — 99203 PR OFFICE/OUTPT VISIT, NEW, LEVL III, 30-44 MIN: ICD-10-PCS | Mod: S$GLB,,, | Performed by: PHYSICIAN ASSISTANT

## 2020-06-15 RX ORDER — PROMETHAZINE HYDROCHLORIDE AND DEXTROMETHORPHAN HYDROBROMIDE 6.25; 15 MG/5ML; MG/5ML
5 SYRUP ORAL EVERY 4 HOURS PRN
Qty: 180 ML | Refills: 0 | Status: SHIPPED | OUTPATIENT
Start: 2020-06-15 | End: 2020-06-25

## 2020-06-15 RX ORDER — AZITHROMYCIN 250 MG/1
250 TABLET, FILM COATED ORAL SEE ADMIN INSTRUCTIONS
Qty: 6 TABLET | Refills: 0 | Status: SHIPPED | OUTPATIENT
Start: 2020-06-15 | End: 2021-02-19 | Stop reason: ALTCHOICE

## 2020-06-15 RX ORDER — PREDNISONE 50 MG/1
50 TABLET ORAL DAILY
Qty: 5 TABLET | Refills: 0 | Status: SHIPPED | OUTPATIENT
Start: 2020-06-15 | End: 2021-06-06 | Stop reason: ALTCHOICE

## 2020-06-15 RX ORDER — CLONAZEPAM 0.5 MG/1
0.5 TABLET ORAL 2 TIMES DAILY PRN
COMMUNITY
End: 2021-08-11

## 2020-06-15 NOTE — PATIENT INSTRUCTIONS
Instructions for Patients with Confirmed or Suspected COVID-19    If you are awaiting your test result, you will either be called or it will be released to the patient portal.  If you have any questions about your test, please visit [email or web address] or call our COVID-19 information line at [phone].      Preventing the Spread of Coronavirus Disease 2019 (COVID-19) in Homes and Residential Communities -- Patients     Prevention steps for people with confirmed or suspected COVID-19 (including persons under investigation) who do not need to be hospitalized and people with confirmed COVID-19 who were hospitalized and determined to be medically stable to go home.    Stay home except to get medical care.    Separate yourself from other people and animals in your home.    Call ahead before visiting your doctor.    Wear a face mask.    Cover your coughs and sneezes.    Clean your hands often.    Avoid sharing personal household items.    Clean all high-touch surfaces every day.    Monitor your symptoms. Seek prompt medical attention if your illness is worsening (e.g., difficulty breathing). Before seeking care, call your healthcare provider.    If you have a medical emergency and must call 911, notify the dispatcher that you have or are being evaluated for COVID-19. If possible, put on a face mask before emergency medical services arrive.    Use the following symptom-based strategy to return to normal activity following a suspected or confirmed case of COVID-19. Continue isolation until:   o At least 3 days (72 hours) have passed since recovery defined as resolution of fever without the use of fever-reducing medications and improvement in respiratory symptoms (e.g. cough, shortness of breath), and   o At least 10 days have passed since symptoms first appeared.     Precautions for household members, intimate partners and caregivers in a non-healthcare setting of a patient with symptomatic  laboratory-confirmed COVID-19 or a patient under investigation.     Household members, intimate partners and caregivers in a non-healthcare setting may have close contact with a person with symptomatic, laboratory-confirmed COVID-19 or a person under investigation. Close contacts should monitor their health; they should call their healthcare provider right away if they develop symptoms suggestive of COVID-19 (e.g., fever, cough, shortness of breath). Close contacts should also follow these recommendations:      Make sure that you understand and can help the patient follow their healthcare providers instructions for medication(s) and care. You should help the patient with basic needs in the home and provide support for getting groceries, prescriptions, and other personal needs.    Monitor the patients symptoms. If the patient is getting sicker, call his or her healthcare provider and tell them that the patient has laboratory-confirmed COVID-19. This will help the healthcare providers office take steps to keep people in the office or waiting room from getting infected. Ask the healthcare provider to call the local or [facility] health department for additional guidance. If the patient has a medical emergency and you need to call 911, notify the dispatch personnel that the patient has or is being evaluated for COVID-19.    Household members should stay in another room or be  from the patient as much as possible. Household members should use a separate bedroom and bathroom, if available.    Prohibit visitors who do not have an essential need to be in the home.    Household members should care for any pets. Do not handle pets or other animals while sick.    Make sure that shared spaces in the home have good air flow, such as by an air conditioner.    Perform hand hygiene frequently. Wash your hands often with soap and water for at least 20 seconds or use an alcohol-based hand  that contains 60 to  95% alcohol, covering all surfaces of your hands and rubbing them together until they feel dry. Soap and water are preferred if hands are visibly dirty.    Avoid touching your eyes, nose and mouth with unwashed hands.    The patient should wear a face mask when you are around other people. If the patient is not able to wear a face mask (for example, because it causes trouble breathing), you, as the caregiver, should wear a mask when you are in the same room as the patient.    Wear a disposable face mask and gloves when you touch or have contact with the patients blood, stool or body fluids, such as saliva, sputum, nasal mucus, vomit and urine.   o Throw out disposable face masks and gloves after using them. Do not reuse.   o When removing personal protective equipment, first remove and dispose of gloves. Then, immediately clean your hands with soap and water or alcohol-based hand . Next, remove and dispose of face mask, and immediately clean your hands again with soap and water or alcohol-based hand .    Avoid sharing household items with the patient. You should not share dishes, drinking glasses, cups, eating utensils, towels, bedding or other items. After the patient uses these items, you should wash them thoroughly (see below Wash laundry thoroughly).    Clean all high-touch surfaces, such as counters, tabletops, doorknobs, bathroom fixtures, toilets, phones, keyboards, tablets and bedside tables, every day. Also, clean any surfaces that may have blood, stool or body fluids on them.   o Use a household cleaning spray or wipe, according to the label instructions. Labels contain instructions for safe and effective use of the cleaning product including precautions you should take when applying the product, such as wearing gloves and making sure you have good ventilation during use of the product.    Wash laundry thoroughly.   o Immediately remove and wash clothes or bedding that have  blood, stool or body fluids on them.  o Wear disposable gloves while handling soiled items and keep soiled items away from your body. Clean your hands (with soap and water or an alcohol-based hand ) immediately after removing your gloves.   o Read and follow directions on labels of laundry or clothing items and detergent. In general, using a normal laundry detergent according to washing machine instructions and dry thoroughly using the warmest temperatures recommended on the clothing label.    Place all used disposable gloves, face masks and other contaminated items in a lined container before disposing of them with other household waste. Clean your hands (with soap and water or an alcohol-based hand ) immediately after handling these items. Soap and water should be used preferentially if hands are visibly dirty.   Discuss any additional questions with your state or local health department      Pneumonia (Adult)  Pneumonia is an infection deep within the lungs. It is in the small air sacs (alveoli). Pneumonia may be caused by a virus or bacteria. Pneumonia caused by bacteria is usually treated with an antibiotic. Severe cases may need to be treated in the hospital. Milder cases can be treated at home. Symptoms usually start to get better during the first 2 days of treatment.    Home care  Follow these guidelines when caring for yourself at home:  · Rest at home for the first 2 to 3 days, or until you feel stronger. Dont let yourself get overly tired when you go back to your activities.  · Stay away from cigarette smoke - yours or other peoples.  · You may use acetaminophen or ibuprofen to control fever or pain, unless another medicine was prescribed. If you have chronic liver or kidney disease, talk with your healthcare provider before using these medicines. Also talk with your provider if youve had a stomach ulcer or gastrointestinal bleeding. Dont give aspirin to anyone younger than 18 years  of age who is ill with a fever. It may cause severe liver damage.  · Your appetite may be poor, so a light diet is fine.  · Drink 6 to 8 glasses of fluids every day to make sure you are getting enough fluids. Beverages can include water, sport drinks, sodas without caffeine, juices, tea, or soup. Fluids will help loosen secretions in the lung. This will make it easier for you to cough up the phlegm (sputum). If you also have heart or kidney disease, check with your healthcare provider before you drink extra fluids.  · Take antibiotic medicine prescribed until it is all gone, even if you are feeling better after a few days.  Follow-up care  Follow up with your healthcare provider in the next 2 to 3 days, or as advised. This is to be sure the medicine is helping you get better.  If you are 65 or older, you should get a pneumococcal vaccine and a yearly flu (influenza) shot. You should also get these vaccines if you have chronic lung disease like asthma, emphysema, or COPD. Recently, a second type of pneumonia vaccine has become available for everyone over 65 years old. This is in addition to the previous vaccine. Ask your provider about this.  When to seek medical advice  Call your healthcare provider right away if any of these occur:  · You dont get better within the first 48 hours of treatment  · Shortness of breath gets worse  · Rapid breathing (more than 25 breaths per minute)  · Coughing up blood  · Chest pain gets worse with breathing  · Fever of 100.4°F (38°C) or higher that doesnt get better with fever medicine  · Weakness, dizziness, or fainting that gets worse  · Thirst or dry mouth that gets worse  · Sinus pain, headache, or a stiff neck  · Chest pain not caused by coughing  Date Last Reviewed: 1/1/2017  © 4001-6747 [employer]. [address] [place] [address], [place] [address]. All rights reserved. This information is not intended as a substitute for professional medical care. Always follow your  healthcare professional's instructions.

## 2020-06-15 NOTE — PROGRESS NOTES
Subjective:       Patient ID: Gina Coto is a 50 y.o. female.    Vitals:  weight is 86.2 kg (190 lb). Her tympanic temperature is 98.7 °F (37.1 °C). Her blood pressure is 135/89 and her pulse is 77. Her respiration is 18 and oxygen saturation is 94% (abnormal).     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 States that she has been compliant with her albuterol inhaler as well as duonebs with no relief of her symptoms. She admits that her symptoms started yesterday. Unknown COVID exposure    Cough  This is a new problem. The current episode started yesterday. The problem has been gradually worsening. The problem occurs every few minutes. The cough is productive of sputum. Associated symptoms include chills, a fever (100.8), headaches, nasal congestion, postnasal drip, a sore throat (middle of throat), shortness of breath, sweats and wheezing. Pertinent negatives include no ear pain, eye redness, hemoptysis, myalgias or rash. The symptoms are aggravated by lying down. Treatments tried: zinc. The treatment provided no relief. Her past medical history is significant for bronchitis. There is no history of asthma or pneumonia.       Constitution: Positive for chills, sweating, fatigue and fever (100.8).   HENT: Positive for congestion, postnasal drip and sore throat (middle of throat). Negative for ear pain, sinus pain, sinus pressure and voice change.    Neck: Negative for painful lymph nodes.   Eyes: Negative for eye redness.        Burning eyes     Respiratory: Positive for cough, sputum production, shortness of breath and wheezing. Negative for chest tightness, bloody sputum, stridor and asthma.    Gastrointestinal: Positive for nausea. Negative for vomiting and diarrhea.   Musculoskeletal: Negative for muscle ache.   Skin: Negative for rash.   Allergic/Immunologic:  Negative for seasonal allergies and asthma.   Neurological: Positive for headaches.   Hematologic/Lymphatic: Negative for swollen lymph nodes.       Objective:      Physical Exam   Constitutional: She is oriented to person, place, and time. She appears well-developed. She is cooperative.  Non-toxic appearance. She does not appear ill. No distress.   HENT:   Head: Normocephalic and atraumatic.   Right Ear: Hearing, tympanic membrane, external ear and ear canal normal. No middle ear effusion.   Left Ear: Hearing, tympanic membrane, external ear and ear canal normal.  No middle ear effusion.   Nose: Nose normal. No mucosal edema, rhinorrhea or nasal deformity. No epistaxis. Right sinus exhibits no maxillary sinus tenderness and no frontal sinus tenderness. Left sinus exhibits no maxillary sinus tenderness and no frontal sinus tenderness.   Mouth/Throat: Uvula is midline, oropharynx is clear and moist and mucous membranes are normal. Mucous membranes are not dry. No trismus in the jaw. Normal dentition. No uvula swelling. No oropharyngeal exudate, posterior oropharyngeal edema, posterior oropharyngeal erythema, tonsillar abscesses or cobblestoning. No tonsillar exudate.   Eyes: Conjunctivae and lids are normal. No scleral icterus.   Neck: Trachea normal, full passive range of motion without pain and phonation normal. Neck supple. No neck rigidity. No edema and no erythema present.   Cardiovascular: Normal rate, regular rhythm, normal heart sounds and normal pulses.   Pulmonary/Chest: Effort normal. No accessory muscle usage or stridor. No tachypnea. No respiratory distress. She has no decreased breath sounds. She has wheezes (inspiratory and expiratory wheezing heard throughout lung fields). She has no rhonchi. She has no rales.   Abdominal: Normal appearance.   Musculoskeletal: Normal range of motion.         General: No deformity.   Neurological: She is alert and oriented to person, place, and time. She exhibits  normal muscle tone. Coordination normal.   Skin: Skin is warm, dry, intact, not diaphoretic and not pale.   Psychiatric: Her speech is normal and behavior is normal. Judgment and thought content normal.   Nursing note and vitals reviewed.        Assessment:       1. Pneumonia of left lower lobe due to infectious organism    2. Cough    3. Shortness of breath    4. Wheezing        Plan:         Pneumonia of left lower lobe due to infectious organism  -     XR CHEST PA AND LATERAL; Future; Expected date: 06/15/2020  -     azithromycin (Z-CHLOE) 250 MG tablet; Take 1 tablet (250 mg total) by mouth As instructed. Take first 2 tablets together, then 1 every day until finished.  Dispense: 6 tablet; Refill: 0  -     predniSONE (DELTASONE) 50 MG Tab; Take 1 tablet (50 mg total) by mouth once daily.  Dispense: 5 tablet; Refill: 0    Cough  -     XR CHEST PA AND LATERAL; Future; Expected date: 06/15/2020  -     COVID-19 Routine Screening  -     promethazine-dextromethorphan (PROMETHAZINE-DM) 6.25-15 mg/5 mL Syrp; Take 5 mLs by mouth every 4 (four) hours as needed (cough).  Dispense: 180 mL; Refill: 0    Shortness of breath  -     XR CHEST PA AND LATERAL; Future; Expected date: 06/15/2020  -     COVID-19 Routine Screening  -     predniSONE (DELTASONE) 50 MG Tab; Take 1 tablet (50 mg total) by mouth once daily.  Dispense: 5 tablet; Refill: 0    Wheezing  -     XR CHEST PA AND LATERAL; Future; Expected date: 06/15/2020  -     COVID-19 Routine Screening  -     predniSONE (DELTASONE) 50 MG Tab; Take 1 tablet (50 mg total) by mouth once daily.  Dispense: 5 tablet; Refill: 0    Xr Chest Pa And Lateral    Result Date: 6/15/2020  EXAMINATION: XR CHEST PA AND LATERAL CLINICAL HISTORY: Cough TECHNIQUE: PA and lateral views of the chest were performed. COMPARISON: No 09/27/2019 ne FINDINGS: Postoperative changes in the right hemithorax as before.  Heart size normal.  Pleural thickening adjacent to the right chest wall and elevated right  hemidiaphragm as before. There are small ill-defined opacities probably airspace disease identified at the left lung base and a pneumonic process has to be considered.  No significant pleural effusion     See above Electronically signed by: Alejandro Madrid MD Date:    06/15/2020 Time:    09:44    Imaging reviewed by me in PACS    Patient states that her baseline O2 sats are 99%. Discussed ER transfer vs home care. Patient is not in respiratory distress. She states that she would prefer home treatment and to go to the ER if symptoms do not improve/worsen. Will discharge home with Rx prednisone and azithromycin. Urged to continue duonebs and rescue inhaler as prescribed. Will call for COVID results. Given care instructions and strict f/u instructions/when to go to ER.    Patient Instructions   Instructions for Patients with Confirmed or Suspected COVID-19    If you are awaiting your test result, you will either be called or it will be released to the patient portal.  If you have any questions about your test, please visit [email or web address] or call our COVID-19 information line at [phone].      Preventing the Spread of Coronavirus Disease 2019 (COVID-19) in Homes and Residential Communities -- Patients     Prevention steps for people with confirmed or suspected COVID-19 (including persons under investigation) who do not need to be hospitalized and people with confirmed COVID-19 who were hospitalized and determined to be medically stable to go home.    Stay home except to get medical care.    Separate yourself from other people and animals in your home.    Call ahead before visiting your doctor.    Wear a face mask.    Cover your coughs and sneezes.    Clean your hands often.    Avoid sharing personal household items.    Clean all high-touch surfaces every day.    Monitor your symptoms. Seek prompt medical attention if your illness is worsening (e.g., difficulty breathing). Before seeking care,  call your healthcare provider.    If you have a medical emergency and must call 911, notify the dispatcher that you have or are being evaluated for COVID-19. If possible, put on a face mask before emergency medical services arrive.    Use the following symptom-based strategy to return to normal activity following a suspected or confirmed case of COVID-19. Continue isolation until:   o At least 3 days (72 hours) have passed since recovery defined as resolution of fever without the use of fever-reducing medications and improvement in respiratory symptoms (e.g. cough, shortness of breath), and   o At least 10 days have passed since symptoms first appeared.     Precautions for household members, intimate partners and caregivers in a non-healthcare setting of a patient with symptomatic laboratory-confirmed COVID-19 or a patient under investigation.     Household members, intimate partners and caregivers in a non-healthcare setting may have close contact with a person with symptomatic, laboratory-confirmed COVID-19 or a person under investigation. Close contacts should monitor their health; they should call their healthcare provider right away if they develop symptoms suggestive of COVID-19 (e.g., fever, cough, shortness of breath). Close contacts should also follow these recommendations:      Make sure that you understand and can help the patient follow their healthcare providers instructions for medication(s) and care. You should help the patient with basic needs in the home and provide support for getting groceries, prescriptions, and other personal needs.    Monitor the patients symptoms. If the patient is getting sicker, call his or her healthcare provider and tell them that the patient has laboratory-confirmed COVID-19. This will help the healthcare providers office take steps to keep people in the office or waiting room from getting infected. Ask the healthcare provider to call the [facility] or [facility]  department for additional guidance. If the patient has a medical emergency and you need to call 911, notify the dispatch personnel that the patient has or is being evaluated for COVID-19.    Household members should stay in another room or be  from the patient as much as possible. Household members should use a separate bedroom and bathroom, if available.    Prohibit visitors who do not have an essential need to be in the home.    Household members should care for any pets. Do not handle pets or other animals while sick.    Make sure that shared spaces in the home have good air flow, such as by an air conditioner.    Perform hand hygiene frequently. Wash your hands often with soap and water for at least 20 seconds or use an alcohol-based hand  that contains 60 to 95% alcohol, covering all surfaces of your hands and rubbing them together until they feel dry. Soap and water are preferred if hands are visibly dirty.    Avoid touching your eyes, nose and mouth with unwashed hands.    The patient should wear a face mask when you are around other people. If the patient is not able to wear a face mask (for example, because it causes trouble breathing), you, as the caregiver, should wear a mask when you are in the same room as the patient.    Wear a disposable face mask and gloves when you touch or have contact with the patients blood, stool or body fluids, such as saliva, sputum, nasal mucus, vomit and urine.   o Throw out disposable face masks and gloves after using them. Do not reuse.   o When removing personal protective equipment, first remove and dispose of gloves. Then, immediately clean your hands with soap and water or alcohol-based hand . Next, remove and dispose of face mask, and immediately clean your hands again with soap and water or alcohol-based hand .    Avoid sharing household items with the patient. You should not share dishes, drinking glasses, cups, eating  utensils, towels, bedding or other items. After the patient uses these items, you should wash them thoroughly (see below Wash laundry thoroughly).    Clean all high-touch surfaces, such as counters, tabletops, doorknobs, bathroom fixtures, toilets, phones, keyboards, tablets and bedside tables, every day. Also, clean any surfaces that may have blood, stool or body fluids on them.   o Use a household cleaning spray or wipe, according to the label instructions. Labels contain instructions for safe and effective use of the cleaning product including precautions you should take when applying the product, such as wearing gloves and making sure you have good ventilation during use of the product.    Wash laundry thoroughly.   o Immediately remove and wash clothes or bedding that have blood, stool or body fluids on them.  o Wear disposable gloves while handling soiled items and keep soiled items away from your body. Clean your hands (with soap and water or an alcohol-based hand ) immediately after removing your gloves.   o Read and follow directions on labels of laundry or clothing items and detergent. In general, using a normal laundry detergent according to washing machine instructions and dry thoroughly using the warmest temperatures recommended on the clothing label.    Place all used disposable gloves, face masks and other contaminated items in a lined container before disposing of them with other household waste. Clean your hands (with soap and water or an alcohol-based hand ) immediately after handling these items. Soap and water should be used preferentially if hands are visibly dirty.   Discuss any additional questions with your state or local health department      Pneumonia (Adult)  Pneumonia is an infection deep within the lungs. It is in the small air sacs (alveoli). Pneumonia may be caused by a virus or bacteria. Pneumonia caused by bacteria is usually treated with an antibiotic.  Severe cases may need to be treated in the hospital. Milder cases can be treated at home. Symptoms usually start to get better during the first 2 days of treatment.    Home care  Follow these guidelines when caring for yourself at home:  · Rest at home for the first 2 to 3 days, or until you feel stronger. Dont let yourself get overly tired when you go back to your activities.  · Stay away from cigarette smoke - yours or other peoples.  · You may use acetaminophen or ibuprofen to control fever or pain, unless another medicine was prescribed. If you have chronic liver or kidney disease, talk with your healthcare provider before using these medicines. Also talk with your provider if youve had a stomach ulcer or gastrointestinal bleeding. Dont give aspirin to anyone younger than 18 years of age who is ill with a fever. It may cause severe liver damage.  · Your appetite may be poor, so a light diet is fine.  · Drink 6 to 8 glasses of fluids every day to make sure you are getting enough fluids. Beverages can include water, sport drinks, sodas without caffeine, juices, tea, or soup. Fluids will help loosen secretions in the lung. This will make it easier for you to cough up the phlegm (sputum). If you also have heart or kidney disease, check with your healthcare provider before you drink extra fluids.  · Take antibiotic medicine prescribed until it is all gone, even if you are feeling better after a few days.  Follow-up care  Follow up with your healthcare provider in the next 2 to 3 days, or as advised. This is to be sure the medicine is helping you get better.  If you are 65 or older, you should get a pneumococcal vaccine and a yearly flu (influenza) shot. You should also get these vaccines if you have chronic lung disease like asthma, emphysema, or COPD. Recently, a second type of pneumonia vaccine has become available for everyone over 65 years old. This is in addition to the previous vaccine. Ask your provider  about this.  When to seek medical advice  Call your healthcare provider right away if any of these occur:  · You dont get better within the first 48 hours of treatment  · Shortness of breath gets worse  · Rapid breathing (more than 25 breaths per minute)  · Coughing up blood  · Chest pain gets worse with breathing  · Fever of 100.4°F (38°C) or higher that doesnt get better with fever medicine  · Weakness, dizziness, or fainting that gets worse  · Thirst or dry mouth that gets worse  · Sinus pain, headache, or a stiff neck  · Chest pain not caused by coughing  Date Last Reviewed: 1/1/2017  © 4955-3767 [employer]. [address] [place] [address], [place] [address]. All rights reserved. This information is not intended as a substitute for professional medical care. Always follow your healthcare professional's instructions.

## 2020-06-16 LAB — SARS-COV-2 RNA RESP QL NAA+PROBE: NOT DETECTED

## 2020-06-18 ENCOUNTER — TELEPHONE (OUTPATIENT)
Dept: URGENT CARE | Facility: CLINIC | Age: 50
End: 2020-06-18

## 2020-06-18 NOTE — TELEPHONE ENCOUNTER
----- Message from Shanna Christianson NP sent at 6/18/2020 10:36 AM CDT -----  Notify pt of negative result please      Called patient about negative results: Left message for patient to call back

## 2020-06-19 ENCOUNTER — TELEPHONE (OUTPATIENT)
Dept: URGENT CARE | Facility: CLINIC | Age: 50
End: 2020-06-19

## 2020-06-19 NOTE — TELEPHONE ENCOUNTER
Pt called to return a phone call. I informed her of her covid results. She states she also seen them on [facility].

## 2020-07-14 NOTE — PROGRESS NOTES
Subjective:       Patient ID: Gina Ctoo is a 50 y.o. female.    Chief Complaint: No chief complaint on file.    Diagnosis:  Carcinoid Tumor    Pre-operative therapy: NA    Procedure(s) and date(s):   4/22/19- Bronchoscopy, right thoracotomy for right lower bilobectomy for carcinoid tumor in bronchus intermedius and MLND.  Bronchial stump closed with 4-0 PDS under direct bronchoscopic vision.  Intercostal muscle flap between bronchus and PA.  5/6/19- Surveillance bronchoscopy  9/9/19 - re do thoracotomy for rib plating     Pathology:  2.8cm well differentiated neuroendocrine tumor. Grade 1 typical carcinoid tumor. Ki-67 stains less than 2% of tumor cell nuclei. Negative margins, 0.5cm from closest margin. 3 benign LNs within the specimen. Levels 7 and 11 LN negative.   pT2 N0    Post-operative therapy: NA    HPI   50 y.o. female returns for follow up s/p right thoracotomy for right lower bilobectomy for resection of carcinoid tumor and is now s/p rib plating. Did well post-op. Still taking Gabapentin, Flexeril and Ibuprofen for post-thoracotomy pain. At last visit, breast abnormality was noted on chest CT. This was worked up and she is being followed by General Surgery in [place].  She was recently diagnosed with a CAP of the left lung and managed as an outpatient with a Z pack.        Review of Systems   Constitutional: Negative for activity change and fatigue.   HENT: Negative for congestion.    Respiratory: Negative for cough, choking and shortness of breath.    Cardiovascular: Negative for chest pain and palpitations.   Gastrointestinal: Negative for abdominal distention and abdominal pain.   Genitourinary: Negative for difficulty urinating.   Musculoskeletal: Negative for arthralgias.        Point tenderness left lateral chest near thoracotomy site   Skin: Negative for color change and rash.   Neurological: Negative for dizziness and syncope.   Psychiatric/Behavioral: Negative for agitation. The patient  is not nervous/anxious.          Objective:       There were no vitals filed for this visit.     Physical Exam  Vitals signs reviewed.   Constitutional:       Appearance: She is well-developed.   HENT:      Head: Normocephalic and atraumatic.   Neck:      Musculoskeletal: Normal range of motion and neck supple.      Trachea: No tracheal deviation.   Cardiovascular:      Rate and Rhythm: Normal rate and regular rhythm.      Heart sounds: Normal heart sounds.   Pulmonary:      Effort: Pulmonary effort is normal.   Abdominal:      Palpations: Abdomen is soft.   Musculoskeletal: Normal range of motion.      Comments: Decreased ROM in right shoulder. Unable to illicit scapular clicking. No chest wall instability   Skin:     General: Skin is warm and dry.   Neurological:      Mental Status: She is alert and oriented to person, place, and time.   Psychiatric:         Thought Content: Thought content normal.           Chest CT 1/15/20:   1.  Postoperative changes of right middle and lower lobectomies.  Status post osteotomy of the posterior aspect of the right 6th rib with intact plate and screw construct bridging the osteotomy gap.  No unusual postoperative finding identified; right upper lobe appears well aerated and clear.   2.  Hepatic parenchyma attenuation measures 69 HU on noncontrast chest CT today; it measured 52 HU on noncontrast chest CT dated 11/26/2018.  Cause for mildly elevated hepatic parenchymal attenuation is unclear.  3.  There is asymmetric distribution of soft tissue in the breasts, greater on the left where there is a collection of soft tissue that measures at least 4.2 by 3.4 cm on axial image (axial series 2, image 47) and with greater cranial-caudad extent as seen on sagittal series 604, image 189.  Breasts are incompletely included on earlier studies.  Please confirm that the patient is up-to-date on mammographic surveillance.  I reported this by telephone to APARNA Eaton in thoracic surgery,  prior to this dictation.    Chest CT 7/15/20:  Stable postoperative changes of right middle and lower lobectomies and osteotomy of the posterolateral right 6th rib.  Remaining right upper lobe is well aerated without evidence of acute pathology or recurrent disease in this patient with history of carcinoid.     Liver demonstrates diffuse parenchymal hyperattenuation, of uncertain etiology but can be seen with long-term amiodarone use, iron overload, or copper overload.     Additional stable and incidental findings, as above  Assessment:       50 y.o. female returns for follow up s/p right thoracotomy for right lower bilobectomy for resection of carcinoid tumor with MLND.   Recent hospitalization for left lung CAP  [facility] for recurrent carcinoid tumor  Plan:   RTC in 6 months with chest CT  Patient advised to wear mask and practice social distancing

## 2020-07-15 ENCOUNTER — HOSPITAL ENCOUNTER (OUTPATIENT)
Dept: RADIOLOGY | Facility: HOSPITAL | Age: 50
Discharge: HOME | End: 2020-07-15
Attending: THORACIC SURGERY (CARDIOTHORACIC VASCULAR SURGERY)
Payer: MEDICAID

## 2020-07-15 ENCOUNTER — OFFICE VISIT (OUTPATIENT)
Dept: CARDIOTHORACIC SURGERY | Facility: CLINIC | Age: 50
End: 2020-07-15
Payer: MEDICAID

## 2020-07-15 VITALS
HEART RATE: 67 BPM | SYSTOLIC BLOOD PRESSURE: 116 MMHG | BODY MASS INDEX: 32.06 KG/M2 | WEIGHT: 199.5 LBS | OXYGEN SATURATION: 96 % | DIASTOLIC BLOOD PRESSURE: 74 MMHG | HEIGHT: 66 IN

## 2020-07-15 DIAGNOSIS — Z85.110: Primary | ICD-10-CM

## 2020-07-15 DIAGNOSIS — Z98.890 S/P THORACOTOMY: ICD-10-CM

## 2020-07-15 PROCEDURE — 99999 PR PBB SHADOW E&M-EST. PATIENT-LVL V: CPT | Mod: PBBFAC,,, | Performed by: THORACIC SURGERY (CARDIOTHORACIC VASCULAR SURGERY)

## 2020-07-15 PROCEDURE — 99999 PR PBB SHADOW E&M-EST. PATIENT-LVL V: ICD-10-PCS | Mod: PBBFAC,,, | Performed by: THORACIC SURGERY (CARDIOTHORACIC VASCULAR SURGERY)

## 2020-07-15 PROCEDURE — 71250 CT THORAX DX C-: CPT | Mod: TC

## 2020-07-15 PROCEDURE — 71250 CT THORAX DX C-: CPT | Mod: 26,,, | Performed by: RADIOLOGY

## 2020-07-15 PROCEDURE — 99213 OFFICE O/P EST LOW 20 MIN: CPT | Mod: S$PBB,,, | Performed by: THORACIC SURGERY (CARDIOTHORACIC VASCULAR SURGERY)

## 2020-07-15 PROCEDURE — 71250 CT CHEST WITHOUT CONTRAST: ICD-10-PCS | Mod: 26,,, | Performed by: RADIOLOGY

## 2020-07-15 PROCEDURE — 99213 PR OFFICE/OUTPT VISIT, EST, LEVL III, 20-29 MIN: ICD-10-PCS | Mod: S$PBB,,, | Performed by: THORACIC SURGERY (CARDIOTHORACIC VASCULAR SURGERY)

## 2020-07-15 PROCEDURE — 99215 OFFICE O/P EST HI 40 MIN: CPT | Mod: PBBFAC,25 | Performed by: THORACIC SURGERY (CARDIOTHORACIC VASCULAR SURGERY)

## 2020-07-15 RX ORDER — FLUOXETINE HYDROCHLORIDE 40 MG/1
40 CAPSULE ORAL DAILY
COMMUNITY
End: 2022-08-11 | Stop reason: DRUGHIGH

## 2020-07-22 ENCOUNTER — HOSPITAL ENCOUNTER (OUTPATIENT)
Dept: RADIOLOGY | Facility: HOSPITAL | Age: 50
Discharge: HOME | End: 2020-07-22
Attending: STUDENT IN AN ORGANIZED HEALTH CARE EDUCATION/TRAINING PROGRAM
Payer: MEDICAID

## 2020-07-22 DIAGNOSIS — Z91.89 AT HIGH RISK FOR BREAST CANCER: ICD-10-CM

## 2020-07-22 PROCEDURE — 25500020 PHARM REV CODE 255: Performed by: STUDENT IN AN ORGANIZED HEALTH CARE EDUCATION/TRAINING PROGRAM

## 2020-07-22 PROCEDURE — 77049 MRI BREAST C-+ W/CAD BI: CPT | Mod: 26,,, | Performed by: RADIOLOGY

## 2020-07-22 PROCEDURE — 77049 MRI BREAST C-+ W/CAD BI: CPT | Mod: TC

## 2020-07-22 PROCEDURE — 77049 MRI BREAST W/WO CONTRAST, W/CAD, BILATERAL: ICD-10-PCS | Mod: 26,,, | Performed by: RADIOLOGY

## 2020-07-22 PROCEDURE — A9577 INJ MULTIHANCE: HCPCS | Performed by: STUDENT IN AN ORGANIZED HEALTH CARE EDUCATION/TRAINING PROGRAM

## 2020-07-22 RX ADMIN — GADOBENATE DIMEGLUMINE 20 ML: 529 INJECTION, SOLUTION INTRAVENOUS at 11:07

## 2020-08-06 ENCOUNTER — HOSPITAL ENCOUNTER (OUTPATIENT)
Dept: RADIOLOGY | Facility: HOSPITAL | Age: 50
Discharge: HOME | End: 2020-08-06
Attending: STUDENT IN AN ORGANIZED HEALTH CARE EDUCATION/TRAINING PROGRAM
Payer: MEDICAID

## 2020-08-06 DIAGNOSIS — Z91.89 AT HIGH RISK FOR BREAST CANCER: ICD-10-CM

## 2020-08-06 PROCEDURE — 77049 MRI BREAST C-+ W/CAD BI: CPT | Mod: 26,,, | Performed by: RADIOLOGY

## 2020-08-06 PROCEDURE — 77049 MRI BREAST C-+ W/CAD BI: CPT | Mod: TC

## 2020-08-06 PROCEDURE — A9577 INJ MULTIHANCE: HCPCS | Performed by: STUDENT IN AN ORGANIZED HEALTH CARE EDUCATION/TRAINING PROGRAM

## 2020-08-06 PROCEDURE — 25500020 PHARM REV CODE 255: Performed by: STUDENT IN AN ORGANIZED HEALTH CARE EDUCATION/TRAINING PROGRAM

## 2020-08-06 PROCEDURE — 77049 MRI BREAST W/WO CONTRAST, W/CAD, BILATERAL: ICD-10-PCS | Mod: 26,,, | Performed by: RADIOLOGY

## 2020-08-06 RX ADMIN — GADOBENATE DIMEGLUMINE 20 ML: 529 INJECTION, SOLUTION INTRAVENOUS at 10:08

## 2020-11-29 DIAGNOSIS — D3A.090 CARCINOID TUMOR OF RIGHT LUNG: Primary | ICD-10-CM

## 2021-01-15 ENCOUNTER — PATIENT MESSAGE (OUTPATIENT)
Dept: CARDIOTHORACIC SURGERY | Facility: HOSPITAL | Age: 51
End: 2021-01-15

## 2021-01-30 ENCOUNTER — PATIENT MESSAGE (OUTPATIENT)
Dept: CARDIOTHORACIC SURGERY | Facility: HOSPITAL | Age: 51
End: 2021-01-30

## 2021-02-19 ENCOUNTER — OFFICE VISIT (OUTPATIENT)
Dept: URGENT CARE | Facility: CLINIC | Age: 51
End: 2021-02-19
Payer: MEDICAID

## 2021-02-19 ENCOUNTER — PATIENT MESSAGE (OUTPATIENT)
Dept: CARDIOTHORACIC SURGERY | Facility: CLINIC | Age: 51
End: 2021-02-19

## 2021-02-19 VITALS
WEIGHT: 199 LBS | DIASTOLIC BLOOD PRESSURE: 81 MMHG | RESPIRATION RATE: 18 BRPM | HEIGHT: 66 IN | OXYGEN SATURATION: 97 % | SYSTOLIC BLOOD PRESSURE: 146 MMHG | HEART RATE: 86 BPM | BODY MASS INDEX: 31.98 KG/M2 | TEMPERATURE: 99 F

## 2021-02-19 DIAGNOSIS — R91.8 ABNORMAL CT LUNG SCREENING: ICD-10-CM

## 2021-02-19 DIAGNOSIS — J06.9 ACUTE URI: Primary | ICD-10-CM

## 2021-02-19 DIAGNOSIS — Z11.59 SCREENING FOR VIRAL DISEASE: ICD-10-CM

## 2021-02-19 LAB
CTP QC/QA: YES
SARS-COV-2 RDRP RESP QL NAA+PROBE: NEGATIVE

## 2021-02-19 PROCEDURE — 99213 OFFICE O/P EST LOW 20 MIN: CPT | Mod: S$GLB,CS,, | Performed by: NURSE PRACTITIONER

## 2021-02-19 PROCEDURE — U0002: ICD-10-PCS | Mod: QW,S$GLB,, | Performed by: NURSE PRACTITIONER

## 2021-02-19 PROCEDURE — 99213 PR OFFICE/OUTPT VISIT, EST, LEVL III, 20-29 MIN: ICD-10-PCS | Mod: S$GLB,CS,, | Performed by: NURSE PRACTITIONER

## 2021-02-19 PROCEDURE — U0002 COVID-19 LAB TEST NON-CDC: HCPCS | Mod: QW,S$GLB,, | Performed by: NURSE PRACTITIONER

## 2021-02-19 RX ORDER — DEXAMETHASONE 4 MG/1
4 TABLET ORAL DAILY
Qty: 5 TABLET | Refills: 0 | Status: SHIPPED | OUTPATIENT
Start: 2021-02-19 | End: 2021-02-24

## 2021-02-19 RX ORDER — AZITHROMYCIN 250 MG/1
TABLET, FILM COATED ORAL
Qty: 6 TABLET | Refills: 0 | Status: SHIPPED | OUTPATIENT
Start: 2021-02-19 | End: 2021-08-11

## 2021-02-23 ENCOUNTER — OFFICE VISIT (OUTPATIENT)
Dept: URGENT CARE | Facility: CLINIC | Age: 51
End: 2021-02-23
Payer: MEDICAID

## 2021-02-23 VITALS
HEART RATE: 84 BPM | HEIGHT: 66 IN | WEIGHT: 199 LBS | OXYGEN SATURATION: 95 % | TEMPERATURE: 98 F | RESPIRATION RATE: 16 BRPM | DIASTOLIC BLOOD PRESSURE: 95 MMHG | BODY MASS INDEX: 31.98 KG/M2 | SYSTOLIC BLOOD PRESSURE: 178 MMHG

## 2021-02-23 DIAGNOSIS — R05.9 COUGH: Primary | ICD-10-CM

## 2021-02-23 DIAGNOSIS — R06.2 WHEEZES: ICD-10-CM

## 2021-02-23 PROCEDURE — U0003 INFECTIOUS AGENT DETECTION BY NUCLEIC ACID (DNA OR RNA); SEVERE ACUTE RESPIRATORY SYNDROME CORONAVIRUS 2 (SARS-COV-2) (CORONAVIRUS DISEASE [COVID-19]), AMPLIFIED PROBE TECHNIQUE, MAKING USE OF HIGH THROUGHPUT TECHNOLOGIES AS DESCRIBED BY CMS-2020-01-R: HCPCS

## 2021-02-23 PROCEDURE — U0005 INFEC AGEN DETEC AMPLI PROBE: HCPCS

## 2021-02-23 PROCEDURE — 99213 PR OFFICE/OUTPT VISIT, EST, LEVL III, 20-29 MIN: ICD-10-PCS | Mod: S$GLB,,, | Performed by: PHYSICIAN ASSISTANT

## 2021-02-23 PROCEDURE — 99213 OFFICE O/P EST LOW 20 MIN: CPT | Mod: S$GLB,,, | Performed by: PHYSICIAN ASSISTANT

## 2021-02-23 RX ORDER — BREXPIPRAZOLE 1 MG/1
1 TABLET ORAL DAILY
COMMUNITY
Start: 2020-12-03 | End: 2022-08-11 | Stop reason: DRUGHIGH

## 2021-02-23 RX ORDER — TRAZODONE HYDROCHLORIDE 100 MG/1
200 TABLET ORAL NIGHTLY
COMMUNITY
Start: 2021-01-20

## 2021-02-23 RX ORDER — ERYTHROMYCIN 5 MG/G
OINTMENT OPHTHALMIC
COMMUNITY
Start: 2021-01-18 | End: 2021-08-11

## 2021-02-25 ENCOUNTER — TELEPHONE (OUTPATIENT)
Dept: URGENT CARE | Facility: CLINIC | Age: 51
End: 2021-02-25

## 2021-02-25 ENCOUNTER — PATIENT MESSAGE (OUTPATIENT)
Dept: CARDIOTHORACIC SURGERY | Facility: CLINIC | Age: 51
End: 2021-02-25

## 2021-02-25 LAB — SARS-COV-2 RNA RESP QL NAA+PROBE: NOT DETECTED

## 2021-06-06 ENCOUNTER — HOSPITAL ENCOUNTER (EMERGENCY)
Facility: HOSPITAL | Age: 51
Discharge: HOME | End: 2021-06-07
Attending: FAMILY MEDICINE
Payer: MEDICAID

## 2021-06-06 DIAGNOSIS — J18.9 PNEUMONIA OF LEFT LOWER LOBE DUE TO INFECTIOUS ORGANISM: Primary | ICD-10-CM

## 2021-06-06 DIAGNOSIS — R06.02 SOB (SHORTNESS OF BREATH): ICD-10-CM

## 2021-06-06 DIAGNOSIS — R05.9 COUGH: ICD-10-CM

## 2021-06-06 DIAGNOSIS — Z20.822 COVID-19 VIRUS NOT DETECTED: ICD-10-CM

## 2021-06-06 LAB
ALBUMIN SERPL BCP-MCNC: 3.9 G/DL (ref 3.5–5.2)
ALP SERPL-CCNC: 51 U/L (ref 55–135)
ALT SERPL W/O P-5'-P-CCNC: 15 U/L (ref 10–44)
ANION GAP SERPL CALC-SCNC: 14 MMOL/L (ref 8–16)
AST SERPL-CCNC: 12 U/L (ref 10–40)
BASOPHILS # BLD AUTO: 0.05 K/UL (ref 0–0.2)
BASOPHILS NFR BLD: 0.3 % (ref 0–1.9)
BILIRUB SERPL-MCNC: 0.4 MG/DL (ref 0.1–1)
BNP SERPL-MCNC: 10 PG/ML (ref 0–99)
BUN SERPL-MCNC: 10 MG/DL (ref 6–20)
CALCIUM SERPL-MCNC: 9.1 MG/DL (ref 8.7–10.5)
CHLORIDE SERPL-SCNC: 103 MMOL/L (ref 95–110)
CO2 SERPL-SCNC: 21 MMOL/L (ref 23–29)
CREAT SERPL-MCNC: 0.8 MG/DL (ref 0.5–1.4)
DIFFERENTIAL METHOD: ABNORMAL
EOSINOPHIL # BLD AUTO: 0.1 K/UL (ref 0–0.5)
EOSINOPHIL NFR BLD: 0.9 % (ref 0–8)
ERYTHROCYTE [DISTWIDTH] IN BLOOD BY AUTOMATED COUNT: 13.1 % (ref 11.5–14.5)
EST. GFR  (AFRICAN AMERICAN): >60 ML/MIN/1.73 M^2
EST. GFR  (NON AFRICAN AMERICAN): >60 ML/MIN/1.73 M^2
GLUCOSE SERPL-MCNC: 135 MG/DL (ref 70–110)
HCT VFR BLD AUTO: 42.3 % (ref 37–48.5)
HGB BLD-MCNC: 14 G/DL (ref 12–16)
IMM GRANULOCYTES # BLD AUTO: 0.04 K/UL (ref 0–0.04)
IMM GRANULOCYTES NFR BLD AUTO: 0.3 % (ref 0–0.5)
LACTATE SERPL-SCNC: 1.2 MMOL/L (ref 0.5–2.2)
LYMPHOCYTES # BLD AUTO: 0.8 K/UL (ref 1–4.8)
LYMPHOCYTES NFR BLD: 5.5 % (ref 18–48)
MCH RBC QN AUTO: 28.5 PG (ref 27–31)
MCHC RBC AUTO-ENTMCNC: 33.1 G/DL (ref 32–36)
MCV RBC AUTO: 86 FL (ref 82–98)
MONOCYTES # BLD AUTO: 0.7 K/UL (ref 0.3–1)
MONOCYTES NFR BLD: 4.7 % (ref 4–15)
NEUTROPHILS # BLD AUTO: 13.1 K/UL (ref 1.8–7.7)
NEUTROPHILS NFR BLD: 88.3 % (ref 38–73)
NRBC BLD-RTO: 0 /100 WBC
PLATELET # BLD AUTO: 298 K/UL (ref 150–450)
PMV BLD AUTO: 9.5 FL (ref 9.2–12.9)
POTASSIUM SERPL-SCNC: 4.2 MMOL/L (ref 3.5–5.1)
PROT SERPL-MCNC: 7.4 G/DL (ref 6–8.4)
RBC # BLD AUTO: 4.92 M/UL (ref 4–5.4)
SARS-COV-2 RDRP RESP QL NAA+PROBE: NEGATIVE
SODIUM SERPL-SCNC: 138 MMOL/L (ref 136–145)
TROPONIN I SERPL DL<=0.01 NG/ML-MCNC: <0.006 NG/ML (ref 0–0.03)
WBC # BLD AUTO: 14.81 K/UL (ref 3.9–12.7)

## 2021-06-06 PROCEDURE — 84484 ASSAY OF TROPONIN QUANT: CPT | Performed by: FAMILY MEDICINE

## 2021-06-06 PROCEDURE — 93010 ELECTROCARDIOGRAM REPORT: CPT | Mod: ,,, | Performed by: INTERNAL MEDICINE

## 2021-06-06 PROCEDURE — 83880 ASSAY OF NATRIURETIC PEPTIDE: CPT | Performed by: FAMILY MEDICINE

## 2021-06-06 PROCEDURE — 25000242 PHARM REV CODE 250 ALT 637 W/ HCPCS: Performed by: FAMILY MEDICINE

## 2021-06-06 PROCEDURE — 99900031 HC PATIENT EDUCATION (STAT)

## 2021-06-06 PROCEDURE — 93005 ELECTROCARDIOGRAM TRACING: CPT

## 2021-06-06 PROCEDURE — 25000003 PHARM REV CODE 250: Performed by: FAMILY MEDICINE

## 2021-06-06 PROCEDURE — 85025 COMPLETE CBC W/AUTO DIFF WBC: CPT | Performed by: FAMILY MEDICINE

## 2021-06-06 PROCEDURE — 63600175 PHARM REV CODE 636 W HCPCS: Performed by: FAMILY MEDICINE

## 2021-06-06 PROCEDURE — U0002 COVID-19 LAB TEST NON-CDC: HCPCS | Performed by: FAMILY MEDICINE

## 2021-06-06 PROCEDURE — 83605 ASSAY OF LACTIC ACID: CPT | Performed by: FAMILY MEDICINE

## 2021-06-06 PROCEDURE — 96365 THER/PROPH/DIAG IV INF INIT: CPT

## 2021-06-06 PROCEDURE — 96361 HYDRATE IV INFUSION ADD-ON: CPT

## 2021-06-06 PROCEDURE — 93010 EKG 12-LEAD: ICD-10-PCS | Mod: ,,, | Performed by: INTERNAL MEDICINE

## 2021-06-06 PROCEDURE — 87040 BLOOD CULTURE FOR BACTERIA: CPT | Mod: 59 | Performed by: FAMILY MEDICINE

## 2021-06-06 PROCEDURE — 99285 EMERGENCY DEPT VISIT HI MDM: CPT | Mod: 25

## 2021-06-06 PROCEDURE — 27000221 HC OXYGEN, UP TO 24 HOURS

## 2021-06-06 PROCEDURE — 94640 AIRWAY INHALATION TREATMENT: CPT

## 2021-06-06 PROCEDURE — 36415 COLL VENOUS BLD VENIPUNCTURE: CPT | Performed by: FAMILY MEDICINE

## 2021-06-06 PROCEDURE — 96367 TX/PROPH/DG ADDL SEQ IV INF: CPT

## 2021-06-06 PROCEDURE — 96375 TX/PRO/DX INJ NEW DRUG ADDON: CPT

## 2021-06-06 PROCEDURE — 80053 COMPREHEN METABOLIC PANEL: CPT | Performed by: FAMILY MEDICINE

## 2021-06-06 RX ORDER — IPRATROPIUM BROMIDE AND ALBUTEROL SULFATE 2.5; .5 MG/3ML; MG/3ML
3 SOLUTION RESPIRATORY (INHALATION)
Status: COMPLETED | OUTPATIENT
Start: 2021-06-06 | End: 2021-06-06

## 2021-06-06 RX ORDER — LEVOFLOXACIN 500 MG/1
500 TABLET, FILM COATED ORAL DAILY
Qty: 5 TABLET | Refills: 0 | Status: SHIPPED | OUTPATIENT
Start: 2021-06-06 | End: 2021-06-11

## 2021-06-06 RX ORDER — PREDNISONE 50 MG/1
50 TABLET ORAL DAILY
Qty: 5 TABLET | Refills: 0 | Status: SHIPPED | OUTPATIENT
Start: 2021-06-06 | End: 2021-06-11

## 2021-06-06 RX ORDER — METHYLPREDNISOLONE SOD SUCC 125 MG
125 VIAL (EA) INJECTION
Status: COMPLETED | OUTPATIENT
Start: 2021-06-06 | End: 2021-06-06

## 2021-06-06 RX ORDER — ACETAMINOPHEN 500 MG
1000 TABLET ORAL
Status: COMPLETED | OUTPATIENT
Start: 2021-06-06 | End: 2021-06-06

## 2021-06-06 RX ORDER — BROMPHENIRAMINE MALEATE, PSEUDOEPHEDRINE HYDROCHLORIDE, AND DEXTROMETHORPHAN HYDROBROMIDE 2; 30; 10 MG/5ML; MG/5ML; MG/5ML
5 SYRUP ORAL 4 TIMES DAILY PRN
Qty: 200 ML | Refills: 0 | Status: SHIPPED | OUTPATIENT
Start: 2021-06-06 | End: 2021-06-16

## 2021-06-06 RX ORDER — IBUPROFEN 800 MG/1
800 TABLET ORAL
Status: COMPLETED | OUTPATIENT
Start: 2021-06-06 | End: 2021-06-06

## 2021-06-06 RX ORDER — IPRATROPIUM BROMIDE AND ALBUTEROL SULFATE 2.5; .5 MG/3ML; MG/3ML
3 SOLUTION RESPIRATORY (INHALATION) EVERY 6 HOURS PRN
Qty: 1 BOX | Refills: 6 | Status: SHIPPED | OUTPATIENT
Start: 2021-06-06 | End: 2022-11-30

## 2021-06-06 RX ADMIN — ACETAMINOPHEN 1000 MG: 500 TABLET ORAL at 09:06

## 2021-06-06 RX ADMIN — IBUPROFEN 800 MG: 800 TABLET, FILM COATED ORAL at 09:06

## 2021-06-06 RX ADMIN — SODIUM CHLORIDE 1000 ML: 0.9 INJECTION, SOLUTION INTRAVENOUS at 09:06

## 2021-06-06 RX ADMIN — CEFTRIAXONE 2 G: 2 INJECTION, SOLUTION INTRAVENOUS at 10:06

## 2021-06-06 RX ADMIN — IPRATROPIUM BROMIDE AND ALBUTEROL SULFATE 3 ML: .5; 3 SOLUTION RESPIRATORY (INHALATION) at 09:06

## 2021-06-06 RX ADMIN — AZITHROMYCIN MONOHYDRATE 500 MG: 500 INJECTION, POWDER, LYOPHILIZED, FOR SOLUTION INTRAVENOUS at 11:06

## 2021-06-06 RX ADMIN — METHYLPREDNISOLONE SODIUM SUCCINATE 125 MG: 125 INJECTION, POWDER, FOR SOLUTION INTRAMUSCULAR; INTRAVENOUS at 09:06

## 2021-06-06 RX ADMIN — SODIUM CHLORIDE 2712 ML: 0.9 INJECTION, SOLUTION INTRAVENOUS at 10:06

## 2021-06-07 VITALS
BODY MASS INDEX: 32.17 KG/M2 | SYSTOLIC BLOOD PRESSURE: 114 MMHG | TEMPERATURE: 97 F | HEART RATE: 84 BPM | RESPIRATION RATE: 20 BRPM | WEIGHT: 199.31 LBS | DIASTOLIC BLOOD PRESSURE: 71 MMHG | OXYGEN SATURATION: 94 %

## 2021-06-12 LAB
BACTERIA BLD CULT: NORMAL
BACTERIA BLD CULT: NORMAL

## 2021-06-22 DIAGNOSIS — C7A.090 CARCINOID BRONCHIAL ADENOMA OF RIGHT LUNG: Primary | ICD-10-CM

## 2021-07-23 ENCOUNTER — OFFICE VISIT (OUTPATIENT)
Dept: URGENT CARE | Facility: CLINIC | Age: 51
End: 2021-07-23
Payer: MEDICAID

## 2021-07-23 VITALS
WEIGHT: 199 LBS | BODY MASS INDEX: 31.98 KG/M2 | DIASTOLIC BLOOD PRESSURE: 77 MMHG | OXYGEN SATURATION: 95 % | SYSTOLIC BLOOD PRESSURE: 113 MMHG | TEMPERATURE: 99 F | HEART RATE: 81 BPM | HEIGHT: 66 IN | RESPIRATION RATE: 15 BRPM

## 2021-07-23 DIAGNOSIS — Z20.822 CLOSE EXPOSURE TO COVID-19 VIRUS: ICD-10-CM

## 2021-07-23 DIAGNOSIS — R06.2 WHEEZES: ICD-10-CM

## 2021-07-23 DIAGNOSIS — Z11.59 SCREENING FOR VIRAL DISEASE: ICD-10-CM

## 2021-07-23 DIAGNOSIS — B34.9 VIRAL SYNDROME: Primary | ICD-10-CM

## 2021-07-23 LAB
CTP QC/QA: YES
SARS-COV-2 RDRP RESP QL NAA+PROBE: NEGATIVE

## 2021-07-23 PROCEDURE — 99214 PR OFFICE/OUTPT VISIT, EST, LEVL IV, 30-39 MIN: ICD-10-PCS | Mod: S$GLB,,, | Performed by: NURSE PRACTITIONER

## 2021-07-23 PROCEDURE — U0002 COVID-19 LAB TEST NON-CDC: HCPCS | Mod: QW,S$GLB,, | Performed by: NURSE PRACTITIONER

## 2021-07-23 PROCEDURE — 99214 OFFICE O/P EST MOD 30 MIN: CPT | Mod: S$GLB,,, | Performed by: NURSE PRACTITIONER

## 2021-07-23 PROCEDURE — U0002: ICD-10-PCS | Mod: QW,S$GLB,, | Performed by: NURSE PRACTITIONER

## 2021-07-23 RX ORDER — DEXAMETHASONE SODIUM PHOSPHATE 100 MG/10ML
6 INJECTION INTRAMUSCULAR; INTRAVENOUS
Status: COMPLETED | OUTPATIENT
Start: 2021-07-23 | End: 2021-07-23

## 2021-07-23 RX ADMIN — DEXAMETHASONE SODIUM PHOSPHATE 6 MG: 100 INJECTION INTRAMUSCULAR; INTRAVENOUS at 02:07

## 2021-08-11 ENCOUNTER — OFFICE VISIT (OUTPATIENT)
Dept: FAMILY MEDICINE | Facility: CLINIC | Age: 51
End: 2021-08-11
Payer: MEDICAID

## 2021-08-11 VITALS
SYSTOLIC BLOOD PRESSURE: 126 MMHG | RESPIRATION RATE: 18 BRPM | HEIGHT: 66 IN | BODY MASS INDEX: 32.99 KG/M2 | WEIGHT: 205.25 LBS | DIASTOLIC BLOOD PRESSURE: 82 MMHG | HEART RATE: 82 BPM

## 2021-08-11 DIAGNOSIS — G47.30 SLEEP APNEA, UNSPECIFIED TYPE: ICD-10-CM

## 2021-08-11 DIAGNOSIS — E66.9 CLASS 1 OBESITY WITH BODY MASS INDEX (BMI) OF 33.0 TO 33.9 IN ADULT, UNSPECIFIED OBESITY TYPE, UNSPECIFIED WHETHER SERIOUS COMORBIDITY PRESENT: ICD-10-CM

## 2021-08-11 DIAGNOSIS — Z13.220 LIPID SCREENING: ICD-10-CM

## 2021-08-11 DIAGNOSIS — Z85.110 HISTORY OF MALIGNANT CARCINOID TUMOR OF BRONCHUS AND LUNG: ICD-10-CM

## 2021-08-11 DIAGNOSIS — Z11.59 ENCOUNTER FOR HEPATITIS C SCREENING TEST FOR LOW RISK PATIENT: ICD-10-CM

## 2021-08-11 DIAGNOSIS — Z13.88 HEAVY METAL POISON. SCREEN: ICD-10-CM

## 2021-08-11 DIAGNOSIS — Z12.31 SCREENING MAMMOGRAM, ENCOUNTER FOR: ICD-10-CM

## 2021-08-11 DIAGNOSIS — Z76.89 ENCOUNTER TO ESTABLISH CARE: Primary | ICD-10-CM

## 2021-08-11 DIAGNOSIS — Z12.11 COLON CANCER SCREENING: ICD-10-CM

## 2021-08-11 DIAGNOSIS — Z20.822 CLOSE EXPOSURE TO COVID-19 VIRUS: ICD-10-CM

## 2021-08-11 DIAGNOSIS — Z01.419 WELL WOMAN EXAM: ICD-10-CM

## 2021-08-11 DIAGNOSIS — Z00.00 ANNUAL PHYSICAL EXAM: ICD-10-CM

## 2021-08-11 PROCEDURE — 99999 PR PBB SHADOW E&M-EST. PATIENT-LVL V: CPT | Mod: PBBFAC,,, | Performed by: STUDENT IN AN ORGANIZED HEALTH CARE EDUCATION/TRAINING PROGRAM

## 2021-08-11 PROCEDURE — 99214 OFFICE O/P EST MOD 30 MIN: CPT | Mod: S$PBB,,, | Performed by: STUDENT IN AN ORGANIZED HEALTH CARE EDUCATION/TRAINING PROGRAM

## 2021-08-11 PROCEDURE — 99215 OFFICE O/P EST HI 40 MIN: CPT | Mod: PBBFAC | Performed by: STUDENT IN AN ORGANIZED HEALTH CARE EDUCATION/TRAINING PROGRAM

## 2021-08-11 PROCEDURE — 99999 PR PBB SHADOW E&M-EST. PATIENT-LVL V: ICD-10-PCS | Mod: PBBFAC,,, | Performed by: STUDENT IN AN ORGANIZED HEALTH CARE EDUCATION/TRAINING PROGRAM

## 2021-08-11 PROCEDURE — 99214 PR OFFICE/OUTPT VISIT, EST, LEVL IV, 30-39 MIN: ICD-10-PCS | Mod: S$PBB,,, | Performed by: STUDENT IN AN ORGANIZED HEALTH CARE EDUCATION/TRAINING PROGRAM

## 2021-08-11 RX ORDER — CYCLOBENZAPRINE HCL 10 MG
10 TABLET ORAL 3 TIMES DAILY PRN
COMMUNITY
Start: 2021-07-17 | End: 2021-10-14

## 2021-08-11 RX ORDER — ALBUTEROL SULFATE 90 UG/1
1-2 AEROSOL, METERED RESPIRATORY (INHALATION) EVERY 6 HOURS PRN
Qty: 18 G | Refills: 2 | Status: SHIPPED | OUTPATIENT
Start: 2021-08-11 | End: 2022-01-13 | Stop reason: SDUPTHER

## 2021-08-11 RX ORDER — PHENTERMINE HYDROCHLORIDE 37.5 MG/1
37.5 TABLET ORAL
Qty: 30 TABLET | Refills: 2 | Status: SHIPPED | OUTPATIENT
Start: 2021-08-11 | End: 2021-11-09

## 2021-08-18 ENCOUNTER — HOSPITAL ENCOUNTER (OUTPATIENT)
Dept: RADIOLOGY | Facility: HOSPITAL | Age: 51
Discharge: HOME | End: 2021-08-18
Attending: THORACIC SURGERY (CARDIOTHORACIC VASCULAR SURGERY)
Payer: MEDICAID

## 2021-08-18 ENCOUNTER — OFFICE VISIT (OUTPATIENT)
Dept: CARDIOTHORACIC SURGERY | Facility: CLINIC | Age: 51
End: 2021-08-18
Payer: MEDICAID

## 2021-08-18 VITALS
HEIGHT: 66 IN | TEMPERATURE: 98 F | OXYGEN SATURATION: 97 % | BODY MASS INDEX: 32.59 KG/M2 | DIASTOLIC BLOOD PRESSURE: 79 MMHG | HEART RATE: 86 BPM | WEIGHT: 202.81 LBS | SYSTOLIC BLOOD PRESSURE: 140 MMHG

## 2021-08-18 DIAGNOSIS — C7A.090 CARCINOID BRONCHIAL ADENOMA OF RIGHT LUNG: ICD-10-CM

## 2021-08-18 DIAGNOSIS — Z85.110 HISTORY OF MALIGNANT CARCINOID TUMOR OF BRONCHUS AND LUNG: ICD-10-CM

## 2021-08-18 DIAGNOSIS — Z85.118 HISTORY OF LUNG CANCER: Primary | ICD-10-CM

## 2021-08-18 PROCEDURE — 99213 OFFICE O/P EST LOW 20 MIN: CPT | Mod: S$PBB,,, | Performed by: THORACIC SURGERY (CARDIOTHORACIC VASCULAR SURGERY)

## 2021-08-18 PROCEDURE — 71250 CT THORAX DX C-: CPT | Mod: 26,,, | Performed by: RADIOLOGY

## 2021-08-18 PROCEDURE — 99999 PR PBB SHADOW E&M-EST. PATIENT-LVL V: ICD-10-PCS | Mod: PBBFAC,,, | Performed by: THORACIC SURGERY (CARDIOTHORACIC VASCULAR SURGERY)

## 2021-08-18 PROCEDURE — 99999 PR PBB SHADOW E&M-EST. PATIENT-LVL V: CPT | Mod: PBBFAC,,, | Performed by: THORACIC SURGERY (CARDIOTHORACIC VASCULAR SURGERY)

## 2021-08-18 PROCEDURE — 71250 CT THORAX DX C-: CPT | Mod: TC

## 2021-08-18 PROCEDURE — 99215 OFFICE O/P EST HI 40 MIN: CPT | Mod: PBBFAC | Performed by: THORACIC SURGERY (CARDIOTHORACIC VASCULAR SURGERY)

## 2021-08-18 PROCEDURE — 99213 PR OFFICE/OUTPT VISIT, EST, LEVL III, 20-29 MIN: ICD-10-PCS | Mod: S$PBB,,, | Performed by: THORACIC SURGERY (CARDIOTHORACIC VASCULAR SURGERY)

## 2021-08-18 PROCEDURE — 71250 CT CHEST WITHOUT CONTRAST: ICD-10-PCS | Mod: 26,,, | Performed by: RADIOLOGY

## 2021-08-25 ENCOUNTER — HOSPITAL ENCOUNTER (OUTPATIENT)
Dept: RADIOLOGY | Facility: HOSPITAL | Age: 51
Discharge: HOME | End: 2021-08-25
Attending: STUDENT IN AN ORGANIZED HEALTH CARE EDUCATION/TRAINING PROGRAM
Payer: MEDICAID

## 2021-08-25 VITALS — HEIGHT: 66 IN | WEIGHT: 202 LBS | BODY MASS INDEX: 32.47 KG/M2

## 2021-08-25 DIAGNOSIS — Z12.31 SCREENING MAMMOGRAM, ENCOUNTER FOR: ICD-10-CM

## 2021-08-25 PROCEDURE — 77067 SCR MAMMO BI INCL CAD: CPT | Mod: TC

## 2021-08-25 PROCEDURE — 77063 BREAST TOMOSYNTHESIS BI: CPT | Mod: 26,,, | Performed by: RADIOLOGY

## 2021-08-25 PROCEDURE — 77067 SCR MAMMO BI INCL CAD: CPT | Mod: 26,,, | Performed by: RADIOLOGY

## 2021-08-25 PROCEDURE — 77067 MAMMO DIGITAL SCREENING BILAT WITH TOMO: ICD-10-PCS | Mod: 26,,, | Performed by: RADIOLOGY

## 2021-08-25 PROCEDURE — 77063 MAMMO DIGITAL SCREENING BILAT WITH TOMO: ICD-10-PCS | Mod: 26,,, | Performed by: RADIOLOGY

## 2021-11-10 ENCOUNTER — OFFICE VISIT (OUTPATIENT)
Dept: URGENT CARE | Facility: CLINIC | Age: 51
End: 2021-11-10
Payer: MEDICAID

## 2021-11-10 VITALS
WEIGHT: 202 LBS | TEMPERATURE: 99 F | SYSTOLIC BLOOD PRESSURE: 196 MMHG | HEART RATE: 102 BPM | OXYGEN SATURATION: 94 % | RESPIRATION RATE: 18 BRPM | BODY MASS INDEX: 32.47 KG/M2 | DIASTOLIC BLOOD PRESSURE: 105 MMHG | HEIGHT: 66 IN

## 2021-11-10 DIAGNOSIS — J02.9 ACUTE PHARYNGITIS, UNSPECIFIED ETIOLOGY: Primary | ICD-10-CM

## 2021-11-10 DIAGNOSIS — J01.40 ACUTE NON-RECURRENT PANSINUSITIS: ICD-10-CM

## 2021-11-10 DIAGNOSIS — J40 BRONCHITIS: ICD-10-CM

## 2021-11-10 PROCEDURE — 99214 OFFICE O/P EST MOD 30 MIN: CPT | Mod: S$GLB,,, | Performed by: FAMILY MEDICINE

## 2021-11-10 PROCEDURE — 71046 X-RAY EXAM CHEST 2 VIEWS: CPT | Mod: S$GLB,,, | Performed by: RADIOLOGY

## 2021-11-10 PROCEDURE — 99214 PR OFFICE/OUTPT VISIT, EST, LEVL IV, 30-39 MIN: ICD-10-PCS | Mod: S$GLB,,, | Performed by: FAMILY MEDICINE

## 2021-11-10 PROCEDURE — 71046 XR CHEST PA AND LATERAL: ICD-10-PCS | Mod: S$GLB,,, | Performed by: RADIOLOGY

## 2021-11-10 RX ORDER — FLUOXETINE HYDROCHLORIDE 20 MG/1
60 CAPSULE ORAL EVERY MORNING
COMMUNITY
Start: 2021-10-13 | End: 2024-03-27

## 2021-11-10 RX ORDER — DEXTROMETHORPHAN HBR, GUAIFENESIN AND PSEUDOEPHEDRINE HCL 60; 380; 20 MG/1; MG/1; MG/1
1 TABLET ORAL EVERY 6 HOURS PRN
Qty: 20 TABLET | Refills: 0 | Status: SHIPPED | OUTPATIENT
Start: 2021-11-10 | End: 2022-08-11

## 2021-11-10 RX ORDER — CODEINE PHOSPHATE AND GUAIFENESIN 10; 100 MG/5ML; MG/5ML
10 SOLUTION ORAL NIGHTLY PRN
Qty: 100 ML | Refills: 0 | Status: SHIPPED | OUTPATIENT
Start: 2021-11-10 | End: 2021-11-20

## 2021-11-10 RX ORDER — CEFDINIR 300 MG/1
300 CAPSULE ORAL 2 TIMES DAILY
Qty: 20 CAPSULE | Refills: 0 | Status: SHIPPED | OUTPATIENT
Start: 2021-11-10 | End: 2021-11-20

## 2021-11-10 RX ORDER — NAPROXEN 500 MG/1
500 TABLET ORAL 2 TIMES DAILY WITH MEALS
Qty: 20 TABLET | Refills: 0 | Status: SHIPPED | OUTPATIENT
Start: 2021-11-10 | End: 2022-08-11

## 2021-11-10 RX ORDER — ALPRAZOLAM 0.5 MG/1
0.5 TABLET ORAL DAILY PRN
COMMUNITY
Start: 2021-10-13 | End: 2022-08-11

## 2021-11-12 ENCOUNTER — ANESTHESIA EVENT (OUTPATIENT)
Dept: ENDOSCOPY | Facility: HOSPITAL | Age: 51
End: 2021-11-12
Payer: MEDICAID

## 2021-11-30 ENCOUNTER — HOSPITAL ENCOUNTER (OUTPATIENT)
Dept: PREADMISSION TESTING | Facility: HOSPITAL | Age: 51
Discharge: HOME | End: 2021-11-30
Attending: COLON & RECTAL SURGERY
Payer: MEDICAID

## 2021-11-30 DIAGNOSIS — Z01.818 PRE-OP TESTING: ICD-10-CM

## 2021-11-30 PROCEDURE — U0003 INFECTIOUS AGENT DETECTION BY NUCLEIC ACID (DNA OR RNA); SEVERE ACUTE RESPIRATORY SYNDROME CORONAVIRUS 2 (SARS-COV-2) (CORONAVIRUS DISEASE [COVID-19]), AMPLIFIED PROBE TECHNIQUE, MAKING USE OF HIGH THROUGHPUT TECHNOLOGIES AS DESCRIBED BY CMS-2020-01-R: HCPCS | Performed by: COLON & RECTAL SURGERY

## 2021-11-30 PROCEDURE — U0005 INFEC AGEN DETEC AMPLI PROBE: HCPCS | Performed by: COLON & RECTAL SURGERY

## 2021-12-01 LAB
SARS-COV-2 RNA RESP QL NAA+PROBE: NOT DETECTED
SARS-COV-2- CYCLE NUMBER: NORMAL

## 2021-12-02 ENCOUNTER — ANESTHESIA (OUTPATIENT)
Dept: ENDOSCOPY | Facility: HOSPITAL | Age: 51
End: 2021-12-02
Payer: MEDICAID

## 2021-12-02 ENCOUNTER — HOSPITAL ENCOUNTER (OUTPATIENT)
Facility: HOSPITAL | Age: 51
Discharge: HOME | End: 2021-12-02
Attending: COLON & RECTAL SURGERY | Admitting: COLON & RECTAL SURGERY
Payer: MEDICAID

## 2021-12-02 VITALS
OXYGEN SATURATION: 99 % | RESPIRATION RATE: 18 BRPM | SYSTOLIC BLOOD PRESSURE: 122 MMHG | TEMPERATURE: 97 F | DIASTOLIC BLOOD PRESSURE: 71 MMHG | HEART RATE: 71 BPM

## 2021-12-02 DIAGNOSIS — Z12.11 COLON CANCER SCREENING: ICD-10-CM

## 2021-12-02 PROCEDURE — G0121 COLON CA SCRN NOT HI RSK IND: HCPCS | Mod: ,,, | Performed by: COLON & RECTAL SURGERY

## 2021-12-02 PROCEDURE — G0121 COLON CA SCRN NOT HI RSK IND: HCPCS | Performed by: COLON & RECTAL SURGERY

## 2021-12-02 PROCEDURE — 63600175 PHARM REV CODE 636 W HCPCS: Performed by: NURSE ANESTHETIST, CERTIFIED REGISTERED

## 2021-12-02 PROCEDURE — 37000008 HC ANESTHESIA 1ST 15 MINUTES: Performed by: COLON & RECTAL SURGERY

## 2021-12-02 PROCEDURE — G0121 COLON CA SCRN NOT HI RSK IND: ICD-10-PCS | Mod: ,,, | Performed by: COLON & RECTAL SURGERY

## 2021-12-02 PROCEDURE — 00812 ANES LWR INTST SCR COLSC: CPT | Mod: QZ | Performed by: NURSE ANESTHETIST, CERTIFIED REGISTERED

## 2021-12-02 PROCEDURE — 00812 ANES LWR INTST SCR COLSC: CPT | Performed by: COLON & RECTAL SURGERY

## 2021-12-02 PROCEDURE — 37000009 HC ANESTHESIA EA ADD 15 MINS: Performed by: COLON & RECTAL SURGERY

## 2021-12-02 RX ORDER — FENTANYL CITRATE 50 UG/ML
INJECTION, SOLUTION INTRAMUSCULAR; INTRAVENOUS
Status: DISCONTINUED | OUTPATIENT
Start: 2021-12-02 | End: 2021-12-02

## 2021-12-02 RX ORDER — SODIUM CHLORIDE, SODIUM LACTATE, POTASSIUM CHLORIDE, CALCIUM CHLORIDE 600; 310; 30; 20 MG/100ML; MG/100ML; MG/100ML; MG/100ML
INJECTION, SOLUTION INTRAVENOUS CONTINUOUS
Status: DISCONTINUED | OUTPATIENT
Start: 2021-12-02 | End: 2021-12-02 | Stop reason: HOSPADM

## 2021-12-02 RX ADMIN — FENTANYL CITRATE 100 MCG: 50 INJECTION, SOLUTION INTRAMUSCULAR; INTRAVENOUS at 09:12

## 2021-12-08 ENCOUNTER — PATIENT OUTREACH (OUTPATIENT)
Dept: ADMINISTRATIVE | Facility: HOSPITAL | Age: 51
End: 2021-12-08
Payer: MEDICAID

## 2021-12-22 ENCOUNTER — OFFICE VISIT (OUTPATIENT)
Dept: OBSTETRICS AND GYNECOLOGY | Facility: CLINIC | Age: 51
End: 2021-12-22
Payer: MEDICAID

## 2021-12-22 VITALS
SYSTOLIC BLOOD PRESSURE: 124 MMHG | OXYGEN SATURATION: 100 % | BODY MASS INDEX: 31.64 KG/M2 | RESPIRATION RATE: 12 BRPM | HEART RATE: 86 BPM | DIASTOLIC BLOOD PRESSURE: 86 MMHG | WEIGHT: 196.88 LBS | HEIGHT: 66 IN

## 2021-12-22 DIAGNOSIS — N94.10 DYSPAREUNIA, FEMALE: ICD-10-CM

## 2021-12-22 DIAGNOSIS — Z01.419 ENCNTR FOR GYN EXAM (GENERAL) (ROUTINE) W/O ABN FINDINGS: Primary | ICD-10-CM

## 2021-12-22 PROCEDURE — 99386 PREV VISIT NEW AGE 40-64: CPT | Mod: S$PBB,,, | Performed by: STUDENT IN AN ORGANIZED HEALTH CARE EDUCATION/TRAINING PROGRAM

## 2021-12-22 PROCEDURE — 3008F PR BODY MASS INDEX (BMI) DOCUMENTED: ICD-10-PCS | Mod: CPTII,,, | Performed by: STUDENT IN AN ORGANIZED HEALTH CARE EDUCATION/TRAINING PROGRAM

## 2021-12-22 PROCEDURE — 99999 PR PBB SHADOW E&M-EST. PATIENT-LVL IV: ICD-10-PCS | Mod: PBBFAC,,, | Performed by: STUDENT IN AN ORGANIZED HEALTH CARE EDUCATION/TRAINING PROGRAM

## 2021-12-22 PROCEDURE — 3079F DIAST BP 80-89 MM HG: CPT | Mod: CPTII,,, | Performed by: STUDENT IN AN ORGANIZED HEALTH CARE EDUCATION/TRAINING PROGRAM

## 2021-12-22 PROCEDURE — 3008F BODY MASS INDEX DOCD: CPT | Mod: CPTII,,, | Performed by: STUDENT IN AN ORGANIZED HEALTH CARE EDUCATION/TRAINING PROGRAM

## 2021-12-22 PROCEDURE — 3074F SYST BP LT 130 MM HG: CPT | Mod: CPTII,,, | Performed by: STUDENT IN AN ORGANIZED HEALTH CARE EDUCATION/TRAINING PROGRAM

## 2021-12-22 PROCEDURE — 1159F PR MEDICATION LIST DOCUMENTED IN MEDICAL RECORD: ICD-10-PCS | Mod: CPTII,,, | Performed by: STUDENT IN AN ORGANIZED HEALTH CARE EDUCATION/TRAINING PROGRAM

## 2021-12-22 PROCEDURE — 99999 PR PBB SHADOW E&M-EST. PATIENT-LVL IV: CPT | Mod: PBBFAC,,, | Performed by: STUDENT IN AN ORGANIZED HEALTH CARE EDUCATION/TRAINING PROGRAM

## 2021-12-22 PROCEDURE — 99386 PR PREVENTIVE VISIT,NEW,40-64: ICD-10-PCS | Mod: S$PBB,,, | Performed by: STUDENT IN AN ORGANIZED HEALTH CARE EDUCATION/TRAINING PROGRAM

## 2021-12-22 PROCEDURE — 1159F MED LIST DOCD IN RCRD: CPT | Mod: CPTII,,, | Performed by: STUDENT IN AN ORGANIZED HEALTH CARE EDUCATION/TRAINING PROGRAM

## 2021-12-22 PROCEDURE — 3074F PR MOST RECENT SYSTOLIC BLOOD PRESSURE < 130 MM HG: ICD-10-PCS | Mod: CPTII,,, | Performed by: STUDENT IN AN ORGANIZED HEALTH CARE EDUCATION/TRAINING PROGRAM

## 2021-12-22 PROCEDURE — 99214 OFFICE O/P EST MOD 30 MIN: CPT | Mod: PBBFAC | Performed by: STUDENT IN AN ORGANIZED HEALTH CARE EDUCATION/TRAINING PROGRAM

## 2021-12-22 PROCEDURE — 3079F PR MOST RECENT DIASTOLIC BLOOD PRESSURE 80-89 MM HG: ICD-10-PCS | Mod: CPTII,,, | Performed by: STUDENT IN AN ORGANIZED HEALTH CARE EDUCATION/TRAINING PROGRAM

## 2021-12-23 ENCOUNTER — TELEPHONE (OUTPATIENT)
Dept: FAMILY MEDICINE | Facility: CLINIC | Age: 51
End: 2021-12-23
Payer: MEDICAID

## 2022-01-05 ENCOUNTER — TELEPHONE (OUTPATIENT)
Dept: PULMONOLOGY | Facility: CLINIC | Age: 52
End: 2022-01-05
Payer: MEDICAID

## 2022-01-05 NOTE — TELEPHONE ENCOUNTER
----- Message from Alex Castaneda sent at 1/5/2022  3:21 PM CST -----  Regarding: referral in chart to see you      The Pt states that she was given a referral for her to see you for- History of malignant carcinoid tumor of bronchus and lung [Z85.110].     # [phone]

## 2022-01-10 ENCOUNTER — TELEPHONE (OUTPATIENT)
Dept: FAMILY MEDICINE | Facility: CLINIC | Age: 52
End: 2022-01-10
Payer: MEDICAID

## 2022-01-10 ENCOUNTER — TELEPHONE (OUTPATIENT)
Dept: PULMONOLOGY | Facility: CLINIC | Age: 52
End: 2022-01-10
Payer: MEDICAID

## 2022-01-10 ENCOUNTER — PATIENT MESSAGE (OUTPATIENT)
Dept: FAMILY MEDICINE | Facility: CLINIC | Age: 52
End: 2022-01-10
Payer: MEDICAID

## 2022-01-10 NOTE — TELEPHONE ENCOUNTER
----- Message from Thi Lazar sent at 1/10/2022 11:56 AM CST -----  Gina Coto  MRN: [id number]  : 1970  PCP: Jeffry Velez  Home Phone      [phone]  Work Phone      Not on file.  Mobile          [phone]      MESSAGE:   Pt requesting refill or new Rx.   Is this a refill or new RX:  refill  RX name and strength:   phentermine (ADIPEX-P) 37.5 mg tablet  Last office visit: 2021  Is this a 30-day or 90-day RX:  30  Pharmacy name and location: [facility]    Comments:    Last fill in chart was for 30 day, but Pt states toribio was filling for 90 days    Phone:  [phone]

## 2022-01-10 NOTE — TELEPHONE ENCOUNTER
----- Message from Thi Lazar sent at 1/10/2022 11:56 AM CST -----  Gina Coto  MRN: [id number]  : 1970  PCP: Jeffry Velez  Home Phone      [phone]  Work Phone      Not on file.  Mobile          [phone]      MESSAGE:   Pt requesting refill or new Rx.   Is this a refill or new RX:  refill  RX name and strength:   phentermine (ADIPEX-P) 37.5 mg tablet  Last office visit: 2021  Is this a 30-day or 90-day RX:  30  Pharmacy name and location: [facility]    Comments:    Last fill in chart was for 30 day, but Pt states toribio was filling for 90 days    Phone:  [phone]

## 2022-01-10 NOTE — TELEPHONE ENCOUNTER
----- Message from Sury Bryson sent at 1/10/2022 11:21 AM CST -----  Contact: [phone] (home)  Pt is calling to see if she can get a rescheduled appt with the do.    Pt had to cancel her last appt.    Pt would like a call back.    [phone] (home)

## 2022-01-11 NOTE — TELEPHONE ENCOUNTER
Please inform pt, as we discussed at her visit, adipex use is only recommended for short duration. Most of the time I will only provide 90 days total. Also, this is a controlled substance and there is no follow-up documented since Aug. Unable to provide another script at this time.   MB

## 2022-01-13 ENCOUNTER — HOSPITAL ENCOUNTER (OUTPATIENT)
Dept: PULMONOLOGY | Facility: CLINIC | Age: 52
Discharge: HOME | End: 2022-01-13
Payer: MEDICAID

## 2022-01-13 ENCOUNTER — OFFICE VISIT (OUTPATIENT)
Dept: PULMONOLOGY | Facility: CLINIC | Age: 52
End: 2022-01-13
Payer: MEDICAID

## 2022-01-13 VITALS
DIASTOLIC BLOOD PRESSURE: 79 MMHG | BODY MASS INDEX: 30.9 KG/M2 | HEIGHT: 66 IN | SYSTOLIC BLOOD PRESSURE: 130 MMHG | OXYGEN SATURATION: 95 % | HEART RATE: 94 BPM | WEIGHT: 192.25 LBS

## 2022-01-13 DIAGNOSIS — Z13.9 SCREENING PROCEDURE: Primary | ICD-10-CM

## 2022-01-13 DIAGNOSIS — J45.909 ASTHMA, UNSPECIFIED ASTHMA SEVERITY, UNSPECIFIED WHETHER COMPLICATED, UNSPECIFIED WHETHER PERSISTENT: Primary | ICD-10-CM

## 2022-01-13 DIAGNOSIS — J45.909 ASTHMA, UNSPECIFIED ASTHMA SEVERITY, UNSPECIFIED WHETHER COMPLICATED, UNSPECIFIED WHETHER PERSISTENT: ICD-10-CM

## 2022-01-13 LAB
CTP QC/QA: YES
SARS-COV-2 AG RESP QL IA.RAPID: NEGATIVE

## 2022-01-13 PROCEDURE — 99999 PR PBB SHADOW E&M-EST. PATIENT-LVL IV: ICD-10-PCS | Mod: PBBFAC,,, | Performed by: INTERNAL MEDICINE

## 2022-01-13 PROCEDURE — 1159F PR MEDICATION LIST DOCUMENTED IN MEDICAL RECORD: ICD-10-PCS | Mod: CPTII,,, | Performed by: INTERNAL MEDICINE

## 2022-01-13 PROCEDURE — 3008F BODY MASS INDEX DOCD: CPT | Mod: CPTII,,, | Performed by: INTERNAL MEDICINE

## 2022-01-13 PROCEDURE — 94727 GAS DIL/WSHOT DETER LNG VOL: CPT | Mod: 26,S$PBB,, | Performed by: INTERNAL MEDICINE

## 2022-01-13 PROCEDURE — 3078F PR MOST RECENT DIASTOLIC BLOOD PRESSURE < 80 MM HG: ICD-10-PCS | Mod: CPTII,,, | Performed by: INTERNAL MEDICINE

## 2022-01-13 PROCEDURE — 1159F MED LIST DOCD IN RCRD: CPT | Mod: CPTII,,, | Performed by: INTERNAL MEDICINE

## 2022-01-13 PROCEDURE — 3075F PR MOST RECENT SYSTOLIC BLOOD PRESS GE 130-139MM HG: ICD-10-PCS | Mod: CPTII,,, | Performed by: INTERNAL MEDICINE

## 2022-01-13 PROCEDURE — 1160F RVW MEDS BY RX/DR IN RCRD: CPT | Mod: CPTII,,, | Performed by: INTERNAL MEDICINE

## 2022-01-13 PROCEDURE — 94010 BREATHING CAPACITY TEST: CPT | Mod: PBBFAC | Performed by: INTERNAL MEDICINE

## 2022-01-13 PROCEDURE — 94729 PR C02/MEMBANE DIFFUSE CAPACITY: ICD-10-PCS | Mod: 26,S$PBB,, | Performed by: INTERNAL MEDICINE

## 2022-01-13 PROCEDURE — 3078F DIAST BP <80 MM HG: CPT | Mod: CPTII,,, | Performed by: INTERNAL MEDICINE

## 2022-01-13 PROCEDURE — 87811 SARS-COV-2 COVID19 W/OPTIC: CPT | Mod: PBBFAC

## 2022-01-13 PROCEDURE — 99204 OFFICE O/P NEW MOD 45 MIN: CPT | Mod: 25,S$PBB,, | Performed by: INTERNAL MEDICINE

## 2022-01-13 PROCEDURE — 3008F PR BODY MASS INDEX (BMI) DOCUMENTED: ICD-10-PCS | Mod: CPTII,,, | Performed by: INTERNAL MEDICINE

## 2022-01-13 PROCEDURE — 99204 PR OFFICE/OUTPT VISIT, NEW, LEVL IV, 45-59 MIN: ICD-10-PCS | Mod: 25,S$PBB,, | Performed by: INTERNAL MEDICINE

## 2022-01-13 PROCEDURE — 94727 PR PULM FUNCTION TEST BY GAS: ICD-10-PCS | Mod: 26,S$PBB,, | Performed by: INTERNAL MEDICINE

## 2022-01-13 PROCEDURE — 94010 BREATHING CAPACITY TEST: CPT | Mod: 26,S$PBB,, | Performed by: INTERNAL MEDICINE

## 2022-01-13 PROCEDURE — 94727 GAS DIL/WSHOT DETER LNG VOL: CPT | Mod: PBBFAC | Performed by: INTERNAL MEDICINE

## 2022-01-13 PROCEDURE — 94729 DIFFUSING CAPACITY: CPT | Mod: PBBFAC | Performed by: INTERNAL MEDICINE

## 2022-01-13 PROCEDURE — 99214 OFFICE O/P EST MOD 30 MIN: CPT | Mod: PBBFAC,25 | Performed by: INTERNAL MEDICINE

## 2022-01-13 PROCEDURE — 3075F SYST BP GE 130 - 139MM HG: CPT | Mod: CPTII,,, | Performed by: INTERNAL MEDICINE

## 2022-01-13 PROCEDURE — 1160F PR REVIEW ALL MEDS BY PRESCRIBER/CLIN PHARMACIST DOCUMENTED: ICD-10-PCS | Mod: CPTII,,, | Performed by: INTERNAL MEDICINE

## 2022-01-13 PROCEDURE — 94729 DIFFUSING CAPACITY: CPT | Mod: 26,S$PBB,, | Performed by: INTERNAL MEDICINE

## 2022-01-13 PROCEDURE — 94010 BREATHING CAPACITY TEST: ICD-10-PCS | Mod: 26,S$PBB,, | Performed by: INTERNAL MEDICINE

## 2022-01-13 PROCEDURE — 99999 PR PBB SHADOW E&M-EST. PATIENT-LVL IV: CPT | Mod: PBBFAC,,, | Performed by: INTERNAL MEDICINE

## 2022-01-13 RX ORDER — FLUTICASONE PROPIONATE AND SALMETEROL 500; 50 UG/1; UG/1
1 POWDER RESPIRATORY (INHALATION) 2 TIMES DAILY
Qty: 60 EACH | Refills: 11 | Status: SHIPPED | OUTPATIENT
Start: 2022-01-13 | End: 2022-12-09

## 2022-01-13 RX ORDER — ALBUTEROL SULFATE 90 UG/1
1-2 AEROSOL, METERED RESPIRATORY (INHALATION) EVERY 6 HOURS PRN
Qty: 18 G | Refills: 2 | Status: SHIPPED | OUTPATIENT
Start: 2022-01-13 | End: 2022-03-22

## 2022-01-13 NOTE — PROGRESS NOTES
Subjective:       Patient ID: Gina Coto is a 51 y.o. female.    Chief Complaint: Carcinoid bronchial adenoma of right lung    HPI   Gina Coto 51 y.o. female    has a past medical history of Carcinoid tumor of bronchus and Seasonal asthma.    has a past surgical history that includes Bronchoscopy (N/A, 11/28/2018); Pelvic laparoscopy; Sleeve lobectomy of lung by thoracotomy (Right, 4/22/2019); Bronchoscopy (N/A, 4/22/2019); Bronchoscopy (N/A, 5/6/2019); and Colonoscopy (N/A, 12/2/2021).   reports that she quit smoking about 3 years ago. Her smoking use included cigarettes. She has a 7.50 pack-year smoking history. She has never used smokeless tobacco. She reports current drug use. Drug: Marijuana. She reports that she does not drink alcohol.  Referred by: Dr. Jeffry Velez  Who had concerns including Carcinoid bronchial adenoma of right lung.  The patient's last visit with me was on Visit date not found.  LAST VISIT    Interval History  S/p lobectomy for carcinoid 2019  pneumonia x 2  Cough for year, occ productive  + congestion  +wheezing-  History of seasonal asthma per chart  Has a nebulizer, using at night    Sister with asthma  No childhood sx  +sob with exertion  + wheezing    Prior smoker- 21- max half ppd- 12py history    Using albuterol 2-3 per day  Nocturnal use every night 2am    11/21 had UC visit cxr with LLL infiltrate, treated with abx/no steroids    No fever chills, ns, wt changes, nausea, vomiting, diarrhea, constipation, chest pain, tightness, pressure. Remainder of review of systems is negative.  Otherwise asymptomatic from pulmonary standpoint.    Review of Systems    Objective:      Physical Exam   Constitutional: She is oriented to person, place, and time. She appears well-developed and well-nourished. She appears not cachectic. No distress. She is not obese.   HENT:   Head: Normocephalic.   Nose: Nose normal. No mucosal edema.   Mouth/Throat: Normal dentition. No oropharyngeal  exudate.   Neck: No tracheal deviation present.   Cardiovascular: Normal rate, regular rhythm, normal heart sounds and intact distal pulses. Exam reveals no gallop and no friction rub.   No murmur heard.  Pulmonary/Chest: Normal expansion, symmetric chest wall expansion, effort normal and breath sounds normal. No stridor.   Abdominal: Soft. Bowel sounds are normal.   Musculoskeletal:         General: No tenderness, deformity or edema. Normal range of motion.      Cervical back: Normal range of motion and neck supple.   Lymphadenopathy: No supraclavicular adenopathy is present.     She has no cervical adenopathy.   Neurological: She is alert and oriented to person, place, and time. No cranial nerve deficit. Gait normal.   Skin: Skin is warm and dry. No rash noted. She is not diaphoretic. No cyanosis or erythema. No pallor. Nails show no clubbing.   Psychiatric: She has a normal mood and affect. Her behavior is normal. Judgment and thought content normal.   Vitals reviewed.    Personal Diagnostic Review    No flowsheet data found.      Assessment:       1. Asthma, unspecified asthma severity, unspecified whether complicated, unspecified whether persistent        Outpatient Encounter Medications as of 1/13/2022   Medication Sig Dispense Refill    ALPRAZolam (XANAX) 0.5 MG tablet Take 0.5 mg by mouth daily as needed.      cloNIDine (CATAPRES) 0.1 MG tablet       cyclobenzaprine (FLEXERIL) 10 MG tablet TAKE 1 TABLET BY MOUTH 3 TIMES A DAY AS NEEDED FOR MUSCLE SPASMS 90 tablet 4    ergocalciferol, vitamin D2, (VITAMIN D ORAL) Take by mouth.      FLUoxetine 20 MG capsule Take 60 mg by mouth every morning.      FLUoxetine 40 MG capsule Take 40 mg by mouth once daily.      folic acid/vit Bcomp,C/Cu/zinc (VIT B COMP-C-FA-COPPER-ZINC ORAL) Take by mouth.      ibuprofen (ADVIL,MOTRIN) 400 MG tablet TAKE 1 TABLET BY MOUTH THREE TIMES A DAY 90 tablet 4    naproxen (NAPROSYN) 500 MG tablet Take 1 tablet (500 mg total) by  mouth 2 (two) times daily with meals. 20 tablet 0    pseudoephedrine-DM-guaiFENesin (POLY-VENT DM) 60- mg Tab Take 1 tablet by mouth every 6 (six) hours as needed. 20 tablet 0    REXULTI 1 mg Tab Take 1 tablet by mouth once daily.      traZODone (DESYREL) 100 MG tablet Take 200 mg by mouth nightly.      [DISCONTINUED] albuterol (PROVENTIL/VENTOLIN HFA) 90 mcg/actuation inhaler Inhale 1-2 puffs into the lungs every 6 (six) hours as needed for Wheezing or Shortness of Breath. Rescue 18 g 2    albuterol (PROVENTIL/VENTOLIN HFA) 90 mcg/actuation inhaler Inhale 1-2 puffs into the lungs every 6 (six) hours as needed for Wheezing or Shortness of Breath. Rescue 18 g 2    albuterol-ipratropium (DUO-NEB) 2.5 mg-0.5 mg/3 mL nebulizer solution Take 3 mLs by nebulization every 6 (six) hours as needed for Wheezing. 1 Box 6    fluticasone-salmeterol diskus inhaler 500-50 mcg Inhale 1 puff into the lungs 2 (two) times daily. Controller 60 each 11    garlic 200 mg Tab Take 200 mg by mouth once daily.      ginkgo biloba 40 mg Tab Take by mouth once daily.       multivitamin (THERAGRAN) per tablet Take 1 tablet by mouth once daily.      MAXX'S WORT ORAL Take by mouth.      TURMERIC ORAL Take by mouth.      vitamin E 200 UNIT capsule Take 200 Units by mouth once daily.       No facility-administered encounter medications on file as of 1/13/2022.     Orders Placed This Encounter   Procedures    Lung Volumes     Standing Status:   Future     Number of Occurrences:   1     Standing Expiration Date:   1/13/2023     Order Specific Question:   Release to patient     Answer:   Immediate    Spirometry without Bronchodilator     Standing Status:   Future     Number of Occurrences:   1     Standing Expiration Date:   1/13/2023     Order Specific Question:   Release to patient     Answer:   Immediate    DLCO-Carbon Monoxide Diffusing Capacity     Standing Status:   Future     Number of Occurrences:   1     Standing  Expiration Date:   1/13/2023     Order Specific Question:   Release to patient     Answer:   Immediate       Plan:               Assessment:  Gina was seen today for carcinoid bronchial adenoma of right lung.    Diagnoses and all orders for this visit:    Asthma, unspecified asthma severity, unspecified whether complicated, unspecified whether persistent  -     Lung Volumes; Future  -     Spirometry without Bronchodilator; Future  -     DLCO-Carbon Monoxide Diffusing Capacity; Future    Other orders  -     fluticasone-salmeterol diskus inhaler 500-50 mcg; Inhale 1 puff into the lungs 2 (two) times daily. Controller  -     albuterol (PROVENTIL/VENTOLIN HFA) 90 mcg/actuation inhaler; Inhale 1-2 puffs into the lungs every 6 (six) hours as needed for Wheezing or Shortness of Breath. Rescue        Plan:  Problem List Items Addressed This Visit    None     Visit Diagnoses     Asthma, unspecified asthma severity, unspecified whether complicated, unspecified whether persistent    -  Primary    Relevant Orders    Lung Volumes    Spirometry without Bronchodilator (Completed)    DLCO-Carbon Monoxide Diffusing Capacity        Moderate obstruction, restriction and diffusion impairment- asthma vs copd vs combination  Start advair  Prn albuterol      Follow up in about 3 months (around 4/13/2022) for pfts, 6mwd.    There are no Patient Instructions on file for this visit.    Immunization History   Administered Date(s) Administered    PPD Test 11/27/2018

## 2022-01-21 NOTE — TELEPHONE ENCOUNTER
No new care gaps identified.  Powered by [employer]. Reference number: [id number].   1/21/2022 8:05:47 AM CST

## 2022-01-24 ENCOUNTER — PATIENT MESSAGE (OUTPATIENT)
Dept: ADMINISTRATIVE | Facility: HOSPITAL | Age: 52
End: 2022-01-24
Payer: MEDICAID

## 2022-01-26 RX ORDER — ALBUTEROL SULFATE 90 UG/1
AEROSOL, METERED RESPIRATORY (INHALATION)
Qty: 54 G | Refills: 2 | OUTPATIENT
Start: 2022-01-26

## 2022-01-27 NOTE — TELEPHONE ENCOUNTER
Quick DC. Request already responded to by other means (e.g. phone or fax)   Refill Authorization Note   Gina Coto  is requesting a refill authorization.  Brief Assessment and Rationale for Refill:  Quick Discontinue  Medication Therapy Plan:       Medication Reconciliation Completed:  No      Comments:   Pended Medication(s)       Requested Prescriptions     Refused Prescriptions Disp Refills    albuterol (PROVENTIL/VENTOLIN HFA) 90 mcg/actuation inhaler [Pharmacy Med Name: ALBUTEROL HFA (VENTOLIN) INH] 54 g 2     Sig: INHALE 1 TO 2 PUFFS BY MOUTH EVERY 6 HOURS AS NEEDED FOR WHEEZING OR SHORTNESS OF BREATH     Refused By: BOZENA PERALTA     Reason for Refusal: Request already responded to by other means (e.g. phone or fax)        Duplicate Pended Encounter(s)/ Last Prescribed Details: (includes pharmacy & prescriber details)   Ordering Encounter Report    Associated Reports   View Encounter                Note composed:6:24 PM 01/26/2022

## 2022-03-08 DIAGNOSIS — Z85.118 HISTORY OF LUNG CANCER: Primary | ICD-10-CM

## 2022-04-04 ENCOUNTER — PATIENT MESSAGE (OUTPATIENT)
Dept: ADMINISTRATIVE | Facility: HOSPITAL | Age: 52
End: 2022-04-04
Payer: MEDICAID

## 2022-05-19 NOTE — PROGRESS NOTES
Subjective:       Patient ID: Gina Coto is a 52 y.o. female.    Chief Complaint: Follow-up    Diagnosis:  Carcinoid Tumor    Pre-operative therapy: NA    Procedure(s) and date(s):   4/22/19- Bronchoscopy, right thoracotomy for right lower bilobectomy for carcinoid tumor in bronchus intermedius and MLND.  Bronchial stump closed with 4-0 PDS under direct bronchoscopic vision.  Intercostal muscle flap between bronchus and PA.  5/6/19- Surveillance bronchoscopy  9/9/19 - re do thoracotomy for rib plating     Pathology:  2.8cm well differentiated neuroendocrine tumor. Grade 1 typical carcinoid tumor. Ki-67 stains less than 2% of tumor cell nuclei. Negative margins, 0.5cm from closest margin. 3 benign LNs within the specimen. Levels 7 and 11 LN negative. pT2 N0    Post-operative therapy: NA    HPI   52 y.o. female returns for 3 year follow up s/p right thoracotomy for right lower bilobectomy for resection of carcinoid tumor and is now s/p rib plating. Did well post-op. Still taking Gabapentin, Flexeril and Ibuprofen for post-thoracotomy pain. Spaced interval to 9 months. Started Advair and inhaler per Dr. Rooney in January. Breathing improved. Infrequent use of rescue inhaler. Here today with updated chest CT.    Review of Systems   Constitutional: Negative for activity change and fatigue.   HENT: Negative for congestion.    Respiratory: Negative for cough, choking and shortness of breath.    Cardiovascular: Negative for chest pain and palpitations.   Gastrointestinal: Negative for abdominal distention and abdominal pain.   Genitourinary: Negative for difficulty urinating.   Musculoskeletal: Negative for arthralgias.   Skin: Negative for color change and rash.   Neurological: Negative for dizziness and syncope.   Psychiatric/Behavioral: Negative for agitation. The patient is not nervous/anxious.          Objective:       Vitals:    05/20/22 0918   BP: 135/87   Pulse: 90   SpO2: (!) 94%   Weight: 90.9 kg (200 lb 6.4  "oz)   Height: 5' 6" (1.676 m)   PainSc: 0-No pain        Physical Exam  Vitals reviewed.   Constitutional:       Appearance: She is well-developed.   HENT:      Head: Normocephalic and atraumatic.   Neck:      Trachea: No tracheal deviation.   Cardiovascular:      Rate and Rhythm: Normal rate and regular rhythm.      Heart sounds: Normal heart sounds.   Pulmonary:      Effort: Pulmonary effort is normal.   Abdominal:      Palpations: Abdomen is soft.   Musculoskeletal:         General: Normal range of motion.      Cervical back: Normal range of motion and neck supple.   Skin:     General: Skin is warm and dry.   Neurological:      Mental Status: She is alert and oriented to person, place, and time.   Psychiatric:         Thought Content: Thought content normal.         Chest CT 1/15/20:   1.  Postoperative changes of right middle and lower lobectomies.  Status post osteotomy of the posterior aspect of the right 6th rib with intact plate and screw construct bridging the osteotomy gap.  No unusual postoperative finding identified; right upper lobe appears well aerated and clear.   2.  Hepatic parenchyma attenuation measures 69 HU on noncontrast chest CT today; it measured 52 HU on noncontrast chest CT dated 11/26/2018.  Cause for mildly elevated hepatic parenchymal attenuation is unclear.  3.  There is asymmetric distribution of soft tissue in the breasts, greater on the left where there is a collection of soft tissue that measures at least 4.2 by 3.4 cm on axial image (axial series 2, image 47) and with greater cranial-caudad extent as seen on sagittal series 604, image 189.  Breasts are incompletely included on earlier studies.  Please confirm that the patient is up-to-date on mammographic surveillance.  I reported this by telephone to APARNA Eaton in thoracic surgery, prior to this dictation.    Chest CT 7/15/20:  Stable postoperative changes of right middle and lower lobectomies and osteotomy of the " posterolateral right 6th rib.  Remaining right upper lobe is well aerated without evidence of acute pathology or recurrent disease in this patient with history of carcinoid.  Liver demonstrates diffuse parenchymal hyperattenuation, of uncertain etiology but can be seen with long-term amiodarone use, iron overload, or copper overload.  Additional stable and incidental findings, as above    Chest CT 2/19/21:  The show multiple new peripheral round regions of ground-glass opacification in both lungs consistent with viral pneumonia.  Central airways are widely patent.  There is evidence of prior right chest surgery with volume loss in right lung.  Mediastinum:  No mediastinal or hilar lymphadenopathy is seen.  There are persistent changes of focal pleural thickening in right lung base.  Scans of the upper abdomen show both adrenal glands to be normal in size.    Chest CT 8/17/21: Reviewed. MELIZA.   Stable postoperative changes of right middle and lower lobectomies for carcinoid tumor resection.  No evidence of local recurrence.  Continued improvement of multifocal bilateral ground-glass opacities and subcentimeter ground-glass nodules in this patient with history of COVID-19 pneumonia.  Findings likely represent resolving inflammation/infection.  New ground-glass opacity in the apicoposterior segment of the right upper lobe.  This is nonspecific and could be related to infection, inflammation or aspiration.  Attention on follow-up.    Chest CT 5/20/22:   1.  Status post right middle lower lobectomy for reported carcinoid tumor resection.  No specific CT evidence for new recurrent or metastatic disease in the chest.  2.  There is improvement in the right upper lobe ground-glass opacities.  However, ground-glass opacities in the left lung have increased appear distributed centrally.  Consider pneumonitis, bronchopneumonia, COVID-19 pulmonary disease, etc.    Assessment:       52 y.o. female returns for follow up s/p right  thoracotomy for right lower bilobectomy for resection of carcinoid tumor with MLND.   MELIZA for recurrent carcinoid tumor.  Dysphagia vs dysmotility.     Plan:     Will discuss new CT at conference - refer to pulmonary for bronch?   Esophagram to evaluate nonspecific dysphagia complaints.   RTC in 6 months with repeat non contrast chest CT

## 2022-05-20 ENCOUNTER — HOSPITAL ENCOUNTER (OUTPATIENT)
Dept: RADIOLOGY | Facility: HOSPITAL | Age: 52
Discharge: HOME | End: 2022-05-20
Attending: THORACIC SURGERY (CARDIOTHORACIC VASCULAR SURGERY)
Payer: MEDICAID

## 2022-05-20 ENCOUNTER — OFFICE VISIT (OUTPATIENT)
Dept: CARDIOTHORACIC SURGERY | Facility: CLINIC | Age: 52
End: 2022-05-20
Payer: MEDICAID

## 2022-05-20 VITALS
HEIGHT: 66 IN | OXYGEN SATURATION: 94 % | SYSTOLIC BLOOD PRESSURE: 135 MMHG | HEART RATE: 90 BPM | DIASTOLIC BLOOD PRESSURE: 87 MMHG | WEIGHT: 200.38 LBS | BODY MASS INDEX: 32.2 KG/M2

## 2022-05-20 DIAGNOSIS — D3A.00 CARCINOID TUMOR, UNSPECIFIED SITE, UNSPECIFIED WHETHER MALIGNANT: Primary | ICD-10-CM

## 2022-05-20 DIAGNOSIS — Z85.118 HISTORY OF LUNG CANCER: ICD-10-CM

## 2022-05-20 DIAGNOSIS — R13.10 DYSPHAGIA, UNSPECIFIED TYPE: ICD-10-CM

## 2022-05-20 PROCEDURE — 3079F DIAST BP 80-89 MM HG: CPT | Mod: CPTII,,, | Performed by: THORACIC SURGERY (CARDIOTHORACIC VASCULAR SURGERY)

## 2022-05-20 PROCEDURE — 1159F MED LIST DOCD IN RCRD: CPT | Mod: CPTII,,, | Performed by: THORACIC SURGERY (CARDIOTHORACIC VASCULAR SURGERY)

## 2022-05-20 PROCEDURE — 99215 OFFICE O/P EST HI 40 MIN: CPT | Mod: PBBFAC,25 | Performed by: THORACIC SURGERY (CARDIOTHORACIC VASCULAR SURGERY)

## 2022-05-20 PROCEDURE — 71250 CT THORAX DX C-: CPT | Mod: 26,,, | Performed by: RADIOLOGY

## 2022-05-20 PROCEDURE — 99213 OFFICE O/P EST LOW 20 MIN: CPT | Mod: S$PBB,,, | Performed by: THORACIC SURGERY (CARDIOTHORACIC VASCULAR SURGERY)

## 2022-05-20 PROCEDURE — 99213 PR OFFICE/OUTPT VISIT, EST, LEVL III, 20-29 MIN: ICD-10-PCS | Mod: S$PBB,,, | Performed by: THORACIC SURGERY (CARDIOTHORACIC VASCULAR SURGERY)

## 2022-05-20 PROCEDURE — 99999 PR PBB SHADOW E&M-EST. PATIENT-LVL V: ICD-10-PCS | Mod: PBBFAC,,, | Performed by: THORACIC SURGERY (CARDIOTHORACIC VASCULAR SURGERY)

## 2022-05-20 PROCEDURE — 3079F PR MOST RECENT DIASTOLIC BLOOD PRESSURE 80-89 MM HG: ICD-10-PCS | Mod: CPTII,,, | Performed by: THORACIC SURGERY (CARDIOTHORACIC VASCULAR SURGERY)

## 2022-05-20 PROCEDURE — 3008F PR BODY MASS INDEX (BMI) DOCUMENTED: ICD-10-PCS | Mod: CPTII,,, | Performed by: THORACIC SURGERY (CARDIOTHORACIC VASCULAR SURGERY)

## 2022-05-20 PROCEDURE — 3008F BODY MASS INDEX DOCD: CPT | Mod: CPTII,,, | Performed by: THORACIC SURGERY (CARDIOTHORACIC VASCULAR SURGERY)

## 2022-05-20 PROCEDURE — 71250 CT THORAX DX C-: CPT | Mod: TC

## 2022-05-20 PROCEDURE — 3075F SYST BP GE 130 - 139MM HG: CPT | Mod: CPTII,,, | Performed by: THORACIC SURGERY (CARDIOTHORACIC VASCULAR SURGERY)

## 2022-05-20 PROCEDURE — 99999 PR PBB SHADOW E&M-EST. PATIENT-LVL V: CPT | Mod: PBBFAC,,, | Performed by: THORACIC SURGERY (CARDIOTHORACIC VASCULAR SURGERY)

## 2022-05-20 PROCEDURE — 1159F PR MEDICATION LIST DOCUMENTED IN MEDICAL RECORD: ICD-10-PCS | Mod: CPTII,,, | Performed by: THORACIC SURGERY (CARDIOTHORACIC VASCULAR SURGERY)

## 2022-05-20 PROCEDURE — 3075F PR MOST RECENT SYSTOLIC BLOOD PRESS GE 130-139MM HG: ICD-10-PCS | Mod: CPTII,,, | Performed by: THORACIC SURGERY (CARDIOTHORACIC VASCULAR SURGERY)

## 2022-05-20 PROCEDURE — 71250 CT CHEST WITHOUT CONTRAST: ICD-10-PCS | Mod: 26,,, | Performed by: RADIOLOGY

## 2022-05-20 RX ORDER — PHENTERMINE HYDROCHLORIDE 37.5 MG/1
37.5 TABLET ORAL EVERY MORNING
COMMUNITY
Start: 2022-04-13 | End: 2022-11-30

## 2022-05-26 ENCOUNTER — DOCUMENTATION ONLY (OUTPATIENT)
Dept: CARDIOTHORACIC SURGERY | Facility: CLINIC | Age: 52
End: 2022-05-26
Payer: MEDICAID

## 2022-05-26 NOTE — PATIENT CARE CONFERENCE
[facility]      THORACIC MULTIDISCIPLINARY TUMOR BOARD  PATIENT REVIEW FORM  ________________________________________________________________________    CLINIC #: [id number]  DATE: 05/26/2022    DIAGNOSIS:   Typical carcinois    PRESENTER:   Dr. Miles    PATIENT SUMMARY:   52 y.o. female 3 years s/p right thoracotomy for right lower bilobectomy for resection of carcinoid tumor and is now s/p rib plating. Pathology: 2.8cm well differentiated neuroendocrine tumor. Grade 1 typical carcinoid tumor. Ki-67 stains less than 2% of tumor cell nuclei. Negative margins, 0.5cm from closest margin. 3 benign LNs within the specimen. Levels 7 and 11 LN negative. pT2 N0. Surveillance CT in June of 2021 following COVID showed scattered GGO in left lung. Repeat scan Aug showed interval improvement. CT May 2022 showed worsening of GIBSON GGO.      Started Advair and inhaler per Dr. Rooney in January. Breathing improved. Infrequent use of rescue inhaler. Minimal cough.     BOARD RECOMMENDATIONS:   GGO appearance not consistent with carcinoid recurrence. Concern for pneumonitis however patient is asymptomatic at this time. Agree with plan for esophagram to rule out aspiration.     CONSULT NEEDED:     [] Surgery    [] Hem/Onc    [] Rad/Onc    [] Dietary                 [] Social Service    [] Psychology       [] Pulmonology    Clinical Stage: Tumor 1c Node(s) 0 Metastasis   Pathologic Stage: Tumor 1c Node(s) 0 Metastasis         GROUP STAGE:     [] O    [x] 1A    [] IB    [] IIA    [] IIB     [] IIIA     [] IIIB     [] IIIC    [] IV                               [] Local recurrence     [] Regional recurrence     [] Distant recurrence                   [] NSCLC     [] SCLC     Tumor type typical carcinoid    Unstageable:      [] Yes     [x] No  Metastatic site(s):no         [x] Rena'l Treatment Guidelines reviewed and care planned is consistent with guidelines.         (i.e., NCCN, NCI, PD, ACO, AUA, etc.)    PRESENTATION AT CANCER  CONFERENCE:         [] Prospective    [] Retrospective     [] Follow-Up          [] Eligible for clinical trial

## 2022-06-14 ENCOUNTER — PATIENT MESSAGE (OUTPATIENT)
Dept: GASTROENTEROLOGY | Facility: CLINIC | Age: 52
End: 2022-06-14
Payer: MEDICAID

## 2022-06-14 ENCOUNTER — TELEPHONE (OUTPATIENT)
Dept: CARDIOTHORACIC SURGERY | Facility: CLINIC | Age: 52
End: 2022-06-14
Payer: MEDICAID

## 2022-06-14 ENCOUNTER — TELEPHONE (OUTPATIENT)
Dept: GASTROENTEROLOGY | Facility: CLINIC | Age: 52
End: 2022-06-14
Payer: MEDICAID

## 2022-06-14 DIAGNOSIS — Z85.110: ICD-10-CM

## 2022-06-14 DIAGNOSIS — R13.10 DYSPHAGIA, UNSPECIFIED TYPE: Primary | ICD-10-CM

## 2022-06-14 NOTE — TELEPHONE ENCOUNTER
----- Message from Juan Jose Miles MD sent at 6/14/2022  7:42 AM CDT -----  I presented this patient in esophageal conference.  Please order a timed barium esophagram with a barium tablet and order esophageal manometry.  Thanks.

## 2022-06-14 NOTE — TELEPHONE ENCOUNTER
Called and spoke with patient.  Pt would like to schedule her testing on two consecutive days.  Explained will let Regi NICOLAS know.  Also explained will send Regi's ph no and Ryan brunson no to her portal.  And will schedule TBS after other procedures scheduled, on the following day.

## 2022-06-14 NOTE — TELEPHONE ENCOUNTER
Case presented at Swallowing Conference  PT w dysphagia after thoracotomy/resection of carcinoid.  Esophagogram w abrupt bend is detected in the mid to distal esophagus and delay in emptying of barium  Pls help arrange EGD w endoscopically placed manometry w Dr. Floyd and [facility] - For Dr. Miles       MOTILITY CLINIC PROCEDURE ORDERS    CLEARANCE FOR PROCEDURES:  [x] Not needed   [] Cardiology   [] Pulmonary  [] PCP    PROCEDURES  [x] EGD   [] Colonoscopy   [] EGD with EndoFlip   [x] Esophageal manometry with impedance - dysphagia   [] Esophageal manometry with impedance - rumination  [] Esophageal manometry with impedance - belching   [] EGD with BRAVO 48 hours   [] EGD with BRAVO 96 hours   [] pH Impedance 24 hours   [] Anorectal manometry   [] Rectal Ultrasound     Does manometry need to be placed endoscopically:   [x] Yes    FLOOR:  [] 4th Floor   [x] 2nd Floor    Reason for 2nd Floor:   [] Gastroparesis   [] End stage achalasia  [] Pre lung transplant   [] Pre hear transplant   [] Pulmonary HTN (PAP>50 or on meds)   [] Severe pulmonary Disease   [] Complex medical problems   [] BMI>50  [] History of anesthesia issues  [x] Other: fluid in esophagus     PREP  [] Standard Prep  [] Severe Constipation Prep (Low residue diet 7 days.  1.5 prep the day prior, 0.5 prep the day of procedure)  [] Full liquid diet 5 days   [x] Full liquid diet 3 days   [] Full liquid diet 2 days   [] Full liquid diet 1 day   [] Other:     MEDICATIONS    pH Studies  [] ON PPI/H2 Blocker   [] OFF PPI/H2 Blocker     Metal allergy (stainless steal w chromium, nickel, copper, cobalt and iron).:   []  Yes    Pacemaker/defibrillator:  [] Yes    Motility Studies (esophageal manometry/anorectal manometry)  Hold narcotics x 1 days if able   Hold TCA x 1 days if able  Propofol/lidocaine only during sedation.  Discuss with Dr. Floyd if additional sedation needed.   Hold baclofen for thee days (at least one day) if able   Hold muscle relaxants x 1 day  if able     Anticoagulation/antiplt agents:   []  Yes    ORDER OF TESTING:  Day 1: EGD/manometry  Day 2: TBS     [] No special requirements - pt lives locally     SCHEDULING PRIORITY  [] Urgent  (<7 days)  [] Priority (<30 days)  [] Routine 1 (schedule ASAP)  [] Routine 2 (next available, < 3 months)   [] Routine 3 (ok if > 3 months)       PHYSICIAN  []  Ok with Dr. Welch   []  Ok with any GI MD

## 2022-06-15 ENCOUNTER — PATIENT MESSAGE (OUTPATIENT)
Dept: ENDOSCOPY | Facility: HOSPITAL | Age: 52
End: 2022-06-15
Payer: MEDICAID

## 2022-06-15 ENCOUNTER — TELEPHONE (OUTPATIENT)
Dept: ENDOSCOPY | Facility: HOSPITAL | Age: 52
End: 2022-06-15
Payer: MEDICAID

## 2022-06-15 ENCOUNTER — PATIENT MESSAGE (OUTPATIENT)
Dept: GASTROENTEROLOGY | Facility: CLINIC | Age: 52
End: 2022-06-15
Payer: MEDICAID

## 2022-06-15 ENCOUNTER — TELEPHONE (OUTPATIENT)
Dept: GASTROENTEROLOGY | Facility: CLINIC | Age: 52
End: 2022-06-15
Payer: MEDICAID

## 2022-06-15 NOTE — TELEPHONE ENCOUNTER
Called and spoke with patient. Reviewed TBS pre-procedure instructions.  Explained did schedule her TBS for 8:30 but I will have to move to 9:30am as manometry probe will be removed at 9am  Explained will call radiology tomorrow and reschedule    Explained also sent TBS pre-procedure instructions to her portal as well.

## 2022-06-15 NOTE — TELEPHONE ENCOUNTER
Contacted patient to schedule procedure. As per our conversation,  patient is scheduled for EGD and Esophageal Manometry on 7/12/22 at 1:00 pm and 2:30pm. Instructions reviewed with patient and informed instructions will be on patient portal for review.

## 2022-06-16 ENCOUNTER — PATIENT MESSAGE (OUTPATIENT)
Dept: GASTROENTEROLOGY | Facility: CLINIC | Age: 52
End: 2022-06-16
Payer: MEDICAID

## 2022-06-16 ENCOUNTER — DOCUMENTATION ONLY (OUTPATIENT)
Dept: CARDIOTHORACIC SURGERY | Facility: CLINIC | Age: 52
End: 2022-06-16
Payer: MEDICAID

## 2022-06-16 NOTE — PATIENT CARE CONFERENCE
[facility]   BENIGN FOREGUT MULTIDISCIPLINARY CONFERENCE  PATIENT REVIEW FORM  ____________________________________________________________    CLINIC #: [id number]    DATE: 06/16/2022    DIAGNOSIS:     [x] Dysphagia [] GERD                                        [] Slipped Nissen                      [] Gastroparesis                     [] LP Reflux     [] Achalasia        [] Hypercontractile esophagus      [] Hiatal Hernia                         [] Rumination Syndrome    [] CP Bar     [] EGJOO            [] Diffuse esophageal spasm        [] Para-esophageal Hernia       [] Cyclic Vomiting                  [] Zenker's Diverticulum     [] Dysmotility     [] Pseudoachalasia                       [] Esophageal diverticulum       [] Dumping Syndrome            [] Oropharyngeal dysphagia     [] Payton's              [] OTHER:     PRESENTER:      [] Dr. Floyd    [] Dr. Jenkins   [] Dr. Cochran   [] Dr. Miles           [] Dr. Bal  [] Dr. Edwards   [] Dr. Welch    [x] Other: Dr. Miles    PATIENT SUMMARY:   53yo female with a history of bronchus intermedius typical carcinoid tumor status post right thoracotomy right lower bilobectomy with bronchial stump suture closure and intercostal muscle flap interposition between the bronchus and pulmonary artery.  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  In February 2021, she presented with moderate to severe respiratory symptoms and a radiographic pattern highly suggestive of COVID pneumonia.  PCR test was negative and the patient was diagnosed with possible viral or bacterial pneumonia.  During routine carcinoid surveillance follow-up in May 2022 she endorsed solid and liquid dysphagia.  Follow-up esophagram 6/03/2022 demonstrated an abrupt band along the mid to distal esophagus.  Comparison to chest CT 5/20/2022 suggests that this abnormality occurs distal to the inferior pulmonary vein takeoff.    RECOMMENDATIONS:    -Timed barium esophagram with 13 mm tablet  -Upper endoscopy with esophageal manometry  -Possible robotic transabdominal distal esophageal mobilization in an effort to restore linear esophageal transit along the posterior mediastinum.  This approach is preferred given the presumed intense intrathoracic adhesion formation related to prior thoracotomies and pneumonia.  A left-sided transthoracic approach is not feasible given history of prior right lower bilobectomy.  This approach were present challenges with patient oxygenation during thoracotomy.    CONSULT NEEDED:         [] Foregut Surg   [] ENT                [x] GI            [] CT Surg         [] Psychiatry        [] Psychology     [] Speech       IMAGING:       [x] TBS      [] MBS    [] CT ABD/PELVIS       [] GES      [] MRI     [] CT chest    [] EKG      [] U/S     [] UGI w/SBFT        PROCEDURES:    [x] EGD       [] EGD w dilation  [] EGD w Botox        [] BRAVO  [] Ph Impedance  [x] Manometry    [] Endoflip  [] EUS                     [] OTHER:     SURGERY  [] Heller Myotomy      [] POEM                 []  Diverticulectomy  [] Nissen Fundoplication       [] Ian Fundoplication      [] Toupet Fundoplication  [] Hiatal Hernia Repair   [] Paraesophageal Hernia Repair

## 2022-07-06 ENCOUNTER — PATIENT MESSAGE (OUTPATIENT)
Dept: GASTROENTEROLOGY | Facility: CLINIC | Age: 52
End: 2022-07-06
Payer: MEDICAID

## 2022-07-07 ENCOUNTER — PATIENT MESSAGE (OUTPATIENT)
Dept: ENDOSCOPY | Facility: HOSPITAL | Age: 52
End: 2022-07-07
Payer: MEDICAID

## 2022-07-12 ENCOUNTER — HOSPITAL ENCOUNTER (OUTPATIENT)
Facility: HOSPITAL | Age: 52
Discharge: HOME | End: 2022-07-12
Attending: INTERNAL MEDICINE | Admitting: INTERNAL MEDICINE
Payer: MEDICAID

## 2022-07-12 ENCOUNTER — ANESTHESIA (OUTPATIENT)
Dept: ENDOSCOPY | Facility: HOSPITAL | Age: 52
End: 2022-07-12
Payer: MEDICAID

## 2022-07-12 ENCOUNTER — ANESTHESIA EVENT (OUTPATIENT)
Dept: ENDOSCOPY | Facility: HOSPITAL | Age: 52
End: 2022-07-12
Payer: MEDICAID

## 2022-07-12 VITALS
RESPIRATION RATE: 31 BRPM | BODY MASS INDEX: 32.14 KG/M2 | OXYGEN SATURATION: 98 % | WEIGHT: 200 LBS | TEMPERATURE: 98 F | HEART RATE: 70 BPM | SYSTOLIC BLOOD PRESSURE: 140 MMHG | HEIGHT: 66 IN | DIASTOLIC BLOOD PRESSURE: 86 MMHG

## 2022-07-12 DIAGNOSIS — R13.10 DYSPHAGIA: ICD-10-CM

## 2022-07-12 LAB
B-HCG UR QL: NEGATIVE
CTP QC/QA: YES
CTP QC/QA: YES
SARS-COV-2 AG RESP QL IA.RAPID: NEGATIVE

## 2022-07-12 PROCEDURE — 27201012 HC FORCEPS, HOT/COLD, DISP: Performed by: INTERNAL MEDICINE

## 2022-07-12 PROCEDURE — 63600175 PHARM REV CODE 636 W HCPCS: Performed by: NURSE ANESTHETIST, CERTIFIED REGISTERED

## 2022-07-12 PROCEDURE — D9220A PRA ANESTHESIA: ICD-10-PCS | Mod: ANES,,, | Performed by: ANESTHESIOLOGY

## 2022-07-12 PROCEDURE — 00731 ANES UPR GI NDSC PX NOS: CPT | Performed by: INTERNAL MEDICINE

## 2022-07-12 PROCEDURE — 63600175 PHARM REV CODE 636 W HCPCS: Mod: JA | Performed by: ANESTHESIOLOGY

## 2022-07-12 PROCEDURE — 37000009 HC ANESTHESIA EA ADD 15 MINS: Performed by: INTERNAL MEDICINE

## 2022-07-12 PROCEDURE — 91037 ESOPH IMPED FUNCTION TEST: CPT | Mod: TC | Performed by: INTERNAL MEDICINE

## 2022-07-12 PROCEDURE — 43239 EGD BIOPSY SINGLE/MULTIPLE: CPT | Mod: ,,, | Performed by: INTERNAL MEDICINE

## 2022-07-12 PROCEDURE — 91010 ESOPHAGUS MOTILITY STUDY: CPT | Mod: TC | Performed by: INTERNAL MEDICINE

## 2022-07-12 PROCEDURE — 88305 TISSUE EXAM BY PATHOLOGIST: CPT | Mod: 26,,, | Performed by: STUDENT IN AN ORGANIZED HEALTH CARE EDUCATION/TRAINING PROGRAM

## 2022-07-12 PROCEDURE — 88305 TISSUE EXAM BY PATHOLOGIST: CPT | Performed by: STUDENT IN AN ORGANIZED HEALTH CARE EDUCATION/TRAINING PROGRAM

## 2022-07-12 PROCEDURE — D9220A PRA ANESTHESIA: Mod: CRNA,,, | Performed by: NURSE ANESTHETIST, CERTIFIED REGISTERED

## 2022-07-12 PROCEDURE — 43239 EGD BIOPSY SINGLE/MULTIPLE: CPT | Performed by: INTERNAL MEDICINE

## 2022-07-12 PROCEDURE — 88305 TISSUE EXAM BY PATHOLOGIST: ICD-10-PCS | Mod: 26,,, | Performed by: STUDENT IN AN ORGANIZED HEALTH CARE EDUCATION/TRAINING PROGRAM

## 2022-07-12 PROCEDURE — 37000008 HC ANESTHESIA 1ST 15 MINUTES: Performed by: INTERNAL MEDICINE

## 2022-07-12 PROCEDURE — D9220A PRA ANESTHESIA: Mod: ANES,,, | Performed by: ANESTHESIOLOGY

## 2022-07-12 PROCEDURE — 25000003 PHARM REV CODE 250: Performed by: NURSE ANESTHETIST, CERTIFIED REGISTERED

## 2022-07-12 PROCEDURE — 43239 PR EGD, FLEX, W/BIOPSY, SGL/MULTI: ICD-10-PCS | Mod: ,,, | Performed by: INTERNAL MEDICINE

## 2022-07-12 PROCEDURE — D9220A PRA ANESTHESIA: ICD-10-PCS | Mod: CRNA,,, | Performed by: NURSE ANESTHETIST, CERTIFIED REGISTERED

## 2022-07-12 RX ORDER — SODIUM CHLORIDE 9 MG/ML
INJECTION, SOLUTION INTRAVENOUS CONTINUOUS
Status: DISCONTINUED | OUTPATIENT
Start: 2022-07-12 | End: 2022-07-18 | Stop reason: HOSPADM

## 2022-07-12 RX ORDER — LIDOCAINE HYDROCHLORIDE 20 MG/ML
INJECTION, SOLUTION EPIDURAL; INFILTRATION; INTRACAUDAL; PERINEURAL
Status: DISCONTINUED | OUTPATIENT
Start: 2022-07-12 | End: 2022-07-12

## 2022-07-12 RX ORDER — OCTREOTIDE ACETATE 500 UG/ML
500 INJECTION, SOLUTION INTRAVENOUS; SUBCUTANEOUS ONCE
Status: COMPLETED | OUTPATIENT
Start: 2022-07-12 | End: 2022-07-12

## 2022-07-12 RX ORDER — SODIUM CHLORIDE 0.9 % (FLUSH) 0.9 %
10 SYRINGE (ML) INJECTION
Status: CANCELLED | OUTPATIENT
Start: 2022-07-12

## 2022-07-12 RX ORDER — PROPOFOL 10 MG/ML
VIAL (ML) INTRAVENOUS CONTINUOUS PRN
Status: DISCONTINUED | OUTPATIENT
Start: 2022-07-12 | End: 2022-07-12

## 2022-07-12 RX ORDER — SODIUM CHLORIDE 0.9 % (FLUSH) 0.9 %
10 SYRINGE (ML) INJECTION
Status: DISCONTINUED | OUTPATIENT
Start: 2022-07-12 | End: 2022-07-18 | Stop reason: HOSPADM

## 2022-07-12 RX ORDER — SODIUM CHLORIDE 9 MG/ML
INJECTION, SOLUTION INTRAVENOUS CONTINUOUS
Status: CANCELLED | OUTPATIENT
Start: 2022-07-12

## 2022-07-12 RX ORDER — ONDANSETRON 2 MG/ML
4 INJECTION INTRAMUSCULAR; INTRAVENOUS ONCE AS NEEDED
Status: DISCONTINUED | OUTPATIENT
Start: 2022-07-12 | End: 2022-07-18 | Stop reason: HOSPADM

## 2022-07-12 RX ORDER — LIDOCAINE HYDROCHLORIDE 20 MG/ML
JELLY TOPICAL ONCE
Status: DISCONTINUED | OUTPATIENT
Start: 2022-07-12 | End: 2022-07-18 | Stop reason: HOSPADM

## 2022-07-12 RX ORDER — ONDANSETRON 2 MG/ML
INJECTION INTRAMUSCULAR; INTRAVENOUS
Status: DISCONTINUED | OUTPATIENT
Start: 2022-07-12 | End: 2022-07-12

## 2022-07-12 RX ORDER — PROPOFOL 10 MG/ML
VIAL (ML) INTRAVENOUS
Status: DISCONTINUED | OUTPATIENT
Start: 2022-07-12 | End: 2022-07-12

## 2022-07-12 RX ADMIN — PROPOFOL 100 MG: 10 INJECTION, EMULSION INTRAVENOUS at 01:07

## 2022-07-12 RX ADMIN — OCTREOTIDE ACETATE 500 MCG: 500 INJECTION, SOLUTION INTRAVENOUS; SUBCUTANEOUS at 01:07

## 2022-07-12 RX ADMIN — LIDOCAINE HYDROCHLORIDE 100 MG: 20 INJECTION, SOLUTION EPIDURAL; INFILTRATION; INTRACAUDAL; PERINEURAL at 02:07

## 2022-07-12 RX ADMIN — LIDOCAINE HYDROCHLORIDE 50 MG: 20 INJECTION, SOLUTION EPIDURAL; INFILTRATION; INTRACAUDAL; PERINEURAL at 01:07

## 2022-07-12 RX ADMIN — Medication 175 MCG/KG/MIN: at 01:07

## 2022-07-12 RX ADMIN — SODIUM CHLORIDE: 9 INJECTION, SOLUTION INTRAVENOUS at 01:07

## 2022-07-12 RX ADMIN — ONDANSETRON 8 MG: 2 INJECTION INTRAMUSCULAR; INTRAVENOUS at 01:07

## 2022-07-12 NOTE — PLAN OF CARE
Called endoscopy for discharge order and was told it would be placed in manometry and patient would be discharged from manometry.

## 2022-07-12 NOTE — ANESTHESIA PREPROCEDURE EVALUATION
07/12/2022  Gina Coto is a 52 y.o., female    Pre-operative evaluation for Procedure(s) (LRB):  ESOPHAGOGASTRODUODENOSCOPY (EGD) (N/A)  MANOMETRY-ESOPHAGEAL-WITH IMPEDANCE (N/A)    Gina Coto is a 52 y.o. female     Patient Active Problem List   Diagnosis    History of pneumonia- Nov 2018     Seasonal asthma    Abnormal chest CT    Carcinoid tumor    Carcinoid tumor of right lung    Rib fracture       Review of patient's allergies indicates:  No Known Allergies    No current facility-administered medications on file prior to encounter.     Current Outpatient Medications on File Prior to Encounter   Medication Sig Dispense Refill    albuterol (PROVENTIL/VENTOLIN HFA) 90 mcg/actuation inhaler INHALE 1 TO 2 PUFFS BY MOUTH EVERY 6 HOURS AS NEEDED FOR WHEEZING OR SHORTNESS OF BREATH 18 g 2    albuterol-ipratropium (DUO-NEB) 2.5 mg-0.5 mg/3 mL nebulizer solution Take 3 mLs by nebulization every 6 (six) hours as needed for Wheezing. 1 Box 6    ALPRAZolam (XANAX) 0.5 MG tablet Take 0.5 mg by mouth daily as needed.      cloNIDine (CATAPRES) 0.1 MG tablet       cyclobenzaprine (FLEXERIL) 10 MG tablet TAKE 1 TABLET BY MOUTH 3 TIMES A DAY AS NEEDED FOR MUSCLE SPASMS 90 tablet 4    ergocalciferol, vitamin D2, (VITAMIN D ORAL) Take by mouth.      FLUoxetine 20 MG capsule Take 60 mg by mouth every morning.      FLUoxetine 40 MG capsule Take 40 mg by mouth once daily.      fluticasone-salmeterol diskus inhaler 500-50 mcg Inhale 1 puff into the lungs 2 (two) times daily. Controller 60 each 11    folic acid/vit Bcomp,C/Cu/zinc (VIT B COMP-C-FA-COPPER-ZINC ORAL) Take by mouth.      garlic 200 mg Tab Take 200 mg by mouth once daily.      ginkgo biloba 40 mg Tab Take by mouth once daily.       ibuprofen (ADVIL,MOTRIN) 400 MG tablet TAKE 1 TABLET BY MOUTH THREE TIMES A DAY 90 tablet 4     multivitamin (THERAGRAN) per tablet Take 1 tablet by mouth once daily.      naproxen (NAPROSYN) 500 MG tablet Take 1 tablet (500 mg total) by mouth 2 (two) times daily with meals. 20 tablet 0    phentermine (ADIPEX-P) 37.5 mg tablet Take 37.5 mg by mouth every morning.      pseudoephedrine-DM-guaiFENesin (POLY-VENT DM) 60- mg Tab Take 1 tablet by mouth every 6 (six) hours as needed. 20 tablet 0    REXULTI 1 mg Tab Take 1 tablet by mouth once daily.      MAXX'S WORT ORAL Take by mouth.      traZODone (DESYREL) 100 MG tablet Take 200 mg by mouth nightly.      TURMERIC ORAL Take by mouth.      vitamin E 200 UNIT capsule Take 200 Units by mouth once daily.         Past Surgical History:   Procedure Laterality Date    BRONCHOSCOPY N/A 11/28/2018    Procedure: BRONCHOSCOPY;  Surgeon: Omar Prescott MD;  Location: [facility] CATH LAB;  Service: Pulmonary;  Laterality: N/A;    BRONCHOSCOPY N/A 4/22/2019    Procedure: BRONCHOSCOPY;  Surgeon: Juan Jose Miles MD;  [facility];  Service: Thoracic;  Laterality: N/A;    BRONCHOSCOPY N/A 5/6/2019    Procedure: BRONCHOSCOPY;  Surgeon: Juan Jose Miles MD;  [facility];  Service: Thoracic;  Laterality: N/A;    COLONOSCOPY N/A 12/2/2021    Procedure: COLONOSCOPY;  Surgeon: Juan Jose Castano MD;  Location: [facility];  Service: Colon and Rectal;  Laterality: N/A;    PELVIC LAPAROSCOPY      SLEEVE LOBECTOMY OF LUNG BY THORACOTOMY Right 4/22/2019    Procedure: LOBECTOMY, SLEEVE, LUNG, THORACOTOMY APPROACH;  Surgeon: Juan Jose Miles MD;  Location: [facility] OR [address] [place] [address];  Service: Thoracic;  Laterality: Right;  lower bilobectomy     Pre-op Assessment    I have reviewed the Patient Summary Reports.     I have reviewed the Nursing Notes. I have reviewed the NPO Status.   I have reviewed the Medications.     Review of Systems  Anesthesia Hx:  No problems with previous Anesthesia Denies Hx of Anesthetic complications  History of prior surgery  of interest to airway management or planning: Denies Family Hx of Anesthesia complications.   Denies Personal Hx of Anesthesia complications.   Social:  No Alcohol Use, Non-Smoker    Hematology/Oncology:  Hematology Normal       -- Cancer in past history:  surgery  Oncology Comments: Hx of carcinoid cancer in lung     EENT/Dental:EENT/Dental Normal   Cardiovascular:  Cardiovascular Normal Exercise tolerance: good     Pulmonary:   Asthma    Renal/:  Renal/ Normal     Hepatic/GI:  Hepatic/GI Normal    Neurological:  Neurology Normal    Endocrine:  Endocrine Normal  Obesity / BMI > 30  Psych:  Psychiatric Normal           Physical Exam  General: Well nourished, Cooperative, Alert and Oriented    Airway:  Mallampati: II   Mouth Opening: Normal  TM Distance: Normal  Tongue: Normal  Neck ROM: Normal ROM    Dental:  Intact    Chest/Lungs:  Clear to auscultation, Normal Respiratory Rate    Heart:  Rate: Normal  Rhythm: Regular Rhythm        Anesthesia Plan  Type of Anesthesia, risks & benefits discussed:    Anesthesia Type: Gen Natural Airway  Intra-op Monitoring Plan: Standard ASA Monitors  Post Op Pain Control Plan: multimodal analgesia and IV/PO Opioids PRN  Induction:  IV  Airway Plan: Direct  Informed Consent: Informed consent signed with the Patient and all parties understand the risks and agree with anesthesia plan.  All questions answered. Patient consented to blood products? No  ASA Score: 3  Day of Surgery Review of History & Physical: H&P Update referred to the surgeon/provider.    Ready For Surgery From Anesthesia Perspective.     .

## 2022-07-12 NOTE — TRANSFER OF CARE
"Anesthesia Transfer of Care Note    Patient: Gina Coto    Procedure(s) Performed: Procedure(s) (LRB):  ESOPHAGOGASTRODUODENOSCOPY (EGD) (N/A)  MANOMETRY-ESOPHAGEAL-WITH IMPEDANCE (N/A)    Patient location: [facility]    Anesthesia Type: general    Transport from OR: Transported from OR on room air with adequate spontaneous ventilation    Post pain: adequate analgesia    Post assessment: no apparent anesthetic complications    Post vital signs: stable    Level of consciousness: awake    Nausea/Vomiting: no nausea/vomiting    Complications: none    Transfer of care protocol was followed      Last vitals:   Visit Vitals  /89   Pulse 77   Temp 36.7 °C (98.1 °F) (Temporal)   Resp 20   Ht 5' 6" (1.676 m)   Wt 90.7 kg (200 lb)   SpO2 97%   Breastfeeding No   BMI 32.28 kg/m²     "

## 2022-07-12 NOTE — H&P
Short Stay Endoscopy History and Physical    PCP - Jeffry Velez MD     Procedure - EGD w manometry  ASA - per anesthesia  Mallampati - per anesthesia  History of Anesthesia problems - no  Family history Anesthesia problems -  no   Plan of anesthesia - General    HPI:  This is a 52 y.o. female here for evaluation of dysphagia, abnormal imaging :        Medical History:  has a past medical history of Carcinoid tumor of bronchus and Seasonal asthma.    Surgical History:  has a past surgical history that includes Bronchoscopy (N/A, 11/28/2018); Pelvic laparoscopy; Sleeve lobectomy of lung by thoracotomy (Right, 4/22/2019); Bronchoscopy (N/A, 4/22/2019); Bronchoscopy (N/A, 5/6/2019); and Colonoscopy (N/A, 12/2/2021).    Family History: family history includes Arthritis in her mother; COPD in her mother; Cancer in her maternal grandmother and paternal grandfather; Hypertension in her father and mother; Pacemaker/defibrilator in her father; Stroke in her mother.. Otherwise no colon cancer, inflammatory bowel disease, or GI malignancies.    Social History:  reports that she quit smoking about 3 years ago. Her smoking use included cigarettes. She has a 7.50 pack-year smoking history. She has never used smokeless tobacco. She reports current drug use. Drug: Marijuana. She reports that she does not drink alcohol.    Review of patient's allergies indicates:  No Known Allergies    Medications:   Medications Prior to Admission   Medication Sig Dispense Refill Last Dose    albuterol (PROVENTIL/VENTOLIN HFA) 90 mcg/actuation inhaler INHALE 1 TO 2 PUFFS BY MOUTH EVERY 6 HOURS AS NEEDED FOR WHEEZING OR SHORTNESS OF BREATH 18 g 2     albuterol-ipratropium (DUO-NEB) 2.5 mg-0.5 mg/3 mL nebulizer solution Take 3 mLs by nebulization every 6 (six) hours as needed for Wheezing. 1 Box 6     ALPRAZolam (XANAX) 0.5 MG tablet Take 0.5 mg by mouth daily as needed.       cloNIDine (CATAPRES) 0.1 MG tablet        cyclobenzaprine (FLEXERIL)  10 MG tablet TAKE 1 TABLET BY MOUTH 3 TIMES A DAY AS NEEDED FOR MUSCLE SPASMS 90 tablet 4     ergocalciferol, vitamin D2, (VITAMIN D ORAL) Take by mouth.       FLUoxetine 20 MG capsule Take 60 mg by mouth every morning.       FLUoxetine 40 MG capsule Take 40 mg by mouth once daily.       fluticasone-salmeterol diskus inhaler 500-50 mcg Inhale 1 puff into the lungs 2 (two) times daily. Controller 60 each 11     folic acid/vit Bcomp,C/Cu/zinc (VIT B COMP-C-FA-COPPER-ZINC ORAL) Take by mouth.       garlic 200 mg Tab Take 200 mg by mouth once daily.       ginkgo biloba 40 mg Tab Take by mouth once daily.        ibuprofen (ADVIL,MOTRIN) 400 MG tablet TAKE 1 TABLET BY MOUTH THREE TIMES A DAY 90 tablet 4     multivitamin (THERAGRAN) per tablet Take 1 tablet by mouth once daily.       naproxen (NAPROSYN) 500 MG tablet Take 1 tablet (500 mg total) by mouth 2 (two) times daily with meals. 20 tablet 0     phentermine (ADIPEX-P) 37.5 mg tablet Take 37.5 mg by mouth every morning.       pseudoephedrine-DM-guaiFENesin (POLY-VENT DM) 60- mg Tab Take 1 tablet by mouth every 6 (six) hours as needed. 20 tablet 0     REXULTI 1 mg Tab Take 1 tablet by mouth once daily.       MAXX'S WORT ORAL Take by mouth.       traZODone (DESYREL) 100 MG tablet Take 200 mg by mouth nightly.       TURMERIC ORAL Take by mouth.       vitamin E 200 UNIT capsule Take 200 Units by mouth once daily.          Physical Exam:    Vital Signs: There were no vitals filed for this visit.    I have explained the risks and benefits of endoscopy procedures to the patient including but not limited to bleeding, perforation, infection, and death.      Katarzyna Floyd MD

## 2022-07-12 NOTE — PLAN OF CARE
Patient tolerated procedure and anesthesia with no complaints of pain or nausea. IV removed and patient taken upstairs by manometry staff.

## 2022-07-13 ENCOUNTER — HOSPITAL ENCOUNTER (OUTPATIENT)
Dept: RADIOLOGY | Facility: HOSPITAL | Age: 52
Discharge: HOME | End: 2022-07-13
Attending: INTERNAL MEDICINE
Payer: MEDICAID

## 2022-07-13 DIAGNOSIS — R13.10 DYSPHAGIA, UNSPECIFIED TYPE: ICD-10-CM

## 2022-07-13 PROCEDURE — 74220 FL ESOPHAGRAM COMPLETE: ICD-10-PCS | Mod: 26,,, | Performed by: INTERNAL MEDICINE

## 2022-07-13 PROCEDURE — 74220 X-RAY XM ESOPHAGUS 1CNTRST: CPT | Mod: TC

## 2022-07-13 PROCEDURE — 74220 X-RAY XM ESOPHAGUS 1CNTRST: CPT | Mod: 26,,, | Performed by: INTERNAL MEDICINE

## 2022-07-13 PROCEDURE — 25500020 PHARM REV CODE 255: Performed by: INTERNAL MEDICINE

## 2022-07-13 PROCEDURE — A9698 NON-RAD CONTRAST MATERIALNOC: HCPCS | Performed by: INTERNAL MEDICINE

## 2022-07-13 RX ADMIN — BARIUM SULFATE 290 ML: 0.81 POWDER, FOR SUSPENSION ORAL at 09:07

## 2022-07-13 RX ADMIN — BARIUM SULFATE 225 ML: 0.6 SUSPENSION ORAL at 09:07

## 2022-07-13 NOTE — [employer] PATIENT INSTRUCTIONS
Discharge Summary/Instructions after an Endoscopic Procedure  Patient Name: Gina Coto  Patient MRN: [id number]  Patient YOB: 1970  Tuesday, July 12, 2022  Katarzyna Floyd MD  Dear patient,  As a result of recent federal legislation (The Federal Cures Act), you may   receive lab or pathology results from your procedure in your [facility]   account before your physician is able to contact you. Your physician or   their representative will relay the results to you with their   recommendations at their soonest availability.  Thank you,  RESTRICTIONS:  During your procedure today, you received medications for sedation.  These   medications may affect your judgment, balance and coordination.  Therefore,   for 24 hours, you have the following restrictions:   - DO NOT drive a car, operate machinery, make legal/financial decisions,   sign important papers or drink alcohol.    ACTIVITY:  Today: no heavy lifting, straining or running due to procedural   sedation/anesthesia.  The following day: return to full activity including work.  DIET:  Eat and drink normally unless instructed otherwise.     TREATMENT FOR COMMON SIDE EFFECTS:  - Mild abdominal pain, nausea, belching, bloating or excessive gas:  rest,   eat lightly and use a heating pad.  - Sore Throat: treat with throat lozenges and/or gargle with warm salt   water.  - Because air was used during the procedure, expelling large amounts of air   from your rectum or belching is normal.  - If a bowel prep was taken, you may not have a bowel movement for 1-3 days.    This is normal.  SYMPTOMS TO WATCH FOR AND REPORT TO YOUR PHYSICIAN:  1. Abdominal pain or bloating, other than gas cramps.  2. Chest pain.  3. Back pain.  4. Signs of infection such as: chills or fever occurring within 24 hours   after the procedure.  5. Rectal bleeding, which would show as bright red, maroon, or black stools.   (A tablespoon of blood from the rectum is not serious, especially if    hemorrhoids are present.)  6. Vomiting.  7. Weakness or dizziness.  GO DIRECTLY TO THE NEAREST EMERGENCY ROOM IF YOU HAVE ANY OF THE FOLLOWING:      Difficulty breathing              Chills and/or fever over 101 F   Persistent vomiting and/or vomiting blood   Severe abdominal pain   Severe chest pain   Black, tarry stools   Bleeding- more than one tablespoon   Any other symptom or condition that you feel may need urgent attention  Your doctor recommends these additional instructions:  If any biopsies were taken, your doctors clinic will contact you in 1 to 2   weeks with any results.  - Await pathology results.   - Discharge patient to home (with escort).   - Resume previous diet.   - Continue present medications.   - The findings and recommendations were discussed with the patient.   - Patient has a contact number available for emergencies.  The signs and   symptoms of potential delayed complications were discussed with the   patient.  Return to normal activities tomorrow.  Written discharge   instructions were provided to the patient.  For questions, problems or results please call your physician - Katarzyna Floyd MD at Work:  [phone].  [facility], EMERGENCY ROOM PHONE NUMBER: [phone]  IF A COMPLICATION OR EMERGENCY SITUATION ARISES AND YOU ARE UNABLE TO REACH   YOUR PHYSICIAN - GO DIRECTLY TO THE EMERGENCY ROOM.  Katarzyna Floyd MD  7/13/2022 8:03:26 AM  [employer]

## 2022-07-13 NOTE — ANESTHESIA POSTPROCEDURE EVALUATION
Anesthesia Post Evaluation    Patient: Gina Coto    Procedure(s) Performed: Procedure(s) (LRB):  ESOPHAGOGASTRODUODENOSCOPY (EGD) (N/A)  MANOMETRY-ESOPHAGEAL-WITH IMPEDANCE (N/A)    Final Anesthesia Type: general      Patient location during evaluation: PACU  Patient participation: Yes- Able to Participate  Level of consciousness: awake and alert and oriented  Post-procedure vital signs: reviewed and stable  Pain management: adequate  Airway patency: patent    PONV status at discharge: No PONV  Anesthetic complications: no      Cardiovascular status: blood pressure returned to baseline and hemodynamically stable  Respiratory status: unassisted, spontaneous ventilation and room air  Hydration status: euvolemic  Follow-up not needed.          Vitals Value Taken Time   /86 07/12/22 1446   Temp  07/13/22 1214   Pulse 70 07/12/22 1446   Resp 34 07/12/22 1446   SpO2 98 % 07/12/22 1446   Vitals shown include unvalidated device data.      No case tracking events are documented in the log.      Pain/Saundra Score: Saundra Score: 10 (7/12/2022  2:48 PM)

## 2022-07-14 ENCOUNTER — TELEPHONE (OUTPATIENT)
Dept: GASTROENTEROLOGY | Facility: CLINIC | Age: 52
End: 2022-07-14
Payer: MEDICAID

## 2022-07-14 PROCEDURE — 91037 PR GERD TST W/ NASAL IMPEDENCE ELECTROD: ICD-10-PCS | Mod: 26,,, | Performed by: INTERNAL MEDICINE

## 2022-07-14 PROCEDURE — 91010 ESOPHAGUS MOTILITY STUDY: CPT | Mod: 26,,, | Performed by: INTERNAL MEDICINE

## 2022-07-14 PROCEDURE — 91010 PR ESOPHAGEAL MOTILITY STUDY, MA2METRY: ICD-10-PCS | Mod: 26,,, | Performed by: INTERNAL MEDICINE

## 2022-07-14 PROCEDURE — 91037 ESOPH IMPED FUNCTION TEST: CPT | Mod: 26,,, | Performed by: INTERNAL MEDICINE

## 2022-07-14 NOTE — TELEPHONE ENCOUNTER
Manometry Results:    High LES pressure with complete relaxation and premature contractions   Achalasia -Type III vs EGJ outflow obstruction with distal esophageal spasm   Incomplete bolus clearance  Abnormal multiple rapid swallows test with panesophageal pressurization  IRP remains elevated with pressurization upright  No significant difference with provocative maneuvers   Evidence of residual liquid at the end of 200cc bolus   Hypercontractile activity with 200cc bolus    Please let patient know that the manometry shows  Spasms in esophagus  This may be related to some of the medications she is taking     Recommendation:  Pls arrange new pt apt w me - all records in Epic

## 2022-07-14 NOTE — PROVATION PATIENT INSTRUCTIONS
Discharge Summary/Instructions after an Endoscopic Procedure  Patient Name: Gina Coto  Patient MRN: [id number]  Patient YOB: 1970  Thursday, July 14, 2022  Katarzyna Floyd MD  Dear patient,  As a result of recent federal legislation (The Federal Cures Act), you may   receive lab or pathology results from your procedure in your [facility]   account before your physician is able to contact you. Your physician or   their representative will relay the results to you with their   recommendations at their soonest availability.  Thank you,  RESTRICTIONS:  During your procedure today, you received medications for sedation.  These   medications may affect your judgment, balance and coordination.  Therefore,   for 24 hours, you have the following restrictions:   - DO NOT drive a car, operate machinery, make legal/financial decisions,   sign important papers or drink alcohol.    ACTIVITY:  Today: no heavy lifting, straining or running due to procedural   sedation/anesthesia.  The following day: return to full activity including work.  DIET:  Eat and drink normally unless instructed otherwise.     TREATMENT FOR COMMON SIDE EFFECTS:  - Mild abdominal pain, nausea, belching, bloating or excessive gas:  rest,   eat lightly and use a heating pad.  - Sore Throat: treat with throat lozenges and/or gargle with warm salt   water.  - Because air was used during the procedure, expelling large amounts of air   from your rectum or belching is normal.  - If a bowel prep was taken, you may not have a bowel movement for 1-3 days.    This is normal.  SYMPTOMS TO WATCH FOR AND REPORT TO YOUR PHYSICIAN:  1. Abdominal pain or bloating, other than gas cramps.  2. Chest pain.  3. Back pain.  4. Signs of infection such as: chills or fever occurring within 24 hours   after the procedure.  5. Rectal bleeding, which would show as bright red, maroon, or black stools.   (A tablespoon of blood from the rectum is not serious, especially if    hemorrhoids are present.)  6. Vomiting.  7. Weakness or dizziness.  GO DIRECTLY TO THE NEAREST EMERGENCY ROOM IF YOU HAVE ANY OF THE FOLLOWING:      Difficulty breathing              Chills and/or fever over 101 F   Persistent vomiting and/or vomiting blood   Severe abdominal pain   Severe chest pain   Black, tarry stools   Bleeding- more than one tablespoon   Any other symptom or condition that you feel may need urgent attention  Your doctor recommends these additional instructions:  If any biopsies were taken, your doctors clinic will contact you in 1 to 2   weeks with any results.  - Return to my office as previously scheduled.  For questions, problems or results please call your physician - aKtarzyna Floyd MD at Work:  [phone].  [facility], EMERGENCY ROOM PHONE NUMBER: [phone]  IF A COMPLICATION OR EMERGENCY SITUATION ARISES AND YOU ARE UNABLE TO REACH   YOUR PHYSICIAN - GO DIRECTLY TO THE EMERGENCY ROOM.  Katarzyna Floyd MD  7/14/2022 5:12:27 PM  This report has been verified and signed electronically.  Dear patient,  As a result of recent federal legislation (The Federal Cures Act), you may   receive lab or pathology results from your procedure in your [facility]   account before your physician is able to contact you. Your physician or   their representative will relay the results to you with their   recommendations at their soonest availability.  Thank you,  [employer]

## 2022-07-18 ENCOUNTER — TELEPHONE (OUTPATIENT)
Dept: GASTROENTEROLOGY | Facility: CLINIC | Age: 52
End: 2022-07-18
Payer: MEDICAID

## 2022-07-18 NOTE — TELEPHONE ENCOUNTER
Called pt and scheduled NP appointment for 8/15/22 @8am    Discussed with pt that Dr. Floyd is a consultant that does not accept patients as a primary GI provider.  Discussed that she will send them back to their primary GI provider once their symptoms improved or the plan of care is established.     Pt was told the logistics of the appointment (arrival time, length of visit, total time spent at facility).     Pt was also told that Dr. Floyd will review their previous workup but may order additional test and perform her own procedures and that this may require an overnight stay at a local hot to complete the workup. Patient agreed and verbalized understanding.

## 2022-07-19 LAB
FINAL PATHOLOGIC DIAGNOSIS: NORMAL
Lab: NORMAL

## 2022-08-11 RX ORDER — BREXPIPRAZOLE 0.5 MG/1
1 TABLET ORAL DAILY
COMMUNITY
Start: 2022-07-12 | End: 2024-03-08

## 2022-08-15 ENCOUNTER — TELEPHONE (OUTPATIENT)
Dept: GASTROENTEROLOGY | Facility: CLINIC | Age: 52
End: 2022-08-15

## 2022-08-15 ENCOUNTER — OFFICE VISIT (OUTPATIENT)
Dept: GASTROENTEROLOGY | Facility: CLINIC | Age: 52
End: 2022-08-15
Payer: MEDICAID

## 2022-08-15 DIAGNOSIS — R13.10 DYSPHAGIA, UNSPECIFIED TYPE: ICD-10-CM

## 2022-08-15 DIAGNOSIS — K22.4 ESOPHAGEAL SPASM: ICD-10-CM

## 2022-08-15 DIAGNOSIS — R07.89 NON-CARDIAC CHEST PAIN: ICD-10-CM

## 2022-08-15 DIAGNOSIS — G47.00 INSOMNIA, UNSPECIFIED TYPE: Primary | ICD-10-CM

## 2022-08-15 PROCEDURE — 1160F PR REVIEW ALL MEDS BY PRESCRIBER/CLIN PHARMACIST DOCUMENTED: ICD-10-PCS | Mod: CPTII,95,, | Performed by: INTERNAL MEDICINE

## 2022-08-15 PROCEDURE — 99215 OFFICE O/P EST HI 40 MIN: CPT | Mod: 95,,, | Performed by: INTERNAL MEDICINE

## 2022-08-15 PROCEDURE — 1159F MED LIST DOCD IN RCRD: CPT | Mod: CPTII,95,, | Performed by: INTERNAL MEDICINE

## 2022-08-15 PROCEDURE — 1160F RVW MEDS BY RX/DR IN RCRD: CPT | Mod: CPTII,95,, | Performed by: INTERNAL MEDICINE

## 2022-08-15 PROCEDURE — 1159F PR MEDICATION LIST DOCUMENTED IN MEDICAL RECORD: ICD-10-PCS | Mod: CPTII,95,, | Performed by: INTERNAL MEDICINE

## 2022-08-15 PROCEDURE — 99215 PR OFFICE/OUTPT VISIT, EST, LEVL V, 40-54 MIN: ICD-10-PCS | Mod: 95,,, | Performed by: INTERNAL MEDICINE

## 2022-08-15 RX ORDER — OMEPRAZOLE 40 MG/1
40 CAPSULE, DELAYED RELEASE ORAL EVERY MORNING
Qty: 90 CAPSULE | Refills: 3 | Status: SHIPPED | OUTPATIENT
Start: 2022-08-15 | End: 2022-11-30

## 2022-08-15 NOTE — PATIENT INSTRUCTIONS
-Decrease flexeril to 10mg every other day for 5 days, than every three days for 5 days, than stop   -Start peppermint three to four times per day (altoids, peppermint tea, peppermint oil (four drops in half glass of water)   -Increase trazodone to 300mg at night with your psychiatrist   -Start omeprazole 40mg daily, 30 minutes before breakfast  -Reduce caffeine intake   -Follow up with your PCP about your sleep study   -See Dr. Puri to make sure you do not have a sleep disorder and to help improve your sleep hygiene   -Practice diaphragmatic breathing three times per day.  Follow the instructions in this video:   [email or web address]/[email or web address]

## 2022-08-15 NOTE — TELEPHONE ENCOUNTER
Called patient and scheduled appointment with Dr. Richard at the [facility].

## 2022-08-15 NOTE — PROGRESS NOTES
The patient location is: home  The chief complaint leading to consultation is: dysphagia, chest pain    Visit type: audiovisual    Face to Face time with patient: 60  73 minutes of total time spent on the encounter, which includes face to face time and non-face to face time preparing to see the patient (eg, review of tests), Obtaining and/or reviewing separately obtained history, Documenting clinical information in the electronic or other health record, Independently interpreting results (not separately reported) and communicating results to the patient/family/caregiver, or Care coordination (not separately reported).         Each patient to whom he or she provides medical services by telemedicine is:  (1) informed of the relationship between the physician and patient and the respective role of any other health care provider with respect to management of the patient; and (2) notified that he or she may decline to receive medical services by telemedicine and may withdraw from such care at any time.    Notes:       [facility] Consultation Note    Reason for Consult:  No chief complaint on file.        PCP:   Jeffry Velez   [address] / [place] [address]    Referring MD:  Juan Jose Miles Md  [address] [place] [id number]      HPI:  Gina Coto is a 52 y.o. female referred to motility clinic for second opinion regarding the following problems:    Swallowing Conference     PATIENT SUMMARY:   53yo female with a history of bronchus intermedius typical carcinoid tumor status post right thoracotomy right lower bilobectomy with bronchial stump suture closure and intercostal muscle flap interposition between the bronchus and pulmonary artery.  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  In February 2021, she presented with moderate to severe respiratory symptoms and a radiographic pattern highly suggestive of COVID pneumonia.  PCR  test was negative and the patient was diagnosed with possible viral or bacterial pneumonia.  During routine carcinoid surveillance follow-up in May 2022 she endorsed solid and liquid dysphagia.  Follow-up esophagram 6/03/2022 demonstrated an abrupt band along the mid to distal esophagus.  Comparison to chest CT 5/20/2022 suggests that this abnormality occurs distal to the inferior pulmonary vein takeoff.     RECOMMENDATIONS:   -Timed barium esophagram with 13 mm tablet  -Upper endoscopy with esophageal manometry      GERD.    Retrosternal pyrosis: none  Regurgitation: occasional.  No nausea  Food or liquid get stuck and regurgitate      NO PPI     Dysphagia.    Problems with solids:daily  Problems with liquids: daily   Choking while eating: no   Coughing wile eating: no  Location: mid-upper esophagus/mid chest  Onset of symptoms:      Lobectomy 4/23/2019  Thoracotomy 9/9/2019  3/2020 Symptoms started right after she found out  cheating on her    History of food impactions requiring ED visit: no   History of allergies/eczema. no  Short duration of dysphagia (<1 year): no  Serious weight loss (>6.8 kg): yes  Age over 55 years: no    Esophagogram showed a kink in esophagus   Eating smaller bites, sitting up helps    Chest pain and pressure. Daily   Chest pain. Reports bothersome chest pain and spasms of esophagus.   Triggers (cold fluids, caffeine, smoking, ETOH): no   Consumes mostly soft foods and liquids: yes  Caffeine intake: 2-3 cups    Negative cardiac workup:  None   CP only w swallowing     No improvement with trazodone    Eckardt Symptom Score  Dysphagia: 2  Regurgitation: 1  Retrosternal pain: 2  Weight Loss: 0  Total: 5     Weight loss   Lost 15lb, but gained it back bc unable to eat initially   200lb     BRBPR.  Sm amnt of red blood for few years.  Colonoscopy 2021 negative     MGM w CRC in 70s  Colonoscopy 2021 negative.  Repeat 10 yrs     Anxiety and depression   Seeing a psychiatrist    Started  fluoxetine, rexulti  On trazodone 200mg QHS without improvement   Flexeril since 4/2019 for mm spasms    Seeing a counselor Q few months     Insomnia.  Difficulty staying asleep.    Does not wake up rested.  Feeling fatigue   On flexeril  On melatonin   Trazodone 200mg QHS with some improvement     Ho lung carcinoid  4/22/2019 R thoracotomy with R lower bilobectomy and bronchoplsty, mediastinal LN dissection, adhesiolysis  9/9/2019 thoracotomy, ORIF R rib #6     Denies nausea, vomiting, early satiety, abdominal pain, bloating, diarrhea, constipation, BRBPR, melena.      Total visit time was 73   minutes, more than 50% of which was spent in face-to-face counseling with patient regarding symptoms, diagnostic results, prognosis, risks and benefits of treatment options, instructions for management, importance of compliance with chosen treatment options and coping strategies.      Previous Studies:   Timed barium swallow 06/14/2022:  No evidence of achalasia.  Angular deformity of distal esophagus lactic E reflecting postoperative changes given history of right middle and lower lobectomies.  Esophageal dysmotility.  13 mm tablet readily passed into the stomach.  Timed barium swallow 0 minutes equals 0 cm.  No GERD.  No hiatal hernia.  Cervical spondylosis with minimal impression upon the posterior esophagus.    Manometry 07/14/2022:  High LES pressure with incomplete relaxation and premature contractions. Hypercontractile activity localizing to LES.  Achalasia type 3 versus EG junction outflow obstruction with distal esophageal spasm.  Incomplete bolus clearance.  Abnormal multiple rapid swallow test with pan esophageal pressurization.  IRP remained elevated with pressurization upright.  No significant difference with provocative maneuvers.  Evidence of residual liquid at the end of 200 cc bolus.  Hypercontractile activity with 200 cc bolus.  Meds:  Cyclobenzaprine (Flexeril),  , trazodone, clonidine.  EGD 07/12/2022:   Mildly tortuous mid esophagus.  Normal mucosa (negative).  Z-line 40 cm. Intermittent esophageal spasm from 30-40 cm.  Intermittent resistance, puckering and pop.  Normal stomach.  Normal duodenum  Esophagram 06/03/2022:  Abrupt bent is detected in the mid to distal esophagus possibly associated with patient's surgical changes of thorax.  In conjunction with some esophageal dysmotility this man is resulted in delayed contrast passage into the distal esophagus and stomach.  Liquid contrast material remained present in the proximal mid esophagus for an extended period of time which places the patient at risk for aspiration.  However no aspiration was detected during the exam.  Nodularity of epiglottis.  CT chest 05/20/2022:  Status post right middle lower lobectomy for reported carcinoid tumor resection.  No specific CT evidence of new recurrent metastatic disease of the chest.  There is improvement in the right upper lobe ground-glass opacity.  However ground-glass opacity in the left lung have increased appear distributed centrally.  Consider pneumonitis, bronchopneumonia, COVID.  Colonoscopy 12/02/2021:  Normal.  Repeat 10 years.      Relevant Surgical History:    4/22/2019 R thoracotomy with R lower bilobectomy and bronchoplsty, mediastinal LN dissection, adhesiolysis  9/9/2019 thoracotomy, ORIF R rib #6     ROS:  ROS     Complete ROS negative except as above    Medical History:   Past Medical History:   Diagnosis Date    Carcinoid tumor of bronchus     s/p resection    Dysphagia     Seasonal asthma         Surgical History:   Past Surgical History:   Procedure Laterality Date    BRONCHOSCOPY N/A 11/28/2018    Procedure: BRONCHOSCOPY;  Surgeon: Omar Prescott MD;  Location: [facility] CATH LAB;  Service: Pulmonary;  Laterality: N/A;    BRONCHOSCOPY N/A 4/22/2019    Procedure: BRONCHOSCOPY;  Surgeon: Juan Jose Miles MD;  Location: [facility] OR [address] [place] [address];  Service: Thoracic;  Laterality: N/A;    BRONCHOSCOPY N/A  5/6/2019    Procedure: BRONCHOSCOPY;  Surgeon: Juan Jose Miles MD;  Location: [facility] OR [facility] FLR;  Service: Thoracic;  Laterality: N/A;    COLONOSCOPY N/A 12/2/2021    Procedure: COLONOSCOPY;  Surgeon: Juan Jose Castano MD;  Location: [facility];  Service: Colon and Rectal;  Laterality: N/A;    ESOPHAGEAL MANOMETRY WITH MEASUREMENT OF IMPEDANCE N/A 7/12/2022    Procedure: MANOMETRY-ESOPHAGEAL-WITH IMPEDANCE;  Surgeon: Katarzyna Floyd MD;  [facility]  Service: Endoscopy;  Laterality: N/A;  hold trazodone 24 hours prior to procedure    ESOPHAGOGASTRODUODENOSCOPY N/A 7/12/2022    Procedure: ESOPHAGOGASTRODUODENOSCOPY (EGD);  Surgeon: Katarzyna Floyd MD;  Location: [facility] ENDO (2ND FLR);  Service: Endoscopy;  Laterality: N/A;  Endoscopically placed manometry probe  2nd floor-fluid in esophagus  propofol only  full liquid diet x3 days, clear liquid diet x1 day prior to procedure  RAPID COVID test, pt to arrive for 11:00am-[facility]   instructions sent to [facility]    PELVIC LAPAROSCOPY      SLEEVE LOBECTOMY OF LUNG BY THORACOTOMY Right 4/22/2019    Procedure: LOBECTOMY, SLEEVE, LUNG, THORACOTOMY APPROACH;  Surgeon: Juan Jose Miles MD;  [facility];  Service: Thoracic;  Laterality: Right;  lower bilobectomy        Family History:   Family History   Problem Relation Age of Onset    Arthritis Mother     COPD Mother     Hypertension Mother     Stroke Mother         vascular dementia    Hypertension Father     Pacemaker/defibrilator Father         sleep apnea    Colon cancer Maternal Grandmother     Cancer Maternal Grandmother         colon     Cancer Paternal Grandfather         lung; smoker    Anesthesia problems Neg Hx     Esophageal cancer Neg Hx     Stomach cancer Neg Hx     Colon polyps Neg Hx     Rectal cancer Neg Hx     Liver cancer Neg Hx     Pancreatic cancer Neg Hx     Celiac disease Neg Hx     Crohn's disease Neg Hx     Irritable bowel syndrome Neg Hx          Social History:   Social History     Socioeconomic History    Marital status:    Tobacco Use    Smoking status: Former Smoker     Packs/day: 0.50     Years: 15.00     Pack years: 7.50     Types: Cigarettes     Quit date: 11/19/2018     Years since quitting: 3.7    Smokeless tobacco: Never Used   Substance and Sexual Activity    Alcohol use: No    Drug use: Yes     Types: Marijuana    Sexual activity: Not Currently        Review of patient's allergies indicates:  No Known Allergies    Current Outpatient Medications   Medication Sig Dispense Refill    albuterol (PROVENTIL/VENTOLIN HFA) 90 mcg/actuation inhaler INHALE 1 TO 2 PUFFS BY MOUTH EVERY 6 HOURS AS NEEDED FOR WHEEZING OR SHORTNESS OF BREATH 18 g 0    albuterol-ipratropium (DUO-NEB) 2.5 mg-0.5 mg/3 mL nebulizer solution Take 3 mLs by nebulization every 6 (six) hours as needed for Wheezing. 1 Box 6    cloNIDine (CATAPRES) 0.1 MG tablet       cyclobenzaprine (FLEXERIL) 10 MG tablet TAKE 1 TABLET BY MOUTH 3 TIMES A DAY AS NEEDED FOR MUSCLE SPASMS 90 tablet 4    FLUoxetine 20 MG capsule Take 60 mg by mouth every morning.      fluticasone-salmeterol diskus inhaler 500-50 mcg Inhale 1 puff into the lungs 2 (two) times daily. Controller 60 each 11    ibuprofen (ADVIL,MOTRIN) 400 MG tablet TAKE 1 TABLET BY MOUTH THREE TIMES A DAY 90 tablet 4    phentermine (ADIPEX-P) 37.5 mg tablet Take 37.5 mg by mouth every morning.      REXULTI 0.5 mg Tab Take 1 tablet by mouth once daily.      traZODone (DESYREL) 100 MG tablet Take 200 mg by mouth nightly.      omeprazole (PRILOSEC) 40 MG capsule Take 1 capsule (40 mg total) by mouth every morning. 90 capsule 3     No current facility-administered medications for this visit.        Objective Findings:  Vital Signs:  There were no vitals taken for this visit.  There is no height or weight on file to calculate BMI.    Physical Exam:  Physical Exam:limited due to video visit  General appearance: alert,  cooperative, no distress  HENT: Normocephalic, atraumatic, neck symmetrical, no nasal discharge  Eyes: conjunctivae/corneas clear,  EOM's intact  Extremities: visible extremities symmetric; no clubbing, cyanosis, or edema  Integument: visible Skin color, texture, turgor normal; no rashes; hair distrubution normal  Neurologic: Alert and oriented X 3,  Psychiatric: no pressured speech; normal affect; no evidence of impaired cognition      Labs:   Reviewed in Epic/record      Assessment and Plan:  Gina Coto is a 52 y.o. female with referred to\Esophageal Motility Clinic for 2nd opinion regarding following problems:    GERD.  No pyrosis.  Regurgitation during esophageal spasms   -Start omeprazole 40mg daily for YAHIR     Dysphagia to solids and liquids   Regurgitation   Chest spasms   Eckardt: 5/12 4/22/2019 R thoracotomy with R lower bilobectomy and bronchoplsty, mediastinal LN dissection, adhesiolysis  9/9/2019 thoracotomy, ORIF R rib #6   Symptoms started 3/2020  right after she found out  was cheating on her  Esophagogram showed a kink in esophagus   EGD with mildly tortuous mid esophagus.  Intermittent spasm, puckering, resistance from 30-40 cm.   Manometry with high LES pressure with incomplete relaxation and premature contractions.  Hypercontractile activity localizing to LES.  Achalasia type 3 versus EG junction outflow obstruction with distal esophageal spasm.  Incomplete bolus clearance.  Pan esophageal pressurization with multiple rapid swallows.  IRP remains elevated upright.  Evidence of residual liquid with 200 cc bolus.  Hypercontractile activity with 200 cc bolus.  Timed barium swallow with immediate clearance of barium an tablet.  Angular deformity of distal esophagus reflecting postoperative changes.  Possibly related to medication. Meds:  Flexeril, trazodone, ?phenteramine  Clinical presentation suggestive of EG junction outflow obstruction with distal esophageal spasm and  hypercontractile activity versus early achalasia type 3. We will attempt medical management prior to any surgical consideration.    Sx slightly improved with diet   -Start omeprazole 40mg daily   -peppermint prn   -Stop flexaril   -Reduce caffeine  -DB  -Increase trazodone 300mg w psychiatry     Weight loss.  Now stable     BRBPR.  Sm amnt of red blood for few years.  Colonoscopy 2021 negative     MGM w CRC in 70s  Colonoscopy 2021 negative.  Repeat 10 yrs     Anxiety and depression   On fluoxetine, rexulti per psychiatry   On trazodone 200mg QHS without improvement   On flexeril since 4/2019 for mm spasms    Seeing a counselor Q few months     Insomnia.  Difficulty staying asleep.    Does not wake up rested.  Feeling fatigue   On flexeril  On melatonin   Trazodone 200mg QHS with some improvement     Follow up in about 4 months (around 12/15/2022).    1. Insomnia, unspecified type    2. Dysphagia, unspecified type    3. Non-cardiac chest pain    4. Esophageal spasm          Order summary:  Orders Placed This Encounter    Ambulatory referral/consult to Sleep Disorders    omeprazole (PRILOSEC) 40 MG capsule       Discussed with PT that I act as a consult service and do not accept patients to be their primary GI provider. Discussed that the goal of our visits is to address relevant motility problems while deferring other GI problems as well as screening and surveillance to his/her primary GI provider.   Discussed that he/she needs to continue to follow with his local primary GI provider.  Discussed that we will complete his/her workup, clarify diagnosis and attempt to optimize his/her symptoms with intention of him/her returning to referring GI provider for long term GI care.   Pt verbalized understanding.        Thank you so much for allowing me to participate in the care of Gina Floyd MD      This note was created using voice recognition software, and may contain some unrecognized  transcriptional errors.

## 2022-08-15 NOTE — TELEPHONE ENCOUNTER
Called and spoke with pt.  Reviewed AVS and copy sent to portal.  Explained if does not get a call from Dr Atkinson's office within 2 wks to let me know.    F/u ~ 4 mos.

## 2022-09-23 ENCOUNTER — PATIENT MESSAGE (OUTPATIENT)
Dept: SLEEP MEDICINE | Facility: CLINIC | Age: 52
End: 2022-09-23
Payer: MEDICAID

## 2022-09-23 ENCOUNTER — TELEPHONE (OUTPATIENT)
Dept: SLEEP MEDICINE | Facility: CLINIC | Age: 52
End: 2022-09-23
Payer: MEDICAID

## 2022-09-23 NOTE — TELEPHONE ENCOUNTER
Staff contact patient, staff left a voicemail and portal msg. confirming their appointment with Dr Richard on 9/26 at 9:00 am

## 2022-09-26 ENCOUNTER — TELEPHONE (OUTPATIENT)
Dept: SLEEP MEDICINE | Facility: CLINIC | Age: 52
End: 2022-09-26
Payer: MEDICAID

## 2022-09-26 NOTE — TELEPHONE ENCOUNTER
Staff contact patient on 9/26. The patient has not arrive to their appointment on 9/26 with Dr Richard. Patient stated she has been ill and need her appointment rescheduled.

## 2022-10-03 ENCOUNTER — PATIENT MESSAGE (OUTPATIENT)
Dept: ADMINISTRATIVE | Facility: HOSPITAL | Age: 52
End: 2022-10-03
Payer: MEDICAID

## 2022-10-04 RX ORDER — ALBUTEROL SULFATE 90 UG/1
AEROSOL, METERED RESPIRATORY (INHALATION)
Qty: 54 G | Refills: 0 | Status: SHIPPED | OUTPATIENT
Start: 2022-10-04 | End: 2024-03-27

## 2022-10-04 NOTE — TELEPHONE ENCOUNTER
No new care gaps identified.  [facility] Embedded Care Gaps. Reference number: [id number]. 10/04/2022   9:44:55 AM ERICKT

## 2022-10-04 NOTE — TELEPHONE ENCOUNTER
Refill Decision Note   Gina Coto  is requesting a refill authorization.  Brief Assessment and Rationale for Refill:  Approve     Medication Therapy Plan:       Medication Reconciliation Completed: No   Comments:     No Care Gaps recommended.     Note composed:11:50 AM 10/04/2022

## 2022-11-11 ENCOUNTER — TELEPHONE (OUTPATIENT)
Dept: SLEEP MEDICINE | Facility: CLINIC | Age: 52
End: 2022-11-11
Payer: MEDICAID

## 2022-11-16 ENCOUNTER — PATIENT MESSAGE (OUTPATIENT)
Dept: CARDIOTHORACIC SURGERY | Facility: CLINIC | Age: 52
End: 2022-11-16

## 2022-11-16 DIAGNOSIS — Z85.118 HISTORY OF LUNG CANCER: Primary | ICD-10-CM

## 2022-11-23 NOTE — PROGRESS NOTES
"  Subjective:       Patient ID: Gina Coto is a 52 y.o. female.    Chief Complaint: No chief complaint on file.    Diagnosis:  Carcinoid Tumor    Pre-operative therapy: NA    Procedure(s) and date(s):   4/22/19- Bronchoscopy, right thoracotomy for right lower bilobectomy for carcinoid tumor in bronchus intermedius and MLND.  Bronchial stump closed with 4-0 PDS under direct bronchoscopic vision.  Intercostal muscle flap between bronchus and PA.  5/6/19- Surveillance bronchoscopy  9/9/19 - re do thoracotomy for rib plating     Pathology:  2.8cm well differentiated neuroendocrine tumor. Grade 1 typical carcinoid tumor. Ki-67 stains less than 2% of tumor cell nuclei. Negative margins, 0.5cm from closest margin. 3 benign LNs within the specimen. Levels 7 and 11 LN negative. pT2 N0    Post-operative therapy: NA    HPI   52 y.o. female returns for 3 year follow up s/p right thoracotomy for right lower bilobectomy for resection of carcinoid tumor and is now s/p rib plating. Did well post-op. Still taking Gabapentin, Flexeril and Ibuprofen for post-thoracotomy pain. Spaced interval to 9 months. Started Advair and inhaler per Dr. Rooney in January. Breathing improved. Infrequent use of rescue inhaler. Here today with updated chest CT. Patient reports clicking sensation when she moves a certain way that she believes is associated with her surgery. She also reports burning/stinging pain along the incision site and that "it is difficult to wear bra some days" 2/2 to pain. Denies SOB, cough, diaphoresis, fever, abnormal weight loss.     Review of Systems   Constitutional:  Negative for activity change and fatigue.   HENT:  Negative for congestion.    Respiratory:  Negative for cough, choking and shortness of breath.    Cardiovascular:  Negative for chest pain and palpitations.   Gastrointestinal:  Negative for abdominal distention and abdominal pain.   Genitourinary:  Negative for difficulty urinating.   Musculoskeletal:  " "Negative for arthralgias.   Skin:  Negative for color change and rash.   Neurological:  Negative for dizziness and syncope.   Psychiatric/Behavioral:  Negative for agitation. The patient is not nervous/anxious.        Objective:       There were no vitals filed for this visit.       Physical Exam  Vitals reviewed.   Constitutional:       Appearance: She is well-developed.   HENT:      Head: Normocephalic and atraumatic.   Neck:      Trachea: No tracheal deviation.   Cardiovascular:      Rate and Rhythm: Normal rate and regular rhythm.      Heart sounds: Normal heart sounds.   Pulmonary:      Effort: Pulmonary effort is normal.   Abdominal:      Palpations: Abdomen is soft.   Musculoskeletal:         General: Normal range of motion.      Cervical back: Normal range of motion and neck supple.      Comments: 1 isolated episode of right sided "clicking" with right upper extremity motion  No obvious chest wall deformity   Skin:     General: Skin is warm and dry.   Neurological:      Mental Status: She is alert and oriented to person, place, and time.   Psychiatric:         Thought Content: Thought content normal.       Chest CT 1/15/20:   1.  Postoperative changes of right middle and lower lobectomies.  Status post osteotomy of the posterior aspect of the right 6th rib with intact plate and screw construct bridging the osteotomy gap.  No unusual postoperative finding identified; right upper lobe appears well aerated and clear.   2.  Hepatic parenchyma attenuation measures 69 HU on noncontrast chest CT today; it measured 52 HU on noncontrast chest CT dated 11/26/2018.  Cause for mildly elevated hepatic parenchymal attenuation is unclear.  3.  There is asymmetric distribution of soft tissue in the breasts, greater on the left where there is a collection of soft tissue that measures at least 4.2 by 3.4 cm on axial image (axial series 2, image 47) and with greater cranial-caudad extent as seen on sagittal series 604, image " 189.  Breasts are incompletely included on earlier studies.  Please confirm that the patient is up-to-date on mammographic surveillance.  I reported this by telephone to APARNA Eaton in thoracic surgery, prior to this dictation.    Chest CT 7/15/20:  Stable postoperative changes of right middle and lower lobectomies and osteotomy of the posterolateral right 6th rib.  Remaining right upper lobe is well aerated without evidence of acute pathology or recurrent disease in this patient with history of carcinoid.  Liver demonstrates diffuse parenchymal hyperattenuation, of uncertain etiology but can be seen with long-term amiodarone use, iron overload, or copper overload.  Additional stable and incidental findings, as above    Chest CT 2/19/21:  The show multiple new peripheral round regions of ground-glass opacification in both lungs consistent with viral pneumonia.  Central airways are widely patent.  There is evidence of prior right chest surgery with volume loss in right lung.  Mediastinum:  No mediastinal or hilar lymphadenopathy is seen.  There are persistent changes of focal pleural thickening in right lung base.  Scans of the upper abdomen show both adrenal glands to be normal in size.    Chest CT 8/17/21: Reviewed. MELIZA.   Stable postoperative changes of right middle and lower lobectomies for carcinoid tumor resection.  No evidence of local recurrence.  Continued improvement of multifocal bilateral ground-glass opacities and subcentimeter ground-glass nodules in this patient with history of COVID-19 pneumonia.  Findings likely represent resolving inflammation/infection.  New ground-glass opacity in the apicoposterior segment of the right upper lobe.  This is nonspecific and could be related to infection, inflammation or aspiration.  Attention on follow-up.    Chest CT 5/20/22:   1.  Status post right middle lower lobectomy for reported carcinoid tumor resection.  No specific CT evidence for new recurrent or  metastatic disease in the chest.  2.  There is improvement in the right upper lobe ground-glass opacities.  However, ground-glass opacities in the left lung have increased appear distributed centrally.  Consider pneumonitis, bronchopneumonia, COVID-19 pulmonary disease, etc.    FL Esophogram 07/13/22:  No evidence of achalasia, as clinically questioned.  Angular deformity of the distal esophagus, likely reflecting postoperative change given history of right middle and lower lobectomies.  Esophageal dysmotility with weak primary peristaltic contraction, proximal escape, to and fro movement of contrast, and frequent tertiary contractions.    Chest CT 3D Recon 11/30/22: I reviewed the images  1.  Status post right middle and lower lobectomies for reported carcinoid tumor resection.  No specific CT evidence for new or recurrent intrathoracic metastatic disease.   2.  Interval improvement in patchy ground-glass opacities within the left lung.   3.  Stable postsurgical changes of posterior right 6th rib osteotomy.   4.  Additional stable findings as above.    Assessment:       52 y.o. female returns for follow up s/p right thoracotomy for right lower bilobectomy for resection of carcinoid tumor with MLND.   MELIZA for recurrent carcinoid tumor.  Dysphagia vs dysmotility.   Intact chest wall reconstruction with intact 6th rib plate  Plan:     Redo right thoracotomy with rib plate removal offered as an option with the understanding that the clicking sensation may persist  RTC in 6 months with repeat non contrast chest CT

## 2022-11-30 ENCOUNTER — OFFICE VISIT (OUTPATIENT)
Dept: CARDIOTHORACIC SURGERY | Facility: CLINIC | Age: 52
End: 2022-11-30
Payer: MEDICAID

## 2022-11-30 ENCOUNTER — PATIENT MESSAGE (OUTPATIENT)
Dept: CARDIOTHORACIC SURGERY | Facility: CLINIC | Age: 52
End: 2022-11-30

## 2022-11-30 ENCOUNTER — HOSPITAL ENCOUNTER (OUTPATIENT)
Dept: RADIOLOGY | Facility: HOSPITAL | Age: 52
Discharge: HOME | End: 2022-11-30
Attending: THORACIC SURGERY (CARDIOTHORACIC VASCULAR SURGERY)
Payer: MEDICAID

## 2022-11-30 VITALS
HEIGHT: 66 IN | BODY MASS INDEX: 34.12 KG/M2 | OXYGEN SATURATION: 97 % | SYSTOLIC BLOOD PRESSURE: 156 MMHG | HEART RATE: 90 BPM | DIASTOLIC BLOOD PRESSURE: 88 MMHG | WEIGHT: 212.31 LBS

## 2022-11-30 DIAGNOSIS — C7A.090 CARCINOID BRONCHIAL ADENOMA OF RIGHT LUNG: Primary | ICD-10-CM

## 2022-11-30 DIAGNOSIS — Z85.118 HISTORY OF LUNG CANCER: ICD-10-CM

## 2022-11-30 DIAGNOSIS — D3A.00 CARCINOID TUMOR, UNSPECIFIED SITE, UNSPECIFIED WHETHER MALIGNANT: ICD-10-CM

## 2022-11-30 DIAGNOSIS — R07.89 RIGHT-SIDED CHEST WALL PAIN: ICD-10-CM

## 2022-11-30 PROCEDURE — 3079F PR MOST RECENT DIASTOLIC BLOOD PRESSURE 80-89 MM HG: ICD-10-PCS | Mod: CPTII,,, | Performed by: THORACIC SURGERY (CARDIOTHORACIC VASCULAR SURGERY)

## 2022-11-30 PROCEDURE — 99999 PR PBB SHADOW E&M-EST. PATIENT-LVL III: ICD-10-PCS | Mod: PBBFAC,,, | Performed by: THORACIC SURGERY (CARDIOTHORACIC VASCULAR SURGERY)

## 2022-11-30 PROCEDURE — 99213 PR OFFICE/OUTPT VISIT, EST, LEVL III, 20-29 MIN: ICD-10-PCS | Mod: S$PBB,,, | Performed by: THORACIC SURGERY (CARDIOTHORACIC VASCULAR SURGERY)

## 2022-11-30 PROCEDURE — 76377 3D RENDER W/INTRP POSTPROCES: CPT | Mod: 26,,, | Performed by: RADIOLOGY

## 2022-11-30 PROCEDURE — 99999 PR PBB SHADOW E&M-EST. PATIENT-LVL III: CPT | Mod: PBBFAC,,, | Performed by: THORACIC SURGERY (CARDIOTHORACIC VASCULAR SURGERY)

## 2022-11-30 PROCEDURE — 3008F BODY MASS INDEX DOCD: CPT | Mod: CPTII,,, | Performed by: THORACIC SURGERY (CARDIOTHORACIC VASCULAR SURGERY)

## 2022-11-30 PROCEDURE — 3077F SYST BP >= 140 MM HG: CPT | Mod: CPTII,,, | Performed by: THORACIC SURGERY (CARDIOTHORACIC VASCULAR SURGERY)

## 2022-11-30 PROCEDURE — 71250 CT CHEST WITHOUT CONTRAST: ICD-10-PCS | Mod: 26,,, | Performed by: RADIOLOGY

## 2022-11-30 PROCEDURE — 71250 CT THORAX DX C-: CPT | Mod: 26,,, | Performed by: RADIOLOGY

## 2022-11-30 PROCEDURE — 3008F PR BODY MASS INDEX (BMI) DOCUMENTED: ICD-10-PCS | Mod: CPTII,,, | Performed by: THORACIC SURGERY (CARDIOTHORACIC VASCULAR SURGERY)

## 2022-11-30 PROCEDURE — 3079F DIAST BP 80-89 MM HG: CPT | Mod: CPTII,,, | Performed by: THORACIC SURGERY (CARDIOTHORACIC VASCULAR SURGERY)

## 2022-11-30 PROCEDURE — 99213 OFFICE O/P EST LOW 20 MIN: CPT | Mod: S$PBB,,, | Performed by: THORACIC SURGERY (CARDIOTHORACIC VASCULAR SURGERY)

## 2022-11-30 PROCEDURE — 1159F MED LIST DOCD IN RCRD: CPT | Mod: CPTII,,, | Performed by: THORACIC SURGERY (CARDIOTHORACIC VASCULAR SURGERY)

## 2022-11-30 PROCEDURE — 76377 3D RENDER W/INTRP POSTPROCES: CPT | Mod: TC

## 2022-11-30 PROCEDURE — 1159F PR MEDICATION LIST DOCUMENTED IN MEDICAL RECORD: ICD-10-PCS | Mod: CPTII,,, | Performed by: THORACIC SURGERY (CARDIOTHORACIC VASCULAR SURGERY)

## 2022-11-30 PROCEDURE — 76377 CT 3D RECON WITH INDEPENDENT WS: ICD-10-PCS | Mod: 26,,, | Performed by: RADIOLOGY

## 2022-11-30 PROCEDURE — 71250 CT THORAX DX C-: CPT | Mod: TC

## 2022-11-30 PROCEDURE — 3077F PR MOST RECENT SYSTOLIC BLOOD PRESSURE >= 140 MM HG: ICD-10-PCS | Mod: CPTII,,, | Performed by: THORACIC SURGERY (CARDIOTHORACIC VASCULAR SURGERY)

## 2022-11-30 PROCEDURE — 99213 OFFICE O/P EST LOW 20 MIN: CPT | Mod: PBBFAC,25 | Performed by: THORACIC SURGERY (CARDIOTHORACIC VASCULAR SURGERY)

## 2022-11-30 RX ORDER — LISDEXAMFETAMINE DIMESYLATE 10 MG/1
1 CAPSULE ORAL EVERY MORNING
COMMUNITY
Start: 2022-11-01 | End: 2024-03-27

## 2022-11-30 RX ORDER — QUETIAPINE FUMARATE 50 MG/1
TABLET, FILM COATED ORAL
COMMUNITY

## 2022-12-06 ENCOUNTER — PATIENT MESSAGE (OUTPATIENT)
Dept: CARDIOTHORACIC SURGERY | Facility: CLINIC | Age: 52
End: 2022-12-06
Payer: MEDICAID

## 2022-12-06 DIAGNOSIS — C7A.090 CARCINOID BRONCHIAL ADENOMA OF RIGHT LUNG: Primary | ICD-10-CM

## 2022-12-12 NOTE — SUBJECTIVE & OBJECTIVE
Interval History: NAEON. Stable on RA. Voiding. Walked with PT yesterday. bm x 1. Chest tube output not recorded but apx 600 cc ss output, on water seal. Turbulence in CT. Urinating.      Medications:  Continuous Infusions:   ropivacaine (PF) 2 mg/ml (0.2%) 2 mL/hr (04/25/19 0034)     Scheduled Meds:   acetaminophen  1,000 mg Oral Q6H    albuterol-ipratropium  3 mL Nebulization Q6H    celecoxib  200 mg Oral Daily    enoxaparin  40 mg Subcutaneous Daily    lidocaine  1 patch Transdermal Q24H    pantoprazole  40 mg Oral Before breakfast    polyethylene glycol  17 g Oral Daily    pregabalin  150 mg Oral QHS    senna-docusate 8.6-50 mg  1 tablet Oral BID     PRN Meds:bisacodyl, lactulose, ondansetron, oxyCODONE, oxyCODONE, promethazine (PHENERGAN) IVPB     Review of patient's allergies indicates:  No Known Allergies  Objective:     Vital Signs (Most Recent):  Temp: 98.2 °F (36.8 °C) (04/25/19 0723)  Pulse: 87 (04/25/19 0723)  Resp: 17 (04/25/19 0723)  BP: (!) 141/75 (04/25/19 0723)  SpO2: 96 % (04/25/19 0723) Vital Signs (24h Range):  Temp:  [96.2 °F (35.7 °C)-98.4 °F (36.9 °C)] 98.2 °F (36.8 °C)  Pulse:  [81-94] 87  Resp:  [16-20] 17  SpO2:  [94 %-100 %] 96 %  BP: (112-141)/(60-75) 141/75     Intake/Output - Last 3 Shifts       04/23 0700 - 04/24 0659 04/24 0700 - 04/25 0659 04/25 0700 - 04/26 0659    P.O. 1940 1470     I.V. (mL/kg) 21.5 (0.2) 28.1 (0.3) 47.9 (0.5)    IV Piggyback       Total Intake(mL/kg) 1961.5 (22.3) 1498.1 (17) 47.9 (0.5)    Urine (mL/kg/hr) 0 (0) 0 (0)     Stool       Chest Tube 390 330     Total Output 390 330     Net +1571.5 +1168.1 +47.9           Urine Occurrence 6 x 3 x     Stool Occurrence  1 x           SpO2: 96 %  O2 Device (Oxygen Therapy): room air    Physical Exam   Constitutional: She is oriented to person, place, and time. She appears well-developed and well-nourished.   HENT:   Head: Normocephalic.   Eyes: Pupils are equal, round, and reactive to light.   Neck: Normal range  [facility]  Nephrology  Progress Note    Patient Name: Peri Johansen  MRN: [id number]  Admission Date: 11/17/2022  Hospital Length of Stay: 22 days  Attending Provider: Ximena Ramsey MD   Primary Care Physician: Annika Garland MD  Principal Problem:Shock, unspecified    Subjective:     Interval History: Na continue to improve with Tolvaptan. Na 130. Other electrolytes stable. Adequate UOP.   Hgb 11.0    Review of patient's allergies indicates:   Allergen Reactions    Latex, natural rubber Itching    Latex Hives     Current Facility-Administered Medications   Medication Frequency    acetaminophen tablet 650 mg Q6H PRN    amiodarone tablet 200 mg Daily    aspirin chewable tablet 81 mg Daily    atorvastatin tablet 40 mg Daily    benzonatate capsule 100 mg TID PRN    bisacodyL suppository 10 mg Daily PRN    dextromethorphan-guaiFENesin  mg/5 ml liquid 5 mL Q4H PRN    enoxaparin injection 30 mg Daily    ergocalciferol capsule 50,000 Units Q7 Days    glucagon (human recombinant) injection 1 mg PRN    glucose chewable tablet 16 g PRN    glucose chewable tablet 24 g PRN    hydrOXYzine pamoate capsule 25 mg Q6H PRN    insulin aspart U-100 pen 0-5 Units QID (AC + HS) PRN    LIDOcaine 5 % patch 1 patch Daily PRN    losartan split tablet 12.5 mg Daily    melatonin tablet 6 mg Nightly PRN    methIMAzole split tablet 2.5 mg Daily    naloxone 0.4 mg/mL injection 0.02 mg PRN    nitroGLYCERIN SL tablet 0.4 mg Q5 Min PRN    ondansetron injection 8 mg Q6H PRN    pantoprazole EC tablet 40 mg Daily    polyethylene glycol packet 17 g BID PRN    sodium chloride 0.9% flush 10 mL Q12H PRN       Objective:     Vital Signs (Most Recent):  Temp: 97.8 °F (36.6 °C) (12/12/22 1143)  Pulse: 86 (12/12/22 1143)  Resp: 18 (12/12/22 1143)  BP: (!) 97/54 (12/12/22 1143)  SpO2: 98 % (12/12/22 1143)  (RETIRED) O2 Device (Oxygen Therapy): nasal cannula (12/08/22 4048)   Vital Signs (24h  Range):  Temp:  [97 °F (36.1 °C)-98.4 °F (36.9 °C)] 97.8 °F (36.6 °C)  Pulse:  [74-91] 86  Resp:  [16-20] 18  SpO2:  [92 %-98 %] 98 %  BP: ()/(51-61) 97/54     Weight: 47.1 kg (103 lb 13.4 oz) (12/10/22 0400)  Body mass index is 20.97 kg/m².  Body surface area is 1.4 meters squared.    I/O last 3 completed shifts:  In: 1040 [P.O.:1040]  Out: 2100 [Urine:2100]    Physical Exam  Vitals and nursing note reviewed.   Constitutional:       General: She is awake.      Appearance: She is well-developed and underweight. She is ill-appearing.   HENT:      Head: Normocephalic.      Nose: Nose normal.      Mouth/Throat:      Mouth: Mucous membranes are moist.   Eyes:      General: Lids are normal. No scleral icterus.     Conjunctiva/sclera: Conjunctivae normal.      Pupils: Pupils are equal, round, and reactive to light.   Cardiovascular:      Rate and Rhythm: Normal rate and regular rhythm.      Heart sounds: No murmur heard.  Pulmonary:      Effort: Pulmonary effort is normal.      Breath sounds: Rhonchi present. No wheezing.   Abdominal:      General: Bowel sounds are normal.      Palpations: Abdomen is soft.      Tenderness: There is no abdominal tenderness.   Musculoskeletal:         General: No deformity.      Cervical back: Normal range of motion and neck supple.      Right lower leg: No edema.      Left lower leg: No edema.   Skin:     General: Skin is warm and dry.   Neurological:      General: No focal deficit present.      Mental Status: She is alert.   Psychiatric:         Behavior: Behavior is cooperative.       Significant Labs:  CBC:   Recent Labs   Lab 12/12/22  0218   WBC 6.78   RBC 3.89*   HGB 11.0*   HCT 32.6*      MCV 84   MCH 28.3   MCHC 33.7     CMP:   Recent Labs   Lab 12/09/22  1535 12/10/22  0334 12/12/22  0218   *  204*   < > 186*   CALCIUM 9.5  9.5   < > 9.5   ALBUMIN 2.3*  --   --    PROT 7.1  --   --    *  122*   < > 130*   K 5.1  5.1   < > 4.8   CO2 23  23   < > 26  of motion. Neck supple. No tracheal deviation present.   Cardiovascular: Normal rate, regular rhythm, normal heart sounds and intact distal pulses.   Pulmonary/Chest: Effort normal and breath sounds normal. She exhibits no tenderness.   Chest tube with ss output to water seal. Air leak vs turbulence in chest tube   Abdominal: Soft. Bowel sounds are normal. She exhibits no distension.   Musculoskeletal: She exhibits no edema.   Neurological: She is alert and oriented to person, place, and time.   Skin: Skin is warm and dry.   Psychiatric: She has a normal mood and affect.       Significant Labs:  None    Significant Diagnostics:  CXR: I have reviewed all pertinent results/findings within the past 24 hours    VTE Risk Mitigation (From admission, onward)        Ordered     enoxaparin injection 40 mg  Daily      04/25/19 0724     Place PAM hose  Until discontinued      04/22/19 0735     Place sequential compression device  Until discontinued      04/22/19 0735           CL 91*  91*   < > 96   BUN 24*  24*   < > 29*   CREATININE 1.0  1.0   < > 1.0   ALKPHOS 118  --   --    ALT 22  --   --    AST 33  --   --    BILITOT 1.4*  --   --     < > = values in this interval not displayed.     All labs within the past 24 hours have been reviewed.         Assessment/Plan:     * Shock, unspecified  - per primary team     Hyponatremia  Ms. Johansen is an 86 year old female with pmhx of heart failure (EF 58%) who presented with complaint of generalized fatigue. Patient is a poor historian. During her admission, patient was found to have elevated BNP and CXR significant for pulmonary edema bilaterally. Patient was started on lasix and was noted to be responsive. She was transitioned to bumex. BNP trending up to 1026. Patient developed hyponatremia shortly after being admitted. Sodium 136 at admission and dropped down to 120. Nephrology consulted for hyponatremia.     Likely secondary to heart failure. During volume assessment, patient had elevated JVP, rhonchi bilaterally, and RLE swelling.   Serum osm: 266. Consistent with hypotonic hyponatremia   Serum sodium increased significantly from 120 to 130 with NS, suggesting hypovolemic hyponatremia. Na 120 --> 130 -->127 w/ IVFs.    Recommendations:  - Tolvaptan 15 mg given 12/10 and 12/11  - Will plan for another dose of Tolvaptan 12 mg PO   - Continue 1 liter fluid restriction  - Recommend repeat BMP this evening for close monitoring. -Echo (11/19) showed grade III left ventricular diastolic dysfunction, mild-moderate pulmonary HTN, and ejection fraction of 58%. EF on most recent ECHO 35%   - strict ins/outs      Thank you for your consult. I will follow-up with patient. Please contact us if you have any additional questions.    Queenie Hernandez, CATHY, FNP-C  Nephrology  [facility]

## 2022-12-13 ENCOUNTER — PATIENT MESSAGE (OUTPATIENT)
Dept: GASTROENTEROLOGY | Facility: CLINIC | Age: 52
End: 2022-12-13

## 2022-12-13 ENCOUNTER — OFFICE VISIT (OUTPATIENT)
Dept: GASTROENTEROLOGY | Facility: CLINIC | Age: 52
End: 2022-12-13
Payer: MEDICAID

## 2022-12-13 ENCOUNTER — TELEPHONE (OUTPATIENT)
Dept: GASTROENTEROLOGY | Facility: CLINIC | Age: 52
End: 2022-12-13

## 2022-12-13 DIAGNOSIS — K21.9 GASTROESOPHAGEAL REFLUX DISEASE, UNSPECIFIED WHETHER ESOPHAGITIS PRESENT: Primary | ICD-10-CM

## 2022-12-13 DIAGNOSIS — R63.4 WEIGHT LOSS: ICD-10-CM

## 2022-12-13 DIAGNOSIS — R13.10 DYSPHAGIA, UNSPECIFIED TYPE: ICD-10-CM

## 2022-12-13 PROCEDURE — 99213 PR OFFICE/OUTPT VISIT, EST, LEVL III, 20-29 MIN: ICD-10-PCS | Mod: 95,,, | Performed by: INTERNAL MEDICINE

## 2022-12-13 PROCEDURE — 99213 OFFICE O/P EST LOW 20 MIN: CPT | Mod: 95,,, | Performed by: INTERNAL MEDICINE

## 2022-12-13 NOTE — PROGRESS NOTES
The patient location is: home  The chief complaint leading to consultation is: dysphagia, chest pain    Visit type: audiovisual    Face to Face time with patient: 15  20 minutes of total time spent on the encounter, which includes face to face time and non-face to face time preparing to see the patient (eg, review of tests), Obtaining and/or reviewing separately obtained history, Documenting clinical information in the electronic or other health record, Independently interpreting results (not separately reported) and communicating results to the patient/family/caregiver, or Care coordination (not separately reported).         Each patient to whom he or she provides medical services by telemedicine is:  (1) informed of the relationship between the physician and patient and the respective role of any other health care provider with respect to management of the patient; and (2) notified that he or she may decline to receive medical services by telemedicine and may withdraw from such care at any time.    Notes:       [facility] Consultation Note    Reason for Consult:  No chief complaint on file.        PCP:   Jeffry Velez       Referring MD:  Juan Jose Miles Md  [address] [place] [address]      HPI:  Gina Coto is a 52 y.o. female referred to motility clinic for second opinion regarding the following problems:    GERD.    Retrosternal pyrosis: none  Regurgitation: occasional.      NO PPI     Dysphagia.    Problems with solids: few per month   Problems with liquids: few per month     Location: mid-upper esophagus/mid chest    Lobectomy 4/23/2019  Thoracotomy 9/9/2019  3/2020 Symptoms started right after she found out  cheating on her    Esophagogram showed a kink in esophagus     DIET:   Regular diet, eating smaller bites, sitting up helps    Chest pain and pressure. Few per month     Trazodone 300mg     Eckardt Symptom Score  Dysphagia: 1  Regurgitation:  1  Retrosternal pain: 1  Weight Loss: 0  Total: 3     Weight loss   210lb from 200lb     BRBPR. Resolved     Anxiety and depression   Seeing a psychiatrist    Started fluoxetine, rexulti, vyvnarendra, seroquel   On trazodone 300mg QHS without improvement   Limits flexeril   Seeing a counselor     Insomnia.  Improved  On flexeril  On melatonin   Trazodone 300mg QHS and Seroquel    Denies nausea, vomiting, early satiety, abdominal pain, bloating, diarrhea, constipation, BRBPR, melena.      Total visit time was 20  minutes, more than 50% of which was spent in face-to-face counseling with patient regarding symptoms, diagnostic results, prognosis, risks and benefits of treatment options, instructions for management, importance of compliance with chosen treatment options and coping strategies.      Previous Studies:   Timed barium swallow 06/14/2022:  No evidence of achalasia.  Angular deformity of distal esophagus lactic E reflecting postoperative changes given history of right middle and lower lobectomies.  Esophageal dysmotility.  13 mm tablet readily passed into the stomach.  Timed barium swallow 0 minutes equals 0 cm.  No GERD.  No hiatal hernia.  Cervical spondylosis with minimal impression upon the posterior esophagus.    Manometry 07/14/2022:  High LES pressure with incomplete relaxation and premature contractions. Hypercontractile activity localizing to LES.  Achalasia type 3 versus EG junction outflow obstruction with distal esophageal spasm.  Incomplete bolus clearance.  Abnormal multiple rapid swallow test with pan esophageal pressurization.  IRP remained elevated with pressurization upright.  No significant difference with provocative maneuvers.  Evidence of residual liquid at the end of 200 cc bolus.  Hypercontractile activity with 200 cc bolus.  Meds:  Cyclobenzaprine (Flexeril),  , trazodone, clonidine.  EGD 07/12/2022:  Mildly tortuous mid esophagus.  Normal mucosa (negative).  Z-line 40 cm. Intermittent  esophageal spasm from 30-40 cm.  Intermittent resistance, puckering and pop.  Normal stomach.  Normal duodenum  Esophagram 06/03/2022:  Abrupt bent is detected in the mid to distal esophagus possibly associated with patient's surgical changes of thorax.  In conjunction with some esophageal dysmotility this man is resulted in delayed contrast passage into the distal esophagus and stomach.  Liquid contrast material remained present in the proximal mid esophagus for an extended period of time which places the patient at risk for aspiration.  However no aspiration was detected during the exam.  Nodularity of epiglottis.  CT chest 05/20/2022:  Status post right middle lower lobectomy for reported carcinoid tumor resection.  No specific CT evidence of new recurrent metastatic disease of the chest.  There is improvement in the right upper lobe ground-glass opacity.  However ground-glass opacity in the left lung have increased appear distributed centrally.  Consider pneumonitis, bronchopneumonia, COVID.  Colonoscopy 12/02/2021:  Normal.  Repeat 10 years.      Relevant Surgical History:    4/22/2019 R thoracotomy with R lower bilobectomy and bronchoplsty, mediastinal LN dissection, adhesiolysis  9/9/2019 thoracotomy, ORIF R rib #6     ROS:  ROS     Complete ROS negative except as above    Medical History:   Past Medical History:   Diagnosis Date    Carcinoid tumor of bronchus     s/p resection    Dysphagia     Seasonal asthma         Surgical History:   Past Surgical History:   Procedure Laterality Date    BRONCHOSCOPY N/A 11/28/2018    Procedure: BRONCHOSCOPY;  Surgeon: Omar Prescott MD;  [facility];  Service: Pulmonary;  Laterality: N/A;    BRONCHOSCOPY N/A 4/22/2019    Procedure: BRONCHOSCOPY;  Surgeon: Juan Jose Miles MD;  Location: [address];  Service: Thoracic;  Laterality: N/A;    BRONCHOSCOPY N/A 5/6/2019    Procedure: BRONCHOSCOPY;  Surgeon: Juan Jose Miles MD;  [facility]: [facility] OR [facility]  FL[facility];  Service: Thoracic;  Laterality: N/A;    COLONOSCOPY N/A 12/2/2021    Procedure: COLONOSCOPY;  Surgeon: Juan Jose Castano MD;  Location: [facility];  Service: Colon and Rectal;  Laterality: N/A;    ESOPHAGEAL MANOMETRY WITH MEASUREMENT OF IMPEDANCE N/A 7/12/2022    Procedure: MANOMETRY-ESOPHAGEAL-WITH IMPEDANCE;  Surgeon: Katarzyna Floyd MD;  Location: [facility] (2ND FLR);  Service: Endoscopy;  Laterality: N/A;  hold trazodone 24 hours prior to procedure    ESOPHAGOGASTRODUODENOSCOPY N/A 7/12/2022    Procedure: ESOPHAGOGASTRODUODENOSCOPY (EGD);  Surgeon: Katarzyna Floyd MD;  Location: [facility] (2ND FLR);  Service: Endoscopy;  Laterality: N/A;  Endoscopically placed manometry probe  2nd floor-fluid in esophagus  propofol only  full liquid diet x3 days, clear liquid diet x1 day prior to procedure  RAPID COVID test, pt to arrive for 11:00am-[facility]   instructions sent to [facility]    PELVIC LAPAROSCOPY      SLEEVE LOBECTOMY OF LUNG BY THORACOTOMY Right 4/22/2019    Procedure: LOBECTOMY, SLEEVE, LUNG, THORACOTOMY APPROACH;  Surgeon: Juan Jose Miles MD;  [facility];  Service: Thoracic;  Laterality: Right;  lower bilobectomy        Family History:   Family History   Problem Relation Age of Onset    Arthritis Mother     COPD Mother     Hypertension Mother     Stroke Mother         vascular dementia    Hypertension Father     Pacemaker/defibrilator Father         sleep apnea    Colon cancer Maternal Grandmother     Cancer Maternal Grandmother         colon     Cancer Paternal Grandfather         lung; smoker    Anesthesia problems Neg Hx     Esophageal cancer Neg Hx     Stomach cancer Neg Hx     Colon polyps Neg Hx     Rectal cancer Neg Hx     Liver cancer Neg Hx     Pancreatic cancer Neg Hx     Celiac disease Neg Hx     Crohn's disease Neg Hx     Irritable bowel syndrome Neg Hx         Social History:   Social History     Socioeconomic History    Marital status:    Tobacco Use    Smoking  status: Former     Packs/day: 0.50     Years: 15.00     Pack years: 7.50     Types: Cigarettes     Quit date: 2018     Years since quittin.0    Smokeless tobacco: Never   Substance and Sexual Activity    Alcohol use: No    Drug use: Yes     Types: Marijuana    Sexual activity: Not Currently        Review of patient's allergies indicates:  No Known Allergies    Current Outpatient Medications   Medication Sig Dispense Refill    albuterol (PROVENTIL/VENTOLIN HFA) 90 mcg/actuation inhaler INHALE 1 TO 2 PUFFS BY MOUTH EVERY 6 HOURS AS NEEDED FOR WHEEZING OR SHORTNESS OF BREATH 54 g 0    cloNIDine (CATAPRES) 0.1 MG tablet       cyclobenzaprine (FLEXERIL) 10 MG tablet TAKE 1 TABLET BY MOUTH 3 TIMES A DAY AS NEEDED FOR MUSCLE SPASMS 90 tablet 4    FLUoxetine 20 MG capsule Take 60 mg by mouth every morning.      QUEtiapine (SEROQUEL) 50 MG tablet take 1/2 to 1 tablet at bedtime      REXULTI 0.5 mg Tab Take 1 tablet by mouth once daily.      traZODone (DESYREL) 100 MG tablet Take 200 mg by mouth nightly.      VYVANSE 10 mg Cap Take 1 capsule by mouth every morning.       No current facility-administered medications for this visit.        Objective Findings:  Vital Signs:  There were no vitals taken for this visit.  There is no height or weight on file to calculate BMI.    Physical Exam:  Physical Exam:limited due to video visit  General appearance: alert, cooperative, no distress  HENT: Normocephalic, atraumatic, neck symmetrical, no nasal discharge  Eyes: conjunctivae/corneas clear,  EOM's intact  Extremities: visible extremities symmetric; no clubbing, cyanosis, or edema  Integument: visible Skin color, texture, turgor normal; no rashes; hair distrubution normal  Neurologic: Alert and oriented X 3,  Psychiatric: no pressured speech; normal affect; no evidence of impaired cognition      Labs:   Reviewed in Epic/record      Assessment and Plan:  Gina Coto is a 52 y.o. female with referred to Esophageal  Motility Clinic for 2nd opinion regarding following problems:    GERD.  No pyrosis.  Regurgitation during esophageal spasms   -Salgado ot want to take omeprazole      Dysphagia to solids and liquids   Regurgitation   Chest spasms   Eckardt: 3/12 from 5/12 4/22/2019 R thoracotomy with R lower bilobectomy and bronchoplsty, mediastinal LN dissection, adhesiolysis  9/9/2019 thoracotomy, ORIF R rib #6   Symptoms started 3/2020  right after she found out  was cheating on her  EGD with mildly tortuous mid esophagus.  Intermittent spasm, puckering, resistance from 30-40 cm.   Manometry with Achalasia type 3 versus EG junction outflow obstruction with distal esophageal spasm.   Timed barium swallow with immediate clearance of barium an tablet.  Angular deformity of distal esophagus reflecting postoperative changes.  Possibly related to medication. Meds:  Flexeril, trazodone, ?phenteramine  Clinical presentation suggestive of EG junction outflow obstruction with distal esophageal spasm and hypercontractile activity versus early achalasia type 3. We will attempt medical management prior to any surgical consideration.    -Pt does not want to take omeprazole 40mg daily   -peppermint prn   -Avoid flexeril   -Trazodone 300mg w psychiatry     Weight loss.  Now stable     BRBPR.  Resolved.  Colonoscopy 2021 negative     MGM w CRC in 70s  Colonoscopy 2021 negative.  Repeat 10 yrs     Anxiety and depression   On fluoxetine, rexulti, vyvance, seroquel per psychiatry  On trazodone 300mg QHS  On flexeril since 4/2019 for mm spasms    Seeing a counselor     Insomnia.  Improved   Trazodone 300mg QHS/Seroquel     Follow up in about 5 months (around 5/13/2023).    1. Gastroesophageal reflux disease, unspecified whether esophagitis present    2. Dysphagia, unspecified type    3. Weight loss            Order summary:         Discussed with PT that I act as a consult service and do not accept patients to be their primary GI provider.  Discussed that the goal of our visits is to address relevant motility problems while deferring other GI problems as well as screening and surveillance to his/her primary GI provider.   Discussed that he/she needs to continue to follow with his local primary GI provider.  Discussed that we will complete his/her workup, clarify diagnosis and attempt to optimize his/her symptoms with intention of him/her returning to referring GI provider for long term GI care.   Pt verbalized understanding.        Thank you so much for allowing me to participate in the care of Gina Floyd MD      This note was created using voice recognition software, and may contain some unrecognized transcriptional errors.

## 2023-01-04 ENCOUNTER — ANESTHESIA EVENT (OUTPATIENT)
Dept: SURGERY | Facility: HOSPITAL | Age: 53
DRG: 572 | End: 2023-01-04
Payer: MEDICAID

## 2023-01-04 ENCOUNTER — TELEPHONE (OUTPATIENT)
Dept: CARDIOTHORACIC SURGERY | Facility: CLINIC | Age: 53
End: 2023-01-04
Payer: MEDICAID

## 2023-01-04 RX ORDER — ACETAMINOPHEN 500 MG
1000 TABLET ORAL ONCE
Status: CANCELLED | OUTPATIENT
Start: 2023-01-04 | End: 2023-01-04

## 2023-01-05 ENCOUNTER — HOSPITAL ENCOUNTER (INPATIENT)
Facility: HOSPITAL | Age: 53
LOS: 1 days | Discharge: HOME | DRG: 572 | End: 2023-01-06
Attending: THORACIC SURGERY (CARDIOTHORACIC VASCULAR SURGERY) | Admitting: THORACIC SURGERY (CARDIOTHORACIC VASCULAR SURGERY)
Payer: MEDICAID

## 2023-01-05 ENCOUNTER — ANESTHESIA (OUTPATIENT)
Dept: SURGERY | Facility: HOSPITAL | Age: 53
DRG: 572 | End: 2023-01-05
Payer: MEDICAID

## 2023-01-05 DIAGNOSIS — D3A.090 CARCINOID TUMOR OF RIGHT LUNG: Primary | ICD-10-CM

## 2023-01-05 DIAGNOSIS — C7A.090 CARCINOID BRONCHIAL ADENOMA OF RIGHT LUNG: ICD-10-CM

## 2023-01-05 LAB
ABO + RH BLD: NORMAL
B-HCG UR QL: NEGATIVE
BLD GP AB SCN CELLS X3 SERPL QL: NORMAL
CTP QC/QA: YES

## 2023-01-05 PROCEDURE — 94761 N-INVAS EAR/PLS OXIMETRY MLT: CPT

## 2023-01-05 PROCEDURE — C1781 MESH (IMPLANTABLE): HCPCS | Performed by: THORACIC SURGERY (CARDIOTHORACIC VASCULAR SURGERY)

## 2023-01-05 PROCEDURE — 25000003 PHARM REV CODE 250: Performed by: STUDENT IN AN ORGANIZED HEALTH CARE EDUCATION/TRAINING PROGRAM

## 2023-01-05 PROCEDURE — 25000003 PHARM REV CODE 250

## 2023-01-05 PROCEDURE — 88305 TISSUE EXAM BY PATHOLOGIST: ICD-10-PCS | Mod: 26,,, | Performed by: PATHOLOGY

## 2023-01-05 PROCEDURE — 81025 URINE PREGNANCY TEST: CPT | Performed by: THORACIC SURGERY (CARDIOTHORACIC VASCULAR SURGERY)

## 2023-01-05 PROCEDURE — 37000009 HC ANESTHESIA EA ADD 15 MINS: Performed by: THORACIC SURGERY (CARDIOTHORACIC VASCULAR SURGERY)

## 2023-01-05 PROCEDURE — 71000015 HC POSTOP RECOV 1ST HR: Performed by: THORACIC SURGERY (CARDIOTHORACIC VASCULAR SURGERY)

## 2023-01-05 PROCEDURE — 88305 TISSUE EXAM BY PATHOLOGIST: CPT | Performed by: PATHOLOGY

## 2023-01-05 PROCEDURE — D9220A PRA ANESTHESIA: Mod: ,,, | Performed by: ANESTHESIOLOGY

## 2023-01-05 PROCEDURE — 63600175 PHARM REV CODE 636 W HCPCS: Performed by: STUDENT IN AN ORGANIZED HEALTH CARE EDUCATION/TRAINING PROGRAM

## 2023-01-05 PROCEDURE — 86900 BLOOD TYPING SEROLOGIC ABO: CPT | Performed by: THORACIC SURGERY (CARDIOTHORACIC VASCULAR SURGERY)

## 2023-01-05 PROCEDURE — 37000008 HC ANESTHESIA 1ST 15 MINUTES: Performed by: THORACIC SURGERY (CARDIOTHORACIC VASCULAR SURGERY)

## 2023-01-05 PROCEDURE — 71000016 HC POSTOP RECOV ADDL HR: Performed by: THORACIC SURGERY (CARDIOTHORACIC VASCULAR SURGERY)

## 2023-01-05 PROCEDURE — 32999: ICD-10-PCS | Mod: ,,, | Performed by: THORACIC SURGERY (CARDIOTHORACIC VASCULAR SURGERY)

## 2023-01-05 PROCEDURE — 36620 PR INSERT CATH,ART,PERCUT,SHORTTERM: ICD-10-PCS | Mod: 59,,, | Performed by: ANESTHESIOLOGY

## 2023-01-05 PROCEDURE — 25000242 PHARM REV CODE 250 ALT 637 W/ HCPCS: Performed by: STUDENT IN AN ORGANIZED HEALTH CARE EDUCATION/TRAINING PROGRAM

## 2023-01-05 PROCEDURE — 71000033 HC RECOVERY, INTIAL HOUR: Performed by: THORACIC SURGERY (CARDIOTHORACIC VASCULAR SURGERY)

## 2023-01-05 PROCEDURE — 71000039 HC RECOVERY, EACH ADD'L HOUR: Performed by: THORACIC SURGERY (CARDIOTHORACIC VASCULAR SURGERY)

## 2023-01-05 PROCEDURE — 25000003 PHARM REV CODE 250: Performed by: THORACIC SURGERY (CARDIOTHORACIC VASCULAR SURGERY)

## 2023-01-05 PROCEDURE — 32999 UNLISTED PX LUNGS & PLEURA: CPT | Mod: ,,, | Performed by: THORACIC SURGERY (CARDIOTHORACIC VASCULAR SURGERY)

## 2023-01-05 PROCEDURE — 99900035 HC TECH TIME PER 15 MIN (STAT)

## 2023-01-05 PROCEDURE — 63600175 PHARM REV CODE 636 W HCPCS: Performed by: THORACIC SURGERY (CARDIOTHORACIC VASCULAR SURGERY)

## 2023-01-05 PROCEDURE — D9220A PRA ANESTHESIA: ICD-10-PCS | Mod: ,,, | Performed by: ANESTHESIOLOGY

## 2023-01-05 PROCEDURE — 20600001 HC STEP DOWN PRIVATE ROOM

## 2023-01-05 PROCEDURE — C9290 INJ, BUPIVACAINE LIPOSOME: HCPCS | Performed by: THORACIC SURGERY (CARDIOTHORACIC VASCULAR SURGERY)

## 2023-01-05 PROCEDURE — 36000710: Performed by: THORACIC SURGERY (CARDIOTHORACIC VASCULAR SURGERY)

## 2023-01-05 PROCEDURE — 36000711: Performed by: THORACIC SURGERY (CARDIOTHORACIC VASCULAR SURGERY)

## 2023-01-05 PROCEDURE — 88305 TISSUE EXAM BY PATHOLOGIST: CPT | Mod: 26,,, | Performed by: PATHOLOGY

## 2023-01-05 PROCEDURE — 94640 AIRWAY INHALATION TREATMENT: CPT

## 2023-01-05 PROCEDURE — 27201037 HC PRESSURE MONITORING SET UP

## 2023-01-05 PROCEDURE — 36620 INSERTION CATHETER ARTERY: CPT | Mod: 59,,, | Performed by: ANESTHESIOLOGY

## 2023-01-05 DEVICE — IMPLANTABLE DEVICE: Type: IMPLANTABLE DEVICE | Site: CHEST  WALL | Status: FUNCTIONAL

## 2023-01-05 RX ORDER — FLUOXETINE HYDROCHLORIDE 20 MG/1
60 CAPSULE ORAL EVERY MORNING
Status: DISCONTINUED | OUTPATIENT
Start: 2023-01-06 | End: 2023-01-05

## 2023-01-05 RX ORDER — DEXAMETHASONE SODIUM PHOSPHATE 4 MG/ML
INJECTION, SOLUTION INTRA-ARTICULAR; INTRALESIONAL; INTRAMUSCULAR; INTRAVENOUS; SOFT TISSUE
Status: DISCONTINUED | OUTPATIENT
Start: 2023-01-05 | End: 2023-01-05

## 2023-01-05 RX ORDER — LIDOCAINE HYDROCHLORIDE 10 MG/ML
1 INJECTION, SOLUTION EPIDURAL; INFILTRATION; INTRACAUDAL; PERINEURAL ONCE
Status: DISCONTINUED | OUTPATIENT
Start: 2023-01-05 | End: 2023-01-05

## 2023-01-05 RX ORDER — ONDANSETRON 2 MG/ML
4 INJECTION INTRAMUSCULAR; INTRAVENOUS ONCE AS NEEDED
Status: DISCONTINUED | OUTPATIENT
Start: 2023-01-05 | End: 2023-01-05 | Stop reason: HOSPADM

## 2023-01-05 RX ORDER — TRAZODONE HYDROCHLORIDE 100 MG/1
200 TABLET ORAL NIGHTLY
Status: DISCONTINUED | OUTPATIENT
Start: 2023-01-05 | End: 2023-01-06 | Stop reason: HOSPADM

## 2023-01-05 RX ORDER — LIDOCAINE HYDROCHLORIDE 20 MG/ML
INJECTION, SOLUTION EPIDURAL; INFILTRATION; INTRACAUDAL; PERINEURAL
Status: DISCONTINUED | OUTPATIENT
Start: 2023-01-05 | End: 2023-01-05

## 2023-01-05 RX ORDER — BUPIVACAINE HYDROCHLORIDE 5 MG/ML
INJECTION, SOLUTION EPIDURAL; INTRACAUDAL
Status: DISCONTINUED | OUTPATIENT
Start: 2023-01-05 | End: 2023-01-05 | Stop reason: HOSPADM

## 2023-01-05 RX ORDER — HALOPERIDOL 5 MG/ML
0.5 INJECTION INTRAMUSCULAR ONCE AS NEEDED
Status: DISCONTINUED | OUTPATIENT
Start: 2023-01-05 | End: 2023-01-05 | Stop reason: HOSPADM

## 2023-01-05 RX ORDER — ONDANSETRON 2 MG/ML
INJECTION INTRAMUSCULAR; INTRAVENOUS
Status: DISCONTINUED | OUTPATIENT
Start: 2023-01-05 | End: 2023-01-05

## 2023-01-05 RX ORDER — OXYCODONE HYDROCHLORIDE 5 MG/1
5 TABLET ORAL EVERY 4 HOURS PRN
Status: DISCONTINUED | OUTPATIENT
Start: 2023-01-05 | End: 2023-01-06 | Stop reason: HOSPADM

## 2023-01-05 RX ORDER — QUETIAPINE FUMARATE 25 MG/1
50 TABLET, FILM COATED ORAL NIGHTLY
Status: DISCONTINUED | OUTPATIENT
Start: 2023-01-05 | End: 2023-01-06 | Stop reason: HOSPADM

## 2023-01-05 RX ORDER — FLUOXETINE 10 MG/1
10 CAPSULE ORAL DAILY
Status: DISCONTINUED | OUTPATIENT
Start: 2023-01-06 | End: 2023-01-06 | Stop reason: HOSPADM

## 2023-01-05 RX ORDER — ACETAMINOPHEN 500 MG
1000 TABLET ORAL EVERY 8 HOURS
Status: DISCONTINUED | OUTPATIENT
Start: 2023-01-05 | End: 2023-01-06 | Stop reason: HOSPADM

## 2023-01-05 RX ORDER — ONDANSETRON 8 MG/1
8 TABLET, ORALLY DISINTEGRATING ORAL EVERY 8 HOURS PRN
Status: DISCONTINUED | OUTPATIENT
Start: 2023-01-05 | End: 2023-01-06 | Stop reason: HOSPADM

## 2023-01-05 RX ORDER — LABETALOL HYDROCHLORIDE 5 MG/ML
INJECTION, SOLUTION INTRAVENOUS
Status: DISCONTINUED | OUTPATIENT
Start: 2023-01-05 | End: 2023-01-05

## 2023-01-05 RX ORDER — FENTANYL CITRATE 50 UG/ML
25 INJECTION, SOLUTION INTRAMUSCULAR; INTRAVENOUS EVERY 5 MIN PRN
Status: DISCONTINUED | OUTPATIENT
Start: 2023-01-05 | End: 2023-01-05 | Stop reason: HOSPADM

## 2023-01-05 RX ORDER — ACETAMINOPHEN 325 MG/1
650 TABLET ORAL EVERY 8 HOURS PRN
Status: DISCONTINUED | OUTPATIENT
Start: 2023-01-05 | End: 2023-01-05

## 2023-01-05 RX ORDER — SODIUM CHLORIDE 0.9 % (FLUSH) 0.9 %
10 SYRINGE (ML) INJECTION
Status: DISCONTINUED | OUTPATIENT
Start: 2023-01-05 | End: 2023-01-06 | Stop reason: HOSPADM

## 2023-01-05 RX ORDER — CEFAZOLIN SODIUM 1 G/3ML
INJECTION, POWDER, FOR SOLUTION INTRAMUSCULAR; INTRAVENOUS
Status: DISCONTINUED | OUTPATIENT
Start: 2023-01-05 | End: 2023-01-05

## 2023-01-05 RX ORDER — MIDAZOLAM HYDROCHLORIDE 5 MG/ML
INJECTION INTRAMUSCULAR; INTRAVENOUS
Status: DISCONTINUED | OUTPATIENT
Start: 2023-01-05 | End: 2023-01-05

## 2023-01-05 RX ORDER — OXYCODONE HYDROCHLORIDE 10 MG/1
10 TABLET ORAL EVERY 4 HOURS PRN
Status: DISCONTINUED | OUTPATIENT
Start: 2023-01-05 | End: 2023-01-06 | Stop reason: HOSPADM

## 2023-01-05 RX ORDER — ACETAMINOPHEN 500 MG
1000 TABLET ORAL
Status: COMPLETED | OUTPATIENT
Start: 2023-01-05 | End: 2023-01-05

## 2023-01-05 RX ORDER — SODIUM CHLORIDE 0.9 % (FLUSH) 0.9 %
10 SYRINGE (ML) INJECTION
Status: DISCONTINUED | OUTPATIENT
Start: 2023-01-05 | End: 2023-01-05 | Stop reason: HOSPADM

## 2023-01-05 RX ORDER — FENTANYL CITRATE 50 UG/ML
INJECTION, SOLUTION INTRAMUSCULAR; INTRAVENOUS
Status: DISCONTINUED | OUTPATIENT
Start: 2023-01-05 | End: 2023-01-05

## 2023-01-05 RX ORDER — ALBUTEROL SULFATE 90 UG/1
2 AEROSOL, METERED RESPIRATORY (INHALATION) EVERY 6 HOURS PRN
Status: DISCONTINUED | OUTPATIENT
Start: 2023-01-05 | End: 2023-01-06 | Stop reason: HOSPADM

## 2023-01-05 RX ORDER — HYDROMORPHONE HYDROCHLORIDE 1 MG/ML
0.2 INJECTION, SOLUTION INTRAMUSCULAR; INTRAVENOUS; SUBCUTANEOUS EVERY 5 MIN PRN
Status: DISCONTINUED | OUTPATIENT
Start: 2023-01-05 | End: 2023-01-05 | Stop reason: HOSPADM

## 2023-01-05 RX ORDER — ROCURONIUM BROMIDE 10 MG/ML
INJECTION, SOLUTION INTRAVENOUS
Status: DISCONTINUED | OUTPATIENT
Start: 2023-01-05 | End: 2023-01-05

## 2023-01-05 RX ORDER — KETAMINE HCL IN 0.9 % NACL 50 MG/5 ML
SYRINGE (ML) INTRAVENOUS
Status: DISCONTINUED | OUTPATIENT
Start: 2023-01-05 | End: 2023-01-05

## 2023-01-05 RX ORDER — PROPOFOL 10 MG/ML
VIAL (ML) INTRAVENOUS
Status: DISCONTINUED | OUTPATIENT
Start: 2023-01-05 | End: 2023-01-05

## 2023-01-05 RX ORDER — CYCLOBENZAPRINE HCL 5 MG
10 TABLET ORAL 3 TIMES DAILY PRN
Status: DISCONTINUED | OUTPATIENT
Start: 2023-01-05 | End: 2023-01-06 | Stop reason: HOSPADM

## 2023-01-05 RX ADMIN — OXYCODONE HYDROCHLORIDE 10 MG: 10 TABLET ORAL at 10:01

## 2023-01-05 RX ADMIN — PROPOFOL 50 MG: 10 INJECTION, EMULSION INTRAVENOUS at 07:01

## 2023-01-05 RX ADMIN — FENTANYL CITRATE 50 MCG: 50 INJECTION INTRAMUSCULAR; INTRAVENOUS at 09:01

## 2023-01-05 RX ADMIN — CEFAZOLIN 2 G: 330 INJECTION, POWDER, FOR SOLUTION INTRAMUSCULAR; INTRAVENOUS at 07:01

## 2023-01-05 RX ADMIN — ALBUTEROL SULFATE 2 PUFF: 108 INHALANT RESPIRATORY (INHALATION) at 10:01

## 2023-01-05 RX ADMIN — TRAZODONE HYDROCHLORIDE 200 MG: 100 TABLET ORAL at 08:01

## 2023-01-05 RX ADMIN — OXYCODONE HYDROCHLORIDE 10 MG: 10 TABLET ORAL at 08:01

## 2023-01-05 RX ADMIN — ACETAMINOPHEN 1000 MG: 500 TABLET ORAL at 01:01

## 2023-01-05 RX ADMIN — SODIUM CHLORIDE: 0.9 INJECTION, SOLUTION INTRAVENOUS at 07:01

## 2023-01-05 RX ADMIN — ROCURONIUM BROMIDE 20 MG: 10 INJECTION INTRAVENOUS at 07:01

## 2023-01-05 RX ADMIN — ROCURONIUM BROMIDE 50 MG: 10 INJECTION INTRAVENOUS at 08:01

## 2023-01-05 RX ADMIN — GABAPENTIN 400 MG: 300 CAPSULE ORAL at 05:01

## 2023-01-05 RX ADMIN — PROPOFOL 50 MG: 10 INJECTION, EMULSION INTRAVENOUS at 08:01

## 2023-01-05 RX ADMIN — MIDAZOLAM HYDROCHLORIDE 2 MG: 5 INJECTION, SOLUTION INTRAMUSCULAR; INTRAVENOUS at 07:01

## 2023-01-05 RX ADMIN — HYDROMORPHONE HYDROCHLORIDE 0.2 MG: 1 INJECTION, SOLUTION INTRAMUSCULAR; INTRAVENOUS; SUBCUTANEOUS at 10:01

## 2023-01-05 RX ADMIN — PROPOFOL 30 MG: 10 INJECTION, EMULSION INTRAVENOUS at 08:01

## 2023-01-05 RX ADMIN — HYDROMORPHONE HYDROCHLORIDE 0.2 MG: 1 INJECTION, SOLUTION INTRAMUSCULAR; INTRAVENOUS; SUBCUTANEOUS at 12:01

## 2023-01-05 RX ADMIN — Medication 30 MG: at 07:01

## 2023-01-05 RX ADMIN — ONDANSETRON 4 MG: 2 INJECTION INTRAMUSCULAR; INTRAVENOUS at 09:01

## 2023-01-05 RX ADMIN — Medication 20 MG: at 08:01

## 2023-01-05 RX ADMIN — QUETIAPINE FUMARATE 50 MG: 25 TABLET ORAL at 08:01

## 2023-01-05 RX ADMIN — OXYCODONE HYDROCHLORIDE 10 MG: 10 TABLET ORAL at 04:01

## 2023-01-05 RX ADMIN — ROCURONIUM BROMIDE 80 MG: 10 INJECTION INTRAVENOUS at 07:01

## 2023-01-05 RX ADMIN — DEXAMETHASONE SODIUM PHOSPHATE 4 MG: 4 INJECTION INTRA-ARTICULAR; INTRALESIONAL; INTRAMUSCULAR; INTRAVENOUS; SOFT TISSUE at 07:01

## 2023-01-05 RX ADMIN — FENTANYL CITRATE 50 MCG: 50 INJECTION INTRAMUSCULAR; INTRAVENOUS at 07:01

## 2023-01-05 RX ADMIN — LIDOCAINE HYDROCHLORIDE 80 MG: 20 INJECTION, SOLUTION EPIDURAL; INFILTRATION; INTRACAUDAL; PERINEURAL at 07:01

## 2023-01-05 RX ADMIN — PROPOFOL 150 MG: 10 INJECTION, EMULSION INTRAVENOUS at 07:01

## 2023-01-05 RX ADMIN — LABETALOL HYDROCHLORIDE 20 MG: 5 INJECTION, SOLUTION INTRAVENOUS at 10:01

## 2023-01-05 RX ADMIN — SUGAMMADEX 200 MG: 100 INJECTION, SOLUTION INTRAVENOUS at 09:01

## 2023-01-05 RX ADMIN — ACETAMINOPHEN 1000 MG: 500 TABLET ORAL at 05:01

## 2023-01-05 NOTE — ANESTHESIA POSTPROCEDURE EVALUATION
Anesthesia Post Evaluation    Patient: Gina Coto    Procedure(s) Performed: Procedure(s) (LRB):  THORACOTOMY REDO WITH RIB PLATE, RECONSTRUCTION OF CHEST WALL WITH MESH (Right)  BLOCK, NERVE, INTERCOSTAL, 2 OR MORE (Right)    Final Anesthesia Type: general      Patient location during evaluation: PACU  Patient participation: Yes- Able to Participate  Level of consciousness: awake and alert  Post-procedure vital signs: reviewed and stable  Pain management: adequate  Airway patency: patent  HUSSEIN mitigation strategies: Extubation while patient is awake and Multimodal analgesia  PONV status at discharge: No PONV  Anesthetic complications: no      Cardiovascular status: stable  Respiratory status: unassisted and spontaneous ventilation  Hydration status: euvolemic  Follow-up not needed.          Vitals Value Taken Time   /84 01/05/23 1145   Temp 36.6 °C (97.8 °F) 01/05/23 1130   Pulse 68 01/05/23 1145   Resp 13 01/05/23 1145   SpO2 99 % 01/05/23 1145         Event Time   Out of Recovery 11:30:00         Pain/Saundra Score: Pain Rating Prior to Med Admin: 8 (1/5/2023 12:15 PM)  Pain Rating Post Med Admin: 7 (1/5/2023 11:00 AM)  Saundra Score: 8 (1/5/2023 12:30 PM)

## 2023-01-05 NOTE — H&P
"[facility] - [facility] (2nd Fl)  History & Physical  Cardiothoracic Surgery    SUBJECTIVE:     Chief Complaint/Reason for Admission: planned surgery    History of Present Illness:  Gina Coto is a 52 y.o. female s/p right thoracotomy for right lower bilobectomy for resection of carcinoid tumor and is now s/p rib plating. Patient reports clicking sensation when she moves a certain way that she believes is associated with her surgery. She also reports burning/stinging pain along the incision site and that "it is difficult to wear bra some days" 2/2 to pain. Denies SOB, cough, diaphoresis, fever, abnormal weight loss    She presents today for planned re-do thoracotomy and rib plate removal. She denies any changes since last clinic visit.     PTA Medications   Medication Sig    albuterol (PROVENTIL/VENTOLIN HFA) 90 mcg/actuation inhaler INHALE 1 TO 2 PUFFS BY MOUTH EVERY 6 HOURS AS NEEDED FOR WHEEZING OR SHORTNESS OF BREATH    cloNIDine (CATAPRES) 0.1 MG tablet 0.1 mg every 6 (six) hours as needed.    cyclobenzaprine (FLEXERIL) 10 MG tablet TAKE 1 TABLET BY MOUTH 3 TIMES A DAY AS NEEDED FOR MUSCLE SPASMS    FLUoxetine 20 MG capsule Take 60 mg by mouth every morning.    QUEtiapine (SEROQUEL) 50 MG tablet take 1/2 to 1 tablet at bedtime    REXULTI 0.5 mg Tab Take 1 tablet by mouth once daily.    traZODone (DESYREL) 100 MG tablet Take 200 mg by mouth nightly.    VYVANSE 10 mg Cap Take 1 capsule by mouth every morning.       Review of patient's allergies indicates:  No Known Allergies    Past Medical History:   Diagnosis Date    Carcinoid tumor of bronchus     s/p resection    Dysphagia     Seasonal asthma      Past Surgical History:   Procedure Laterality Date    BRONCHOSCOPY N/A 11/28/2018    Procedure: BRONCHOSCOPY;  Surgeon: Omar Prescott MD;  Location: [facility];  Service: Pulmonary;  Laterality: N/A;    BRONCHOSCOPY N/A 4/22/2019    Procedure: BRONCHOSCOPY;  Surgeon: Juan Jose Miles MD;  Location: [facility]" OR 2ND FLR;  Service: Thoracic;  Laterality: N/A;    BRONCHOSCOPY N/A 5/6/2019    Procedure: BRONCHOSCOPY;  Surgeon: Juan Jose Miles MD;  Location: [facility] OR 2ND FLR;  Service: Thoracic;  Laterality: N/A;    COLONOSCOPY N/A 12/2/2021    Procedure: COLONOSCOPY;  Surgeon: Juan Jose Castano MD;  Location: [facility];  Service: Colon and Rectal;  Laterality: N/A;    ESOPHAGEAL MANOMETRY WITH MEASUREMENT OF IMPEDANCE N/A 7/12/2022    Procedure: MANOMETRY-ESOPHAGEAL-WITH IMPEDANCE;  Surgeon: Katarzyna Floyd MD;  Location: [facility] ENDO (2ND FLR);  Service: Endoscopy;  Laterality: N/A;  hold trazodone 24 hours prior to procedure    ESOPHAGOGASTRODUODENOSCOPY N/A 7/12/2022    Procedure: ESOPHAGOGASTRODUODENOSCOPY (EGD);  Surgeon: Katarzyna Floyd MD;  Location: [facility] ENDO (2ND FLR);  Service: Endoscopy;  Laterality: N/A;  Endoscopically placed manometry probe  2nd floor-fluid in esophagus  propofol only  full liquid diet x3 days, clear liquid diet x1 day prior to procedure  RAPID COVID test, pt to arrive for 11:00am-\[facility]\""   instructions sent to [facility]    PELVIC LAPAROSCOPY      SLEEVE LOBECTOMY OF LUNG BY THORACOTOMY Right 4/22/2019    Procedure: LOBECTOMY, SLEEVE, LUNG, THORACOTOMY APPROACH;  Surgeon: Juan Jose Miles MD;  Location: [facility] OR 2ND FLR;  Service: Thoracic;  Laterality: Right;  lower bilobectomy     Family History   Problem Relation Age of Onset    Arthritis Mother     COPD Mother     Hypertension Mother     Stroke Mother         vascular dementia    Hypertension Father     Pacemaker/defibrilator Father         sleep apnea    Colon cancer Maternal Grandmother     Cancer Maternal Grandmother         colon     Cancer Paternal Grandfather         lung; smoker    Anesthesia problems Neg Hx     Esophageal cancer Neg Hx     Stomach cancer Neg Hx     Colon polyps Neg Hx     Rectal cancer Neg Hx     Liver cancer Neg Hx     Pancreatic cancer Neg Hx     Celiac disease Neg Hx     Crohn's disease Neg Hx     Irritable  bowel syndrome Neg Hx      Social History     Tobacco Use    Smoking status: Former     Packs/day: 0.50     Years: 15.00     Pack years: 7.50     Types: Cigarettes     Quit date: 2018     Years since quittin.1    Smokeless tobacco: Never   Substance Use Topics    Alcohol use: No    Drug use: Yes     Types: Marijuana        Review of Systems:  Review of Systems   Constitutional: Negative.    HENT: Negative.     Eyes: Negative.    Respiratory: Negative.     Cardiovascular: Negative.    Gastrointestinal: Negative.    Genitourinary: Negative.    Musculoskeletal: Negative.    Skin: Negative.    Neurological: Negative.    Endo/Heme/Allergies: Negative.    Psychiatric/Behavioral: Negative.        OBJECTIVE:     Vital Signs (Most Recent):  Temp: 97.7 °F (36.5 °C) (23)  Pulse: 76 (23)  Resp: 18 (23)  BP: 119/73 (23)  SpO2: 97 % (23)    Admission:     Most Recent:      Physical Exam:  Physical Exam  Constitutional:       Appearance: Normal appearance.   HENT:      Head: Normocephalic.      Mouth/Throat:      Mouth: Mucous membranes are moist.   Eyes:      Extraocular Movements: Extraocular movements intact.      Pupils: Pupils are equal, round, and reactive to light.   Cardiovascular:      Rate and Rhythm: Normal rate and regular rhythm.   Pulmonary:      Effort: Pulmonary effort is normal.      Comments: Well healed right posterolateral thoracotomy incision  Abdominal:      General: Abdomen is flat.      Palpations: Abdomen is soft.   Musculoskeletal:         General: Normal range of motion.      Cervical back: Normal range of motion.   Skin:     General: Skin is warm.   Neurological:      General: No focal deficit present.      Mental Status: She is alert and oriented to person, place, and time.   Psychiatric:         Mood and Affect: Mood normal.         Behavior: Behavior normal.        Laboratory:  I have reviewed all pertinent lab results within the past  24 hours.    Diagnostic Results:  ECG: Reviewed  X-Ray: Reviewed  CT: Reviewed    ASSESSMENT/PLAN:     52 y.o. female s/p right thoracotomy for right lower bilobectomy and rib plating for resection of carcinoid tumor     Proceed with planned re-do right thoracotomy and removal of rib plates    Hugo Real MD  Cardiothoracic Surgery PGY6

## 2023-01-05 NOTE — OP NOTE
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  Existing rib plate intact and secure. Slight widening of 6th intercostal space. Tip of the scapula appeared to make contact with 6th and 7th ribs with movement. This area was reinforced with Goretex mesh     Indications: Gina Coto is a 52 y.o. female with a history of right lung carcinoid tumor s/p complex resection and also rib plating. The patient presented with chest wall discomfort thought related to the existing rib plate. We recommended exploration and possible plate removal. The patient was in agreement with the plan and did sign informed consent.     Procedure:   The patient was identified in the preoperative area and informed consent verified prior to transport to the operating room. Anesthesia was induced without complication. A double lumen ET tube was inserted using bronchoscopy. A boykin and arterial line were inserted. She was then positioned into the left lateral decubitus position. The right chest was then prepped and draped in the usual standard fashion. A timeout was performed verifying correct patient, procedure, and the administration of prophylactic antibiotics.  The posterior aspect of her previous posterolateral thoracotomy incision was opened sharply. Dissection was carried down to the chest wall with cautery. We did encounter several small calcified nodules in  within the subcutaneous tissue which were excised and sent for pathology. The existing rib plate was exposed in its entirety. It appeared intact and securely fastened to the underlying rib. The sixth intercostal space appeared slightly widened but there was no hernia. Upon manipulating the shoulder, it appeared that the tip of the scapula made contact with the edges of the 6th and 7th ribs which would explain the patient's symptoms. We then elected to reinforce this area using a large piece of 2mm thick Goretex mesh. The mesh was secured to the chest wall overlying the 6th and 7th ribs using interrupted #1 Ethibond. An intercostal nerve block was then performed within in the 5-8th intercostal space using an Exparel/Marcine/saline mixture. A Alcides drain was inserted, externalized, and secured at the skin. The incision was then closed in multiple layers using Stratafix for the deep layers and Monocryl for the skin. Sterile dressings were applied.     The patient was then awaken from anesthesia and transported to the PACU in stable condition.   Dr. Bhakta was scrubbed for the case.      Estimated Blood Loss (EBL): 10mL           Implants:   Implant Name Type Inv. Item Serial No.  Lot No. LRB No. 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 YOKO LI        01/05/23 0019                             Condition: Good    Disposition: PACU - hemodynamically stable.

## 2023-01-05 NOTE — ANESTHESIA PREPROCEDURE EVALUATION
[facility]  Anesthesia Pre-Operative Evaluation         Patient Name: Gina Coto  YOB: 1970  MRN: [id number]    SUBJECTIVE:     Pre-operative evaluation for Procedure(s) (LRB):  THORACOTOMY REDO WITH RIB PLATE REMOVAL (Right)     01/04/2023    Gina Coto is a 52 y.o. female w/ a significant PMHx of carcinoid tumor s/p thoracotomy with R lower bilobectomy (4/2019) and s/p redo thoracotomy for rib plating (9/2019) who presents with c/o persistent clicking sensation. Notably, patent with recent hx of dysphagia and possible achalasia undergoing management by GI.    Patient now presents for the above procedure(s).          Prev airway:   Present Prior to Hospital Arrival?: No; Placement Date: 09/09/19; Placement Time: 1047; Method of Intubation: Direct laryngoscopy; Inserted by: Anesthesia Resident; Airway Device: Endotracheal Tube (OLIVIER 37); Mask Ventilation: Easy; Intubated: Postinduction; Blade: Rikki #3; Airway Device Size:  (37); Style: Cuffed; Cuff Inflation: Minimal occlusive pressure; Inflation Amount: 8; Placement Verified By: Auscultation, Capnometry, ETT Condensation, Fiberoptic bronchoscopic inspection; Grade: Grade I; Complicating Factors: None; Intubation Findings: Positive EtCO2, Bilateral breath sounds, Atraumatic/Condition of teeth unchanged;  Depth of Insertion: 29; Securment: Lips; Complications: None; Breath Sounds: Equal Bilateral; Insertion Attempts: 1; Removal Date: 09/09/19;  Removal Time: 1326         Patient Active Problem List   Diagnosis    History of pneumonia- Nov 2018     Seasonal asthma    Abnormal chest CT    Carcinoid tumor    Carcinoid tumor of right lung    Rib fracture       Review of patient's allergies indicates:  No Known Allergies    Current Outpatient Medications:  No current facility-administered medications for this encounter.    Current Outpatient Medications:     cloNIDine (CATAPRES) 0.1 MG tablet, 0.1 mg every 6 (six)  hours as needed., Disp: , Rfl:     QUEtiapine (SEROQUEL) 50 MG tablet, take 1/2 to 1 tablet at bedtime, Disp: , Rfl:     traZODone (DESYREL) 100 MG tablet, Take 200 mg by mouth nightly., Disp: , Rfl:     VYVANSE 10 mg Cap, Take 1 capsule by mouth every morning., Disp: , Rfl:     albuterol (PROVENTIL/VENTOLIN HFA) 90 mcg/actuation inhaler, INHALE 1 TO 2 PUFFS BY MOUTH EVERY 6 HOURS AS NEEDED FOR WHEEZING OR SHORTNESS OF BREATH, Disp: 54 g, Rfl: 0    cyclobenzaprine (FLEXERIL) 10 MG tablet, TAKE 1 TABLET BY MOUTH 3 TIMES A DAY AS NEEDED FOR MUSCLE SPASMS, Disp: 90 tablet, Rfl: 4    FLUoxetine 20 MG capsule, Take 60 mg by mouth every morning., Disp: , Rfl:     REXULTI 0.5 mg Tab, Take 1 tablet by mouth once daily., Disp: , Rfl:     Past Surgical History:   Procedure Laterality Date    BRONCHOSCOPY N/A 11/28/2018    Procedure: BRONCHOSCOPY;  Surgeon: Omar Prescott MD;  Location: [facility] CATH LAB;  Service: Pulmonary;  Laterality: N/A;    BRONCHOSCOPY N/A 4/22/2019    Procedure: BRONCHOSCOPY;  Surgeon: Juan Jose Miles MD;  [facility];  Service: Thoracic;  Laterality: N/A;    BRONCHOSCOPY N/A 5/6/2019    Procedure: BRONCHOSCOPY;  Surgeon: Juan Jose Miles MD;  [facility];  Service: Thoracic;  Laterality: N/A;    COLONOSCOPY N/A 12/2/2021    Procedure: COLONOSCOPY;  Surgeon: Juan Jose Castano MD;  Location: [facility];  Service: Colon and Rectal;  Laterality: N/A;    ESOPHAGEAL MANOMETRY WITH MEASUREMENT OF IMPEDANCE N/A 7/12/2022    Procedure: MANOMETRY-ESOPHAGEAL-WITH IMPEDANCE;  Surgeon: Katarzyna Floyd MD;  Location: [facility] ([address] [place] [address]);  Service: Endoscopy;  Laterality: N/A;  hold trazodone 24 hours prior to procedure    ESOPHAGOGASTRODUODENOSCOPY N/A 7/12/2022    Procedure: ESOPHAGOGASTRODUODENOSCOPY (EGD);  Surgeon: Katarzyna Floyd MD;  Location: [facility] (2ND FLR);  Service: Endoscopy;  Laterality: N/A;  Endoscopically placed manometry probe  2nd floor-fluid in  esophagus  propofol only  full liquid diet x3 days, clear liquid diet x1 day prior to procedure  RAPID COVID test, pt to arrive for 11:00am-[facility]   instructions [facility] to [facility]    PELVIC LAPAROSCOPY      SLEEVE LOBECTOMY OF LUNG BY THORACOTOMY Right 2019    Procedure: LOBECTOMY, SLEEVE, LUNG, THORACOTOMY APPROACH;  Surgeon: Juan Jose Miles MD;  Location: [facility] OR [address] [place] [address];  Service: Thoracic;  Laterality: Right;  lower bilobectomy       Social History     Socioeconomic History    Marital status:    Tobacco Use    Smoking status: Former     Packs/day: 0.50     Years: 15.00     Pack years: 7.50     Types: Cigarettes     Quit date: 2018     Years since quittin.1    Smokeless tobacco: Never   Substance and Sexual Activity    Alcohol use: No    Drug use: Yes     Types: Marijuana    Sexual activity: Not Currently       OBJECTIVE:     Vital Signs Range (Last 24H):         Significant Labs:  Lab Results   Component Value Date    WBC 14.81 (H) 2021    HGB 14.0 2021    HCT 42.3 2021     2021    CHOL 249 (H) 2018    TRIG 164 (H) 2018    HDL 47 2018    ALT 15 2021    AST 12 2021     2021    K 4.2 2021     2021    CREATININE 0.8 2021    BUN 10 2021    CO2 21 (L) 2021       Microbiology Results (last 7 days)     ** No results found for the last 168 hours. **          Diagnostic Studies:    EKG:   Results for orders placed or performed during the hospital encounter of 21   EKG 12-lead    Collection Time: 21  9:07 PM    Narrative    Test Reason : R06.02    Vent. Rate : 094 BPM     Atrial Rate : 094 BPM     P-R Int : 172 ms          QRS Dur : 146 ms      QT Int : 394 ms       P-R-T Axes : 065 -85 062 degrees     QTc Int : 492 ms    Normal sinus rhythm  Right atrial enlargement  Right bundle branch block  Left anterior fascicular block   Bifascicular block   LVH with  repolarization abnormality  Abnormal ECG  When compared with ECG of 25-NOV-2018 09:49,  No significant change was found  Confirmed by Rakan Rubio MD ([id number]) on 6/8/2021 12:17:18 AM    Referred By: [email or web address]   SELF           Confirmed By:Rakan Rubio MD       2D ECHO:  TTE:  Results for orders placed or performed during the hospital encounter of 11/25/18   Transthoracic echo (TTE) complete (Cupid Only)   Result Value Ref Range    BSA 1.99 m2    TDI SEPTAL 0.10     LV LATERAL E/E' RATIO 9.25     LV SEPTAL E/E' RATIO 11.10     LA WIDTH 3.20 cm    Right Atrial Pressure (from IVC) 8 mmHg    TDI LATERAL 0.12     PV PEAK VELOCITY 1.14 cm/s    LVIDd 4.54 3.5 - 6.0 cm    IVS 0.82 0.6 - 1.1 cm    Posterior Wall 0.67 0.6 - 1.1 cm    LVIDs 3.13 2.1 - 4.0 cm    FS 31 28 - 44 %    IVC proximal 2.1 cm    Ascending aorta 3.00 cm    LV mass 105.17 g    LA size 3.78 cm    RVDD 3.06 cm    TAPSE 2.42 cm    RV S' 15.00 m/s    Left Ventricle Relative Wall Thickness 0.30 cm    AV mean gradient 8.65 mmHg    AV valve area 2.20 cm2    AV index (prosthetic) 0.68     E/A ratio 1.17     Mean e' 0.11     E wave deceleration time 254.36 msec    Pulm vein S/D ratio 0.84     LVOT diameter 2.03 cm    LVOT area 3.23 cm2    LVOT peak VTI 27.63 cm    Ao peak xavi 1.90 m/s    Ao VTI 40.70 cm    LVOT stroke volume 89.38 cm3    AV peak gradient 14.44 mmHg    E/E' ratio 10.09     MV Peak E Xavi 1.11 m/s    MV Peak A Xavi 0.95 m/s    PV Peak S Xavi 0.43 m/s    PV Peak D Xavi 0.51 m/s    LV Systolic Volume 38.86 mL    LV Systolic Volume Index 19.5 mL/m2    LV Diastolic Volume 94.47 mL    LV Diastolic Volume Index 47.47 mL/m2    LV Mass Index 52.8 g/m2    RA Major Axis 4.54 cm    Left Atrium Major Axis 4.80 cm    RA Width 2.80 cm    Narrative    · Normal left ventricular systolic function. The estimated ejection   fraction is 55%  · No wall motion abnormalities.  · Normal LV diastolic function.  · Normal right ventricular systolic function.  · Normal atrial size.  ·  Trace mitral regurgitation.  · Trace tricuspid regurgitation.          PHAM:  No results found for this or any previous visit.    ASSESSMENT/PLAN:           Pre-op Assessment    I have reviewed the Patient Summary Reports.     I have reviewed the Nursing Notes. I have reviewed the NPO Status.   I have reviewed the Medications.     Review of Systems  Anesthesia Hx:  No problems with previous Anesthesia  History of prior surgery of interest to airway management or planning: Denies Family Hx of Anesthesia complications.   Denies Personal Hx of Anesthesia complications.   Social:  No Alcohol Use, Former Smoker    Hematology/Oncology:  Hematology Normal       -- Cancer in past history:  surgery  Oncology Comments: Hx of carcinoid cancer in lung     EENT/Dental:EENT/Dental Normal   Cardiovascular:  Cardiovascular Normal Exercise tolerance: good     Pulmonary:   Asthma    Renal/:  Renal/ Normal     Hepatic/GI:   Dysphagia (? achalasia)   Neurological:  Neurology Normal    Endocrine:  Endocrine Normal  Obesity / BMI > 30  Psych:  Psychiatric Normal           Physical Exam  General: Well nourished, Cooperative, Alert and Oriented    Airway:  Mallampati: II   Mouth Opening: Normal  TM Distance: Normal  Tongue: Normal  Neck ROM: Normal ROM    Dental:  Intact        Anesthesia Plan  Type of Anesthesia, risks & benefits discussed:    Anesthesia Type: Gen ETT  Intra-op Monitoring Plan: Standard ASA Monitors and Art Line  Post Op Pain Control Plan: multimodal analgesia and IV/PO Opioids PRN  Induction:  IV and rapid sequence  Airway Plan: Video and Fiberoptic, Post-Induction  Informed Consent: Informed consent signed with the Patient and all parties understand the risks and agree with anesthesia plan.  All questions answered.   ASA Score: 3  Day of Surgery Review of History & Physical: H&P Update referred to the surgeon/provider.    Ready For Surgery From Anesthesia Perspective.     .

## 2023-01-05 NOTE — PLAN OF CARE
Pt remained stable during pacu stay. Pt following commands appropriately. Pt states pain is tolerable. Pt received prn pacu pain meds as ordered. VSS. No N&V. Pt tolerated sips of water with pill intact. Incision to Rt lateral upper chest intact with guaze and tegaderm and LIONEL drain to bulb suction with minimal output. Teds/SCD's in place throughout duration in PACU. Patient will Transfer to the next Phase of Care when room is available.

## 2023-01-05 NOTE — BRIEF OP NOTE
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 This area reinforced with Goretex mesh   LIONEL drain overlying mesh     Estimated Blood Loss: 10mL    Estimated Blood Loss has been documented.         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 YOKO MILES        01/05/23 0937                    [id number]

## 2023-01-05 NOTE — ANESTHESIA PROCEDURE NOTES
Arterial    Diagnosis: carcinoid adenoma of right lung    Patient location during procedure: done in OR  Procedure start time: 1/5/2023 7:25 AM  Timeout: 1/5/2023 7:25 AM  Procedure end time: 1/5/2023 7:29 AM    Staffing  Authorizing Provider: Des Ramirez MD  Performing Provider: Asha Regan MD    Anesthesiologist was present at the time of the procedure.    Preanesthetic Checklist  Completed: patient identified, IV checked, site marked, risks and benefits discussed, surgical consent, monitors and equipment checked, pre-op evaluation, timeout performed and anesthesia consent givenArterial  Skin Prep: chlorhexidine gluconate  Local Infiltration: none  Orientation: right  Location: radial    Catheter Size: 20 G  Catheter placement by Anatomical landmarks. Heme positive aspiration all ports. Insertion Attempts: 1  Assessment  Dressing: secured with tape and tegaderm  Patient: Tolerated well

## 2023-01-05 NOTE — TRANSFER OF CARE
Anesthesia Transfer of Care Note    Patient: Gina Coto    Procedure(s) Performed: Procedure(s) (LRB):  THORACOTOMY REDO WITH RIB PLATE, RECONSTRUCTION OF CHEST WALL WITH MESH (Right)  BLOCK, NERVE, INTERCOSTAL, 2 OR MORE (Right)    Patient location: PACU    Anesthesia Type: general    Transport from OR: Transported from OR on 6-10 L/min O2 by face mask with adequate spontaneous ventilation    Post pain: adequate analgesia    Post assessment: no apparent anesthetic complications and tolerated procedure well    Post vital signs: stable    Level of consciousness: awake    Nausea/Vomiting: no nausea/vomiting    Complications: none    Transfer of care protocol was followed      Last vitals:   Visit Vitals  /73 (BP Location: Left arm, Patient Position: Lying)   Pulse 76   Temp 36.5 °C (97.7 °F) (Temporal)   Resp 18   SpO2 97%   Breastfeeding No

## 2023-01-05 NOTE — ANESTHESIA PROCEDURE NOTES
Intubation    Date/Time: 1/5/2023 7:19 AM  Performed by: Asha Regan MD  Authorized by: Des Ramirez MD     Intubation:     Induction:  Intravenous    Intubated:  Postinduction    Mask Ventilation:  N/a    Attempts:  1    Attempted By:  Resident anesthesiologist    Method of Intubation:  Video laryngoscopy and fiberoptic    Blade:  Hughes 3    Laryngeal View Grade: Grade I - full view of cords      Difficult Airway Encountered?: No      Complications:  None    Airway Device:  Double lumen tube left    Airway Device Size:  37F    Style/Cuff Inflation:  Cuffed (inflated to minimal occlusive pressure)    Placement Verified By:  Capnometry    Complicating Factors:  None    Findings Post-Intubation:  BS equal bilateral and atraumatic/condition of teeth unchanged

## 2023-01-05 NOTE — NURSING TRANSFER
Nursing Transfer Note      1/5/2023     Reason patient is being transferred: postop    Transfer To: 1062    Transfer via bed    Transfer with n/a    Transported by pacu transport    Medicines sent: n/a    Any special needs or follow-up needed:     Chart send with patient: Yes    Notified:     Patient reassessed at: 1/5/22    Upon arrival to floor: bed in lowest position  Report given to Darío Giles

## 2023-01-05 NOTE — NURSING
NEURO: WDL    CV: WDL    RESP: lung sounds absent in RLL exp. Wheezes in upper left lobe.  2L/NC PRN.     GI: Regular diet    : voids    SKIN/WOUND/DRESSING: incision Right posterior ribs and     EQUIPMENT: aviva drain to bulb suction Rt lateral ribs    LINES/GTTS: 18 lucrecia hand SL

## 2023-01-06 VITALS
HEIGHT: 66 IN | WEIGHT: 211.63 LBS | RESPIRATION RATE: 18 BRPM | DIASTOLIC BLOOD PRESSURE: 81 MMHG | SYSTOLIC BLOOD PRESSURE: 159 MMHG | TEMPERATURE: 98 F | BODY MASS INDEX: 34.01 KG/M2 | OXYGEN SATURATION: 94 % | HEART RATE: 76 BPM

## 2023-01-06 PROCEDURE — 25000003 PHARM REV CODE 250: Performed by: STUDENT IN AN ORGANIZED HEALTH CARE EDUCATION/TRAINING PROGRAM

## 2023-01-06 RX ORDER — OXYCODONE HYDROCHLORIDE 5 MG/1
5 TABLET ORAL EVERY 4 HOURS PRN
Qty: 28 TABLET | Refills: 0 | Status: SHIPPED | OUTPATIENT
Start: 2023-01-06 | End: 2023-01-18 | Stop reason: SDUPTHER

## 2023-01-06 RX ORDER — METHOCARBAMOL 500 MG/1
500 TABLET, FILM COATED ORAL 4 TIMES DAILY
Status: DISCONTINUED | OUTPATIENT
Start: 2023-01-06 | End: 2023-01-06

## 2023-01-06 RX ADMIN — OXYCODONE 5 MG: 5 TABLET ORAL at 09:01

## 2023-01-06 RX ADMIN — OXYCODONE 5 MG: 5 TABLET ORAL at 05:01

## 2023-01-06 RX ADMIN — FLUOXETINE 10 MG: 10 CAPSULE ORAL at 09:01

## 2023-01-06 RX ADMIN — OXYCODONE HYDROCHLORIDE 10 MG: 10 TABLET ORAL at 01:01

## 2023-01-06 RX ADMIN — ACETAMINOPHEN 1000 MG: 500 TABLET ORAL at 05:01

## 2023-01-06 NOTE — PLAN OF CARE
SSC met with patient/family at bedside. Patient experience rounding completed and reviewed the following.     Do you know your discharge plan? Yes      If yes, what is the plan? Home    If you are discharging home, do you have help at home?  No    Do you think you will need help at home at discharge? No     Have you discussed your needs and preferences with your SW/CM? Yes     Assigned SW/CM notified of any patient/family needs or concerns.     Unable to schedule hospital follow up within time frame left a message with doctor office to call the patient to schedule.    JIMBO Case  [phone]

## 2023-01-06 NOTE — PLAN OF CARE
[facility]  Discharge Final Note    Primary Care Provider: Jeffry Velez MD    Expected Discharge Date: 1/6/2023    Final Discharge Note (most recent)       Final Note - 01/06/23 1010          Final Note    Assessment Type Final Discharge Note     Anticipated Discharge Disposition Home or Self Care     Hospital Resources/Appts/Education Provided Provided patient/caregiver with written discharge plan information        Post-Acute Status    Discharge Delays None known at this time                     Important Message from Medicare             Contact Info       Juan Jose Miles MD   Specialty: Cardiothoracic Surgery    [address] [facility] [address]   Phone: [phone]       Next Steps: Follow up on 1/25/2023          Pt to d/c home with family. No d/c needs reported at this time.     Bell Smith LCSW  Case Management/[facility]  [phone]

## 2023-01-06 NOTE — NURSING
Patient  educated on discharge instructions and follow up care. Patient informed of upcoming appointments and verbalized an understanding.  AAO x4, denies pain. Skin WDI.  Patient educated to cont.  Oxy 5 mg  as prescribed and to not operate machinery, drive or consume alcohol.  Pharmacy delivered meds at bedside. PIV's removed  and catheter intact. Patient educated on arm sling use and applied to R arm  and return demonstration  Patient provided instructions to call 24/7 nurse on call line with any concerns.   Personal belongings packed and taking by patient.  Patient transported to  via w/c to private vehicle.

## 2023-01-06 NOTE — PROGRESS NOTES
[facility]  Cardiothoracic Surgery  Progress Note    Patient Name: Gina Coto  MRN: [id number]  Admission Date: 1/5/2023  Hospital Length of Stay: 1 days  Code Status: Prior   Attending Physician: Juan Jose Miles MD   Referring Provider: Juan Jose Miles MD  Principal Problem:Carcinoid tumor of right lung      Subjective:     Post-Op Info:  Procedure(s) (LRB):  THORACOTOMY REDO WITH RIB PLATE, RECONSTRUCTION OF CHEST WALL WITH MESH (Right)  BLOCK, NERVE, INTERCOSTAL, 2 OR MORE (Right)   1 Day Post-Op     Interval History: Did well overnight  AFVSS  Pain controlled  Drain output minimal     Review of Systems   Constitutional: Negative.   HENT: Negative.     Eyes: Negative.    Cardiovascular: Negative.    Respiratory: Negative.     Endocrine: Negative.    Hematologic/Lymphatic: Negative.    Skin: Negative.    Musculoskeletal: Negative.    Gastrointestinal: Negative.    Genitourinary: Negative.    Neurological: Negative.    Medications:  Continuous Infusions:  Scheduled Meds:   acetaminophen  1,000 mg Oral Q8H    FLUoxetine  10 mg Oral Daily    QUEtiapine  50 mg Oral Nightly    traZODone  200 mg Oral Nightly     PRN Meds:albuterol, cyclobenzaprine, ondansetron, oxyCODONE, oxyCODONE, sodium chloride 0.9%     Objective:     Vital Signs (Most Recent):  Temp: 98.1 °F (36.7 °C) (01/06/23 0514)  Pulse: 82 (01/06/23 0514)  Resp: 15 (01/06/23 0522)  BP: (!) 168/77 (01/06/23 0514)  SpO2: (!) 93 % (01/06/23 0514)   Vital Signs (24h Range):  Temp:  [96.4 °F (35.8 °C)-98.2 °F (36.8 °C)] 98.1 °F (36.7 °C)  Pulse:  [66-86] 82  Resp:  [11-20] 15  SpO2:  [91 %-100 %] 93 %  BP: (126-168)/(71-88) 168/77        There is no height or weight on file to calculate BMI.    SpO2: (!) 93 %       Intake/Output - Last 3 Shifts         01/04 0700  01/05 0659 01/05 0700 01/06 0659 01/06 0700 01/07 0659    IV Piggyback  400     Total Intake  400     Urine  125     Drains  30     Total Output  155     Net  +245                     Lines/Drains/Airways       Drain  Duration                  Closed/Suction Drain 09/09/19 1240 Right Chest Bulb 10 Fr. 1214 days         Closed/Suction Drain 01/05/23 0929 Lateral RUQ Bulb 10 Fr. <1 day              Peripheral Intravenous Line  Duration                  Peripheral IV - Single Lumen 18 G Right Hand -- days         Peripheral IV - Single Lumen 01/05/23 0600 18 G Left;Posterior Hand 1 day                    Physical Exam  Vitals and nursing note reviewed.   Constitutional:       Appearance: Normal appearance.   HENT:      Head: Normocephalic.   Eyes:      Extraocular Movements: Extraocular movements intact.      Pupils: Pupils are equal, round, and reactive to light.   Cardiovascular:      Rate and Rhythm: Normal rate.   Pulmonary:      Effort: Pulmonary effort is normal.   Abdominal:      General: Abdomen is flat.      Palpations: Abdomen is soft.   Musculoskeletal:         General: Normal range of motion.      Cervical back: Normal range of motion.   Skin:     General: Skin is warm and dry.      Comments: Right thoracotomy incision with dressings in placed. C/d/I  LIONEL drain with thin SS output   Neurological:      General: No focal deficit present.      Mental Status: She is alert and oriented to person, place, and time.   Psychiatric:         Mood and Affect: Mood normal.         Behavior: Behavior normal.       Significant Labs:  All pertinent labs from the last 24 hours have been reviewed.    Significant Diagnostics:  I have reviewed all pertinent imaging results/findings within the past 24 hours.    Assessment/Plan:     * Carcinoid tumor of right lung  52 y.o. female s/p right thoracotomy for right lower bilobectomy for resection of carcinoid tumor and and subsequent rib plating. Now s/p re-do right thoracotomy, chest wall recon with mesh 1/5/23    D/c LIONEL drain   Multimodal pain control  Regular diet  Home meds  D/c home today        Hugo Real MD  Cardiothoracic Surgery  [facility]

## 2023-01-06 NOTE — PLAN OF CARE
Patient received DC orders to home  Problem: Adult Inpatient Plan of Care  Goal: Plan of Care Review  Outcome: Met  Goal: Patient-Specific Goal (Individualized)  Outcome: Met  Goal: Absence of Hospital-Acquired Illness or Injury  Outcome: Met  Goal: Optimal Comfort and Wellbeing  Outcome: Met  Goal: Readiness for Transition of Care  Outcome: Met     Problem: Infection  Goal: Absence of Infection Signs and Symptoms  Outcome: Met     Problem: Fall Injury Risk  Goal: Absence of Fall and Fall-Related Injury  Outcome: Met

## 2023-01-06 NOTE — ASSESSMENT & PLAN NOTE
52 y.o. female s/p right thoracotomy for right lower bilobectomy for resection of carcinoid tumor and and subsequent rib plating. Now s/p re-do right thoracotomy, chest wall recon with mesh 1/5/23    D/c LIONEL drain   Multimodal pain control  Regular diet  Home meds  D/c home today

## 2023-01-06 NOTE — SUBJECTIVE & OBJECTIVE
Interval History: Did well overnight  AFVSS  Pain controlled  Drain output minimal     Review of Systems   Constitutional: Negative.   HENT: Negative.     Eyes: Negative.    Cardiovascular: Negative.    Respiratory: Negative.     Endocrine: Negative.    Hematologic/Lymphatic: Negative.    Skin: Negative.    Musculoskeletal: Negative.    Gastrointestinal: Negative.    Genitourinary: Negative.    Neurological: Negative.    Medications:  Continuous Infusions:  Scheduled Meds:   acetaminophen  1,000 mg Oral Q8H    FLUoxetine  10 mg Oral Daily    QUEtiapine  50 mg Oral Nightly    traZODone  200 mg Oral Nightly     PRN Meds:albuterol, cyclobenzaprine, ondansetron, oxyCODONE, oxyCODONE, sodium chloride 0.9%     Objective:     Vital Signs (Most Recent):  Temp: 98.1 °F (36.7 °C) (01/06/23 0514)  Pulse: 82 (01/06/23 0514)  Resp: 15 (01/06/23 0522)  BP: (!) 168/77 (01/06/23 0514)  SpO2: (!) 93 % (01/06/23 0514)   Vital Signs (24h Range):  Temp:  [96.4 °F (35.8 °C)-98.2 °F (36.8 °C)] 98.1 °F (36.7 °C)  Pulse:  [66-86] 82  Resp:  [11-20] 15  SpO2:  [91 %-100 %] 93 %  BP: (126-168)/(71-88) 168/77        There is no height or weight on file to calculate BMI.    SpO2: (!) 93 %       Intake/Output - Last 3 Shifts         01/04 0700 01/05 0659 01/05 0700 01/06 0659 01/06 0700 01/07 0659    IV Piggyback  400     Total Intake  400     Urine  125     Drains  30     Total Output  155     Net  +245                    Lines/Drains/Airways       Drain  Duration                  Closed/Suction Drain 09/09/19 1240 Right Chest Bulb 10 Fr. 1214 days         Closed/Suction Drain 01/05/23 0929 Lateral RUQ Bulb 10 Fr. <1 day              Peripheral Intravenous Line  Duration                  Peripheral IV - Single Lumen 18 G Right Hand -- days         Peripheral IV - Single Lumen 01/05/23 0600 18 G Left;Posterior Hand 1 day                    Physical Exam  Vitals and nursing note reviewed.   Constitutional:       Appearance: Normal appearance.    HENT:      Head: Normocephalic.   Eyes:      Extraocular Movements: Extraocular movements intact.      Pupils: Pupils are equal, round, and reactive to light.   Cardiovascular:      Rate and Rhythm: Normal rate.   Pulmonary:      Effort: Pulmonary effort is normal.   Abdominal:      General: Abdomen is flat.      Palpations: Abdomen is soft.   Musculoskeletal:         General: Normal range of motion.      Cervical back: Normal range of motion.   Skin:     General: Skin is warm and dry.      Comments: Right thoracotomy incision with dressings in placed. C/d/I  LIONEL drain with thin SS output   Neurological:      General: No focal deficit present.      Mental Status: She is alert and oriented to person, place, and time.   Psychiatric:         Mood and Affect: Mood normal.         Behavior: Behavior normal.       Significant Labs:  All pertinent labs from the last 24 hours have been reviewed.    Significant Diagnostics:  I have reviewed all pertinent imaging results/findings within the past 24 hours.

## 2023-01-06 NOTE — DISCHARGE SUMMARY
"[facility]  General Surgery  Discharge Summary      Patient Name: Gina Coto  MRN: [id number]  Admission Date: 1/5/2023  Hospital Length of Stay: 1 days  Discharge Date and Time:  01/06/2023 9:47 AM  Attending Physician: Juan Jose Miles MD   Discharging Provider: Hugo Real MD  Primary Care Provider: Jeffry Velez MD     HPI: Gina Coto is a 52 y.o. female s/p right thoracotomy for right lower bilobectomy for resection of carcinoid tumor and is now s/p rib plating. Patient reports clicking sensation when she moves a certain way that she believes is associated with her surgery. She also reports burning/stinging pain along the incision site and that "it is difficult to wear bra some days" 2/2 to pain. Denies SOB, cough, diaphoresis, fever, abnormal weight loss     She presents today for planned re-do thoracotomy and rib plate removal. She denies any changes since last clinic visit.     Procedure(s) (LRB):  THORACOTOMY REDO WITH RIB PLATE, RECONSTRUCTION OF CHEST WALL WITH MESH (Right)  BLOCK, NERVE, INTERCOSTAL, 2 OR MORE (Right)     Hospital Course: The patient underwent the above listed procedures without immediate complication. Post-operatively she was transferred to the surgical floor. Her post-op course was uncomplicated. She was discharged the next morning with clinic follow up appt.     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    Specialty: Cardiothoracic Surgery  Contact information:  [address]  [facility] [id number]  [phone]                           Patient Instructions:      X-Ray Chest PA And Lateral   Standing Status: Future Standing Exp. Date: 01/06/24     Order Specific Question Answer Comments   May the Radiologist modify the order per protocol to meet the clinical needs of the patient? Yes    Release to patient Immediate      Diet Adult Regular     Remove dressing in 48 hours     Notify your health care provider if you experience any of the following:  temperature >100.4     Notify your health care provider if you experience any of the following:  persistent nausea and vomiting or diarrhea     Notify your health care provider if you experience any of the following:  severe uncontrolled pain     Notify your health care provider if you experience any of the following:  redness, tenderness, or signs of infection (pain, swelling, redness, odor or green/yellow discharge around incision site)     Notify your health care provider if you experience any of the following:  difficulty breathing or increased cough     Notify your health care provider if you experience any of the following:  severe persistent headache     Notify your health care provider if you experience any of the following:  worsening rash     Notify your health care provider if you experience any of the following:  persistent dizziness, light-headedness, or visual disturbances     Notify your health care provider if you experience any of the following:  increased confusion or weakness     Activity as tolerated     Shower on day dressing removed (No bath)     Medications:  Reconciled Home Medications:      Medication List        START taking these medications      oxyCODONE 5 MG immediate release tablet  Commonly known as: ROXICODONE  Take 1 tablet (5 mg total) by  mouth every 4 (four) hours as needed for Pain.            CONTINUE taking these medications      albuterol 90 mcg/actuation inhaler  Commonly known as: PROVENTIL/VENTOLIN HFA  INHALE 1 TO 2 PUFFS BY MOUTH EVERY 6 HOURS AS NEEDED FOR WHEEZING OR SHORTNESS OF BREATH     cloNIDine 0.1 MG tablet  Commonly known as: CATAPRES  0.1 mg every 6 (six) hours as needed.     cyclobenzaprine 10 MG tablet  Commonly known as: FLEXERIL  TAKE 1 TABLET BY MOUTH 3 TIMES A DAY AS NEEDED FOR MUSCLE SPASMS     FLUoxetine 20 MG capsule  Take 60 mg by mouth every morning.     QUEtiapine 50 MG tablet  Commonly known as: SEROQUEL  take 1/2 to 1 tablet at bedtime     REXULTI 0.5 mg Tab  Generic drug: brexpiprazole  Take 1 tablet by mouth once daily.     traZODone 100 MG tablet  Commonly known as: DESYREL  Take 200 mg by mouth nightly.     VYVANSE 10 mg Cap  Generic drug: lisdexamfetamine  Take 1 capsule by mouth every morning.              Hugo Real MD  General Surgery  [facility]

## 2023-01-13 LAB
FINAL PATHOLOGIC DIAGNOSIS: NORMAL
Lab: NORMAL

## 2023-01-17 ENCOUNTER — PATIENT MESSAGE (OUTPATIENT)
Dept: CARDIOTHORACIC SURGERY | Facility: CLINIC | Age: 53
End: 2023-01-17
Payer: MEDICAID

## 2023-01-18 RX ORDER — OXYCODONE HYDROCHLORIDE 5 MG/1
5 TABLET ORAL EVERY 4 HOURS PRN
Qty: 28 TABLET | Refills: 0 | Status: SHIPPED | OUTPATIENT
Start: 2023-01-18 | End: 2023-11-06

## 2023-01-20 NOTE — PROGRESS NOTES
"Subjective:       Patient ID: Gina Coto is a 52 y.o. female.    Chief Complaint: Post-op Evaluation    Diagnosis: Carcinoid bronchial adenoma of right lung     Pre-operative therapy:   04/22/2019 - right thoracotomy for right lower bilobectomy and resection of carcinoid tumor   09/09/2019 - right thoracotomy with rib plating    Procedure(s) and date(s): 01/05/23 - Re-do right thoracotomy and chest wall reconstruction using Goretex mesh     Pathology: SKIN, CHEST WALL NODULE, BIOPSY: Benign fibrous soft tissue showing synovial metaplasia. No atypia or malignancy identified.      Post-operative therapy: surveillance    HPI  Gina Coto is a 52 y.o. female s/p right thoracotomy for right lower bilobectomy for resection of carcinoid tumor and s/p rib plating who is presenting to clinic today for 2 week follow up s/p re-do right thoracotomy and chest wall reconstruction. Today patient reports she is doing well. Denies clicking sensation that was reported preoperatively. Pain controlled. Incision healing well. Denies CP, cough, SOB, fever, chills, diaphoresis.  Complains of neck and upper shoulder pain secondary to large breasts.    Review of Systems   Constitutional: Negative.    HENT: Negative.     Respiratory: Negative.     Cardiovascular: Negative.    Gastrointestinal: Negative.    Genitourinary: Negative.    Musculoskeletal:  Positive for neck pain and neck stiffness.   Integumentary:  Negative.   Neurological: Negative.    Hematological: Negative.    Psychiatric/Behavioral: Negative.         Objective:      BP (!) 149/93   Pulse 76   Ht 5' 6" (1.676 m)   Wt 101.7 kg (224 lb 3.3 oz)   SpO2 99%   BMI 36.19 kg/m²      Physical Exam  Constitutional:       Appearance: Normal appearance. She is obese.   Eyes:      Extraocular Movements: Extraocular movements intact.      Pupils: Pupils are equal, round, and reactive to light.   Cardiovascular:      Rate and Rhythm: Normal rate and regular rhythm.      " Pulses: Normal pulses.   Pulmonary:      Effort: Pulmonary effort is normal.      Breath sounds: Normal breath sounds.      Comments: Right incisions healing well.   Abdominal:      General: Abdomen is flat.      Palpations: Abdomen is soft.   Skin:     General: Skin is warm and dry.   Neurological:      General: No focal deficit present.      Mental Status: She is alert and oriented to person, place, and time. Mental status is at baseline.   Psychiatric:         Mood and Affect: Mood normal.         Behavior: Behavior normal.         Thought Content: Thought content normal.         Diagnostics:     CXR - 01/25/23:   1. Postsurgical changes from right thoracotomy and associated volume loss in the right hemithorax.  No acute cardiopulmonary process.  2. Clearing of the patchy reticulonodular airspace opacity in the left lung base since the previous study.    Assessment:       Gina Coto is a 52 y.o. female s/p right thoracotomy for right lower bilobectomy for resection of carcinoid tumor and s/p rib plating who is presenting to clinic today for 2 week follow up s/p re-do right thoracotomy and chest wall reconstruction    Plan:       Referral to Dr. Arriaga for consultation regarding reduction mammoplasty  Follow up in August for previously scheduled appointment with surveillance Chest CT.

## 2023-01-25 ENCOUNTER — OFFICE VISIT (OUTPATIENT)
Dept: CARDIOTHORACIC SURGERY | Facility: CLINIC | Age: 53
End: 2023-01-25
Payer: MEDICAID

## 2023-01-25 ENCOUNTER — HOSPITAL ENCOUNTER (OUTPATIENT)
Dept: RADIOLOGY | Facility: HOSPITAL | Age: 53
Discharge: HOME | End: 2023-01-25
Attending: PHYSICIAN ASSISTANT
Payer: MEDICAID

## 2023-01-25 VITALS
BODY MASS INDEX: 36.03 KG/M2 | SYSTOLIC BLOOD PRESSURE: 149 MMHG | OXYGEN SATURATION: 99 % | WEIGHT: 224.19 LBS | HEART RATE: 76 BPM | DIASTOLIC BLOOD PRESSURE: 93 MMHG | HEIGHT: 66 IN

## 2023-01-25 DIAGNOSIS — M54.2 CHRONIC NECK PAIN: ICD-10-CM

## 2023-01-25 DIAGNOSIS — M54.9 BACK PAIN, UNSPECIFIED BACK LOCATION, UNSPECIFIED BACK PAIN LATERALITY, UNSPECIFIED CHRONICITY: Primary | ICD-10-CM

## 2023-01-25 DIAGNOSIS — D3A.090 CARCINOID TUMOR OF RIGHT LUNG: ICD-10-CM

## 2023-01-25 DIAGNOSIS — G89.29 CHRONIC NECK PAIN: ICD-10-CM

## 2023-01-25 DIAGNOSIS — Z12.31 OTHER SCREENING MAMMOGRAM: ICD-10-CM

## 2023-01-25 PROCEDURE — 99999 PR PBB SHADOW E&M-EST. PATIENT-LVL IV: ICD-10-PCS | Mod: PBBFAC,,, | Performed by: THORACIC SURGERY (CARDIOTHORACIC VASCULAR SURGERY)

## 2023-01-25 PROCEDURE — 3008F BODY MASS INDEX DOCD: CPT | Mod: CPTII,,, | Performed by: THORACIC SURGERY (CARDIOTHORACIC VASCULAR SURGERY)

## 2023-01-25 PROCEDURE — 1159F MED LIST DOCD IN RCRD: CPT | Mod: CPTII,,, | Performed by: THORACIC SURGERY (CARDIOTHORACIC VASCULAR SURGERY)

## 2023-01-25 PROCEDURE — 3080F DIAST BP >= 90 MM HG: CPT | Mod: CPTII,,, | Performed by: THORACIC SURGERY (CARDIOTHORACIC VASCULAR SURGERY)

## 2023-01-25 PROCEDURE — 71046 X-RAY EXAM CHEST 2 VIEWS: CPT | Mod: 26,,, | Performed by: RADIOLOGY

## 2023-01-25 PROCEDURE — 3077F PR MOST RECENT SYSTOLIC BLOOD PRESSURE >= 140 MM HG: ICD-10-PCS | Mod: CPTII,,, | Performed by: THORACIC SURGERY (CARDIOTHORACIC VASCULAR SURGERY)

## 2023-01-25 PROCEDURE — 99214 OFFICE O/P EST MOD 30 MIN: CPT | Mod: PBBFAC,25 | Performed by: THORACIC SURGERY (CARDIOTHORACIC VASCULAR SURGERY)

## 2023-01-25 PROCEDURE — 99999 PR PBB SHADOW E&M-EST. PATIENT-LVL IV: CPT | Mod: PBBFAC,,, | Performed by: THORACIC SURGERY (CARDIOTHORACIC VASCULAR SURGERY)

## 2023-01-25 PROCEDURE — 3077F SYST BP >= 140 MM HG: CPT | Mod: CPTII,,, | Performed by: THORACIC SURGERY (CARDIOTHORACIC VASCULAR SURGERY)

## 2023-01-25 PROCEDURE — 1159F PR MEDICATION LIST DOCUMENTED IN MEDICAL RECORD: ICD-10-PCS | Mod: CPTII,,, | Performed by: THORACIC SURGERY (CARDIOTHORACIC VASCULAR SURGERY)

## 2023-01-25 PROCEDURE — 99024 PR POST-OP FOLLOW-UP VISIT: ICD-10-PCS | Mod: ,,, | Performed by: THORACIC SURGERY (CARDIOTHORACIC VASCULAR SURGERY)

## 2023-01-25 PROCEDURE — 99024 POSTOP FOLLOW-UP VISIT: CPT | Mod: ,,, | Performed by: THORACIC SURGERY (CARDIOTHORACIC VASCULAR SURGERY)

## 2023-01-25 PROCEDURE — 71046 XR CHEST PA AND LATERAL: ICD-10-PCS | Mod: 26,,, | Performed by: RADIOLOGY

## 2023-01-25 PROCEDURE — 3008F PR BODY MASS INDEX (BMI) DOCUMENTED: ICD-10-PCS | Mod: CPTII,,, | Performed by: THORACIC SURGERY (CARDIOTHORACIC VASCULAR SURGERY)

## 2023-01-25 PROCEDURE — 71046 X-RAY EXAM CHEST 2 VIEWS: CPT | Mod: TC

## 2023-01-25 PROCEDURE — 3080F PR MOST RECENT DIASTOLIC BLOOD PRESSURE >= 90 MM HG: ICD-10-PCS | Mod: CPTII,,, | Performed by: THORACIC SURGERY (CARDIOTHORACIC VASCULAR SURGERY)

## 2023-01-25 RX ORDER — OMEPRAZOLE 40 MG/1
40 CAPSULE, DELAYED RELEASE ORAL EVERY MORNING
COMMUNITY
Start: 2023-01-10 | End: 2024-03-08

## 2023-01-25 RX ORDER — FLUTICASONE PROPIONATE AND SALMETEROL 50; 500 UG/1; UG/1
1 POWDER RESPIRATORY (INHALATION) 2 TIMES DAILY
COMMUNITY
Start: 2023-01-10 | End: 2023-02-07

## 2023-02-09 ENCOUNTER — TELEPHONE (OUTPATIENT)
Dept: PLASTIC SURGERY | Facility: CLINIC | Age: 53
End: 2023-02-09
Payer: MEDICAID

## 2023-02-09 NOTE — TELEPHONE ENCOUNTER
Attempted to contact pt to discuss a referral we received. Pt was not available at the time of the call.  I left a detailed VM explaining to patient that she is not a candidate for PLS at this time.

## 2023-02-16 ENCOUNTER — PATIENT MESSAGE (OUTPATIENT)
Dept: CARDIOTHORACIC SURGERY | Facility: CLINIC | Age: 53
End: 2023-02-16
Payer: MEDICAID

## 2023-03-21 RX ORDER — CYCLOBENZAPRINE HCL 10 MG
10 TABLET ORAL 3 TIMES DAILY PRN
Qty: 45 TABLET | Refills: 1 | Status: SHIPPED | OUTPATIENT
Start: 2023-03-21 | End: 2023-05-10

## 2023-03-21 RX ORDER — GABAPENTIN 300 MG/1
300 CAPSULE ORAL NIGHTLY
Qty: 90 CAPSULE | Refills: 3 | Status: ON HOLD | OUTPATIENT
Start: 2023-03-21 | End: 2024-03-14

## 2023-04-03 ENCOUNTER — PATIENT MESSAGE (OUTPATIENT)
Dept: ADMINISTRATIVE | Facility: HOSPITAL | Age: 53
End: 2023-04-03
Payer: MEDICAID

## 2023-05-10 RX ORDER — CYCLOBENZAPRINE HCL 10 MG
TABLET ORAL
Qty: 45 TABLET | Refills: 1 | Status: SHIPPED | OUTPATIENT
Start: 2023-05-10 | End: 2024-02-21 | Stop reason: SDUPTHER

## 2023-05-15 ENCOUNTER — PATIENT OUTREACH (OUTPATIENT)
Dept: ADMINISTRATIVE | Facility: HOSPITAL | Age: 53
End: 2023-05-15
Payer: MEDICAID

## 2023-05-15 NOTE — PROGRESS NOTES
Chart reviewed, immunization record updated.  No new results noted on [facility] or [facility] web site.  Care Everywhere updated.   Patient care coordination note  LOV with PCP 8/11/2021.  Patient scheduled with Dr. Isa Santos (gyn) on 6/05/2023.  Left detailed message for patient to return call to discuss schedule routine PCP visit and MMG.

## 2023-07-10 ENCOUNTER — PATIENT MESSAGE (OUTPATIENT)
Dept: ADMINISTRATIVE | Facility: HOSPITAL | Age: 53
End: 2023-07-10
Payer: MEDICAID

## 2023-07-25 ENCOUNTER — PATIENT OUTREACH (OUTPATIENT)
Dept: ADMINISTRATIVE | Facility: HOSPITAL | Age: 53
End: 2023-07-25
Payer: MEDICAID

## 2023-07-25 NOTE — PROGRESS NOTES
Chart reviewed, immunization record updated.  No new results noted on [facility] or [facility] web site.  Care Everywhere updated.   Patient care coordination note  LOV with PCP 8/11/2021.  Discussed Cervical Cancer Screening, scheduling PCP visit and MMG with patient, patient states she will call back to schedule since she is out of town.

## 2023-08-28 NOTE — PROGRESS NOTES
"Subjective:       Patient ID: Gina Coto is a 53 y.o. female.    Chief Complaint: Follow-up    Diagnosis: Carcinoid bronchial adenoma of right lung     Pre-operative therapy:   04/22/2019 - right thoracotomy for right lower bilobectomy and resection of carcinoid tumor   09/09/2019 - right thoracotomy with rib plating    Procedure(s) and date(s): 01/05/23 - Re-do right thoracotomy and chest wall reconstruction using Goretex mesh     Pathology: SKIN, CHEST WALL NODULE, BIOPSY: Benign fibrous soft tissue showing synovial metaplasia. No atypia or malignancy identified.      Post-operative therapy: surveillance    HPI  Gina Coto is a 52 y.o. female s/p right thoracotomy for right lower bilobectomy for resection of carcinoid tumor and s/p rib plating who is presenting to clinic today for 6 month follow up s/p re-do right thoracotomy and chest wall reconstruction. Today patient reports she is doing well. Denies clicking sensation that was reported preoperatively. Pain controlled. Incision healing well. Denies CP, cough, SOB, fever, chills, diaphoresis.      Review of Systems   Constitutional: Negative.    HENT: Negative.     Respiratory: Negative.     Cardiovascular: Negative.    Gastrointestinal: Negative.    Genitourinary: Negative.    Integumentary:  Negative.   Neurological: Negative.    Hematological: Negative.    Psychiatric/Behavioral: Negative.           Objective:      BP (!) 159/96   Pulse 77   Ht 5' 6" (1.676 m)   Wt 88 kg (194 lb 0.1 oz)   SpO2 98%   BMI 31.31 kg/m²      Physical Exam  Constitutional:       Appearance: Normal appearance. She is obese.   Eyes:      Extraocular Movements: Extraocular movements intact.      Pupils: Pupils are equal, round, and reactive to light.   Cardiovascular:      Rate and Rhythm: Normal rate and regular rhythm.      Pulses: Normal pulses.   Pulmonary:      Effort: Pulmonary effort is normal.      Breath sounds: Normal breath sounds.      Comments: Right " incisions well healed  Abdominal:      General: Abdomen is flat.      Palpations: Abdomen is soft.   Skin:     General: Skin is warm and dry.   Neurological:      General: No focal deficit present.      Mental Status: She is alert and oriented to person, place, and time. Mental status is at baseline.   Psychiatric:         Mood and Affect: Mood normal.         Behavior: Behavior normal.         Thought Content: Thought content normal.         Diagnostics:     CXR - 01/25/23:   1. Postsurgical changes from right thoracotomy and associated volume loss in the right hemithorax.  No acute cardiopulmonary process.  2. Clearing of the patchy reticulonodular airspace opacity in the left lung base since the previous study.    Chest CT 08/30/23: images personally reviewed.   Postsurgical changes in right hemithorax  Stable right chest wall plates and mesh    Assessment:       Gina Coto is a 52 y.o. female s/p right thoracotomy for right lower bilobectomy for resection of carcinoid tumor and s/p rib plating who is presenting to clinic today for 6 month follow up s/p re-do right thoracotomy and chest wall reconstruction  MELIZA  Chest wall discomfort resolved  Plan:       Follow up in 1 year with chest CT

## 2023-08-30 ENCOUNTER — OFFICE VISIT (OUTPATIENT)
Dept: CARDIOTHORACIC SURGERY | Facility: CLINIC | Age: 53
End: 2023-08-30
Payer: MEDICAID

## 2023-08-30 ENCOUNTER — HOSPITAL ENCOUNTER (OUTPATIENT)
Dept: RADIOLOGY | Facility: HOSPITAL | Age: 53
Discharge: HOME | End: 2023-08-30
Attending: THORACIC SURGERY (CARDIOTHORACIC VASCULAR SURGERY)
Payer: MEDICAID

## 2023-08-30 VITALS
BODY MASS INDEX: 31.18 KG/M2 | SYSTOLIC BLOOD PRESSURE: 159 MMHG | HEIGHT: 66 IN | WEIGHT: 194 LBS | HEART RATE: 77 BPM | DIASTOLIC BLOOD PRESSURE: 96 MMHG | OXYGEN SATURATION: 98 %

## 2023-08-30 DIAGNOSIS — C7A.090 CARCINOID BRONCHIAL ADENOMA OF RIGHT LUNG: Primary | ICD-10-CM

## 2023-08-30 DIAGNOSIS — C7A.090 CARCINOID BRONCHIAL ADENOMA OF RIGHT LUNG: ICD-10-CM

## 2023-08-30 PROCEDURE — 99213 OFFICE O/P EST LOW 20 MIN: CPT | Mod: PBBFAC,25 | Performed by: THORACIC SURGERY (CARDIOTHORACIC VASCULAR SURGERY)

## 2023-08-30 PROCEDURE — 3008F BODY MASS INDEX DOCD: CPT | Mod: CPTII,,, | Performed by: THORACIC SURGERY (CARDIOTHORACIC VASCULAR SURGERY)

## 2023-08-30 PROCEDURE — 3077F SYST BP >= 140 MM HG: CPT | Mod: CPTII,,, | Performed by: THORACIC SURGERY (CARDIOTHORACIC VASCULAR SURGERY)

## 2023-08-30 PROCEDURE — 99999 PR PBB SHADOW E&M-EST. PATIENT-LVL III: ICD-10-PCS | Mod: PBBFAC,,, | Performed by: THORACIC SURGERY (CARDIOTHORACIC VASCULAR SURGERY)

## 2023-08-30 PROCEDURE — 99213 PR OFFICE/OUTPT VISIT, EST, LEVL III, 20-29 MIN: ICD-10-PCS | Mod: S$PBB,,, | Performed by: THORACIC SURGERY (CARDIOTHORACIC VASCULAR SURGERY)

## 2023-08-30 PROCEDURE — 1159F PR MEDICATION LIST DOCUMENTED IN MEDICAL RECORD: ICD-10-PCS | Mod: CPTII,,, | Performed by: THORACIC SURGERY (CARDIOTHORACIC VASCULAR SURGERY)

## 2023-08-30 PROCEDURE — 71250 CT THORAX DX C-: CPT | Mod: TC

## 2023-08-30 PROCEDURE — 3080F DIAST BP >= 90 MM HG: CPT | Mod: CPTII,,, | Performed by: THORACIC SURGERY (CARDIOTHORACIC VASCULAR SURGERY)

## 2023-08-30 PROCEDURE — 3080F PR MOST RECENT DIASTOLIC BLOOD PRESSURE >= 90 MM HG: ICD-10-PCS | Mod: CPTII,,, | Performed by: THORACIC SURGERY (CARDIOTHORACIC VASCULAR SURGERY)

## 2023-08-30 PROCEDURE — 99999 PR PBB SHADOW E&M-EST. PATIENT-LVL III: CPT | Mod: PBBFAC,,, | Performed by: THORACIC SURGERY (CARDIOTHORACIC VASCULAR SURGERY)

## 2023-08-30 PROCEDURE — 3077F PR MOST RECENT SYSTOLIC BLOOD PRESSURE >= 140 MM HG: ICD-10-PCS | Mod: CPTII,,, | Performed by: THORACIC SURGERY (CARDIOTHORACIC VASCULAR SURGERY)

## 2023-08-30 PROCEDURE — 1159F MED LIST DOCD IN RCRD: CPT | Mod: CPTII,,, | Performed by: THORACIC SURGERY (CARDIOTHORACIC VASCULAR SURGERY)

## 2023-08-30 PROCEDURE — 99213 OFFICE O/P EST LOW 20 MIN: CPT | Mod: S$PBB,,, | Performed by: THORACIC SURGERY (CARDIOTHORACIC VASCULAR SURGERY)

## 2023-08-30 PROCEDURE — 71250 CT CHEST WITHOUT CONTRAST: ICD-10-PCS | Mod: 26,,, | Performed by: RADIOLOGY

## 2023-08-30 PROCEDURE — 3008F PR BODY MASS INDEX (BMI) DOCUMENTED: ICD-10-PCS | Mod: CPTII,,, | Performed by: THORACIC SURGERY (CARDIOTHORACIC VASCULAR SURGERY)

## 2023-08-30 PROCEDURE — 71250 CT THORAX DX C-: CPT | Mod: 26,,, | Performed by: RADIOLOGY

## 2023-08-30 RX ORDER — BUPROPION HYDROCHLORIDE 150 MG/1
150 TABLET ORAL NIGHTLY
COMMUNITY
Start: 2023-07-11

## 2023-11-06 ENCOUNTER — OFFICE VISIT (OUTPATIENT)
Dept: OBSTETRICS AND GYNECOLOGY | Facility: CLINIC | Age: 53
End: 2023-11-06
Payer: MEDICAID

## 2023-11-06 VITALS
WEIGHT: 193.13 LBS | SYSTOLIC BLOOD PRESSURE: 128 MMHG | HEIGHT: 66 IN | BODY MASS INDEX: 31.04 KG/M2 | DIASTOLIC BLOOD PRESSURE: 74 MMHG | HEART RATE: 90 BPM

## 2023-11-06 DIAGNOSIS — Z12.31 BREAST CANCER SCREENING BY MAMMOGRAM: ICD-10-CM

## 2023-11-06 DIAGNOSIS — Z12.4 PAP SMEAR FOR CERVICAL CANCER SCREENING: ICD-10-CM

## 2023-11-06 DIAGNOSIS — N94.2 VAGINISMUS: ICD-10-CM

## 2023-11-06 DIAGNOSIS — Z01.419 ENCNTR FOR GYN EXAM (GENERAL) (ROUTINE) W/O ABN FINDINGS: Primary | ICD-10-CM

## 2023-11-06 PROCEDURE — 99999 PR PBB SHADOW E&M-EST. PATIENT-LVL III: ICD-10-PCS | Mod: PBBFAC,,, | Performed by: STUDENT IN AN ORGANIZED HEALTH CARE EDUCATION/TRAINING PROGRAM

## 2023-11-06 PROCEDURE — 3008F PR BODY MASS INDEX (BMI) DOCUMENTED: ICD-10-PCS | Mod: CPTII,,, | Performed by: STUDENT IN AN ORGANIZED HEALTH CARE EDUCATION/TRAINING PROGRAM

## 2023-11-06 PROCEDURE — 3078F DIAST BP <80 MM HG: CPT | Mod: CPTII,,, | Performed by: STUDENT IN AN ORGANIZED HEALTH CARE EDUCATION/TRAINING PROGRAM

## 2023-11-06 PROCEDURE — 1160F RVW MEDS BY RX/DR IN RCRD: CPT | Mod: CPTII,,, | Performed by: STUDENT IN AN ORGANIZED HEALTH CARE EDUCATION/TRAINING PROGRAM

## 2023-11-06 PROCEDURE — 99213 OFFICE O/P EST LOW 20 MIN: CPT | Mod: PBBFAC | Performed by: STUDENT IN AN ORGANIZED HEALTH CARE EDUCATION/TRAINING PROGRAM

## 2023-11-06 PROCEDURE — 99396 PR PREVENTIVE VISIT,EST,40-64: ICD-10-PCS | Mod: S$PBB,,, | Performed by: STUDENT IN AN ORGANIZED HEALTH CARE EDUCATION/TRAINING PROGRAM

## 2023-11-06 PROCEDURE — 99396 PREV VISIT EST AGE 40-64: CPT | Mod: S$PBB,,, | Performed by: STUDENT IN AN ORGANIZED HEALTH CARE EDUCATION/TRAINING PROGRAM

## 2023-11-06 PROCEDURE — 3008F BODY MASS INDEX DOCD: CPT | Mod: CPTII,,, | Performed by: STUDENT IN AN ORGANIZED HEALTH CARE EDUCATION/TRAINING PROGRAM

## 2023-11-06 PROCEDURE — 99999 PR PBB SHADOW E&M-EST. PATIENT-LVL III: CPT | Mod: PBBFAC,,, | Performed by: STUDENT IN AN ORGANIZED HEALTH CARE EDUCATION/TRAINING PROGRAM

## 2023-11-06 PROCEDURE — 1159F PR MEDICATION LIST DOCUMENTED IN MEDICAL RECORD: ICD-10-PCS | Mod: CPTII,,, | Performed by: STUDENT IN AN ORGANIZED HEALTH CARE EDUCATION/TRAINING PROGRAM

## 2023-11-06 PROCEDURE — 88175 CYTOPATH C/V AUTO FLUID REDO: CPT | Performed by: STUDENT IN AN ORGANIZED HEALTH CARE EDUCATION/TRAINING PROGRAM

## 2023-11-06 PROCEDURE — 3074F SYST BP LT 130 MM HG: CPT | Mod: CPTII,,, | Performed by: STUDENT IN AN ORGANIZED HEALTH CARE EDUCATION/TRAINING PROGRAM

## 2023-11-06 PROCEDURE — 3078F PR MOST RECENT DIASTOLIC BLOOD PRESSURE < 80 MM HG: ICD-10-PCS | Mod: CPTII,,, | Performed by: STUDENT IN AN ORGANIZED HEALTH CARE EDUCATION/TRAINING PROGRAM

## 2023-11-06 PROCEDURE — 3074F PR MOST RECENT SYSTOLIC BLOOD PRESSURE < 130 MM HG: ICD-10-PCS | Mod: CPTII,,, | Performed by: STUDENT IN AN ORGANIZED HEALTH CARE EDUCATION/TRAINING PROGRAM

## 2023-11-06 PROCEDURE — 1159F MED LIST DOCD IN RCRD: CPT | Mod: CPTII,,, | Performed by: STUDENT IN AN ORGANIZED HEALTH CARE EDUCATION/TRAINING PROGRAM

## 2023-11-06 PROCEDURE — 1160F PR REVIEW ALL MEDS BY PRESCRIBER/CLIN PHARMACIST DOCUMENTED: ICD-10-PCS | Mod: CPTII,,, | Performed by: STUDENT IN AN ORGANIZED HEALTH CARE EDUCATION/TRAINING PROGRAM

## 2023-11-06 PROCEDURE — 87624 HPV HI-RISK TYP POOLED RSLT: CPT | Performed by: STUDENT IN AN ORGANIZED HEALTH CARE EDUCATION/TRAINING PROGRAM

## 2023-11-06 NOTE — PROGRESS NOTES
Subjective:    Patient ID: Gina Coto is a 53 y.o. y.o. female.     Chief Complaint: Annual Well Woman Exam     History of Present Illness:  Gina presents today for Annual Well Woman exam. She describes her menses as absent since August. She denies pelvic pain.  She denies breast tenderness, masses, nipple discharge. She denies difficulty with urination or bowel movements. She denies menopausal symptoms such as hotflashes, vaginal dryness, and night sweats. She denies bloating, early satiety, or weight changes. She is not currently sexually active. Sex is very painful; likely vaginismus       Menstrual History:   Patient's last menstrual period was 2023..     OB History          0    Para   0    Term   0       0    AB   0    Living   0         SAB   0    IAB   0    Ectopic   0    Multiple   0    Live Births   0                 The following portions of the patient's history were reviewed and updated as appropriate: allergies, current medications, past family history, past medical history, past social history, past surgical history, and problem list.    ROS:   CONSTITUTIONAL: Negative for fever, chills, diaphoresis, weakness, fatigue, weight loss, weight gain  ENT: negative for sore throat, nasal congestion, nasal discharge, epistaxis, tinnitus, hearing loss  EYES: negative for blurry vision, decreased vision, loss of vision, eye pain, diplopia, photophobia, discharge  SKIN: Negative for rash, itching, hives  RESPIRATORY: negative for cough, hemoptysis, shortness of breath, pleuritic chest pain, wheezing  CARDIOVASCULAR: negative for chest pain, dyspnea on exertion, orthopnea, paroxysmal nocturnal dyspnea, edema, palpitations  BREAST: negative for breast  tenderness, breast mass, nipple discharge, or skin changes  GASTROINTESTINAL: negative for abdominal pain, flank pain, nausea, vomiting, diarrhea, constipation, black stool, blood in stool  GENITOURINARY: negative for abnormal vaginal  bleeding, amenorrhea, decreased libido, dysuria, genital sores, hematuria, incontinence, menorrhagia, pelvic pain, urinary frequency, vaginal discharge  HEMATOLOGIC/LYMPHATIC: negative for swollen lymph nodes, bleeding, bruising  MUSCULOSKELETAL: negative for back pain, joint pain, joint stiffness, joint swelling, muscle pain, muscle weakness  NEUROLOGICAL: negative for dizzy/vertigo, headache, focal weakness, numbness/tingling, speech problems, loss of consciousness, confusion, memory loss  BEHAVORIAL/PSYCH: negative for anxiety, depression, psychosis  ENDOCRINE: negative for polydipsia/polyuria, palpitations, skin changes, temperature intolerance, unexpected weight changes  ALLERGIC/IMMUNOLOGIC: negative for urticaria, hay fever, angioedema      Objective:    Vital Signs:  Vitals:    11/06/23 1452   BP: 128/74   Pulse: 90       Physical Exam:  General:  alert, cooperative, no distress   Skin:  Skin color, texture, turgor normal. No rashes or lesions   HEENT:  conjunctivae/corneas clear. PERRL.   Neck: supple, trachea midline, no adenopathy or thyromegally   Respiratory:  Normal effort   Breasts:  no discharge, erythema, tenderness, or palpable masses; no axillary lymphadenopathy   Abdomen:  soft, nontender, no palpable masses   Pelvis: External genitalia: normal general appearance  Urinary system: urethral meatus normal, bladder nontender  Vaginal: normal mucosa without prolapse or lesions  Cervix: normal appearance  Uterus: normal size, shape, position  Adnexa: normal size, nontender bilaterally   Extremities: Normal ROM; no edema, no cyanosis   Neurologial: Normal strength and tone. No focal numbness or weakness.   Psychiatric: normal mood, speech, dress, and thought processes         Assessment:       Healthy female exam.     1. Encntr for gyn exam (general) (routine) w/o abn findings    2. Pap smear for cervical cancer screening    3. Breast cancer screening by mammogram          Plan:      Problem List Items  Addressed This Visit    None  Visit Diagnoses       Encntr for gyn exam (general) (routine) w/o abn findings    -  Primary    Pap smear for cervical cancer screening        Relevant Orders    HPV High Risk Genotypes, PCR    Liquid-Based Pap Smear, Screening    Breast cancer screening by mammogram        Relevant Orders    Mammo Digital Screening Bilat w/ Larry              COUNSELING:  Gina was counseled on STD prevention, use and side-effects of various contraceptive measures, A.C.O.G. Pap guidelines and recommendations for yearly pelvic exams in addition to recommendations for monthly self breast exams; to see her PCP for other health maintenance.

## 2023-11-09 LAB
HPV HR 12 DNA SPEC QL NAA+PROBE: NEGATIVE
HPV16 AG SPEC QL: NEGATIVE
HPV18 DNA SPEC QL NAA+PROBE: NEGATIVE

## 2023-11-14 LAB
FINAL PATHOLOGIC DIAGNOSIS: NORMAL
Lab: NORMAL

## 2024-02-16 ENCOUNTER — PATIENT MESSAGE (OUTPATIENT)
Dept: CARDIOTHORACIC SURGERY | Facility: CLINIC | Age: 54
End: 2024-02-16
Payer: MEDICAID

## 2024-02-16 DIAGNOSIS — R07.81 RIB PAIN: ICD-10-CM

## 2024-02-16 DIAGNOSIS — C7A.090 CARCINOID BRONCHIAL ADENOMA OF RIGHT LUNG: Primary | ICD-10-CM

## 2024-02-21 ENCOUNTER — HOSPITAL ENCOUNTER (OUTPATIENT)
Dept: RADIOLOGY | Facility: HOSPITAL | Age: 54
Discharge: HOME | End: 2024-02-21
Attending: PHYSICIAN ASSISTANT
Payer: MEDICAID

## 2024-02-21 ENCOUNTER — OFFICE VISIT (OUTPATIENT)
Dept: CARDIOTHORACIC SURGERY | Facility: CLINIC | Age: 54
End: 2024-02-21
Payer: MEDICAID

## 2024-02-21 VITALS
HEIGHT: 66 IN | OXYGEN SATURATION: 95 % | HEART RATE: 81 BPM | WEIGHT: 187.81 LBS | BODY MASS INDEX: 30.18 KG/M2 | DIASTOLIC BLOOD PRESSURE: 86 MMHG | SYSTOLIC BLOOD PRESSURE: 144 MMHG

## 2024-02-21 DIAGNOSIS — R07.81 RIB PAIN: ICD-10-CM

## 2024-02-21 DIAGNOSIS — Z01.818 PRE-OP EVALUATION: Primary | ICD-10-CM

## 2024-02-21 DIAGNOSIS — C7A.090 CARCINOID BRONCHIAL ADENOMA OF RIGHT LUNG: ICD-10-CM

## 2024-02-21 DIAGNOSIS — D68.9 COAGULOPATHY: ICD-10-CM

## 2024-02-21 DIAGNOSIS — R07.89 RIGHT-SIDED CHEST WALL PAIN: ICD-10-CM

## 2024-02-21 PROCEDURE — 99999 PR PBB SHADOW E&M-EST. PATIENT-LVL IV: CPT | Mod: PBBFAC,,, | Performed by: THORACIC SURGERY (CARDIOTHORACIC VASCULAR SURGERY)

## 2024-02-21 PROCEDURE — 1159F MED LIST DOCD IN RCRD: CPT | Mod: CPTII,,, | Performed by: THORACIC SURGERY (CARDIOTHORACIC VASCULAR SURGERY)

## 2024-02-21 PROCEDURE — 3077F SYST BP >= 140 MM HG: CPT | Mod: CPTII,,, | Performed by: THORACIC SURGERY (CARDIOTHORACIC VASCULAR SURGERY)

## 2024-02-21 PROCEDURE — 99213 OFFICE O/P EST LOW 20 MIN: CPT | Mod: S$PBB,,, | Performed by: THORACIC SURGERY (CARDIOTHORACIC VASCULAR SURGERY)

## 2024-02-21 PROCEDURE — 76377 3D RENDER W/INTRP POSTPROCES: CPT | Mod: 26,,, | Performed by: STUDENT IN AN ORGANIZED HEALTH CARE EDUCATION/TRAINING PROGRAM

## 2024-02-21 PROCEDURE — 99214 OFFICE O/P EST MOD 30 MIN: CPT | Mod: PBBFAC,25 | Performed by: THORACIC SURGERY (CARDIOTHORACIC VASCULAR SURGERY)

## 2024-02-21 PROCEDURE — 76377 3D RENDER W/INTRP POSTPROCES: CPT | Mod: TC

## 2024-02-21 PROCEDURE — 71250 CT THORAX DX C-: CPT | Mod: TC

## 2024-02-21 PROCEDURE — 3079F DIAST BP 80-89 MM HG: CPT | Mod: CPTII,,, | Performed by: THORACIC SURGERY (CARDIOTHORACIC VASCULAR SURGERY)

## 2024-02-21 PROCEDURE — 71250 CT THORAX DX C-: CPT | Mod: 26,,, | Performed by: STUDENT IN AN ORGANIZED HEALTH CARE EDUCATION/TRAINING PROGRAM

## 2024-02-21 PROCEDURE — 3008F BODY MASS INDEX DOCD: CPT | Mod: CPTII,,, | Performed by: THORACIC SURGERY (CARDIOTHORACIC VASCULAR SURGERY)

## 2024-02-21 RX ORDER — CYCLOBENZAPRINE HCL 10 MG
10 TABLET ORAL 3 TIMES DAILY PRN
Qty: 45 TABLET | Refills: 1 | Status: SHIPPED | OUTPATIENT
Start: 2024-02-21 | End: 2024-06-10

## 2024-02-21 NOTE — PROGRESS NOTES
"Subjective:       Patient ID: Gina Coto is a 53 y.o. female.    Chief Complaint: Follow-up    Diagnosis: Carcinoid bronchial adenoma of right lung     Pre-operative therapy:   04/22/2019 - right thoracotomy for right lower bilobectomy and resection of carcinoid tumor   09/09/2019 - right thoracotomy with rib plating    Procedure(s) and date(s): 01/05/23 - Re-do right thoracotomy and chest wall reconstruction using Goretex mesh     Pathology: SKIN, CHEST WALL NODULE, BIOPSY: Benign fibrous soft tissue showing synovial metaplasia. No atypia or malignancy identified.      Post-operative therapy: surveillance    Follow-up      Gina Coto is a 52 y.o. female s/p right thoracotomy for right lower bilobectomy for resection of carcinoid tumor and s/p rib plating who is presenting to clinic today for 6 month follow up s/p re-do right thoracotomy and chest wall reconstruction. Today patient reports she is doing well. Denies clicking sensation that was reported preoperatively. Pain controlled. Incision healing well. Denies CP, cough, SOB, fever, chills, diaphoresis.      Review of Systems   Constitutional: Negative.    HENT: Negative.     Respiratory: Negative.     Cardiovascular: Negative.    Gastrointestinal: Negative.    Genitourinary: Negative.    Integumentary:  Negative.   Neurological: Negative.    Hematological: Negative.    Psychiatric/Behavioral: Negative.           Objective:      BP (!) 144/86   Pulse 81   Ht 5' 6" (1.676 m)   Wt 85.2 kg (187 lb 13.3 oz)   SpO2 95%   BMI 30.32 kg/m²      Physical Exam  Constitutional:       Appearance: Normal appearance. She is obese.   Eyes:      Extraocular Movements: Extraocular movements intact.      Pupils: Pupils are equal, round, and reactive to light.   Cardiovascular:      Rate and Rhythm: Normal rate and regular rhythm.      Pulses: Normal pulses.   Pulmonary:      Effort: Pulmonary effort is normal.      Breath sounds: Normal breath sounds.      " Comments: Right incisions well healed  Abdominal:      General: Abdomen is flat.      Palpations: Abdomen is soft.   Musculoskeletal:      Comments: No chest wall instability, audible or palpable clicking along right posterior chest.     Skin:     General: Skin is warm and dry.   Neurological:      General: No focal deficit present.      Mental Status: She is alert and oriented to person, place, and time. Mental status is at baseline.   Psychiatric:         Mood and Affect: Mood normal.         Behavior: Behavior normal.         Thought Content: Thought content normal.           Diagnostics:     CXR - 01/25/23:   1. Postsurgical changes from right thoracotomy and associated volume loss in the right hemithorax.  No acute cardiopulmonary process.  2. Clearing of the patchy reticulonodular airspace opacity in the left lung base since the previous study.    Chest CT 08/30/23: images personally reviewed.   Postsurgical changes in right hemithorax  Stable right chest wall plates and mesh    Chest CT 3D recon 2/21/24:  I reviewed the images  Stable postsurgical changes in right hemithorax  Goretex mesh separation from superior rib margin of 5th rib with large interspace between 5th and 6th ribs    Assessment:       Gina Coto is a 52 y.o. female s/p right thoracotomy for right lower bilobectomy for resection of carcinoid tumor and s/p rib plating who is presenting to clinic today for 6 month follow up s/p re-do right thoracotomy and chest wall reconstruction  MELIZA  Recurrent chest wall discomfort:  No rib clicking on physical exam today    Schedule for redo redo right thoracotomy with revision of chest wall closure    Plan:       Follow up in 1 year with chest CT

## 2024-03-07 ENCOUNTER — ANESTHESIA EVENT (OUTPATIENT)
Dept: SURGERY | Facility: HOSPITAL | Age: 54
DRG: 909 | End: 2024-03-07
Payer: MEDICAID

## 2024-03-08 ENCOUNTER — TELEPHONE (OUTPATIENT)
Dept: CARDIOTHORACIC SURGERY | Facility: CLINIC | Age: 54
End: 2024-03-08
Payer: MEDICAID

## 2024-03-11 ENCOUNTER — ANESTHESIA (OUTPATIENT)
Dept: SURGERY | Facility: HOSPITAL | Age: 54
DRG: 909 | End: 2024-03-11
Payer: MEDICAID

## 2024-03-11 ENCOUNTER — HOSPITAL ENCOUNTER (INPATIENT)
Facility: HOSPITAL | Age: 54
LOS: 4 days | Discharge: HOME | DRG: 909 | End: 2024-03-15
Attending: THORACIC SURGERY (CARDIOTHORACIC VASCULAR SURGERY) | Admitting: THORACIC SURGERY (CARDIOTHORACIC VASCULAR SURGERY)
Payer: MEDICAID

## 2024-03-11 DIAGNOSIS — R07.89 RIGHT-SIDED CHEST WALL PAIN: ICD-10-CM

## 2024-03-11 DIAGNOSIS — C7A.090 CARCINOID BRONCHIAL ADENOMA OF RIGHT LUNG: Primary | ICD-10-CM

## 2024-03-11 LAB
ABO + RH BLD: NORMAL
B-HCG UR QL: NEGATIVE
BLD GP AB SCN CELLS X3 SERPL QL: NORMAL
CTP QC/QA: YES
SPECIMEN OUTDATE: NORMAL

## 2024-03-11 PROCEDURE — 25000003 PHARM REV CODE 250

## 2024-03-11 PROCEDURE — C1781 MESH (IMPLANTABLE): HCPCS | Performed by: THORACIC SURGERY (CARDIOTHORACIC VASCULAR SURGERY)

## 2024-03-11 PROCEDURE — 63600175 PHARM REV CODE 636 W HCPCS: Performed by: STUDENT IN AN ORGANIZED HEALTH CARE EDUCATION/TRAINING PROGRAM

## 2024-03-11 PROCEDURE — 0PU50JZ SUPPLEMENT RIGHT SCAPULA WITH SYNTHETIC SUBSTITUTE, OPEN APPROACH: ICD-10-PCS | Performed by: THORACIC SURGERY (CARDIOTHORACIC VASCULAR SURGERY)

## 2024-03-11 PROCEDURE — 88304 TISSUE EXAM BY PATHOLOGIST: CPT | Performed by: PATHOLOGY

## 2024-03-11 PROCEDURE — 25000003 PHARM REV CODE 250: Performed by: STUDENT IN AN ORGANIZED HEALTH CARE EDUCATION/TRAINING PROGRAM

## 2024-03-11 PROCEDURE — D9220A PRA ANESTHESIA: Mod: ,,, | Performed by: ANESTHESIOLOGY

## 2024-03-11 PROCEDURE — 63600175 PHARM REV CODE 636 W HCPCS

## 2024-03-11 PROCEDURE — 27201423 OPTIME MED/SURG SUP & DEVICES STERILE SUPPLY: Performed by: THORACIC SURGERY (CARDIOTHORACIC VASCULAR SURGERY)

## 2024-03-11 PROCEDURE — 27201037 HC PRESSURE MONITORING SET UP

## 2024-03-11 PROCEDURE — 36000710: Performed by: THORACIC SURGERY (CARDIOTHORACIC VASCULAR SURGERY)

## 2024-03-11 PROCEDURE — 88311 DECALCIFY TISSUE: CPT | Mod: 26,,, | Performed by: PATHOLOGY

## 2024-03-11 PROCEDURE — 20600001 HC STEP DOWN PRIVATE ROOM

## 2024-03-11 PROCEDURE — 64463 PVB THORACIC CONT INFUSION: CPT | Performed by: STUDENT IN AN ORGANIZED HEALTH CARE EDUCATION/TRAINING PROGRAM

## 2024-03-11 PROCEDURE — 0PP50JZ REMOVAL OF SYNTHETIC SUBSTITUTE FROM RIGHT SCAPULA, OPEN APPROACH: ICD-10-PCS | Performed by: THORACIC SURGERY (CARDIOTHORACIC VASCULAR SURGERY)

## 2024-03-11 PROCEDURE — 88311 DECALCIFY TISSUE: CPT | Performed by: PATHOLOGY

## 2024-03-11 PROCEDURE — 71000015 HC POSTOP RECOV 1ST HR: Performed by: THORACIC SURGERY (CARDIOTHORACIC VASCULAR SURGERY)

## 2024-03-11 PROCEDURE — 76942 ECHO GUIDE FOR BIOPSY: CPT | Performed by: STUDENT IN AN ORGANIZED HEALTH CARE EDUCATION/TRAINING PROGRAM

## 2024-03-11 PROCEDURE — 81025 URINE PREGNANCY TEST: CPT | Performed by: THORACIC SURGERY (CARDIOTHORACIC VASCULAR SURGERY)

## 2024-03-11 PROCEDURE — 37000008 HC ANESTHESIA 1ST 15 MINUTES: Performed by: THORACIC SURGERY (CARDIOTHORACIC VASCULAR SURGERY)

## 2024-03-11 PROCEDURE — 64999 UNLISTED PX NERVOUS SYSTEM: CPT | Mod: ,,, | Performed by: ANESTHESIOLOGY

## 2024-03-11 PROCEDURE — 88305 TISSUE EXAM BY PATHOLOGIST: CPT | Mod: 59 | Performed by: PATHOLOGY

## 2024-03-11 PROCEDURE — 37000009 HC ANESTHESIA EA ADD 15 MINS: Performed by: THORACIC SURGERY (CARDIOTHORACIC VASCULAR SURGERY)

## 2024-03-11 PROCEDURE — 71000033 HC RECOVERY, INTIAL HOUR: Performed by: THORACIC SURGERY (CARDIOTHORACIC VASCULAR SURGERY)

## 2024-03-11 PROCEDURE — 25000003 PHARM REV CODE 250: Performed by: THORACIC SURGERY (CARDIOTHORACIC VASCULAR SURGERY)

## 2024-03-11 PROCEDURE — 88305 TISSUE EXAM BY PATHOLOGIST: CPT | Mod: 26,,, | Performed by: PATHOLOGY

## 2024-03-11 PROCEDURE — 94761 N-INVAS EAR/PLS OXIMETRY MLT: CPT

## 2024-03-11 PROCEDURE — 71000016 HC POSTOP RECOV ADDL HR: Performed by: THORACIC SURGERY (CARDIOTHORACIC VASCULAR SURGERY)

## 2024-03-11 PROCEDURE — 36000711: Performed by: THORACIC SURGERY (CARDIOTHORACIC VASCULAR SURGERY)

## 2024-03-11 PROCEDURE — 86901 BLOOD TYPING SEROLOGIC RH(D): CPT

## 2024-03-11 DEVICE — IMPLANTABLE DEVICE: Type: IMPLANTABLE DEVICE | Site: CHEST  WALL | Status: FUNCTIONAL

## 2024-03-11 RX ORDER — DEXMEDETOMIDINE HYDROCHLORIDE 100 UG/ML
INJECTION, SOLUTION INTRAVENOUS
Status: DISCONTINUED | OUTPATIENT
Start: 2024-03-11 | End: 2024-03-11

## 2024-03-11 RX ORDER — ENOXAPARIN SODIUM 100 MG/ML
40 INJECTION SUBCUTANEOUS EVERY 24 HOURS
Status: DISCONTINUED | OUTPATIENT
Start: 2024-03-12 | End: 2024-03-15 | Stop reason: HOSPADM

## 2024-03-11 RX ORDER — PREGABALIN 75 MG/1
75 CAPSULE ORAL NIGHTLY
Status: DISCONTINUED | OUTPATIENT
Start: 2024-03-11 | End: 2024-03-13

## 2024-03-11 RX ORDER — FLUOXETINE HYDROCHLORIDE 20 MG/1
60 CAPSULE ORAL EVERY MORNING
Status: DISCONTINUED | OUTPATIENT
Start: 2024-03-12 | End: 2024-03-11

## 2024-03-11 RX ORDER — LIDOCAINE HYDROCHLORIDE 10 MG/ML
1 INJECTION, SOLUTION EPIDURAL; INFILTRATION; INTRACAUDAL; PERINEURAL ONCE
Status: COMPLETED | OUTPATIENT
Start: 2024-03-11 | End: 2024-03-11

## 2024-03-11 RX ORDER — OXYCODONE HYDROCHLORIDE 10 MG/1
10 TABLET ORAL
Status: DISCONTINUED | OUTPATIENT
Start: 2024-03-11 | End: 2024-03-15 | Stop reason: HOSPADM

## 2024-03-11 RX ORDER — OXYCODONE HYDROCHLORIDE 5 MG/1
5 TABLET ORAL
Status: DISCONTINUED | OUTPATIENT
Start: 2024-03-11 | End: 2024-03-11

## 2024-03-11 RX ORDER — ROCURONIUM BROMIDE 10 MG/ML
INJECTION, SOLUTION INTRAVENOUS
Status: DISCONTINUED | OUTPATIENT
Start: 2024-03-11 | End: 2024-03-11

## 2024-03-11 RX ORDER — HALOPERIDOL 5 MG/ML
0.5 INJECTION INTRAMUSCULAR EVERY 10 MIN PRN
Status: DISCONTINUED | OUTPATIENT
Start: 2024-03-11 | End: 2024-03-11 | Stop reason: HOSPADM

## 2024-03-11 RX ORDER — BUPROPION HYDROCHLORIDE 150 MG/1
150 TABLET ORAL NIGHTLY
Status: DISCONTINUED | OUTPATIENT
Start: 2024-03-11 | End: 2024-03-15 | Stop reason: HOSPADM

## 2024-03-11 RX ORDER — METHOCARBAMOL 500 MG/1
1000 TABLET, FILM COATED ORAL EVERY 6 HOURS
Status: DISCONTINUED | OUTPATIENT
Start: 2024-03-11 | End: 2024-03-15 | Stop reason: HOSPADM

## 2024-03-11 RX ORDER — POLYETHYLENE GLYCOL 3350 17 G/17G
17 POWDER, FOR SOLUTION ORAL DAILY
Status: DISCONTINUED | OUTPATIENT
Start: 2024-03-11 | End: 2024-03-15 | Stop reason: HOSPADM

## 2024-03-11 RX ORDER — MIDAZOLAM HYDROCHLORIDE 1 MG/ML
.5-4 INJECTION INTRAMUSCULAR; INTRAVENOUS
Status: DISCONTINUED | OUTPATIENT
Start: 2024-03-11 | End: 2024-03-11

## 2024-03-11 RX ORDER — ACETAMINOPHEN 500 MG
1000 TABLET ORAL EVERY 6 HOURS
Status: DISCONTINUED | OUTPATIENT
Start: 2024-03-11 | End: 2024-03-12

## 2024-03-11 RX ORDER — OXYCODONE HYDROCHLORIDE 10 MG/1
10 TABLET ORAL
Status: DISCONTINUED | OUTPATIENT
Start: 2024-03-11 | End: 2024-03-11

## 2024-03-11 RX ORDER — ROPIVACAINE HYDROCHLORIDE 2 MG/ML
0.1 INJECTION, SOLUTION EPIDURAL; INFILTRATION; PERINEURAL CONTINUOUS
Status: DISCONTINUED | OUTPATIENT
Start: 2024-03-11 | End: 2024-03-13

## 2024-03-11 RX ORDER — AMOXICILLIN 250 MG
1 CAPSULE ORAL DAILY
Status: DISCONTINUED | OUTPATIENT
Start: 2024-03-11 | End: 2024-03-15 | Stop reason: HOSPADM

## 2024-03-11 RX ORDER — SODIUM CHLORIDE 0.9 % (FLUSH) 0.9 %
10 SYRINGE (ML) INJECTION
Status: DISCONTINUED | OUTPATIENT
Start: 2024-03-11 | End: 2024-03-11

## 2024-03-11 RX ORDER — FENTANYL CITRATE 50 UG/ML
25-200 INJECTION, SOLUTION INTRAMUSCULAR; INTRAVENOUS
Status: DISCONTINUED | OUTPATIENT
Start: 2024-03-11 | End: 2024-03-11

## 2024-03-11 RX ORDER — HYDROMORPHONE HYDROCHLORIDE 1 MG/ML
0.5 INJECTION, SOLUTION INTRAMUSCULAR; INTRAVENOUS; SUBCUTANEOUS
Status: DISCONTINUED | OUTPATIENT
Start: 2024-03-11 | End: 2024-03-11

## 2024-03-11 RX ORDER — HYDROMORPHONE HYDROCHLORIDE 1 MG/ML
0.5 INJECTION, SOLUTION INTRAMUSCULAR; INTRAVENOUS; SUBCUTANEOUS
Status: DISCONTINUED | OUTPATIENT
Start: 2024-03-11 | End: 2024-03-14

## 2024-03-11 RX ORDER — FAMOTIDINE 20 MG/1
20 TABLET, FILM COATED ORAL 2 TIMES DAILY
Status: DISCONTINUED | OUTPATIENT
Start: 2024-03-11 | End: 2024-03-15 | Stop reason: HOSPADM

## 2024-03-11 RX ORDER — HYDROMORPHONE HYDROCHLORIDE 1 MG/ML
0.2 INJECTION, SOLUTION INTRAMUSCULAR; INTRAVENOUS; SUBCUTANEOUS ONCE
Status: COMPLETED | OUTPATIENT
Start: 2024-03-11 | End: 2024-03-11

## 2024-03-11 RX ORDER — PROPOFOL 10 MG/ML
VIAL (ML) INTRAVENOUS
Status: DISCONTINUED | OUTPATIENT
Start: 2024-03-11 | End: 2024-03-11

## 2024-03-11 RX ORDER — HYDROMORPHONE HYDROCHLORIDE 1 MG/ML
0.2 INJECTION, SOLUTION INTRAMUSCULAR; INTRAVENOUS; SUBCUTANEOUS EVERY 5 MIN PRN
Status: COMPLETED | OUTPATIENT
Start: 2024-03-11 | End: 2024-03-11

## 2024-03-11 RX ORDER — FENTANYL CITRATE 50 UG/ML
INJECTION, SOLUTION INTRAMUSCULAR; INTRAVENOUS
Status: DISCONTINUED | OUTPATIENT
Start: 2024-03-11 | End: 2024-03-11

## 2024-03-11 RX ORDER — DEXAMETHASONE SODIUM PHOSPHATE 4 MG/ML
INJECTION, SOLUTION INTRA-ARTICULAR; INTRALESIONAL; INTRAMUSCULAR; INTRAVENOUS; SOFT TISSUE
Status: DISCONTINUED | OUTPATIENT
Start: 2024-03-11 | End: 2024-03-11

## 2024-03-11 RX ORDER — MUPIROCIN 20 MG/G
1 OINTMENT TOPICAL 2 TIMES DAILY
Status: DISCONTINUED | OUTPATIENT
Start: 2024-03-11 | End: 2024-03-15 | Stop reason: HOSPADM

## 2024-03-11 RX ORDER — LIDOCAINE HYDROCHLORIDE 20 MG/ML
INJECTION, SOLUTION EPIDURAL; INFILTRATION; INTRACAUDAL; PERINEURAL
Status: DISCONTINUED | OUTPATIENT
Start: 2024-03-11 | End: 2024-03-11

## 2024-03-11 RX ORDER — ONDANSETRON HYDROCHLORIDE 2 MG/ML
4 INJECTION, SOLUTION INTRAVENOUS EVERY 12 HOURS PRN
Status: DISCONTINUED | OUTPATIENT
Start: 2024-03-11 | End: 2024-03-15 | Stop reason: HOSPADM

## 2024-03-11 RX ORDER — ACETAMINOPHEN 500 MG
1000 TABLET ORAL
Status: COMPLETED | OUTPATIENT
Start: 2024-03-11 | End: 2024-03-11

## 2024-03-11 RX ORDER — TRAZODONE HYDROCHLORIDE 100 MG/1
200 TABLET ORAL NIGHTLY
Status: DISCONTINUED | OUTPATIENT
Start: 2024-03-11 | End: 2024-03-15 | Stop reason: HOSPADM

## 2024-03-11 RX ORDER — PHENYLEPHRINE HCL IN 0.9% NACL 1 MG/10 ML
SYRINGE (ML) INTRAVENOUS
Status: DISCONTINUED | OUTPATIENT
Start: 2024-03-11 | End: 2024-03-11

## 2024-03-11 RX ORDER — ONDANSETRON HYDROCHLORIDE 2 MG/ML
INJECTION, SOLUTION INTRAVENOUS
Status: DISCONTINUED | OUTPATIENT
Start: 2024-03-11 | End: 2024-03-11

## 2024-03-11 RX ORDER — KETAMINE HCL IN 0.9 % NACL 50 MG/5 ML
SYRINGE (ML) INTRAVENOUS
Status: DISCONTINUED | OUTPATIENT
Start: 2024-03-11 | End: 2024-03-11

## 2024-03-11 RX ORDER — QUETIAPINE FUMARATE 25 MG/1
50 TABLET, FILM COATED ORAL NIGHTLY
Status: DISCONTINUED | OUTPATIENT
Start: 2024-03-11 | End: 2024-03-15 | Stop reason: HOSPADM

## 2024-03-11 RX ORDER — SODIUM CHLORIDE 9 MG/ML
INJECTION, SOLUTION INTRAVENOUS CONTINUOUS
Status: DISCONTINUED | OUTPATIENT
Start: 2024-03-11 | End: 2024-03-11

## 2024-03-11 RX ADMIN — HYDROMORPHONE HYDROCHLORIDE 0.2 MG: 1 INJECTION, SOLUTION INTRAMUSCULAR; INTRAVENOUS; SUBCUTANEOUS at 12:03

## 2024-03-11 RX ADMIN — ACETAMINOPHEN 1000 MG: 500 TABLET ORAL at 08:03

## 2024-03-11 RX ADMIN — DEXMEDETOMIDINE 8 MCG: 200 INJECTION, SOLUTION INTRAVENOUS at 10:03

## 2024-03-11 RX ADMIN — MIDAZOLAM 2 MG: 1 INJECTION INTRAMUSCULAR; INTRAVENOUS at 09:03

## 2024-03-11 RX ADMIN — HYDROMORPHONE HYDROCHLORIDE 0.5 MG: 1 INJECTION, SOLUTION INTRAMUSCULAR; INTRAVENOUS; SUBCUTANEOUS at 09:03

## 2024-03-11 RX ADMIN — DEXAMETHASONE SODIUM PHOSPHATE 4 MG: 4 INJECTION INTRA-ARTICULAR; INTRALESIONAL; INTRAMUSCULAR; INTRAVENOUS; SOFT TISSUE at 09:03

## 2024-03-11 RX ADMIN — GABAPENTIN 400 MG: 300 CAPSULE ORAL at 08:03

## 2024-03-11 RX ADMIN — QUETIAPINE FUMARATE 50 MG: 25 TABLET ORAL at 08:03

## 2024-03-11 RX ADMIN — CEFAZOLIN 2 G: 2 INJECTION, POWDER, FOR SOLUTION INTRAMUSCULAR; INTRAVENOUS at 09:03

## 2024-03-11 RX ADMIN — LIDOCAINE HYDROCHLORIDE 2 MG: 10 INJECTION, SOLUTION EPIDURAL; INFILTRATION; INTRACAUDAL; PERINEURAL at 08:03

## 2024-03-11 RX ADMIN — ROCURONIUM BROMIDE 50 MG: 10 INJECTION, SOLUTION INTRAVENOUS at 09:03

## 2024-03-11 RX ADMIN — Medication 100 MCG: at 11:03

## 2024-03-11 RX ADMIN — FENTANYL CITRATE 100 MCG: 50 INJECTION INTRAMUSCULAR; INTRAVENOUS at 09:03

## 2024-03-11 RX ADMIN — POLYETHYLENE GLYCOL 3350 17 G: 17 POWDER, FOR SOLUTION ORAL at 02:03

## 2024-03-11 RX ADMIN — ROCURONIUM BROMIDE 30 MG: 10 INJECTION, SOLUTION INTRAVENOUS at 09:03

## 2024-03-11 RX ADMIN — PREGABALIN 75 MG: 75 CAPSULE ORAL at 08:03

## 2024-03-11 RX ADMIN — SODIUM CHLORIDE: 0.9 INJECTION, SOLUTION INTRAVENOUS at 09:03

## 2024-03-11 RX ADMIN — ACETAMINOPHEN 1000 MG: 500 TABLET ORAL at 02:03

## 2024-03-11 RX ADMIN — ONDANSETRON 4 MG: 2 INJECTION INTRAMUSCULAR; INTRAVENOUS at 11:03

## 2024-03-11 RX ADMIN — METHOCARBAMOL 1000 MG: 500 TABLET ORAL at 05:03

## 2024-03-11 RX ADMIN — SODIUM CHLORIDE: 9 INJECTION, SOLUTION INTRAVENOUS at 08:03

## 2024-03-11 RX ADMIN — METHOCARBAMOL 1000 MG: 500 TABLET ORAL at 12:03

## 2024-03-11 RX ADMIN — LIDOCAINE HYDROCHLORIDE 100 MG: 20 INJECTION, SOLUTION EPIDURAL; INFILTRATION; INTRACAUDAL; PERINEURAL at 09:03

## 2024-03-11 RX ADMIN — Medication 25 MG: at 09:03

## 2024-03-11 RX ADMIN — HYDROMORPHONE HYDROCHLORIDE 0.5 MG: 1 INJECTION, SOLUTION INTRAMUSCULAR; INTRAVENOUS; SUBCUTANEOUS at 04:03

## 2024-03-11 RX ADMIN — FENTANYL CITRATE 25 MCG: 50 INJECTION INTRAMUSCULAR; INTRAVENOUS at 09:03

## 2024-03-11 RX ADMIN — BUPROPION HYDROCHLORIDE 150 MG: 150 TABLET, EXTENDED RELEASE ORAL at 08:03

## 2024-03-11 RX ADMIN — PROPOFOL 150 MG: 10 INJECTION, EMULSION INTRAVENOUS at 09:03

## 2024-03-11 RX ADMIN — ACETAMINOPHEN 1000 MG: 500 TABLET ORAL at 11:03

## 2024-03-11 RX ADMIN — SENNOSIDES AND DOCUSATE SODIUM 1 TABLET: 8.6; 5 TABLET ORAL at 02:03

## 2024-03-11 RX ADMIN — DEXMEDETOMIDINE 8 MCG: 200 INJECTION, SOLUTION INTRAVENOUS at 11:03

## 2024-03-11 RX ADMIN — OXYCODONE HYDROCHLORIDE 10 MG: 10 TABLET ORAL at 12:03

## 2024-03-11 RX ADMIN — METHOCARBAMOL 1000 MG: 500 TABLET ORAL at 11:03

## 2024-03-11 RX ADMIN — MUPIROCIN 1 G: 20 OINTMENT TOPICAL at 08:03

## 2024-03-11 RX ADMIN — ROCURONIUM BROMIDE 20 MG: 10 INJECTION, SOLUTION INTRAVENOUS at 10:03

## 2024-03-11 RX ADMIN — Medication 15 MG: at 10:03

## 2024-03-11 RX ADMIN — Medication 10 MG: at 11:03

## 2024-03-11 RX ADMIN — FENTANYL CITRATE 25 MCG: 50 INJECTION INTRAMUSCULAR; INTRAVENOUS at 11:03

## 2024-03-11 RX ADMIN — OXYCODONE HYDROCHLORIDE 15 MG: 10 TABLET ORAL at 03:03

## 2024-03-11 RX ADMIN — ACETAMINOPHEN 1000 MG: 500 TABLET ORAL at 05:03

## 2024-03-11 RX ADMIN — HYDROMORPHONE HYDROCHLORIDE 0.2 MG: 1 INJECTION, SOLUTION INTRAMUSCULAR; INTRAVENOUS; SUBCUTANEOUS at 03:03

## 2024-03-11 RX ADMIN — TRAZODONE HYDROCHLORIDE 200 MG: 100 TABLET ORAL at 08:03

## 2024-03-11 RX ADMIN — ROPIVACAINE HYDROCHLORIDE 0.1 ML/HR: 2 INJECTION, SOLUTION EPIDURAL; INFILTRATION at 11:03

## 2024-03-11 RX ADMIN — OXYCODONE HYDROCHLORIDE 15 MG: 10 TABLET ORAL at 08:03

## 2024-03-11 RX ADMIN — SUGAMMADEX 200 MG: 100 INJECTION, SOLUTION INTRAVENOUS at 11:03

## 2024-03-11 RX ADMIN — FENTANYL CITRATE 50 MCG: 50 INJECTION INTRAMUSCULAR; INTRAVENOUS at 09:03

## 2024-03-11 RX ADMIN — FAMOTIDINE 20 MG: 20 TABLET, FILM COATED ORAL at 08:03

## 2024-03-11 NOTE — INTERVAL H&P NOTE
The patient has been examined and the H&P has been reviewed:    I concur with the findings and no changes have occurred since H&P was written.    Surgery risks, benefits and alternative options discussed and understood by patient/family.    Vitals:    03/11/24 0827   BP: (!) 144/88   Pulse: 60   Resp: 16   Temp: 97.5 °F (36.4 °C)      Krishna Fajardo MD  General Surgery PGY-4  03/11/2024

## 2024-03-11 NOTE — NURSING
Nurses Note -- 4 Eyes      3/11/2024   4:13 PM      Skin assessed during: Admit      [] No Altered Skin Integrity Present    []Prevention Measures Documented      [x] Yes- Altered Skin Integrity Present or Discovered   [x] LDA Added if Not in Epic (Describe Wound)   [] New Altered Skin Integrity was Present on Admit and Documented in LDA   [] Wound Image Taken    Wound Care Consulted? No    Attending Nurse:  Pretty Newton RN/Staff Member:   FIDENCIO Saenz     R Thoracotomy SI - Island dressing CDI    Perineural - blood under occlusive dressing noted - Shalini - Anesthesia called to bedside to assess       Patient admitted to unit. VS taken - see flowsheet. Anesthesia paged to assess perineural Ripivacaine. Patients pain is 6/10 pain which is better than she previously experienced. Overall she is resting comfortably in bed. Patient and spouse oriented to unit, use of the call bell and fall contract.

## 2024-03-11 NOTE — ANESTHESIA POSTPROCEDURE EVALUATION
Anesthesia Post Evaluation    Patient: Gina Coto    Procedure(s) Performed: Procedure(s) (LRB):  THORACOTOMY with mesh removal (APS pain consult) (Right)  RECONSTRUCTION, CHEST WALL (Right)    Final Anesthesia Type: general      Patient location during evaluation: PACU  Patient participation: Yes- Able to Participate  Level of consciousness: awake and alert and oriented  Post-procedure vital signs: reviewed and stable  Pain management: adequate  Airway patency: patent    PONV status at discharge: No PONV  Anesthetic complications: no      Cardiovascular status: blood pressure returned to baseline, hemodynamically stable and stable  Respiratory status: unassisted, room air and spontaneous ventilation  Hydration status: euvolemic  Follow-up not needed.              Vitals Value Taken Time   /90 03/11/24 1302   Temp 36.2 °C (97.2 °F) 03/11/24 1147   Pulse 62 03/11/24 1314   Resp 11 03/11/24 1314   SpO2 97 % 03/11/24 1314   Vitals shown include unvalidated device data.      No case tracking events are documented in the log.      Pain/Saundra Score: Pain Rating Prior to Med Admin: 10 (3/11/2024 12:41 PM)  Saundra Score: 10 (3/11/2024 12:00 PM)

## 2024-03-11 NOTE — OP NOTE
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  She recently presented with right posterolateral chest wall discomfort and clicking, suggestive of recurrent chest wall instability.  Evaluation included a chest CT which demonstrated partial dehiscence of the previously placed Doerun-Nemesio mesh.  Reoperation is indicated.      The patient was taken to the operating room placed supine and identified.  General anesthesia was established with single-lumen tube placement.  Tube positioning was confirmed fiberoptically.  The patient was positioned in a left lateral decubitus position, all pressure points were padded, and the right chest was prepped and draped.  The previous incision was opened along its entire length.  Dissection was carried down to the chest wall through a considerable amount of scar tissue.  We encountered a seroma which contained serous non foul-smelling fluid.  The right shoulder was moved to assess the relationship between the scapula tip and and exposed rib plate at the wound base.  I felt that the 2 structures came into contact with shoulder motion.  I mobilize the soft tissues away from the scapula tip and resected nearly 3 cm of the scapula tip.  I completely excised the dehisced Doerun-Nemesio patch.  A 15 cm x 20 cm x 2 mm Doerun-Nemesio patch was placed onto the operative field and tailored to fit into the wound.  The proximal margin of the patch was placed at the  inferior margin of the scapula, placed over the rib plate and sutured to soft tissues located inferiorly medially and laterally to the rib plate.  Interrupted 0 Ethibond was placed in a horizontal mattress fashion to secure the patch, excluding the scapula from the underlying rib plate.  The operative field was hemostatic.  The wound was then closed in several layers using absorbable suture.  A sterile dressing was applied.  The patient was extubated and transported to the PACU stable.    Attending attestation:  I was present for and either directly assisted with or performed the critical and key portions of the procedure.

## 2024-03-11 NOTE — NURSING TRANSFER
Nursing Transfer Note      3/11/2024   3:28 PM    Nurse giving handoff:Amy NICOLAS PACU  Nurse receiving handoff:Renee NASSAR    Reason patient is being transferred: MD order    Transfer To: 1052    Transfer via stretcher      Transported by PCT    Order for Tele Monitor? No    Medicines sent: ROPIvacaine     Patient seen by Regional pain team multiple times for concerns of pain. Perineal boluses per team. Janessa Pulliam redid dressing after last visit at the bedside. After 2 bolus given per regional pain team no relief per patient. Doctor Janessa called and new orders for pain management given. Dk BRAUN and Norma BRAUN from thoracic team awake of frequent pain interventions. Will continue to observe.     Patient belongings transferred with patient: No    Chart send with patient: Yes    Notified: spouse    Patient reassessed at: 1530 (date, time)  1  Upon arrival to floor: call bell in reach and bed in lowest position

## 2024-03-11 NOTE — ANESTHESIA POST-OP PAIN MANAGEMENT
Acute Pain Service Progress Note    Gina Coto is a 54 y.o., female, [id number].    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       Subjective:     General appearance of alert, oriented, no complaints   Pain with rest: 3    Numbers   Pain with movement: 6    Numbers   Side Effects    1. Pruritis No    2. Nausea No    3. Motor Blockade No, 0=Ability to raise lower extremities off bed    4. Sedation No, 1=awake and alert    Objective:     Catheter site clean, dry, intact        Vitals   Vitals:    03/12/24 1129   BP: (!) 160/84   Pulse: 80   Resp: 18   Temp: 36.1 °C (97 °F)        Labs    Admission on 03/11/2024   Component Date Value Ref Range Status    Group & Rh 03/11/2024 A NEG   Final    Indirect Hector 03/11/2024 NEG   Final    Specimen Outdate 03/11/2024 03/14/2024 23:59   Final    POC Preg Test, Ur 03/11/2024 Negative  Negative Final     Acceptable 03/11/2024 Yes   Final    Creatinine 03/12/2024 0.7  0.5 - 1.4 mg/dL Final    eGFR 03/12/2024 >60.0  >60 mL/min/1.73 m^2 Final    WBC 03/12/2024 12.90 (H)  3.90 - 12.70 K/uL Final    RBC 03/12/2024 4.55  4.00 - 5.40 M/uL Final    Hemoglobin 03/12/2024 13.4  12.0 - 16.0 g/dL Final    Hematocrit 03/12/2024 40.6  37.0 - 48.5 % Final    MCV 03/12/2024 89  82 - 98 fL Final    MCH 03/12/2024 29.5  27.0 - 31.0 pg Final    MCHC 03/12/2024 33.0  32.0 - 36.0 g/dL Final    RDW 03/12/2024 12.4  11.5 - 14.5 % Final    Platelets 03/12/2024 250  150 - 450 K/uL Final     MPV 03/12/2024 10.0  9.2 - 12.9 fL Final    Immature Granulocytes 03/12/2024 0.4  0.0 - 0.5 % Final    Gran # (ANC) 03/12/2024 10.1 (H)  1.8 - 7.7 K/uL Final    Immature Grans (Abs) 03/12/2024 0.05 (H)  0.00 - 0.04 K/uL Final    Lymph # 03/12/2024 1.6  1.0 - 4.8 K/uL Final    Mono # 03/12/2024 0.9  0.3 - 1.0 K/uL Final    Eos # 03/12/2024 0.2  0.0 - 0.5 K/uL Final    Baso # 03/12/2024 0.06  0.00 - 0.20 K/uL Final    nRBC 03/12/2024 0  0 /100 WBC Final    Gran % 03/12/2024 78.4 (H)  38.0 - 73.0 % Final    Lymph % 03/12/2024 12.4 (L)  18.0 - 48.0 % Final    Mono % 03/12/2024 6.7  4.0 - 15.0 % Final    Eosinophil % 03/12/2024 1.6  0.0 - 8.0 % Final    Basophil % 03/12/2024 0.5  0.0 - 1.9 % Final    Differential Method 03/12/2024 Automated   Final        Meds   Current Facility-Administered Medications   Medication Dose Route Frequency Provider Last Rate Last Admin    acetaminophen tablet 1,000 mg  1,000 mg Oral Q8H Hugo Real MD        buPROPion TB24 tablet 150 mg  150 mg Oral QHS Krishna Fajardo MD   150 mg at 03/11/24 2035    enoxaparin injection 40 mg  40 mg Subcutaneous Q24H (prophylaxis, 1700) Krishna Fajardo MD        famotidine tablet 20 mg  20 mg Oral BID Krishna Fajardo MD   20 mg at 03/12/24 0917    oxyCODONE immediate release tablet Tab 10 mg  10 mg Oral Q3H PRN Janessa Khanna MD   10 mg at 03/12/24 0301    And    oxyCODONE immediate release tablet 15 mg  15 mg Oral Q3H PRN Janessa Khanna MD   15 mg at 03/12/24 0917    And    HYDROmorphone injection 0.5 mg  0.5 mg Intravenous Q3H PRN Janessa Khanna MD   0.5 mg at 03/12/24 1032    methocarbamoL tablet 1,000 mg  1,000 mg Oral Q6H Krishna Fajardo MD   1,000 mg at 03/12/24 0540    mupirocin 2 % ointment 1 g  1 g Nasal BID Krishna Fajardo MD   1 g at 03/12/24 0918    ondansetron injection 4 mg  4 mg Intravenous Q12H PRN Krishna Fajardo MD        polyethylene glycol packet 17 g  17 g Oral Daily Krishna Fajardo MD   17 g at 03/11/24 1419     pregabalin capsule 75 mg  75 mg Oral QHS Krishna Fajardo MD   75 mg at 03/11/24 2035    QUEtiapine tablet 50 mg  50 mg Oral Nightly Krishna Fajardo MD   50 mg at 03/11/24 2035    ROPIvacaine (PF) 2 mg/ml (0.2%) solution  0.1 mL/hr Perineural Continuous Krishna Fajardo MD 0.1 mL/hr at 03/11/24 1157 0.1 mL/hr at 03/11/24 1157    senna-docusate 8.6-50 mg per tablet 1 tablet  1 tablet Oral Daily Krishna Fajardo MD   1 tablet at 03/12/24 0915    traZODone tablet 200 mg  200 mg Oral Nightly Krishna Fajardo MD   200 mg at 03/11/24 2036        Anticoagulation: Enoxaparin SubQ 40 mg q24    Assessment:     Pain control adequate    Plan:     Patient doing well, continue present treatment.    - Continue PNC. Will plan to pause catheter tomorrow morning (3/13) and reassess.  - Continue multimodals with tylenol, robaxin, lyrica  - Continue oxycodone 5/10 for mod/sev pain with 0.5 mg dilaudid for breakthrough pain    Kayden Marr MD

## 2024-03-11 NOTE — ANESTHESIA PREPROCEDURE EVALUATION
03/11/2024  Pre-operative evaluation for Procedure(s) (LRB):  THORACOTOMY with mesh removal (APS pain consult) (Right)  RECONSTRUCTION, CHEST WALL (Right)    Gina Coto is a 54 y.o. female     Patient Active Problem List   Diagnosis    History of pneumonia- Nov 2018     Seasonal asthma    Abnormal chest CT    Carcinoid tumor    Carcinoid tumor of right lung    Rib fracture       Review of patient's allergies indicates:  No Known Allergies    No current facility-administered medications on file prior to encounter.     Current Outpatient Medications on File Prior to Encounter   Medication Sig Dispense Refill    ADVAIR DISKUS 500-50 mcg/dose DsDv diskus inhaler INHALE 1 PUFF INTO THE LUNGS 2 TIMES DAY 60 each 11    albuterol (PROVENTIL/VENTOLIN HFA) 90 mcg/actuation inhaler INHALE 1 TO 2 PUFFS BY MOUTH EVERY 6 HOURS AS NEEDED FOR WHEEZING OR SHORTNESS OF BREATH 54 g 0    albuterol (PROVENTIL/VENTOLIN HFA) 90 mcg/actuation inhaler Inhale 2 puffs into the lungs every 6 (six) hours as needed for Wheezing. Rescue 18 g 3    buPROPion (WELLBUTRIN XL) 150 MG TB24 tablet Take 150 mg by mouth every evening.      cetirizine (ZYRTEC) 10 MG tablet Take 1 tablet (10 mg total) by mouth once daily. 30 tablet 11    cloNIDine (CATAPRES) 0.1 MG tablet 0.1 mg every 6 (six) hours as needed.      cyclobenzaprine (FLEXERIL) 10 MG tablet Take 1 tablet (10 mg total) by mouth 3 (three) times daily as needed for Muscle spasms. 45 tablet 1    famotidine (PEPCID) 20 MG tablet Take 1 tablet (20 mg total) by mouth 2 (two) times daily. 60 tablet 11    fluticasone-salmeterol diskus inhaler 500-50 mcg Inhale 1 puff into the lungs 2 (two) times daily. Controller 60 each 11    gabapentin (NEURONTIN) 300 MG capsule Take 1 capsule (300 mg total) by mouth every evening. 90 capsule 3    QUEtiapine (SEROQUEL) 50 MG tablet take 1/2 to 1 tablet at  bedtime      traZODone (DESYREL) 100 MG tablet Take 200 mg by mouth nightly.      VYVANSE 10 mg Cap Take 1 capsule by mouth every morning.      FLUoxetine 20 MG capsule Take 60 mg by mouth every morning.         Past Surgical History:   Procedure Laterality Date    BRONCHOSCOPY N/A 11/28/2018    Procedure: BRONCHOSCOPY;  Surgeon: Omar Prescott MD;  Location: [facility] CATH LAB;  Service: Pulmonary;  Laterality: N/A;    BRONCHOSCOPY N/A 4/22/2019    Procedure: BRONCHOSCOPY;  Surgeon: Juan Jose Miles MD;  Location: [facility] FLR;  Service: Thoracic;  Laterality: N/A;    BRONCHOSCOPY N/A 5/6/2019    Procedure: BRONCHOSCOPY;  Surgeon: Juan Jose Miles MD;  Location: [facility] OR [facility] FLR;  Service: Thoracic;  Laterality: N/A;    COLONOSCOPY N/A 12/2/2021    Procedure: COLONOSCOPY;  Surgeon: Juan Jose Castano MD;  Location: [facility];  Service: Colon and Rectal;  Laterality: N/A;    ESOPHAGEAL MANOMETRY WITH MEASUREMENT OF IMPEDANCE N/A 7/12/2022    Procedure: MANOMETRY-ESOPHAGEAL-WITH IMPEDANCE;  Surgeon: Katrazyna Floyd MD;  [facility]  Service: Endoscopy;  Laterality: N/A;  hold trazodone 24 hours prior to procedure    ESOPHAGOGASTRODUODENOSCOPY N/A 7/12/2022    Procedure: ESOPHAGOGASTRODUODENOSCOPY (EGD);  Surgeon: Katarzyna Floyd MD;  [facility]  Service: Endoscopy;  Laterality: N/A;  Endoscopically placed manometry probe  2nd floor-fluid in esophagus  propofol only  full liquid diet x3 days, clear liquid diet x1 day prior to procedure  RAPID COVID test, pt to arrive for 11:00am-KPvt   instructions sent to [email or web address]    INJECTION OF ANESTHETIC AGENT AROUND MULTIPLE INTERCOSTAL NERVES Right 1/5/2023    Procedure: BLOCK, NERVE, INTERCOSTAL, 2 OR MORE;  Surgeon: Juan Jose Miles MD;  Location: [facility] OR [facility];  Service: Thoracic;  Laterality: Right;    PELVIC LAPAROSCOPY      SLEEVE LOBECTOMY OF LUNG BY THORACOTOMY Right 4/22/2019    Procedure: LOBECTOMY, SLEEVE, LUNG,  THORACOTOMY APPROACH;  Surgeon: Juan Jose Miles MD;  Location: [facility] OR [address] [place] [address];  Service: Thoracic;  Laterality: Right;  lower bilobectomy    THORACOTOMY Right 2023    Procedure: THORACOTOMY REDO WITH RIB PLATE, RECONSTRUCTION OF CHEST WALL WITH MESH;  Surgeon: Juan Jose Miles MD;  [facility];  Service: Thoracic;  Laterality: Right;       Social History     Socioeconomic History    Marital status:    Tobacco Use    Smoking status: Former     Current packs/day: 0.00     Average packs/day: 0.5 packs/day for 15.0 years (7.5 ttl pk-yrs)     Types: Cigarettes     Start date: 2003     Quit date: 2018     Years since quittin.3    Smokeless tobacco: Never   Substance and Sexual Activity    Alcohol use: Yes     Comment: somewhat    Drug use: Not Currently     Types: Marijuana    Sexual activity: Not Currently     Partners: Male     Birth control/protection: None     Social Determinants of Health     Financial Resource Strain: High Risk (2021)    Overall Financial Resource Strain (CARDIA)     Difficulty of Paying Living Expenses: Hard   Food Insecurity: Unknown (2021)    Hunger Vital Sign     Worried About Running Out of Food in the Last Year: Patient declined     Ran Out of Food in the Last Year: Patient declined   Transportation Needs: No Transportation Needs (2021)    PRAPARE - Transportation     Lack of Transportation (Medical): No     Lack of Transportation (Non-Medical): No   Physical Activity: Unknown (2021)    Exercise Vital Sign     Days of Exercise per Week: 0 days   Stress: Stress Concern Present (2021)    [place] [employer] - Occupational Stress Questionnaire     Feeling of Stress : Rather much   Social Connections: Unknown (2021)    Social Connection and Isolation Panel [NHANES]     Frequency of Communication with Friends and Family: Three times a week     Frequency of Social Gatherings with Friends and Family: Three  "times a week     Active Member of Clubs or Organizations: No     Marital Status:          CBC: No results for input(s): "WBC", "RBC", "HGB", "HCT", "PLT", "MCV", "MCH", "MCHC" in the last 72 hours.    CMP: No results for input(s): "NA", "K", "CL", "CO2", "BUN", "CREATININE", "GLU", "MG", "PHOS", "CALCIUM", "ALBUMIN", "PROT", "ALKPHOS", "ALT", "AST", "BILITOT" in the last 72 hours.    INR  No results for input(s): "PT", "INR", "PROTIME", "APTT" in the last 72 hours.        Diagnostic Studies:      EKD Echo:  No results found for this or any previous visit.        Pre-op Assessment    I have reviewed the Patient Summary Reports.     I have reviewed the Nursing Notes. I have reviewed the NPO Status.   I have reviewed the Medications.     Review of Systems  Anesthesia Hx:  No problems with previous Anesthesia   History of prior surgery of interest to airway management or planning:          Denies Family Hx of Anesthesia complications.    Denies Personal Hx of Anesthesia complications.                    Hematology/Oncology:       -- Denies Anemia:                                  Cardiovascular:  Exercise tolerance: good    Denies Hypertension.    Denies CAD.                                        Pulmonary:    Denies COPD. Asthma                    Renal/:   Denies Chronic Renal Disease.                Hepatic/GI:      Denies GERD. Denies Liver Disease.            Neurological:    Denies CVA.    Denies Seizures.                                Endocrine:  Denies Diabetes.               Physical Exam  General: Well nourished, Cooperative, Alert and Oriented    Airway:  Mallampati: III / II  Mouth Opening: Normal  TM Distance: Normal  Tongue: Normal  Neck ROM: Normal ROM    Dental:  Intact    Chest/Lungs:  Normal Respiratory Rate    Heart:  Rate: Normal  Rhythm: Regular Rhythm        Anesthesia Plan  Type of Anesthesia, risks & benefits discussed:    Anesthesia Type: Gen ETT  Intra-op Monitoring Plan: " Standard ASA Monitors  Post Op Pain Control Plan: multimodal analgesia  Induction:  IV  Airway Plan: Video, Post-Induction  ASA Score: 2  Day of Surgery Review of History & Physical: H&P Update referred to the surgeon/provider.    Ready For Surgery From Anesthesia Perspective.     .

## 2024-03-11 NOTE — TRANSFER OF CARE
"Anesthesia Transfer of Care Note    Patient: Gina Coto    Procedure(s) Performed: Procedure(s) (LRB):  THORACOTOMY with mesh removal (APS pain consult) (Right)  RECONSTRUCTION, CHEST WALL (Right)    Patient location: PACU    Anesthesia Type: general    Transport from OR: Transported from OR on 6-10 L/min O2 by face mask with adequate spontaneous ventilation    Post pain: adequate analgesia    Post assessment: no apparent anesthetic complications    Post vital signs: stable    Level of consciousness: sedated    Nausea/Vomiting: no nausea/vomiting    Complications: none    Transfer of care protocol was followed      Last vitals: Visit Vitals  BP (!) 149/79 (BP Location: Left arm, Patient Position: Lying)   Pulse 82   Temp 36.4 °C (97.5 °F) (Oral)   Resp 16   Ht 5' 6" (1.676 m)   Wt 81.6 kg (180 lb)   LMP 01/11/2024 (Approximate)   SpO2 100%   Breastfeeding No   BMI 29.05 kg/m²     "

## 2024-03-11 NOTE — ANESTHESIA PROCEDURE NOTES
Intubation    Date/Time: 3/11/2024 9:40 AM    Performed by: Varun Drake DO  Authorized by: Victorino Larry Jr., MD    Intubation:     Induction:  Intravenous    Intubated:  Postinduction    Mask Ventilation:  Easy mask    Attempts:  1    Attempted By:  Resident anesthesiologist    Method of Intubation:  Video laryngoscopy    Blade:  Hughes 3    Laryngeal View Grade: Grade I - full view of cords      Difficult Airway Encountered?: No      Complications:  None    Airway Device:  Oral endotracheal tube    Airway Device Size:  8.0    Style/Cuff Inflation:  Cuffed (inflated to minimal occlusive pressure)    Placement Verified By:  Capnometry    Complicating Factors:  None    Findings Post-Intubation:  BS equal bilateral and atraumatic/condition of teeth unchanged      
R WILFREDO catheter    Patient location during procedure: pre-op   Block not for primary anesthetic.  Reason for block: at surgeon's request and post-op pain management   Post-op Pain Location: right chest wall pain   Start time: 3/11/2024 9:15 AM  Timeout: 3/11/2024 9:15 AM   End time: 3/11/2024 9:30 AM    Staffing  Authorizing Provider: Dk Flaherty MD  Performing Provider: Thi Cuello MD    Staffing  Performed by: Thi Cuello MD  Authorized by: Dk Flaherty MD    Preanesthetic Checklist  Completed: patient identified, IV checked, site marked, risks and benefits discussed, surgical consent, monitors and equipment checked, pre-op evaluation and timeout performed  Peripheral Block  Patient position: sitting  Prep: ChloraPrep  Patient monitoring: heart rate, cardiac monitor, continuous pulse ox, continuous capnometry and frequent blood pressure checks  Block type: erector spinae plane (Erector Spinae Plane)  Laterality: right  Injection technique: continuous  Interspace: T6-7    Needle  Needle type: Tuohy   Needle gauge: 17 G  Needle length: 3.5 in  Needle localization: anatomical landmarks and ultrasound guidance  Catheter type: spring wound  Catheter size: 19 G  Test dose: lidocaine 1.5% with Epi 1-to-200,000 and negative   -ultrasound image captured on disc.  Assessment  Injection assessment: negative aspiration, negative parasthesia and local visualized surrounding nerve  Paresthesia pain: none  Heart rate change: no  Slow fractionated injection: yes  Pain Tolerance: no complaints and comfortable throughout block      Additional Notes  Patient tolerated very well.  Vital signs stable.  See [facility] RN charting for vital signs.    30 cc of 0.375% bupivacaine with 1:200,000 epi administered          
No

## 2024-03-12 PROBLEM — K21.9 GERD (GASTROESOPHAGEAL REFLUX DISEASE): Chronic | Status: ACTIVE | Noted: 2024-03-12

## 2024-03-12 LAB
BASOPHILS # BLD AUTO: 0.06 K/UL (ref 0–0.2)
BASOPHILS NFR BLD: 0.5 % (ref 0–1.9)
CREAT SERPL-MCNC: 0.7 MG/DL (ref 0.5–1.4)
DIFFERENTIAL METHOD BLD: ABNORMAL
EOSINOPHIL # BLD AUTO: 0.2 K/UL (ref 0–0.5)
EOSINOPHIL NFR BLD: 1.6 % (ref 0–8)
ERYTHROCYTE [DISTWIDTH] IN BLOOD BY AUTOMATED COUNT: 12.4 % (ref 11.5–14.5)
EST. GFR  (NO RACE VARIABLE): >60 ML/MIN/1.73 M^2
HCT VFR BLD AUTO: 40.6 % (ref 37–48.5)
HGB BLD-MCNC: 13.4 G/DL (ref 12–16)
IMM GRANULOCYTES # BLD AUTO: 0.05 K/UL (ref 0–0.04)
IMM GRANULOCYTES NFR BLD AUTO: 0.4 % (ref 0–0.5)
LYMPHOCYTES # BLD AUTO: 1.6 K/UL (ref 1–4.8)
LYMPHOCYTES NFR BLD: 12.4 % (ref 18–48)
MCH RBC QN AUTO: 29.5 PG (ref 27–31)
MCHC RBC AUTO-ENTMCNC: 33 G/DL (ref 32–36)
MCV RBC AUTO: 89 FL (ref 82–98)
MONOCYTES # BLD AUTO: 0.9 K/UL (ref 0.3–1)
MONOCYTES NFR BLD: 6.7 % (ref 4–15)
NEUTROPHILS # BLD AUTO: 10.1 K/UL (ref 1.8–7.7)
NEUTROPHILS NFR BLD: 78.4 % (ref 38–73)
NRBC BLD-RTO: 0 /100 WBC
PLATELET # BLD AUTO: 250 K/UL (ref 150–450)
PMV BLD AUTO: 10 FL (ref 9.2–12.9)
RBC # BLD AUTO: 4.55 M/UL (ref 4–5.4)
WBC # BLD AUTO: 12.9 K/UL (ref 3.9–12.7)

## 2024-03-12 PROCEDURE — 99231 SBSQ HOSP IP/OBS SF/LOW 25: CPT | Mod: ,,, | Performed by: ANESTHESIOLOGY

## 2024-03-12 PROCEDURE — 82565 ASSAY OF CREATININE: CPT | Performed by: THORACIC SURGERY (CARDIOTHORACIC VASCULAR SURGERY)

## 2024-03-12 PROCEDURE — 94761 N-INVAS EAR/PLS OXIMETRY MLT: CPT

## 2024-03-12 PROCEDURE — 25000003 PHARM REV CODE 250: Performed by: STUDENT IN AN ORGANIZED HEALTH CARE EDUCATION/TRAINING PROGRAM

## 2024-03-12 PROCEDURE — 97116 GAIT TRAINING THERAPY: CPT

## 2024-03-12 PROCEDURE — 63600175 PHARM REV CODE 636 W HCPCS: Performed by: STUDENT IN AN ORGANIZED HEALTH CARE EDUCATION/TRAINING PROGRAM

## 2024-03-12 PROCEDURE — 97161 PT EVAL LOW COMPLEX 20 MIN: CPT

## 2024-03-12 PROCEDURE — 20600001 HC STEP DOWN PRIVATE ROOM

## 2024-03-12 PROCEDURE — 97535 SELF CARE MNGMENT TRAINING: CPT

## 2024-03-12 PROCEDURE — 97165 OT EVAL LOW COMPLEX 30 MIN: CPT

## 2024-03-12 PROCEDURE — 85025 COMPLETE CBC W/AUTO DIFF WBC: CPT | Performed by: THORACIC SURGERY (CARDIOTHORACIC VASCULAR SURGERY)

## 2024-03-12 PROCEDURE — 63600175 PHARM REV CODE 636 W HCPCS

## 2024-03-12 PROCEDURE — 36415 COLL VENOUS BLD VENIPUNCTURE: CPT | Performed by: THORACIC SURGERY (CARDIOTHORACIC VASCULAR SURGERY)

## 2024-03-12 PROCEDURE — 25000003 PHARM REV CODE 250

## 2024-03-12 RX ORDER — ACETAMINOPHEN 500 MG
1000 TABLET ORAL EVERY 8 HOURS
Status: DISCONTINUED | OUTPATIENT
Start: 2024-03-12 | End: 2024-03-13

## 2024-03-12 RX ADMIN — HYDROMORPHONE HYDROCHLORIDE 0.5 MG: 1 INJECTION, SOLUTION INTRAMUSCULAR; INTRAVENOUS; SUBCUTANEOUS at 10:03

## 2024-03-12 RX ADMIN — HYDROMORPHONE HYDROCHLORIDE 0.5 MG: 1 INJECTION, SOLUTION INTRAMUSCULAR; INTRAVENOUS; SUBCUTANEOUS at 02:03

## 2024-03-12 RX ADMIN — ACETAMINOPHEN 1000 MG: 500 TABLET ORAL at 02:03

## 2024-03-12 RX ADMIN — HYDROMORPHONE HYDROCHLORIDE 0.5 MG: 1 INJECTION, SOLUTION INTRAMUSCULAR; INTRAVENOUS; SUBCUTANEOUS at 07:03

## 2024-03-12 RX ADMIN — OXYCODONE HYDROCHLORIDE 15 MG: 10 TABLET ORAL at 12:03

## 2024-03-12 RX ADMIN — METHOCARBAMOL 1000 MG: 500 TABLET ORAL at 12:03

## 2024-03-12 RX ADMIN — QUETIAPINE FUMARATE 50 MG: 25 TABLET ORAL at 09:03

## 2024-03-12 RX ADMIN — ACETAMINOPHEN 1000 MG: 500 TABLET ORAL at 09:03

## 2024-03-12 RX ADMIN — HYDROMORPHONE HYDROCHLORIDE 0.5 MG: 1 INJECTION, SOLUTION INTRAMUSCULAR; INTRAVENOUS; SUBCUTANEOUS at 06:03

## 2024-03-12 RX ADMIN — OXYCODONE HYDROCHLORIDE 10 MG: 10 TABLET ORAL at 03:03

## 2024-03-12 RX ADMIN — ENOXAPARIN SODIUM 40 MG: 40 INJECTION SUBCUTANEOUS at 05:03

## 2024-03-12 RX ADMIN — BUPROPION HYDROCHLORIDE 150 MG: 150 TABLET, EXTENDED RELEASE ORAL at 09:03

## 2024-03-12 RX ADMIN — SENNOSIDES AND DOCUSATE SODIUM 1 TABLET: 8.6; 5 TABLET ORAL at 09:03

## 2024-03-12 RX ADMIN — OXYCODONE HYDROCHLORIDE 15 MG: 10 TABLET ORAL at 05:03

## 2024-03-12 RX ADMIN — TRAZODONE HYDROCHLORIDE 200 MG: 100 TABLET ORAL at 09:03

## 2024-03-12 RX ADMIN — ROPIVACAINE HYDROCHLORIDE 0.1 ML/HR: 2 INJECTION, SOLUTION EPIDURAL; INFILTRATION at 07:03

## 2024-03-12 RX ADMIN — FAMOTIDINE 20 MG: 20 TABLET, FILM COATED ORAL at 09:03

## 2024-03-12 RX ADMIN — PREGABALIN 75 MG: 75 CAPSULE ORAL at 09:03

## 2024-03-12 RX ADMIN — OXYCODONE HYDROCHLORIDE 15 MG: 10 TABLET ORAL at 09:03

## 2024-03-12 RX ADMIN — METHOCARBAMOL 1000 MG: 500 TABLET ORAL at 05:03

## 2024-03-12 RX ADMIN — MUPIROCIN 1 G: 20 OINTMENT TOPICAL at 09:03

## 2024-03-12 NOTE — PROGRESS NOTES
[facility]  Thoracic Surgery  Progress Note    Subjective:     History of Present Illness:  No notes on file    Post-Op Info:  Procedure(s) (LRB):  THORACOTOMY with mesh removal (APS pain consult) (Right)  RECONSTRUCTION, CHEST WALL (Right)   1 Day Post-Op     Interval History: Did well overnight  Pain controlled on current reg    Medications:  Continuous Infusions:   ROPIvacaine (PF) 2 mg/ml (0.2%) 0.1 mL/hr (03/11/24 1157)     Scheduled Meds:   acetaminophen  1,000 mg Oral Q8H    buPROPion  150 mg Oral QHS    enoxparin  40 mg Subcutaneous Q24H (prophylaxis, 1700)    famotidine  20 mg Oral BID    methocarbamoL  1,000 mg Oral Q6H    mupirocin  1 g Nasal BID    polyethylene glycol  17 g Oral Daily    pregabalin  75 mg Oral QHS    QUEtiapine  50 mg Oral Nightly    senna-docusate 8.6-50 mg  1 tablet Oral Daily    traZODone  200 mg Oral Nightly     PRN Meds:oxyCODONE **AND** oxyCODONE **AND** HYDROmorphone, ondansetron     Review of patient's allergies indicates:  No Known Allergies  Objective:     Vital Signs (Most Recent):  Temp: 97.9 °F (36.6 °C) (03/12/24 0740)  Pulse: 76 (03/12/24 0740)  Resp: 18 (03/12/24 0740)  BP: (!) 169/98 (03/12/24 0740)  SpO2: 96 % (03/12/24 0740) Vital Signs (24h Range):  Temp:  [97.2 °F (36.2 °C)-98.8 °F (37.1 °C)] 97.9 °F (36.6 °C)  Pulse:  [61-82] 76  Resp:  [8-22] 18  SpO2:  [94 %-100 %] 96 %  BP: (129-169)/(65-99) 169/98     Intake/Output - Last 3 Shifts         03/10 0700  03/11 0659 03/11 0700  03/12 0659 03/12 0700  03/13 0659    P.O.  240     IV Piggyback  620     Total Intake(mL/kg)  860 (10.5)     Net  +860            Urine Occurrence  1 x     Stool Occurrence  0 x             SpO2: 96 %        Physical Exam  Vitals and nursing note reviewed.   Constitutional:       Appearance: Normal appearance.   HENT:      Head: Normocephalic.      Nose: Nose normal.      Mouth/Throat:      Mouth: Mucous membranes are moist.   Eyes:      Extraocular Movements: Extraocular movements  intact.      Pupils: Pupils are equal, round, and reactive to light.   Cardiovascular:      Rate and Rhythm: Normal rate and regular rhythm.   Pulmonary:      Effort: Pulmonary effort is normal.   Abdominal:      General: Abdomen is flat.      Palpations: Abdomen is soft.   Musculoskeletal:      Cervical back: Neck supple.      Comments: Decreased right shoulder ROM   Skin:     General: Skin is warm and dry.      Comments: Right chest incisions c/d/i   Neurological:      General: No focal deficit present.      Mental Status: She is alert and oriented to person, place, and time.   Psychiatric:         Mood and Affect: Mood normal.         Behavior: Behavior normal.            Significant Labs:  All pertinent labs from the last 24 hours have been reviewed.    Significant Diagnostics:  I have reviewed all pertinent imaging results/findings within the past 24 hours.    VTE Risk Mitigation (From admission, onward)           Ordered     enoxaparin injection 40 mg  Every 24 hours         03/11/24 1208     IP VTE HIGH RISK PATIENT  Once         03/11/24 1205     Place sequential compression device  Until discontinued         03/11/24 1205                  Assessment/Plan:     * Right-sided chest wall pain  54 y.o. 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 She has since required multiple re-operations for chest wall pain. Now s/p third time redo right thoracotomy, mesh removal, partial scapula resection, and chest wall reconstruction with Goretex mesh    Regular diet  Appreciate APS assistance with pain regimen. Ideally would like to off of WILFREDO infusions tomorrow   PT/OT eval to assist with right should ROM   Ambulate TID  DVT ppx  Home meds        Hugo Real MD  Thoracic Surgery  [facility]

## 2024-03-12 NOTE — CARE UPDATE
I have reviewed the chart of Gina Coto who is hospitalized for the following:    Active Hospital Problems    Diagnosis    *Right-sided chest wall pain    GERD (gastroesophageal reflux disease)     Resumed home meds      History of Carcinoid bronchial adenoma of right lung          Juan Antonio Sauer PA-C  [facility]

## 2024-03-12 NOTE — PT/OT/SLP EVAL
"Physical Therapy Co-Evaluation    Patient Name:  Gina Coto   MRN:  [id number]    Recommendations:     Discharge Recommendations: Low Intensity Therapy   Discharge Equipment Recommendations: none   Barriers to discharge: None    Assessment:     Gina Coto is a 54 y.o. female admitted with a medical diagnosis of Right-sided chest wall pain.  She presents with the following impairments/functional limitations: impaired endurance, weakness, pain, decreased ROM.    Pleasant and in good spirits. Pt amb >300' no AD with 1 person for safety and no LOB noted. Pt ascended/descended 1 step with L HR and demonstrated good balance. Pt will benefit from skilled PT services while in house in order to address the aforementioned deficits.      Rehab Prognosis: Good; patient would benefit from acute skilled PT services to address these deficits and reach maximum level of function.    Recent Surgery: Procedure(s) (LRB):  THORACOTOMY with mesh removal (APS pain consult) (Right)  RECONSTRUCTION, CHEST WALL (Right) 1 Day Post-Op    Plan:     During this hospitalization, patient to be seen 2 x/week to address the identified rehab impairments via gait training, therapeutic activities, therapeutic exercises, neuromuscular re-education and progress toward the following goals:    Plan of Care Expires:  04/12/24    Subjective     "When I'm  moving, it can go up to an 9/10"  Pain/Comfort:  Pain Rating 1: 4/10  Location - Side 1: Right  Location - Orientation 1: generalized  Location 1: shoulder  Pain Rating Post-Intervention 1: 3/10    Patients cultural, spiritual, Rastafarian conflicts given the current situation: no    Living Environment:  Per pt, pt and spouse reside in [facility]  with threshold entrance. Pt has tub/shower combo.  Prior to admission, patients level of function was I.  Equipment used at home: none.  DME owned (not currently used): none.  Upon discharge, patient will have assistance from spouse, intermittently works during " day time.    Objective:     Communicated with RN prior to session.  Patient found HOB elevated with perineural catheter  upon PT entry to room.    General Precautions: Standard, fall  Orthopedic Precautions:N/A   Braces: N/A  Respiratory Status: Room air    Exams:  RLE ROM: WFL  RLE Strength: WFL  LLE ROM: WFL  LLE Strength: WFL    R UE guarded with pain during activity; least painful AROM shoulder ext. Using LUE for AAROM pt grimaced with minimal movement.       Functional Mobility:  Bed Mobility:     Supine to Sit HOB elevated 40: stand by assistance  Transfers:     Sit to Stand:  stand by assistance with no AD  Bed to Chair: stand by assistance with  no AD  using  Step Transfer  Gait: pt amb >300' no AD SBA and presented with reciprocal gait pattern, R UE guarded on side, fair essence.   Ascended/descended 1 stair with L HR SBA reciprocal step with no LOB during turn  Balance:   Good sitting balance  Good standing balance      AM-PAC 6 CLICK MOBILITY  Total Score:19       Treatment & Education:  Discussed adding HEP for shoulder within tolerable pain-free range  Discussed sitting upright in chair >1hr, pt agreeable  Discussed amb to restroom with RN/staff outside of therapy services  Discussed sitting up EOB and/or UIC with RN/staff outside of therapy services      Educated pt on PT role/POC  Educated pt on importance of OOB activity and daily ambulation   Pt educated on proper body mechanics, safety techniques, and energy conservation with PT facilitation and cueing throughout session   Pt verbalized understanding      Patient left up in chair with all lines intact, call button in reach, RN notified, and OT present.    GOALS:   Multidisciplinary Problems       Physical Therapy Goals          Problem: Physical Therapy    Goal Priority Disciplines Outcome Goal Variances Interventions   Physical Therapy Goal     PT, PT/OT Ongoing, Progressing     Description: Goals to be met by: 4/12/24     Patient will increase  functional independence with mobility by performin. Supine to sit with Modified [place]  2. Sit to supine with Modified [place]  3. Sit to stand transfer with Modified [place]  4. Bed to chair transfer with Modified [place] using LRAD  5. Gait  x 300 feet with Supervision using LRAD.   6. Ascend/descend 10 stair with either Handrails Supervision using LRAD.                          History:     Past Medical History:   Diagnosis Date    Carcinoid tumor of bronchus     s/p resection    Dysphagia     Seasonal asthma        Past Surgical History:   Procedure Laterality Date    BRONCHOSCOPY N/A 2018    Procedure: BRONCHOSCOPY;  Surgeon: Omar Prescott MD;  Location: [facility] CATH LAB;  Service: Pulmonary;  Laterality: N/A;    BRONCHOSCOPY N/A 2019    Procedure: BRONCHOSCOPY;  Surgeon: Juan Jose Miles MD;  Location: [facility] F[facility];  Service: Thoracic;  Laterality: N/A;    BRONCHOSCOPY N/A 2019    Procedure: BRONCHOSCOPY;  Surgeon: Juan Jose Miles MD;  Location: [facility];  Service: Thoracic;  Laterality: N/A;    COLONOSCOPY N/A 2021    Procedure: COLONOSCOPY;  Surgeon: Juan Jose Castano MD;  Location: [facility] ENDO;  Service: Colon and Rectal;  Laterality: N/A;    ESOPHAGEAL MANOMETRY WITH MEASUREMENT OF IMPEDANCE N/A 2022    Procedure: MANOMETRY-ESOPHAGEAL-WITH IMPEDANCE;  Surgeon: Katarzyna Floyd MD;  Location: [facility] ENDO (2ND FLR);  Service: Endoscopy;  Laterality: N/A;  hold trazodone 24 hours prior to procedure    ESOPHAGOGASTRODUODENOSCOPY N/A 2022    Procedure: ESOPHAGOGASTRODUODENOSCOPY (EGD);  Surgeon: Katarzyna Floyd MD;  Location: [facility] ENDO (2ND FLR);  Service: Endoscopy;  Laterality: N/A;  Endoscopically placed manometry probe  2nd floor-fluid in esophagus  propofol only  full liquid diet x3 days, clear liquid diet x1 day prior to procedure  RAPID COVID test, pt to arrive for 11:00am-vt   instructions sent to [facility]    INJECTION OF  ANESTHETIC AGENT AROUND MULTIPLE INTERCOSTAL NERVES Right 1/5/2023    Procedure: BLOCK, NERVE, INTERCOSTAL, 2 OR MORE;  Surgeon: Juan Jose Miles MD;  [facility];  Service: Thoracic;  Laterality: Right;    PELVIC LAPAROSCOPY      RECONSTRUCTION OF CHEST WALL Right 3/11/2024    Procedure: RECONSTRUCTION, CHEST WALL;  Surgeon: Juan Jose Miles MD;  [facility];  Service: Thoracic;  Laterality: Right;    SLEEVE LOBECTOMY OF LUNG BY THORACOTOMY Right 4/22/2019    Procedure: LOBECTOMY, SLEEVE, LUNG, THORACOTOMY APPROACH;  Surgeon: Juan Jose Miles MD;  [facility];  Service: Thoracic;  Laterality: Right;  lower bilobectomy    THORACOTOMY Right 1/5/2023    Procedure: THORACOTOMY REDO WITH RIB PLATE, RECONSTRUCTION OF CHEST WALL WITH MESH;  Surgeon: Juan Jose Miles MD;  [facility];  Service: Thoracic;  Laterality: Right;    THORACOTOMY Right 3/11/2024    Procedure: THORACOTOMY with mesh removal (APS pain consult);  Surgeon: Juan Jose Miles MD;  [facility];  Service: Thoracic;  Laterality: Right;       Time Tracking:     PT Received On: 03/12/24  PT Start Time: 1306     PT Stop Time: 1330  PT Total Time (min): 24 min     Billable Minutes: Evaluation 10 and Gait Training 14    Co-treatment performed due to patient's multiple deficits requiring two skilled therapists to appropriately and safely assess patient's strength and endurance while facilitating functional tasks in addition to accommodating for patient's activity tolerance.       03/12/2024

## 2024-03-12 NOTE — PLAN OF CARE
.[facility] Plan of Care Note  Dx: R sided chest wall pain    Shift Events pain     Goals of Care: dc    Neuro: intact    Vital Signs: wdl    Respiratory: ra    Diet: ra    Is patient tolerating current diet? yes    GTTS: perineural    Urine Output/Bowel Movement: adequate lbm 3/10    Drains/Tubes/Tube Feeds (include total output/shift): none    Lines: piv      Accuchecks:none    Skin: R thoracotomy [place] drsg     Fall Risk Score: 11    Activity level? 6    Any scheduled procedures? no    Any safety concerns? N/a    Other: pain control

## 2024-03-12 NOTE — PT/OT/SLP EVAL
Occupational Therapy   Evaluation    Name: Gina Coto  MRN: [id number]  Admitting Diagnosis: Right-sided chest wall pain  Recent Surgery: Procedure(s) (LRB):  THORACOTOMY with mesh removal (APS pain consult) (Right)  RECONSTRUCTION, CHEST WALL (Right) 1 Day Post-Op    Recommendations:     Discharge Recommendations: Low Intensity Therapy  Discharge Equipment Recommendations:  none  Barriers to discharge:       Assessment:     Gina Coto is a 54 y.o. female with a medical diagnosis of Right-sided chest wall pain.  She presents with the following performance deficits affecting function: impaired endurance, impaired self care skills, decreased lower extremity function, pain, decreased ROM, impaired joint extensibility.      Rehab Prognosis: Good; patient would benefit from acute skilled OT services to address these deficits and reach maximum level of function.       Plan:     Patient to be seen   to address the above listed problems via self-care/home management, therapeutic activities, therapeutic exercises, neuromuscular re-education  Plan of Care Expires:    Plan of Care Reviewed with: patient    Subjective     Chief Complaint: Pain  Patient/Family Comments/goals: Maintain mobility    Occupational Profile:  Living Environment: Pt lives with spouse, SSH, TTE, t/s combo  Previous level of function: Independent  Roles and Routines: None stated  Equipment Used at Home: none  Assistance upon Discharge: Some A from spouse    Pain/Comfort:  Pain Rating 1: 4/10  Location - Side 1: Right  Location - Orientation 1: generalized  Location 1: shoulder    Patients cultural, spiritual, Adventist conflicts given the current situation: no    Objective:     Communicated with: Nursing prior to session.  Patient found HOB elevated with perineural catheter upon OT entry to room.    General Precautions: Standard, fall  Orthopedic Precautions: N/A  Braces: N/A  Respiratory Status: Room air    Occupational Performance:    Bed  Mobility:    Supine <> sit with SBA    Functional Mobility/Transfers:  Sit <> stand with SBA no AD  Bed <> bedside chair with SBA no AD  Functional Mobility: SBA with no AD     Activities of Daily Living:  Grooming: stand by assistance in standing with cues for safety   Upper Body Dressing: minimum assistance to manage R UE, patient with increased pain attempting to manage with L UE  Lower Body Dressing: contact guard assistance seated EOB    Cognitive/Visual Perceptual:  A&Ox4    Physical Exam:  RUE - elbow/wrist WFL's, scapular/gary ABD - <15% ROM  LUE - WFL's    AMPAC 6 Click ADL:  [facility] Total Score: 21    Treatment & Education:  -Evaluation completed, plan of care established.  -Patient and family educated on roles/goals of OT and POC.  -White board updated.  -Therapist provided time for questions and answered within scope of practice.  -Patient educated on importance of EOB/OOB activity to maximize recovery.    Patient left up in chair with all lines intact and call button in reach    GOALS:   Multidisciplinary Problems       Occupational Therapy Goals          Problem: Occupational Therapy    Goal Priority Disciplines Outcome Interventions   Occupational Therapy Goal     OT, PT/OT Ongoing, Progressing    Description: Goals to be met by: 4/12/24     Patient will increase functional independence with ADLs by performing:    UE Dressing with Modified [place].  LE Dressing with Modified [place].  Toileting from toilet with Modified [place] for hygiene and clothing management.   Supine to sit with Modified [place].  Toilet transfer to toilet with Modified [place].  Upper extremity exercise program x10 reps per handout, with independence.                         History:     Past Medical History:   Diagnosis Date    Carcinoid tumor of bronchus     s/p resection    Dysphagia     Seasonal asthma          Past Surgical History:   Procedure Laterality Date    BRONCHOSCOPY N/A 11/28/2018     Procedure: BRONCHOSCOPY;  Surgeon: Omar Prescott MD;  Location: [facility] CATH LAB;  Service: Pulmonary;  Laterality: N/A;    BRONCHOSCOPY N/A 4/22/2019    Procedure: BRONCHOSCOPY;  Surgeon: Juan Jose Miles MD;  Location: [facility] OR 2ND FLR;  Service: Thoracic;  Laterality: N/A;    BRONCHOSCOPY N/A 5/6/2019    Procedure: BRONCHOSCOPY;  Surgeon: Juan Jose Miles MD;  Location: [facility] OR [facility] FLR;  Service: Thoracic;  Laterality: N/A;    COLONOSCOPY N/A 12/2/2021    Procedure: COLONOSCOPY;  Surgeon: Juan Jose Castano MD;  Location: [facility] ENDO;  Service: Colon and Rectal;  Laterality: N/A;    ESOPHAGEAL MANOMETRY WITH MEASUREMENT OF IMPEDANCE N/A 7/12/2022    Procedure: MANOMETRY-ESOPHAGEAL-WITH IMPEDANCE;  Surgeon: Katarzyna Floyd MD;  Location: [facility] ENDO (2ND FLR);  Service: Endoscopy;  Laterality: N/A;  hold trazodone 24 hours prior to procedure    ESOPHAGOGASTRODUODENOSCOPY N/A 7/12/2022    Procedure: ESOPHAGOGASTRODUODENOSCOPY (EGD);  Surgeon: Katarzyna Floyd MD;  Location: [facility] ENDO (2ND FLR);  Service: Endoscopy;  Laterality: N/A;  Endoscopically placed manometry probe  2nd floor-fluid in esophagus  propofol only  full liquid diet x3 days, clear liquid diet x1 day prior to procedure  RAPID COVID test, pt to arrive for 11:00am-KPvt   instructions sent to [email or web address]-[email or web address]    INJECTION OF ANESTHETIC AGENT AROUND MULTIPLE INTERCOSTAL NERVES Right 1/5/2023    Procedure: BLOCK, NERVE, INTERCOSTAL, 2 OR MORE;  Surgeon: Juan Jose Miles MD;  [facility];  Service: Thoracic;  Laterality: Right;    PELVIC LAPAROSCOPY      RECONSTRUCTION OF CHEST WALL Right 3/11/2024    Procedure: RECONSTRUCTION, CHEST WALL;  Surgeon: Juan Jose Miles MD;  [facility];  Service: Thoracic;  Laterality: Right;    SLEEVE LOBECTOMY OF LUNG BY THORACOTOMY Right 4/22/2019    Procedure: LOBECTOMY, SLEEVE, LUNG, THORACOTOMY APPROACH;  Surgeon: Juan Jose Miles MD;  [facility];  Service: Thoracic;   Laterality: Right;  lower bilobectomy    THORACOTOMY Right 1/5/2023    Procedure: THORACOTOMY REDO WITH RIB PLATE, RECONSTRUCTION OF CHEST WALL WITH MESH;  Surgeon: Juan Jose Miles MD;  [facility];  Service: Thoracic;  Laterality: Right;    THORACOTOMY Right 3/11/2024    Procedure: THORACOTOMY with mesh removal (APS pain consult);  Surgeon: Juan Jose Miles MD;  Location: [facility] OR [address] [place] [address];  Service: Thoracic;  Laterality: Right;       Time Tracking:     OT Date of Treatment: 03/12/24  OT Start Time: 1305  OT Stop Time: 1330  OT Total Time (min): 25 min    Billable Minutes:Evaluation 1 un  Self Care/Home Management 1 unit    3/12/2024

## 2024-03-12 NOTE — PLAN OF CARE
PT Evaluation completed and PT POC established.    Problem: Physical Therapy  Goal: Physical Therapy Goal  Description: Goals to be met by: 24     Patient will increase functional independence with mobility by performin. Supine to sit with Modified [place]  2. Sit to supine with Modified [place]  3. Sit to stand transfer with Modified [place]  4. Bed to chair transfer with Modified [place] using LRAD  5. Gait  x 300 feet with Supervision using LRAD.   6. Ascend/descend 10 stair with either Handrails Supervision using LRAD.     Outcome: Ongoing, Progressing

## 2024-03-12 NOTE — ASSESSMENT & PLAN NOTE
54 y.o. 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 She has since required multiple re-operations for chest wall pain. Now s/p third time redo right thoracotomy, mesh removal, partial scapula resection, and chest wall reconstruction with Goretex mesh    Regular diet  Appreciate APS assistance with pain regimen. Ideally would like to off of WILFREDO infusions tomorrow   PT/OT eval to assist with right should ROM   Ambulate TID  DVT ppx  Home meds

## 2024-03-12 NOTE — SUBJECTIVE & OBJECTIVE
Interval History: Did well overnight  Pain controlled on current reg    Medications:  Continuous Infusions:   ROPIvacaine (PF) 2 mg/ml (0.2%) 0.1 mL/hr (03/11/24 1157)     Scheduled Meds:   acetaminophen  1,000 mg Oral Q8H    buPROPion  150 mg Oral QHS    enoxparin  40 mg Subcutaneous Q24H (prophylaxis, 1700)    famotidine  20 mg Oral BID    methocarbamoL  1,000 mg Oral Q6H    mupirocin  1 g Nasal BID    polyethylene glycol  17 g Oral Daily    pregabalin  75 mg Oral QHS    QUEtiapine  50 mg Oral Nightly    senna-docusate 8.6-50 mg  1 tablet Oral Daily    traZODone  200 mg Oral Nightly     PRN Meds:oxyCODONE **AND** oxyCODONE **AND** HYDROmorphone, ondansetron     Review of patient's allergies indicates:  No Known Allergies  Objective:     Vital Signs (Most Recent):  Temp: 97.9 °F (36.6 °C) (03/12/24 0740)  Pulse: 76 (03/12/24 0740)  Resp: 18 (03/12/24 0740)  BP: (!) 169/98 (03/12/24 0740)  SpO2: 96 % (03/12/24 0740) Vital Signs (24h Range):  Temp:  [97.2 °F (36.2 °C)-98.8 °F (37.1 °C)] 97.9 °F (36.6 °C)  Pulse:  [61-82] 76  Resp:  [8-22] 18  SpO2:  [94 %-100 %] 96 %  BP: (129-169)/(65-99) 169/98     Intake/Output - Last 3 Shifts         03/10 0700  03/11 0659 03/11 0700  03/12 0659 03/12 0700  03/13 0659    P.O.  240     IV Piggyback  620     Total Intake(mL/kg)  860 (10.5)     Net  +860            Urine Occurrence  1 x     Stool Occurrence  0 x             SpO2: 96 %        Physical Exam  Vitals and nursing note reviewed.   Constitutional:       Appearance: Normal appearance.   HENT:      Head: Normocephalic.      Nose: Nose normal.      Mouth/Throat:      Mouth: Mucous membranes are moist.   Eyes:      Extraocular Movements: Extraocular movements intact.      Pupils: Pupils are equal, round, and reactive to light.   Cardiovascular:      Rate and Rhythm: Normal rate and regular rhythm.   Pulmonary:      Effort: Pulmonary effort is normal.   Abdominal:      General: Abdomen is flat.      Palpations: Abdomen is soft.    Musculoskeletal:      Cervical back: Neck supple.      Comments: Decreased right shoulder ROM   Skin:     General: Skin is warm and dry.      Comments: Right chest incisions c/d/i   Neurological:      General: No focal deficit present.      Mental Status: She is alert and oriented to person, place, and time.   Psychiatric:         Mood and Affect: Mood normal.         Behavior: Behavior normal.            Significant Labs:  All pertinent labs from the last 24 hours have been reviewed.    Significant Diagnostics:  I have reviewed all pertinent imaging results/findings within the past 24 hours.    VTE Risk Mitigation (From admission, onward)           Ordered     enoxaparin injection 40 mg  Every 24 hours         03/11/24 1208     IP VTE HIGH RISK PATIENT  Once         03/11/24 1205     Place sequential compression device  Until discontinued         03/11/24 1205

## 2024-03-12 NOTE — PLAN OF CARE
Evaluation completed, plan of care established.    Problem: Occupational Therapy  Goal: Occupational Therapy Goal  Description: Goals to be met by: 4/12/24     Patient will increase functional independence with ADLs by performing:    UE Dressing with Modified [place].  LE Dressing with Modified [place].  Toileting from toilet with Modified [place] for hygiene and clothing management.   Supine to sit with Modified [place].  Toilet transfer to toilet with Modified [place].  Upper extremity exercise program x10 reps per handout, with independence.    Outcome: Ongoing, Progressing

## 2024-03-12 NOTE — PLAN OF CARE
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]    Admission Date: 3/11/2024  Expected Discharge Date:     Transition of Care Barriers: None    Payor: MEDICAID / Plan: [facility] / Product Type: Managed Medicaid /     Extended Emergency Contact Information  Primary Emergency Contact: Michael Sun  Address: [address] [place] [address]           [place] [address] [facility]  Home Phone: [phone]  Mobile Phone: [phone]  Relation: Spouse    Discharge Plan A: Home with family  Discharge Plan B: Home      [id number] [place] - [address]  [address]  [place] [address]  Phone: [phone] Fax: [phone]      Initial Assessment (most recent)       Adult Discharge Assessment - 03/12/24 1421          Discharge Assessment    Assessment Type Discharge Planning Assessment     Confirmed/corrected address, phone number and insurance Yes     Confirmed Demographics Correct on Facesheet     Source of Information patient     Does patient/caregiver understand observation status Yes     Communicated ROCÍO with patient/caregiver Yes     People in Home spouse     Do you expect to return to your current living situation? Yes     Do you have help at home or someone to help you manage your care at home? Yes     Prior to hospitilization cognitive status: Alert/Oriented     Current cognitive status: Alert/Oriented     Walking or Climbing Stairs Difficulty no     Dressing/Bathing Difficulty no     Home Accessibility wheelchair accessible     Home Layout Able to live on 1st floor     Equipment Currently Used at Home none     Readmission within 30 days? No     Patient currently being followed by outpatient case management? No     Do you currently have service(s) that help you manage your care at home?  No     Do you take prescription medications? Yes     Do you have prescription coverage? Yes     Do you have any problems affording any of your prescribed medications? No     Is the patient taking medications as prescribed? yes     How do you get to doctors appointments? car, drives self     Are you on dialysis? No     Do you take coumadin? No     Discharge Plan A Home with family     Discharge Plan B Home     DME Needed Upon Discharge  none     Discharge Plan discussed with: Patient     Transition of Care Barriers None                      Spoke to pt. Pt lives at home with spouse. Post hospital  stay spouse will be pt support person and pt. has transportation at d/c with spouse. There have been no hospitalizations within the last 30 days per pt. Verified pt PCP and preferred pharmacy. Pt stated not on Coumadin and is not receiving dialysis. All questions answered regarding case management/ discharge planning , pt verbalized understanding. Discharge booklet with SW contact information given to pt.     Discharge Plan A and Plan B have been determined by review of patient's clinical status, future medical and therapeutic needs, and coverage/benefits for post-acute care in coordination with multidisciplinary team members.         Bell Smith LCSW  Case Management/[facility]  [phone]

## 2024-03-13 LAB
FINAL PATHOLOGIC DIAGNOSIS: NORMAL
GROSS: NORMAL
Lab: NORMAL

## 2024-03-13 PROCEDURE — 25000003 PHARM REV CODE 250: Performed by: STUDENT IN AN ORGANIZED HEALTH CARE EDUCATION/TRAINING PROGRAM

## 2024-03-13 PROCEDURE — 20600001 HC STEP DOWN PRIVATE ROOM

## 2024-03-13 PROCEDURE — 63600175 PHARM REV CODE 636 W HCPCS

## 2024-03-13 PROCEDURE — 25000003 PHARM REV CODE 250

## 2024-03-13 PROCEDURE — 97530 THERAPEUTIC ACTIVITIES: CPT

## 2024-03-13 PROCEDURE — 99231 SBSQ HOSP IP/OBS SF/LOW 25: CPT | Mod: ,,, | Performed by: ANESTHESIOLOGY

## 2024-03-13 PROCEDURE — 97112 NEUROMUSCULAR REEDUCATION: CPT

## 2024-03-13 RX ORDER — ACETAMINOPHEN 500 MG
1000 TABLET ORAL EVERY 6 HOURS
Status: DISCONTINUED | OUTPATIENT
Start: 2024-03-13 | End: 2024-03-15 | Stop reason: HOSPADM

## 2024-03-13 RX ORDER — PREGABALIN 150 MG/1
150 CAPSULE ORAL NIGHTLY
Status: DISCONTINUED | OUTPATIENT
Start: 2024-03-13 | End: 2024-03-14

## 2024-03-13 RX ADMIN — OXYCODONE HYDROCHLORIDE 15 MG: 10 TABLET ORAL at 01:03

## 2024-03-13 RX ADMIN — BUPROPION HYDROCHLORIDE 150 MG: 150 TABLET, EXTENDED RELEASE ORAL at 09:03

## 2024-03-13 RX ADMIN — METHOCARBAMOL 1000 MG: 500 TABLET ORAL at 11:03

## 2024-03-13 RX ADMIN — ACETAMINOPHEN 1000 MG: 500 TABLET ORAL at 11:03

## 2024-03-13 RX ADMIN — MUPIROCIN 1 G: 20 OINTMENT TOPICAL at 08:03

## 2024-03-13 RX ADMIN — QUETIAPINE FUMARATE 50 MG: 25 TABLET ORAL at 09:03

## 2024-03-13 RX ADMIN — SENNOSIDES AND DOCUSATE SODIUM 1 TABLET: 8.6; 5 TABLET ORAL at 08:03

## 2024-03-13 RX ADMIN — HYDROMORPHONE HYDROCHLORIDE 0.5 MG: 1 INJECTION, SOLUTION INTRAMUSCULAR; INTRAVENOUS; SUBCUTANEOUS at 04:03

## 2024-03-13 RX ADMIN — HYDROMORPHONE HYDROCHLORIDE 0.5 MG: 1 INJECTION, SOLUTION INTRAMUSCULAR; INTRAVENOUS; SUBCUTANEOUS at 08:03

## 2024-03-13 RX ADMIN — OXYCODONE HYDROCHLORIDE 15 MG: 10 TABLET ORAL at 10:03

## 2024-03-13 RX ADMIN — METHOCARBAMOL 1000 MG: 500 TABLET ORAL at 01:03

## 2024-03-13 RX ADMIN — FAMOTIDINE 20 MG: 20 TABLET, FILM COATED ORAL at 09:03

## 2024-03-13 RX ADMIN — OXYCODONE HYDROCHLORIDE 15 MG: 10 TABLET ORAL at 06:03

## 2024-03-13 RX ADMIN — HYDROMORPHONE HYDROCHLORIDE 0.5 MG: 1 INJECTION, SOLUTION INTRAMUSCULAR; INTRAVENOUS; SUBCUTANEOUS at 07:03

## 2024-03-13 RX ADMIN — OXYCODONE HYDROCHLORIDE 15 MG: 10 TABLET ORAL at 09:03

## 2024-03-13 RX ADMIN — FAMOTIDINE 20 MG: 20 TABLET, FILM COATED ORAL at 08:03

## 2024-03-13 RX ADMIN — HYDROMORPHONE HYDROCHLORIDE 0.5 MG: 1 INJECTION, SOLUTION INTRAMUSCULAR; INTRAVENOUS; SUBCUTANEOUS at 11:03

## 2024-03-13 RX ADMIN — PREGABALIN 150 MG: 150 CAPSULE ORAL at 09:03

## 2024-03-13 RX ADMIN — METHOCARBAMOL 1000 MG: 500 TABLET ORAL at 06:03

## 2024-03-13 RX ADMIN — TRAZODONE HYDROCHLORIDE 200 MG: 100 TABLET ORAL at 09:03

## 2024-03-13 RX ADMIN — ACETAMINOPHEN 1000 MG: 500 TABLET ORAL at 06:03

## 2024-03-13 NOTE — ANESTHESIA POST-OP PAIN MANAGEMENT
Acute Pain Service Progress Note    Gina Coto is a 54 y.o., female, [id number].    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       Subjective:     General appearance of alert, oriented, no complaints   Pain with rest: 2    Numbers   Pain with movement: 6    Numbers   Side Effects    1. Pruritis No    2. Nausea No    3. Motor Blockade No, 0=Ability to raise lower extremities off bed    4. Sedation No, 1=awake and alert    Objective:     Catheter site clean, dry, intact        Vitals   Vitals:    03/13/24 1116   BP: 139/76   Pulse: 79   Resp: 12   Temp: 36.9 °C (98.4 °F)          Labs    Admission on 03/11/2024   Component Date Value Ref Range Status    Group & Rh 03/11/2024 A NEG   Final    Indirect Hector 03/11/2024 NEG   Final    Specimen Outdate 03/11/2024 03/14/2024 23:59   Final    POC Preg Test, Ur 03/11/2024 Negative  Negative Final     Acceptable 03/11/2024 Yes   Final    Final Pathologic Diagnosis 03/11/2024    Final                    Value:1. TISSUE FROM CHEST WALL:  DENSE SCLEROSIS WITH NO NEOPLASIA IDENTIFIED    2. TIP OF SCAPULA:  BONE AND ASSOCIATED CONNECTIVE TISSUE WITH NO SIGNIFICANT HISTOLOGIC ALTERATION    3. GRAFT MATERIAL WITH ASSOCIATED SCLEROTIC FIBROSIS        Gross 03/11/2024    Final                    Value:Patient MRN:  [id number]  Pathology ID:  [id number]  CSN:  [id number]    Received in 3 parts:    Part 1.  The specimen is labeled with the patient's name &quot;Gina Pool  L&quot;, &quot;chest wall biopsy permanent&quot; and is received in formalin, consisting of a irregular firm to soft pale tan-pink fibromembranous portion of tissue measuring 1.2 x 0.7   x 0.5 cm.  The presumed resection margin is inked, quadrisected and entirely submitted in 1 cassette:    XBQ--1-A    Part 2.    The specimen is labeled with the patient's name &quot;Gina Pool L&quot;, &quot;Right scapula tip&quot; and is received in formalin, consisting of 4 irregular, trabeculated portions of bone, measuring 3.3 x 3.0 x 1.0 cm in maximum aggregate   dimension and ranging from 1.0 x 0.6 x 0.3 cm to 3.0 x 2.0 x 1.1 cm.  Representative sections from each portion are submitted, following decal in 1 cassette:     RPC--2-A    Part 3.  The specimen is labeled with the patient's name &quot;Gina Pool L                          &quot;, &quot;Gortex mesh-permanent&quot; and is received in formalin, consisting of a elongated, firm, pale tan white 10.0 x 3.0 x 1.3 cm synthetic material admixed with blue   sutures, suggestive of a mesh.  Representative sections are submitted in 1 cassette:    XSZ--3-A      APARNA Huang (ASCP)      Disclaimer 03/11/2024 Unless the case is a 'gross only' or additional testing only, the final diagnosis for each specimen is based on a microscopic examination of appropriate tissue sections.   Final    Creatinine 03/12/2024 0.7  0.5 - 1.4 mg/dL Final    eGFR 03/12/2024 >60.0  >60 mL/min/1.73 m^2 Final    WBC 03/12/2024 12.90 (H)  3.90 - 12.70 K/uL Final    RBC 03/12/2024 4.55  4.00 - 5.40 M/uL Final    Hemoglobin 03/12/2024 13.4  12.0 - 16.0 g/dL Final    Hematocrit 03/12/2024 40.6  37.0 - 48.5 % Final    MCV 03/12/2024 89  82 - 98 fL Final    MCH 03/12/2024 29.5  27.0 - 31.0 pg Final    MCHC 03/12/2024 33.0  32.0 - 36.0 g/dL Final    RDW 03/12/2024 12.4  11.5 - 14.5 % Final    Platelets 03/12/2024 250  150 - 450 K/uL Final    MPV 03/12/2024 10.0  9.2 - 12.9 fL  Final    Immature Granulocytes 03/12/2024 0.4  0.0 - 0.5 % Final    Gran # (ANC) 03/12/2024 10.1 (H)  1.8 - 7.7 K/uL Final    Immature Grans (Abs) 03/12/2024 0.05 (H)  0.00 - 0.04 K/uL Final    Lymph # 03/12/2024 1.6  1.0 - 4.8 K/uL Final    Mono # 03/12/2024 0.9  0.3 - 1.0 K/uL Final    Eos # 03/12/2024 0.2  0.0 - 0.5 K/uL Final    Baso # 03/12/2024 0.06  0.00 - 0.20 K/uL Final    nRBC 03/12/2024 0  0 /100 WBC Final    Gran % 03/12/2024 78.4 (H)  38.0 - 73.0 % Final    Lymph % 03/12/2024 12.4 (L)  18.0 - 48.0 % Final    Mono % 03/12/2024 6.7  4.0 - 15.0 % Final    Eosinophil % 03/12/2024 1.6  0.0 - 8.0 % Final    Basophil % 03/12/2024 0.5  0.0 - 1.9 % Final    Differential Method 03/12/2024 Automated   Final        Meds   Current Facility-Administered Medications   Medication Dose Route Frequency Provider Last Rate Last Admin    acetaminophen tablet 1,000 mg  1,000 mg Oral Q6H Kayden Marr MD        buPROPion TB24 tablet 150 mg  150 mg Oral QHS Krishna Fajardo MD   150 mg at 03/12/24 2120    enoxaparin injection 40 mg  40 mg Subcutaneous Q24H (prophylaxis, 1700) Krishna Fajardo MD   40 mg at 03/12/24 1726    famotidine tablet 20 mg  20 mg Oral BID Krishna Fajardo MD   20 mg at 03/13/24 0816    oxyCODONE immediate release tablet Tab 10 mg  10 mg Oral Q3H PRN Janessa Khanna MD   10 mg at 03/12/24 0301    And    oxyCODONE immediate release tablet 15 mg  15 mg Oral Q3H PRN Janessa Khanna MD   15 mg at 03/13/24 1025    And    HYDROmorphone injection 0.5 mg  0.5 mg Intravenous Q3H PRN Janessa Khanna MD   0.5 mg at 03/13/24 0816    methocarbamoL tablet 1,000 mg  1,000 mg Oral Q6H Krishna Fajardo MD   1,000 mg at 03/13/24 0615    mupirocin 2 % ointment 1 g  1 g Nasal BID Krishna Fajardo MD   1 g at 03/13/24 0820    ondansetron injection 4 mg  4 mg Intravenous Q12H PRN Krishna Fajardo MD        polyethylene glycol packet 17 g  17 g Oral Daily Krishna Fajardo MD   17 g at 03/11/24 1418    pregabalin  capsule 150 mg  150 mg Oral QHS Kayden Marr MD        QUEtiapine tablet 50 mg  50 mg Oral Nightly Krishna Fajardo MD   50 mg at 03/12/24 2120    ROPIvacaine (PF) 2 mg/ml (0.2%) solution  0.1 mL/hr Perineural Continuous Krishna Fajardo MD 0.1 mL/hr at 03/12/24 1926 0.1 mL/hr at 03/12/24 1926    senna-docusate 8.6-50 mg per tablet 1 tablet  1 tablet Oral Daily Krishna Fajardo MD   1 tablet at 03/13/24 0816    traZODone tablet 200 mg  200 mg Oral Nightly Krishna Fajardo MD   200 mg at 03/12/24 2120        Anticoagulation: Enoxaparin SubQ 40 mg q24    Assessment:     Pain control adequate    Plan:     Patient doing well, continue present treatment.    - PNC discontinued. Catheter removed intact with blue tip visualized.  - Continue multimodals with tylenol, robaxin, lyrica  - Continue oxycodone 5/10 for mod/sev pain with 0.5 mg dilaudid for breakthrough pain. De-escalate as needed prior to discharge.    Kayden Marr MD

## 2024-03-13 NOTE — SUBJECTIVE & OBJECTIVE
Interval History: Did well overnight  AFVSS  Pain has been well controlled  Right shoulder stiffness and pain improved    Medications:  Continuous Infusions:   ROPIvacaine (PF) 2 mg/ml (0.2%) 0.1 mL/hr (03/12/24 1926)     Scheduled Meds:   acetaminophen  1,000 mg Oral Q8H    buPROPion  150 mg Oral QHS    enoxparin  40 mg Subcutaneous Q24H (prophylaxis, 1700)    famotidine  20 mg Oral BID    methocarbamoL  1,000 mg Oral Q6H    mupirocin  1 g Nasal BID    polyethylene glycol  17 g Oral Daily    pregabalin  75 mg Oral QHS    QUEtiapine  50 mg Oral Nightly    senna-docusate 8.6-50 mg  1 tablet Oral Daily    traZODone  200 mg Oral Nightly     PRN Meds:oxyCODONE **AND** oxyCODONE **AND** HYDROmorphone, ondansetron     Review of patient's allergies indicates:  No Known Allergies  Objective:     Vital Signs (Most Recent):  Temp: 97.9 °F (36.6 °C) (03/13/24 0742)  Pulse: 73 (03/13/24 0742)  Resp: 16 (03/13/24 0816)  BP: (!) 148/77 (03/13/24 0742)  SpO2: 95 % (03/13/24 0742) Vital Signs (24h Range):  Temp:  [97 °F (36.1 °C)-98.3 °F (36.8 °C)] 97.9 °F (36.6 °C)  Pulse:  [72-80] 73  Resp:  [14-18] 16  SpO2:  [93 %-100 %] 95 %  BP: (127-160)/(75-84) 148/77     Intake/Output - Last 3 Shifts         03/11 0700  03/12 0659 03/12 0700  03/13 0659 03/13 0700  03/14 0659    P.O. 240      IV Piggyback 620      Total Intake(mL/kg) 860 (10.5)      Net +860             Urine Occurrence 1 x      Stool Occurrence 0 x 0 x             SpO2: 95 %        Physical Exam  Vitals and nursing note reviewed.   Constitutional:       Appearance: Normal appearance.   HENT:      Head: Normocephalic.      Nose: Nose normal.      Mouth/Throat:      Mouth: Mucous membranes are moist.   Eyes:      Extraocular Movements: Extraocular movements intact.      Pupils: Pupils are equal, round, and reactive to light.   Cardiovascular:      Rate and Rhythm: Normal rate and regular rhythm.   Pulmonary:      Effort: Pulmonary effort is normal.   Abdominal:       General: Abdomen is flat.      Palpations: Abdomen is soft.   Musculoskeletal:      Cervical back: Neck supple.      Comments: Decreased right shoulder ROM   Skin:     General: Skin is warm and dry.      Comments: Right chest incisions c/d/i   Neurological:      General: No focal deficit present.      Mental Status: She is alert and oriented to person, place, and time.   Psychiatric:         Mood and Affect: Mood normal.         Behavior: Behavior normal.            Significant Labs:  All pertinent labs from the last 24 hours have been reviewed.    Significant Diagnostics:  I have reviewed all pertinent imaging results/findings within the past 24 hours.    VTE Risk Mitigation (From admission, onward)           Ordered     enoxaparin injection 40 mg  Every 24 hours         03/11/24 1208     IP VTE HIGH RISK PATIENT  Once         03/11/24 1205     Place sequential compression device  Until discontinued         03/11/24 1205

## 2024-03-13 NOTE — PT/OT/SLP PROGRESS
Occupational Therapy   Treatment    Name: Gina Coto  MRN: [id number]  Admitting Diagnosis:  Right-sided chest wall pain  2 Days Post-Op    Recommendations:     Discharge Recommendations: Low Intensity Therapy  Discharge Equipment Recommendations:  none  Barriers to discharge:  None    Assessment:     Gina Coto is a 54 y.o. female with a medical diagnosis of Right-sided chest wall pain.  She presents with R shoulder pain with limited ROM. Performance deficits affecting function are impaired endurance, impaired self care skills, decreased lower extremity function, decreased ROM, impaired joint extensibility.     Rehab Prognosis:  Good; patient would benefit from acute skilled OT services to address these deficits and reach maximum level of function.       Plan:     Patient to be seen 2 x/week to address the above listed problems via self-care/home management, therapeutic activities, therapeutic exercises, neuromuscular re-education  Plan of Care Expires: 04/11/24  Plan of Care Reviewed with: patient    Subjective     Chief Complaint: ROM and shoulder pain  Patient/Family Comments/goals: improve shoulder  Pain/Comfort:  Pain Rating 1: other (see comments) (not rated)  Location - Side 1: Right  Location - Orientation 1: generalized  Location 1: shoulder  Pain Addressed 1: Reposition, Distraction, Cessation of Activity    Objective:     Communicated with: Nsg prior to session.  Patient found  sitting up in bed c/ nsg present  with   upon OT entry to room.    General Precautions: Standard, fall    Orthopedic Precautions:N/A  Braces: N/A  Respiratory Status: Room air     Occupational Performance:     Bed Mobility:    Patient completed Rolling/Turning to Left with  supervision  Patient completed Supine to Sit with supervision  Patient completed Sit to Supine with supervision     Functional Mobility/Transfers:  Functional Mobility: NT    Activities of Daily Living:  Feeding:  supervision drinking water for  medication    Therapeutic activities:  Pt performed 5-10 towel slides using bedside table top within pain free range  Fwd and back  L <> R  Educated on using AAROM to further facilitate full ROM  PROM performed within pain free range      [facility] 6 Click ADL: 22    Treatment & Education:  Pt educated on role and purpose of therapy  Pt educated on goal setting  Pt educated on benefits ROM activities  Pt educated on self advocacy     Patient left  sitting up in bed  with all lines intact, call button in reach, and nsg notified    GOALS:   Multidisciplinary Problems       Occupational Therapy Goals          Problem: Occupational Therapy    Goal Priority Disciplines Outcome Interventions   Occupational Therapy Goal     OT, PT/OT Ongoing, Progressing    Description: Goals to be met by: 4/12/24     Patient will increase functional independence with ADLs by performing:    UE Dressing with Modified [place].  LE Dressing with Modified [place].  Toileting from toilet with Modified [place] for hygiene and clothing management.   Supine to sit with Modified [place].  Toilet transfer to toilet with Modified [place].  Upper extremity exercise program x10 reps per handout, with independence.                         Time Tracking:     OT Date of Treatment: 03/13/24  OT Start Time: 1136  OT Stop Time: 1200  OT Total Time (min): 24 min    Billable Minutes:Therapeutic Activity 12  Neuromuscular Re-education 12    OT/MONET: OT          3/13/2024

## 2024-03-13 NOTE — PROGRESS NOTES
[facility]  Thoracic Surgery  Progress Note    Subjective:     History of Present Illness:  No notes on file    Post-Op Info:  Procedure(s) (LRB):  THORACOTOMY with mesh removal (APS pain consult) (Right)  RECONSTRUCTION, CHEST WALL (Right)   2 Days Post-Op     Interval History: Did well overnight  AFVSS  Pain has been well controlled  Right shoulder stiffness and pain improved    Medications:  Continuous Infusions:   ROPIvacaine (PF) 2 mg/ml (0.2%) 0.1 mL/hr (03/12/24 1926)     Scheduled Meds:   acetaminophen  1,000 mg Oral Q8H    buPROPion  150 mg Oral QHS    enoxparin  40 mg Subcutaneous Q24H (prophylaxis, 1700)    famotidine  20 mg Oral BID    methocarbamoL  1,000 mg Oral Q6H    mupirocin  1 g Nasal BID    polyethylene glycol  17 g Oral Daily    pregabalin  75 mg Oral QHS    QUEtiapine  50 mg Oral Nightly    senna-docusate 8.6-50 mg  1 tablet Oral Daily    traZODone  200 mg Oral Nightly     PRN Meds:oxyCODONE **AND** oxyCODONE **AND** HYDROmorphone, ondansetron     Review of patient's allergies indicates:  No Known Allergies  Objective:     Vital Signs (Most Recent):  Temp: 97.9 °F (36.6 °C) (03/13/24 0742)  Pulse: 73 (03/13/24 0742)  Resp: 16 (03/13/24 0816)  BP: (!) 148/77 (03/13/24 0742)  SpO2: 95 % (03/13/24 0742) Vital Signs (24h Range):  Temp:  [97 °F (36.1 °C)-98.3 °F (36.8 °C)] 97.9 °F (36.6 °C)  Pulse:  [72-80] 73  Resp:  [14-18] 16  SpO2:  [93 %-100 %] 95 %  BP: (127-160)/(75-84) 148/77     Intake/Output - Last 3 Shifts         03/11 0700  03/12 0659 03/12 0700  03/13 0659 03/13 0700  03/14 0659    P.O. 240      IV Piggyback 620      Total Intake(mL/kg) 860 (10.5)      Net +860             Urine Occurrence 1 x      Stool Occurrence 0 x 0 x             SpO2: 95 %        Physical Exam  Vitals and nursing note reviewed.   Constitutional:       Appearance: Normal appearance.   HENT:      Head: Normocephalic.      Nose: Nose normal.      Mouth/Throat:      Mouth: Mucous membranes are moist.   Eyes:       Extraocular Movements: Extraocular movements intact.      Pupils: Pupils are equal, round, and reactive to light.   Cardiovascular:      Rate and Rhythm: Normal rate and regular rhythm.   Pulmonary:      Effort: Pulmonary effort is normal.   Abdominal:      General: Abdomen is flat.      Palpations: Abdomen is soft.   Musculoskeletal:      Cervical back: Neck supple.      Comments: Decreased right shoulder ROM   Skin:     General: Skin is warm and dry.      Comments: Right chest incisions c/d/i   Neurological:      General: No focal deficit present.      Mental Status: She is alert and oriented to person, place, and time.   Psychiatric:         Mood and Affect: Mood normal.         Behavior: Behavior normal.            Significant Labs:  All pertinent labs from the last 24 hours have been reviewed.    Significant Diagnostics:  I have reviewed all pertinent imaging results/findings within the past 24 hours.    VTE Risk Mitigation (From admission, onward)           Ordered     enoxaparin injection 40 mg  Every 24 hours         03/11/24 1208     IP VTE HIGH RISK PATIENT  Once         03/11/24 1205     Place sequential compression device  Until discontinued         03/11/24 1205                  Assessment/Plan:     * Right-sided chest wall pain  54 y.o. 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 She has since required multiple re-operations for chest wall pain. Now s/p third time redo right thoracotomy, mesh removal, partial scapula resection, and chest wall reconstruction with Goretex mesh    Regular diet  Appreciate APS assistance with pain regimen   Plan to pause WILFREDO infusion this morning and possible d/c later today   PT/OT eval to assist with right should ROM   Ambulate TID  DVT ppx  Home meds    Dispo: likely d/c tomorrow        Hugo Real MD  Thoracic Surgery  [facility]

## 2024-03-13 NOTE — NURSING
[facility] Plan of Care Note  Dx Right chest wall resection    Shift Events Discontinued nerve block    Goals of Care: Pain mgmt    Neuro: A/Ox4    Vital Signs: VSS    Respiratory: RA    Diet: Regular    Is patient tolerating current diet? Yes    GTTS: None    Urine Output/Bowel Movement: adequate UOP, no BM this shift    Drains/Tubes/Tube Feeds (include total output/shift): none    Lines:  R&L 18g in wrists      Accuchecks:none    Skin: R back incision with island dressing    Fall Risk Score: 8    Activity level? Ad kriss    Any scheduled procedures? none    Any safety concerns? none    Other: possible d/c tomorrow 3/14

## 2024-03-13 NOTE — ASSESSMENT & PLAN NOTE
54 y.o. 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 She has since required multiple re-operations for chest wall pain. Now s/p third time redo right thoracotomy, mesh removal, partial scapula resection, and chest wall reconstruction with Goretex mesh    Regular diet  Appreciate APS assistance with pain regimen   Plan to pause WILFREDO infusion this morning and possible d/c later today   PT/OT eval to assist with right should ROM   Ambulate TID  DVT ppx  Home meds    Dispo: likely d/c tomorrow

## 2024-03-14 PROCEDURE — 25000003 PHARM REV CODE 250

## 2024-03-14 PROCEDURE — 25000003 PHARM REV CODE 250: Performed by: STUDENT IN AN ORGANIZED HEALTH CARE EDUCATION/TRAINING PROGRAM

## 2024-03-14 PROCEDURE — 20600001 HC STEP DOWN PRIVATE ROOM

## 2024-03-14 PROCEDURE — 63600175 PHARM REV CODE 636 W HCPCS

## 2024-03-14 PROCEDURE — 97530 THERAPEUTIC ACTIVITIES: CPT

## 2024-03-14 RX ORDER — OXYCODONE HYDROCHLORIDE 10 MG/1
10 TABLET ORAL EVERY 6 HOURS PRN
Qty: 30 TABLET | Refills: 0 | Status: SHIPPED | OUTPATIENT
Start: 2024-03-14 | End: 2024-06-10

## 2024-03-14 RX ORDER — HYDROMORPHONE HYDROCHLORIDE 2 MG/1
2 TABLET ORAL ONCE
Status: COMPLETED | OUTPATIENT
Start: 2024-03-14 | End: 2024-03-14

## 2024-03-14 RX ORDER — METHOCARBAMOL 500 MG/1
1000 TABLET, FILM COATED ORAL EVERY 6 HOURS
Qty: 80 TABLET | Refills: 0 | Status: SHIPPED | OUTPATIENT
Start: 2024-03-14 | End: 2024-03-24

## 2024-03-14 RX ORDER — HYDROMORPHONE HYDROCHLORIDE 2 MG/1
2 TABLET ORAL EVERY 4 HOURS PRN
Status: DISCONTINUED | OUTPATIENT
Start: 2024-03-14 | End: 2024-03-15 | Stop reason: HOSPADM

## 2024-03-14 RX ORDER — OXYCODONE HYDROCHLORIDE 15 MG/1
15 TABLET ORAL
Refills: 0 | Status: CANCELLED | OUTPATIENT
Start: 2024-03-14

## 2024-03-14 RX ORDER — GABAPENTIN 300 MG/1
300 CAPSULE ORAL 3 TIMES DAILY
Qty: 90 CAPSULE | Refills: 5 | Status: SHIPPED | OUTPATIENT
Start: 2024-03-14 | End: 2024-04-12

## 2024-03-14 RX ORDER — HYDROMORPHONE HYDROCHLORIDE 2 MG/1
2 TABLET ORAL EVERY 6 HOURS PRN
Qty: 18 TABLET | Refills: 0 | Status: SHIPPED | OUTPATIENT
Start: 2024-03-14 | End: 2024-06-10

## 2024-03-14 RX ORDER — GABAPENTIN 300 MG/1
300 CAPSULE ORAL 3 TIMES DAILY
Status: DISCONTINUED | OUTPATIENT
Start: 2024-03-14 | End: 2024-03-15 | Stop reason: HOSPADM

## 2024-03-14 RX ORDER — ACETAMINOPHEN 500 MG
1000 TABLET ORAL EVERY 6 HOURS
Refills: 0 | COMMUNITY
Start: 2024-03-14 | End: 2024-06-10

## 2024-03-14 RX ADMIN — FAMOTIDINE 20 MG: 20 TABLET, FILM COATED ORAL at 08:03

## 2024-03-14 RX ADMIN — HYDROMORPHONE HYDROCHLORIDE 0.5 MG: 1 INJECTION, SOLUTION INTRAMUSCULAR; INTRAVENOUS; SUBCUTANEOUS at 07:03

## 2024-03-14 RX ADMIN — BUPROPION HYDROCHLORIDE 150 MG: 150 TABLET, EXTENDED RELEASE ORAL at 08:03

## 2024-03-14 RX ADMIN — HYDROMORPHONE HYDROCHLORIDE 2 MG: 2 TABLET ORAL at 02:03

## 2024-03-14 RX ADMIN — METHOCARBAMOL 1000 MG: 500 TABLET ORAL at 11:03

## 2024-03-14 RX ADMIN — OXYCODONE HYDROCHLORIDE 15 MG: 10 TABLET ORAL at 12:03

## 2024-03-14 RX ADMIN — METHOCARBAMOL 1000 MG: 500 TABLET ORAL at 06:03

## 2024-03-14 RX ADMIN — METHOCARBAMOL 1000 MG: 500 TABLET ORAL at 12:03

## 2024-03-14 RX ADMIN — ACETAMINOPHEN 1000 MG: 500 TABLET ORAL at 11:03

## 2024-03-14 RX ADMIN — QUETIAPINE FUMARATE 50 MG: 25 TABLET ORAL at 08:03

## 2024-03-14 RX ADMIN — ACETAMINOPHEN 1000 MG: 500 TABLET ORAL at 05:03

## 2024-03-14 RX ADMIN — GABAPENTIN 300 MG: 300 CAPSULE ORAL at 08:03

## 2024-03-14 RX ADMIN — HYDROMORPHONE HYDROCHLORIDE 2 MG: 2 TABLET ORAL at 11:03

## 2024-03-14 RX ADMIN — SENNOSIDES AND DOCUSATE SODIUM 1 TABLET: 8.6; 5 TABLET ORAL at 08:03

## 2024-03-14 RX ADMIN — HYDROMORPHONE HYDROCHLORIDE 0.5 MG: 1 INJECTION, SOLUTION INTRAMUSCULAR; INTRAVENOUS; SUBCUTANEOUS at 03:03

## 2024-03-14 RX ADMIN — OXYCODONE HYDROCHLORIDE 10 MG: 10 TABLET ORAL at 08:03

## 2024-03-14 RX ADMIN — ACETAMINOPHEN 1000 MG: 500 TABLET ORAL at 12:03

## 2024-03-14 RX ADMIN — TRAZODONE HYDROCHLORIDE 200 MG: 100 TABLET ORAL at 08:03

## 2024-03-14 RX ADMIN — METHOCARBAMOL 1000 MG: 500 TABLET ORAL at 05:03

## 2024-03-14 RX ADMIN — OXYCODONE HYDROCHLORIDE 15 MG: 10 TABLET ORAL at 05:03

## 2024-03-14 RX ADMIN — ACETAMINOPHEN 1000 MG: 500 TABLET ORAL at 06:03

## 2024-03-14 RX ADMIN — HYDROMORPHONE HYDROCHLORIDE 0.5 MG: 1 INJECTION, SOLUTION INTRAMUSCULAR; INTRAVENOUS; SUBCUTANEOUS at 12:03

## 2024-03-14 RX ADMIN — HYDROMORPHONE HYDROCHLORIDE 2 MG: 2 TABLET ORAL at 06:03

## 2024-03-14 RX ADMIN — OXYCODONE HYDROCHLORIDE 15 MG: 10 TABLET ORAL at 01:03

## 2024-03-14 RX ADMIN — OXYCODONE HYDROCHLORIDE 10 MG: 10 TABLET ORAL at 04:03

## 2024-03-14 NOTE — PLAN OF CARE
CHW met with patient/family at bedside. Patient experience rounding completed and reviewed the following.     Do you know your discharge plan? Yes or No,    If yes, what is the plan? (Home, Home Health, Rehab, SNF, LTAC, or Other)    Home/Family    Have you discussed your needs and preferences with your SW/CM? Yes or No   Yes    If you are discharging home, do you have help at home? Yes or No     Yes    Do you think you will need help additional at home at discharge? Yes or No   No    Do you currently have difficulty keeping up with bills, affording medicine or buying food? Yes or No     No    Assigned SW/CM notified of any patient/family needs or concerns. Appropriate resources provided to address patient's needs.    Bell Del Valle CHW  Case Management   [phone]

## 2024-03-14 NOTE — PT/OT/SLP PROGRESS
Occupational Therapy      Patient Name:  Gina Coto   MRN:  [id number]    Patient was seen today ambulating hallway back to room with nsg c/ no AD 2/2 pt c/ a lot of bleeding in hand from IV site and on gown; needed to be changed and have IV replaced. Pt transferred to bedside couch c/ S and no AD. Pt demonstrated understanding in ROM in pain free range and AAROM for affected UE. Charged 1 unit for education and demonstration of therapeutic activity and exercise. Will follow-up .    3/14/2024

## 2024-03-14 NOTE — PLAN OF CARE
[facility]  Discharge Final Note    Primary Care Provider: Speedy Torrez NP    Expected Discharge Date: 3/14/2024    Final Discharge Note (most recent)       Final Note - 03/14/24 1220          Final Note    Assessment Type Discharge Planning Assessment     Anticipated Discharge Disposition Home or Self Care     Hospital Resources/Appts/Education Provided Provided patient/caregiver with written discharge plan information        Post-Acute Status    Patient choice form signed by patient/caregiver List with quality metrics by geographic area provided     Discharge Delays None known at this time                     Important Message from Medicare               Pt d/c home with family. No d/c needs reported by medical team at this time.     Bell Smith LCSW  Case Management/[facility]  [phone]

## 2024-03-14 NOTE — SUBJECTIVE & OBJECTIVE
Interval History: Did well overnight.   PNCs removed. Required IV Dilaudid x2 overnight  AFVSS on RA    Medications:  Continuous Infusions:  Scheduled Meds:   acetaminophen  1,000 mg Oral Q6H    buPROPion  150 mg Oral QHS    enoxparin  40 mg Subcutaneous Q24H (prophylaxis, 1700)    famotidine  20 mg Oral BID    methocarbamoL  1,000 mg Oral Q6H    mupirocin  1 g Nasal BID    polyethylene glycol  17 g Oral Daily    pregabalin  150 mg Oral QHS    QUEtiapine  50 mg Oral Nightly    senna-docusate 8.6-50 mg  1 tablet Oral Daily    traZODone  200 mg Oral Nightly     PRN Meds:oxyCODONE **AND** oxyCODONE **AND** HYDROmorphone, ondansetron     Review of patient's allergies indicates:  No Known Allergies  Objective:     Vital Signs (Most Recent):  Temp: 98.7 °F (37.1 °C) (03/14/24 1149)  Pulse: 79 (03/14/24 1149)  Resp: 14 (03/14/24 1149)  BP: (!) 140/72 (03/14/24 1149)  SpO2: 96 % (03/14/24 1149) Vital Signs (24h Range):  Temp:  [97.7 °F (36.5 °C)-99 °F (37.2 °C)] 98.7 °F (37.1 °C)  Pulse:  [72-81] 79  Resp:  [12-18] 14  SpO2:  [92 %-99 %] 96 %  BP: (101-140)/(60-94) 140/72     Intake/Output - Last 3 Shifts         03/12 0700  03/13 0659 03/13 0700  03/14 0659 03/14 0700  03/15 0659    P.O.  1740 240    IV Piggyback       Total Intake(mL/kg)  1740 (21.3) 240 (2.9)    Net  +1740 +240           Stool Occurrence 0 x 0 x 0 x            SpO2: 96 %        Physical Exam  Vitals and nursing note reviewed.   Constitutional:       Appearance: Normal appearance.   HENT:      Head: Normocephalic.      Nose: Nose normal.      Mouth/Throat:      Mouth: Mucous membranes are moist.   Eyes:      Extraocular Movements: Extraocular movements intact.      Pupils: Pupils are equal, round, and reactive to light.   Cardiovascular:      Rate and Rhythm: Normal rate and regular rhythm.   Pulmonary:      Effort: Pulmonary effort is normal.   Abdominal:      General: Abdomen is flat.      Palpations: Abdomen is soft.   Musculoskeletal:      Cervical  back: Neck supple.      Comments: Decreased right shoulder ROM   Skin:     General: Skin is warm and dry.      Comments: Right chest incisions c/d/i   Neurological:      General: No focal deficit present.      Mental Status: She is alert and oriented to person, place, and time.   Psychiatric:         Mood and Affect: Mood normal.         Behavior: Behavior normal.            Significant Labs:  All pertinent labs from the last 24 hours have been reviewed.    Significant Diagnostics:  I have reviewed all pertinent imaging results/findings within the past 24 hours.    VTE Risk Mitigation (From admission, onward)           Ordered     enoxaparin injection 40 mg  Every 24 hours         03/11/24 1208     IP VTE HIGH RISK PATIENT  Once         03/11/24 1205     Place sequential compression device  Until discontinued         03/11/24 1205

## 2024-03-14 NOTE — ASSESSMENT & PLAN NOTE
54 y.o. 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 She has since required multiple re-operations for chest wall pain. Now s/p third time redo right thoracotomy, mesh removal, partial scapula resection, and chest wall reconstruction with Goretex mesh    Regular diet  PNCs removed. PO pain regimen  PT/OT to assist with right shoulder ROM   Ambulate TID  DVT ppx  Home meds    Dispo: d/c home today

## 2024-03-14 NOTE — ASSESSMENT & PLAN NOTE
54 y.o. 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 She has since required multiple re-operations for chest wall pain. Now s/p third time redo right thoracotomy, mesh removal, partial scapula resection, and chest wall reconstruction with Goretex mesh    Discharge home.   Regular diet  PNCs removed. PO pain regimen  PT/OT to assist with right shoulder ROM   Ambulate TID  DVT ppx  Home meds    Dispo: d/c home today

## 2024-03-14 NOTE — PROGRESS NOTES
[facility]  Thoracic Surgery  Progress Note    Subjective:     History of Present Illness:  No notes on file    Post-Op Info:  Procedure(s) (LRB):  THORACOTOMY with mesh removal (APS pain consult) (Right)  RECONSTRUCTION, CHEST WALL (Right)   3 Days Post-Op     Interval History: Did well overnight.   PNCs removed. Required IV Dilaudid x2 overnight  AFVSS on RA    Medications:  Continuous Infusions:  Scheduled Meds:   acetaminophen  1,000 mg Oral Q6H    buPROPion  150 mg Oral QHS    enoxparin  40 mg Subcutaneous Q24H (prophylaxis, 1700)    famotidine  20 mg Oral BID    methocarbamoL  1,000 mg Oral Q6H    mupirocin  1 g Nasal BID    polyethylene glycol  17 g Oral Daily    pregabalin  150 mg Oral QHS    QUEtiapine  50 mg Oral Nightly    senna-docusate 8.6-50 mg  1 tablet Oral Daily    traZODone  200 mg Oral Nightly     PRN Meds:oxyCODONE **AND** oxyCODONE **AND** HYDROmorphone, ondansetron     Review of patient's allergies indicates:  No Known Allergies  Objective:     Vital Signs (Most Recent):  Temp: 98.7 °F (37.1 °C) (03/14/24 1149)  Pulse: 79 (03/14/24 1149)  Resp: 14 (03/14/24 1149)  BP: (!) 140/72 (03/14/24 1149)  SpO2: 96 % (03/14/24 1149) Vital Signs (24h Range):  Temp:  [97.7 °F (36.5 °C)-99 °F (37.2 °C)] 98.7 °F (37.1 °C)  Pulse:  [72-81] 79  Resp:  [12-18] 14  SpO2:  [92 %-99 %] 96 %  BP: (101-140)/(60-94) 140/72     Intake/Output - Last 3 Shifts         03/12 0700  03/13 0659 03/13 0700  03/14 0659 03/14 0700  03/15 0659    P.O.  1740 240    IV Piggyback       Total Intake(mL/kg)  1740 (21.3) 240 (2.9)    Net  +1740 +240           Stool Occurrence 0 x 0 x 0 x            SpO2: 96 %        Physical Exam  Vitals and nursing note reviewed.   Constitutional:       Appearance: Normal appearance.   HENT:      Head: Normocephalic.      Nose: Nose normal.      Mouth/Throat:      Mouth: Mucous membranes are moist.   Eyes:      Extraocular Movements: Extraocular movements intact.      Pupils: Pupils are equal,  round, and reactive to light.   Cardiovascular:      Rate and Rhythm: Normal rate and regular rhythm.   Pulmonary:      Effort: Pulmonary effort is normal.   Abdominal:      General: Abdomen is flat.      Palpations: Abdomen is soft.   Musculoskeletal:      Cervical back: Neck supple.      Comments: Decreased right shoulder ROM   Skin:     General: Skin is warm and dry.      Comments: Right chest incisions c/d/i   Neurological:      General: No focal deficit present.      Mental Status: She is alert and oriented to person, place, and time.   Psychiatric:         Mood and Affect: Mood normal.         Behavior: Behavior normal.            Significant Labs:  All pertinent labs from the last 24 hours have been reviewed.    Significant Diagnostics:  I have reviewed all pertinent imaging results/findings within the past 24 hours.    VTE Risk Mitigation (From admission, onward)           Ordered     enoxaparin injection 40 mg  Every 24 hours         03/11/24 1208     IP VTE HIGH RISK PATIENT  Once         03/11/24 1205     Place sequential compression device  Until discontinued         03/11/24 1205                  Assessment/Plan:     * Right-sided chest wall pain  54 y.o. 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 She has since required multiple re-operations for chest wall pain. Now s/p third time redo right thoracotomy, mesh removal, partial scapula resection, and chest wall reconstruction with Goretex mesh    Regular diet  PNCs removed. PO pain regimen  PT/OT to assist with right shoulder ROM   Ambulate TID  DVT ppx  Home meds    Dispo: d/c home today        Hugo Real MD  Thoracic Surgery  [facility]

## 2024-03-14 NOTE — DISCHARGE SUMMARY
[facility]  Thoracic Surgery  Discharge Summary    Patient Name: Gina Coto  MRN: [id number]  Admission Date: 3/11/2024  Hospital Length of Stay: 3 days  Discharge Date and Time:  03/14/2024 12:21 PM  Attending Physician: Juan Jose Miles MD   Discharging Provider: Dk Castro PA-C  Primary Care Provider: Speedy Torrez NP    HPI:   Gina Coto is a 52 y.o. female s/p right thoracotomy for right lower bilobectomy for resection of carcinoid tumor and s/p rib plating who is presenting to clinic today for 6 month follow up s/p re-do right thoracotomy and chest wall reconstruction. Today patient reports she is doing well. Denies clicking sensation that was reported preoperatively. Pain controlled. Incision healing well. Denies CP, cough, SOB, fever, chills, diaphoresis.       Procedure(s) (LRB):  THORACOTOMY with mesh removal (APS pain consult) (Right)  RECONSTRUCTION, CHEST WALL (Right)      Hospital Course: Patient admitted 03/11/24 following R thoracotomy with mesh removal. APS consulted for WILFREDO placement and acute pain control. Tolerated procedure well. Post-operative course uncomplicated, WILFREDO catheters discontinued POD2 and transitioned to PO pain regimen. Pain controlled. POD3 patient meeting appropriate milestones for discharge home.     Goals of Care Treatment Preferences:  Code Status: Full Code          Significant Diagnostic Studies: Radiology: X-Ray: CXR: X-Ray Chest 1 View (CXR):   Results for orders placed or performed during the hospital encounter of 03/11/24   X-Ray Chest 1 View    Narrative    EXAMINATION:  XR CHEST 1 VIEW    CLINICAL HISTORY:  Post op;    TECHNIQUE:  One view    COMPARISON:  Comparison is made to 03/12/2024.    FINDINGS:  No significant detrimental interval change in the appearance of the chest since 03/12/2024 is appreciated.  No pneumothorax.      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] 03/11/2024 Yes      Problems Resolved During this Admission:      Discharged Condition: good    Disposition: Home or Self Care    Follow Up:    Patient Instructions:      Diet Adult Regular     No driving until:   Order Comments: Please do NOT drive until all narcotic medications have been discontinued     Notify your health care provider if you experience any of the following:  temperature >100.4     Notify your health care provider if you experience any of the following:  persistent nausea and vomiting or diarrhea     Notify your health care provider if you experience any of the following:  severe uncontrolled pain     Notify your health care provider if you experience any of the following:  redness, tenderness, or signs of infection (pain, swelling, redness, odor or green/yellow discharge around incision site)     Notify your health care provider if you experience any of the following:  difficulty breathing or increased cough     Notify your health care provider if you experience any of the following:  severe persistent headache     Notify your health care provider if you experience any of the following:  worsening rash     Notify your health care provider if you experience any of the following:  persistent dizziness, light-headedness, or visual disturbances     Notify your health care provider if you experience any of the following:  increased confusion or weakness     Remove dressing in 48 hours     Change dressing (specify)   Order Comments: Patient may shower once she arrives home; however, she must keep incision covered for first 48 hours. Allow soap and water to rinse over incisions, do  not scrub.     Activity as tolerated     Medications:  Reconciled Home Medications:      Medication List        START taking these medications      acetaminophen 500 MG tablet  Commonly known as: TYLENOL  Take 2 tablets (1,000 mg total) by mouth every 6 (six) hours.     HYDROmorphone 2 MG tablet  Commonly known as: DILAUDID  Take 1 tablet (2 mg total) by mouth every 6 (six) hours as needed for Pain.     methocarbamoL 1,000 mg Tab  Take 1,000 mg by mouth every 6 (six) hours. for 10 days     oxyCODONE 10 mg Tab immediate release tablet  Commonly known as: ROXICODONE  Take 1 tablet (10 mg total) by mouth every 6 (six) hours as needed for Pain.            CHANGE how you take these medications      gabapentin 300 MG capsule  Commonly known as: NEURONTIN  Take 1 capsule (300 mg total) by mouth 3 (three) times daily.  What changed: when to take this            CONTINUE taking these medications      * ADVAIR DISKUS 500-50 mcg/dose Dsdv diskus inhaler  Generic drug: fluticasone-salmeterol 500-50 mcg/dose  INHALE 1 PUFF INTO THE LUNGS 2 TIMES DAY     * fluticasone-salmeterol 500-50 mcg/dose 500-50 mcg/dose Dsdv diskus inhaler  Commonly known as: ADVAIR DISKUS  Inhale 1 puff into the lungs 2 (two) times daily. Controller     * albuterol 90 mcg/actuation inhaler  Commonly known as: PROVENTIL/VENTOLIN HFA  INHALE 1 TO 2 PUFFS BY MOUTH EVERY 6 HOURS AS NEEDED FOR WHEEZING OR SHORTNESS OF BREATH     * albuterol 90 mcg/actuation inhaler  Commonly known as: PROVENTIL/VENTOLIN HFA  Inhale 2 puffs into the lungs every 6 (six) hours as needed for Wheezing. Rescue     buPROPion 150 MG TB24 tablet  Commonly known as: WELLBUTRIN XL  Take 150 mg by mouth every evening.     cetirizine 10 MG tablet  Commonly known as: ZYRTEC  Take 1 tablet (10 mg total) by mouth once daily.     cloNIDine 0.1 MG tablet  Commonly known as: CATAPRES  0.1 mg every 6 (six) hours as needed.     cyclobenzaprine 10 MG tablet  Commonly known as: FLEXERIL  Take 1  tablet (10 mg total) by mouth 3 (three) times daily as needed for Muscle spasms.     famotidine 20 MG tablet  Commonly known as: PEPCID  Take 1 tablet (20 mg total) by mouth 2 (two) times daily.     FLUoxetine 20 MG capsule  Take 60 mg by mouth every morning.     QUEtiapine 50 MG tablet  Commonly known as: SEROQUEL  take 1/2 to 1 tablet at bedtime     traZODone 100 MG tablet  Commonly known as: DESYREL  Take 200 mg by mouth nightly.     VYVANSE 10 mg Cap  Generic drug: lisdexamfetamine  Take 1 capsule by mouth every morning.           * This list has 4 medication(s) that are the same as other medications prescribed for you. Read the directions carefully, and ask your doctor or other care provider to review them with you.                  Dk Castro PA-C  Thoracic Surgery  [facility]

## 2024-03-14 NOTE — NURSING
[facility] Plan of Care Note  Dx Right chest wall resection     Shift Events: Pain mgmt, d/c'd IV pain meds     Goals of Care: Pain mgmt     Neuro: A/Ox4     Vital Signs: VSS     Respiratory: RA     Diet: Regular     Is patient tolerating current diet? Yes     GTTS: None     Urine Output/Bowel Movement: adequate UOP, no BM this shift     Drains/Tubes/Tube Feeds (include total output/shift): none     Lines:  R 18g in wrists        Accuchecks:none     Skin: R back incision with island dressing     Fall Risk Score: 8     Activity level? Ad kriss     Any scheduled procedures? none     Any safety concerns? none     Other: D/C tomorrow 3/15

## 2024-03-15 VITALS
HEIGHT: 66 IN | HEART RATE: 76 BPM | RESPIRATION RATE: 18 BRPM | WEIGHT: 179.88 LBS | SYSTOLIC BLOOD PRESSURE: 133 MMHG | DIASTOLIC BLOOD PRESSURE: 78 MMHG | BODY MASS INDEX: 28.91 KG/M2 | TEMPERATURE: 98 F | OXYGEN SATURATION: 94 %

## 2024-03-15 DIAGNOSIS — C7A.090 CARCINOID BRONCHIAL ADENOMA OF RIGHT LUNG: Primary | ICD-10-CM

## 2024-03-15 PROCEDURE — 25000003 PHARM REV CODE 250: Performed by: STUDENT IN AN ORGANIZED HEALTH CARE EDUCATION/TRAINING PROGRAM

## 2024-03-15 PROCEDURE — 25000003 PHARM REV CODE 250

## 2024-03-15 RX ADMIN — FAMOTIDINE 20 MG: 20 TABLET, FILM COATED ORAL at 09:03

## 2024-03-15 RX ADMIN — OXYCODONE HYDROCHLORIDE 10 MG: 10 TABLET ORAL at 07:03

## 2024-03-15 RX ADMIN — HYDROMORPHONE HYDROCHLORIDE 2 MG: 2 TABLET ORAL at 05:03

## 2024-03-15 RX ADMIN — HYDROMORPHONE HYDROCHLORIDE 2 MG: 2 TABLET ORAL at 09:03

## 2024-03-15 RX ADMIN — METHOCARBAMOL 1000 MG: 500 TABLET ORAL at 11:03

## 2024-03-15 RX ADMIN — POLYETHYLENE GLYCOL 3350 17 G: 17 POWDER, FOR SOLUTION ORAL at 09:03

## 2024-03-15 RX ADMIN — METHOCARBAMOL 1000 MG: 500 TABLET ORAL at 05:03

## 2024-03-15 RX ADMIN — GABAPENTIN 300 MG: 300 CAPSULE ORAL at 09:03

## 2024-03-15 RX ADMIN — ACETAMINOPHEN 1000 MG: 500 TABLET ORAL at 05:03

## 2024-03-15 RX ADMIN — SENNOSIDES AND DOCUSATE SODIUM 1 TABLET: 8.6; 5 TABLET ORAL at 09:03

## 2024-03-15 RX ADMIN — OXYCODONE HYDROCHLORIDE 10 MG: 10 TABLET ORAL at 01:03

## 2024-03-15 RX ADMIN — OXYCODONE HYDROCHLORIDE 10 MG: 10 TABLET ORAL at 10:03

## 2024-03-15 RX ADMIN — HYDROMORPHONE HYDROCHLORIDE 2 MG: 2 TABLET ORAL at 01:03

## 2024-03-15 NOTE — PLAN OF CARE
[facility]  Discharge Final Note    Primary Care Provider: Speedy Torrez NP    Expected Discharge Date: 3/15/2024    Final Discharge Note (most recent)       Final Note - 03/15/24 0949          Final Note    Assessment Type Final Discharge Note     Anticipated Discharge Disposition Home or Self Care     Hospital Resources/Appts/Education Provided Provided patient/caregiver with written discharge plan information        Post-Acute Status    Patient choice form signed by patient/caregiver List with quality metrics by geographic area provided     Discharge Delays None known at this time                     Important Message from Medicare               Pt d/c home with family. No d/c needs reported by medical team at this time.     Bell Smith LCSW  Case Management/[facility]  [phone]

## 2024-03-15 NOTE — DISCHARGE SUMMARY
[facility]  Thoracic Surgery  Discharge Summary    Patient Name: Gina Coto  MRN: [id number]  Admission Date: 3/11/2024  Hospital Length of Stay: 4 days  Discharge Date and Time:  03/15/2024 7:46 AM  Attending Physician: Juan Jose Miles MD   Discharging Provider: Dk Castro PA-C  Primary Care Provider: Speedy Torrez NP    HPI:   Gina Coto is a 52 y.o. female s/p right thoracotomy for right lower bilobectomy for resection of carcinoid tumor and s/p rib plating who is presenting to clinic today for 6 month follow up s/p re-do right thoracotomy and chest wall reconstruction.     Procedure(s) (LRB):  THORACOTOMY with mesh removal (APS pain consult) (Right)  RECONSTRUCTION, CHEST WALL (Right)      Hospital Course: Patient admitted 03/11/24 following R thoracotomy with mesh removal. APS consulted for WILFREDO placement and acute pain control. Tolerated procedure well. Post-operative course uncomplicated, WILFREDO catheters discontinued POD2 and transitioned to PO pain regimen. Pain controlled. POD3 patient meeting appropriate milestones for discharge home. POD4 Discharge home following improved pain control and reassurance     Goals of Care Treatment Preferences:  Code Status: Full Code          Significant Diagnostic Studies: Radiology: X-Ray: CXR: X-Ray Chest 1 View (CXR):   Results for orders placed or performed during the hospital encounter of 03/11/24   X-Ray Chest 1 View    Narrative    EXAMINATION:  XR CHEST 1 VIEW    CLINICAL HISTORY:  Post op;    TECHNIQUE:  One view    COMPARISON:  Comparison is made to 03/12/2024.    FINDINGS:  No significant detrimental interval change in the appearance of the chest since 03/12/2024 is appreciated.  No pneumothorax.      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] 03/11/2024 Yes      Problems Resolved During this Admission:      Discharged Condition: good    Disposition: Home or Self Care    Follow Up:    Patient Instructions:      Diet Adult Regular     Diet Adult Regular     No driving until:   Order Comments: Please do NOT drive until all narcotic medications have been discontinued     Notify your health care provider if you experience any of the following:  temperature >100.4     Notify your health care provider if you experience any of the following:  persistent nausea and vomiting or diarrhea     Notify your health care provider if you experience any of the following:  severe uncontrolled pain     Notify your health care provider if you experience any of the following:  redness, tenderness, or signs of infection (pain, swelling, redness, odor or green/yellow discharge around incision site)     Notify your health care provider if you experience any of the following:  difficulty breathing or increased cough     Notify your health care provider if you experience any of the following:  severe persistent headache     Notify your health care provider if you experience any of the following:  worsening rash     Notify your health care provider if you experience any of the following:  persistent dizziness, light-headedness, or visual disturbances     Notify your health care provider if you experience any of the following:  increased confusion or weakness     Remove dressing in 48 hours     Change dressing (specify)   Order Comments: Patient may shower once she arrives home; however, she must keep incision covered for first 48 hours. Allow soap and water to rinse over incisions, do not scrub.     No driving until:   Order Comments: No driving until all narcotic medications discontinued      Notify your health care provider if you experience any of the following:  temperature >100.4     Notify your health care provider if you experience any of the following:  persistent nausea and vomiting or diarrhea     Notify your health care provider if you experience any of the following:  severe uncontrolled pain     Notify your health care provider if you experience any of the following:  redness, tenderness, or signs of infection (pain, swelling, redness, odor or green/yellow discharge around incision site)     Notify your health care provider if you experience any of the following:  difficulty breathing or increased cough     Notify your health care provider if you experience any of the following:  severe persistent headache     Notify your health care provider if you experience any of the following:  worsening rash     Notify your health care provider if you experience any of the following:  persistent dizziness, light-headedness, or visual disturbances     Notify your health care provider if you experience any of the following:  increased confusion or weakness     Remove dressing in 48 hours     Change dressing (specify)   Order Comments: Patient may shower; however, only allow water and soap to rinse over incision. Do NOT scrub.     Activity as tolerated     Medications:  Reconciled Home Medications:      Medication List        START taking these medications      acetaminophen 500 MG tablet  Commonly known as: TYLENOL  Take 2 tablets (1,000 mg total) by mouth every 6 (six) hours.     HYDROmorphone 2 MG tablet  Commonly known as: DILAUDID  Take 1 tablet (2 mg total) by mouth every 6 (six) hours as needed for Pain.     methocarbamoL 500 MG Tab  Commonly known as: ROBAXIN  Take 2 tablets (1,000 mg total) by mouth every 6 (six) hours. for 10 days     oxyCODONE 10 mg Tab immediate release tablet  Commonly known as: ROXICODONE  Take 1 tablet (10 mg total) by mouth every 6 (six) hours as needed for Pain.             CHANGE how you take these medications      gabapentin 300 MG capsule  Commonly known as: NEURONTIN  Take 1 capsule (300 mg total) by mouth 3 (three) times daily.  What changed: when to take this            CONTINUE taking these medications      * ADVAIR DISKUS 500-50 mcg/dose Dsdv diskus inhaler  Generic drug: fluticasone-salmeterol 500-50 mcg/dose  INHALE 1 PUFF INTO THE LUNGS 2 TIMES DAY     * fluticasone-salmeterol 500-50 mcg/dose 500-50 mcg/dose Dsdv diskus inhaler  Commonly known as: ADVAIR DISKUS  Inhale 1 puff into the lungs 2 (two) times daily. Controller     * albuterol 90 mcg/actuation inhaler  Commonly known as: PROVENTIL/VENTOLIN HFA  INHALE 1 TO 2 PUFFS BY MOUTH EVERY 6 HOURS AS NEEDED FOR WHEEZING OR SHORTNESS OF BREATH     * albuterol 90 mcg/actuation inhaler  Commonly known as: PROVENTIL/VENTOLIN HFA  Inhale 2 puffs into the lungs every 6 (six) hours as needed for Wheezing. Rescue     buPROPion 150 MG TB24 tablet  Commonly known as: WELLBUTRIN XL  Take 150 mg by mouth every evening.     cetirizine 10 MG tablet  Commonly known as: ZYRTEC  Take 1 tablet (10 mg total) by mouth once daily.     cloNIDine 0.1 MG tablet  Commonly known as: CATAPRES  0.1 mg every 6 (six) hours as needed.     cyclobenzaprine 10 MG tablet  Commonly known as: FLEXERIL  Take 1 tablet (10 mg total) by mouth 3 (three) times daily as needed for Muscle spasms.     famotidine 20 MG tablet  Commonly known as: PEPCID  Take 1 tablet (20 mg total) by mouth 2 (two) times daily.     FLUoxetine 20 MG capsule  Take 60 mg by mouth every morning.     QUEtiapine 50 MG tablet  Commonly known as: SEROQUEL  take 1/2 to 1 tablet at bedtime     traZODone 100 MG tablet  Commonly known as: DESYREL  Take 200 mg by mouth nightly.     VYVANSE 10 mg Cap  Generic drug: lisdexamfetamine  Take 1 capsule by mouth every morning.           * This list has 4 medication(s) that are the same as other medications prescribed for you. Read the directions  carefully, and ask your doctor or other care provider to review them with you.                  Dk Castro PA-C  Thoracic Surgery  [facility]

## 2024-03-15 NOTE — PT/OT/SLP PROGRESS
Physical Therapy Treatment    Patient Name:  Gina Coto   MRN:  [id number]    Recommendations:     Discharge Recommendations: Low Intensity Therapy  Discharge Equipment Recommendations: none  Barriers to discharge: None    Assessment:     Gina Coto is a 54 y.o. female admitted with a medical diagnosis of Right-sided chest wall pain.  She presents with the following impairments/functional limitations: impaired endurance, weakness, pain, decreased ROM . Pt was  motivated and cooperative with treatment session. Pt Progressing with PT Intervention. Pt would continue to benefit from skilled PT to address overall functional mobility, goals , and to return to functional baseline.  Goals remain appropriate.    Rehab Prognosis: Good; patient would benefit from acute skilled PT services to address these deficits and reach maximum level of function.    Recent Surgery: Procedure(s) (LRB):  THORACOTOMY with mesh removal (APS pain consult) (Right)  RECONSTRUCTION, CHEST WALL (Right) 4 Days Post-Op    Plan:     During this hospitalization, patient to be seen 2 x/week to address the identified rehab impairments via gait training, therapeutic activities, therapeutic exercises, neuromuscular re-education and progress toward the following goals:    Plan of Care Expires:  04/12/24    Subjective     Patient/Family Comments/goals: I think I am leaving today  Pain/Comfort:  Pain Rating 1:  (not rated)  Location - Side 1: Right  Location - Orientation 1: generalized  Location 1: shoulder  Pain Addressed 1: Reposition, Distraction      Objective:     Communicated with RN prior to session.  Patient found HOB elevated with   upon PT entry to room.     General Precautions: Standard, fall  Orthopedic Precautions: N/A  Braces: N/A  Respiratory Status: Room air     Functional Mobility:  Bed Mobility:     Supine to Sit HOB elevated 40: stand by assistance  Transfers:     Sit to Stand:  stand by assistance with no AD  Gait: pt amb 400' no  AD SBA and presented with reciprocal gait pattern, R UE guarded on side, fair essence.   Ascended/descended 10 stair with  HR SBA reciprocal step with no LOB during turn    AM-PAC 6 CLICK MOBILITY  Turning over in bed (including adjusting bedclothes, sheets and blankets)?: 4  Sitting down on and standing up from a chair with arms (e.g., wheelchair, bedside commode, etc.): 3  Moving from lying on back to sitting on the side of the bed?: 3  Moving to and from a bed to a chair (including a wheelchair)?: 3  Need to walk in hospital room?: 3  Climbing 3-5 steps with a railing?: 3  Basic Mobility Total Score: 19       Treatment & Education:  Therapist provided instruction and educated of  patient on progress, safety,d/c,PT POC,   proper body mechanics, energy conservation, and fall prevention strategies during tasks listed above, on the effects of prolonged immobility and the importance of performing OOB activity and exercises to promote healing and reduce recovery time   Updated white board with appropriate PT mobility information for medical team notification   Donned an extra gown   Pt safe to ambulate in hallway with RN or PCT assistance  Call nursing/pct to transfer to chair/use bathroom. Pt stated understanding  Bedside table in front of patient and area set up for function, convenience, and safety. RN aware of patient's mobility needs and status. Questions/concerns addressed within PTA scope of practice; patient  with no further questions. Time was provided for active listening, discussion of health disposition, and discussion of safe discharge. Pt?verbalized?agreement .    Patient left sitting edge of bed with all lines intact, call button in reach, and family present..    GOALS:   Multidisciplinary Problems       Physical Therapy Goals          Problem: Physical Therapy    Goal Priority Disciplines Outcome Goal Variances Interventions   Physical Therapy Goal     PT, PT/OT Ongoing, Progressing     Description:  Goals to be met by: 24     Patient will increase functional independence with mobility by performin. Supine to sit with Modified [place]  2. Sit to supine with Modified [place]  3. Sit to stand transfer with Modified [place]  4. Bed to chair transfer with Modified [place] using LRAD  5. Gait  x 300 feet with Supervision using LRAD.   6. Ascend/descend 10 stair with either Handrails Supervision using LRAD.                          Time Tracking:     PT Received On: 03/15/24  PT Start Time: 1211     PT Stop Time: 1249  PT Total Time (min): 38 min     Billable Minutes: Gait Training 30 and Therapeutic Activity 8    Treatment Type: Treatment  PT/PTA: PTA     Number of PTA visits since last PT visit: 1     03/15/2024

## 2024-03-26 NOTE — PROGRESS NOTES
"Subjective:       Patient ID: Gina Coto is a 54 y.o. female.    Chief Complaint: Post-op Evaluation    Diagnosis: Carcinoid bronchial adenoma of right lung     Procedure(s) and date(s):   04/22/2019 - right thoracotomy for right lower bilobectomy and resection of carcinoid tumor   09/09/2019 - right thoracotomy with rib plating  01/05/23 - Re-do right thoracotomy and chest wall reconstruction using Goretex mesh   3/11/24- redo thoracotomy with removal of previous mesh, removal of scapula tip, placement of new gortex mesh     Post-operative therapy: surveillance    Follow-up    Gina Coto is a 52 y.o. female s/p right thoracotomy for right lower bilobectomy for resection of carcinoid tumor and s/p rib plating who is presenting to clinic today for post op follow-up from re-do right thoracotomy and chest wall reconstruction. Today patient reports she is doing well. She reported some should weakness in hospital this has improved; however, she reports significant pain with approx. 45-60 degrees of shoulder abduction. Pain is controlled with current regimen, but she reportedly took last PO dilaudid over the weekend and last oxycodone yesterday evening, 03/26/24.     Review of Systems   Constitutional: Negative.    HENT: Negative.     Respiratory: Negative.     Cardiovascular: Negative.    Gastrointestinal: Negative.    Genitourinary: Negative.    Integumentary:  Negative.   Neurological: Negative.    Hematological: Negative.    Psychiatric/Behavioral: Negative.           Objective:      /73   Pulse 69   Ht 5' 5" (1.651 m)   Wt 90.3 kg (199 lb 1.2 oz)   LMP 01/11/2024 (Approximate)   SpO2 95%   BMI 33.13 kg/m²      Physical Exam  Constitutional:       Appearance: Normal appearance. She is obese.   Eyes:      Extraocular Movements: Extraocular movements intact.      Pupils: Pupils are equal, round, and reactive to light.   Cardiovascular:      Rate and Rhythm: Normal rate and regular rhythm.      " Pulses: Normal pulses.   Pulmonary:      Effort: Pulmonary effort is normal.      Breath sounds: Normal breath sounds.      Comments: Right incisions well healed  Abdominal:      General: Abdomen is flat.      Palpations: Abdomen is soft.   Musculoskeletal:      Comments: No chest wall instability, audible or palpable clicking along right posterior chest.     Skin:     General: Skin is warm and dry.   Neurological:      General: No focal deficit present.      Mental Status: She is alert and oriented to person, place, and time. Mental status is at baseline.   Psychiatric:         Mood and Affect: Mood normal.         Behavior: Behavior normal.         Thought Content: Thought content normal.           Diagnostics:     CXR - 01/25/23:   1. Postsurgical changes from right thoracotomy and associated volume loss in the right hemithorax.  No acute cardiopulmonary process.  2. Clearing of the patchy reticulonodular airspace opacity in the left lung base since the previous study.    Chest CT 08/30/23: images personally reviewed.   Postsurgical changes in right hemithorax  Stable right chest wall plates and mesh    Chest CT 3D recon 2/21/24:  I reviewed the images  Stable postsurgical changes in right hemithorax  Goretex mesh separation from superior rib margin of 5th rib with large interspace between 5th and 6th ribs    CXR 3/27/24: images reviewed.   Stable postoperative changes of the right chest with elevated right hemidiaphragm. Stable cardiomediastinal silhouette noting partial obscuration of the right heart border, similar to prior exam. No focal airspace consolidation, pleural effusion, or pneumothorax. No acute osseous abnormality detected.     Assessment:       Gina Coto is a 52 y.o. female s/p right thoracotomy for right lower bilobectomy for resection of carcinoid tumor and s/p chest wall reconstruction.   MELIZA  NSAIDs for pain control    Plan:       Follow up in 1 year with chest CT

## 2024-03-27 ENCOUNTER — OFFICE VISIT (OUTPATIENT)
Dept: CARDIOTHORACIC SURGERY | Facility: CLINIC | Age: 54
End: 2024-03-27
Payer: MEDICAID

## 2024-03-27 ENCOUNTER — HOSPITAL ENCOUNTER (OUTPATIENT)
Dept: RADIOLOGY | Facility: HOSPITAL | Age: 54
Discharge: HOME | End: 2024-03-27
Attending: INTERNAL MEDICINE
Payer: MEDICAID

## 2024-03-27 VITALS
HEART RATE: 69 BPM | SYSTOLIC BLOOD PRESSURE: 123 MMHG | WEIGHT: 199.06 LBS | OXYGEN SATURATION: 95 % | HEIGHT: 65 IN | DIASTOLIC BLOOD PRESSURE: 73 MMHG | BODY MASS INDEX: 33.16 KG/M2

## 2024-03-27 DIAGNOSIS — C7A.090 CARCINOID BRONCHIAL ADENOMA OF RIGHT LUNG: ICD-10-CM

## 2024-03-27 DIAGNOSIS — R07.89 RIGHT-SIDED CHEST WALL PAIN: ICD-10-CM

## 2024-03-27 DIAGNOSIS — C7A.090 CARCINOID BRONCHIAL ADENOMA OF RIGHT LUNG: Primary | ICD-10-CM

## 2024-03-27 PROCEDURE — 1159F MED LIST DOCD IN RCRD: CPT | Mod: CPTII,,, | Performed by: THORACIC SURGERY (CARDIOTHORACIC VASCULAR SURGERY)

## 2024-03-27 PROCEDURE — 99214 OFFICE O/P EST MOD 30 MIN: CPT | Mod: PBBFAC,25 | Performed by: THORACIC SURGERY (CARDIOTHORACIC VASCULAR SURGERY)

## 2024-03-27 PROCEDURE — 71046 X-RAY EXAM CHEST 2 VIEWS: CPT | Mod: TC

## 2024-03-27 PROCEDURE — 99024 POSTOP FOLLOW-UP VISIT: CPT | Mod: ,,, | Performed by: THORACIC SURGERY (CARDIOTHORACIC VASCULAR SURGERY)

## 2024-03-27 PROCEDURE — 99999 PR PBB SHADOW E&M-EST. PATIENT-LVL IV: CPT | Mod: PBBFAC,,, | Performed by: THORACIC SURGERY (CARDIOTHORACIC VASCULAR SURGERY)

## 2024-03-27 PROCEDURE — 3078F DIAST BP <80 MM HG: CPT | Mod: CPTII,,, | Performed by: THORACIC SURGERY (CARDIOTHORACIC VASCULAR SURGERY)

## 2024-03-27 PROCEDURE — 71046 X-RAY EXAM CHEST 2 VIEWS: CPT | Mod: 26,,, | Performed by: STUDENT IN AN ORGANIZED HEALTH CARE EDUCATION/TRAINING PROGRAM

## 2024-03-27 PROCEDURE — 3074F SYST BP LT 130 MM HG: CPT | Mod: CPTII,,, | Performed by: THORACIC SURGERY (CARDIOTHORACIC VASCULAR SURGERY)

## 2024-03-27 RX ORDER — KETOROLAC TROMETHAMINE 10 MG/1
10 TABLET, FILM COATED ORAL EVERY 8 HOURS PRN
Qty: 20 TABLET | Refills: 0 | Status: SHIPPED | OUTPATIENT
Start: 2024-03-27 | End: 2024-04-03

## 2024-04-02 ENCOUNTER — TELEPHONE (OUTPATIENT)
Dept: GASTROENTEROLOGY | Facility: CLINIC | Age: 54
End: 2024-04-02
Payer: MEDICAID

## 2024-04-02 RX ORDER — LIDOCAINE AND PRILOCAINE 25; 25 MG/G; MG/G
CREAM TOPICAL
Qty: 30 G | Refills: 2 | Status: SHIPPED | OUTPATIENT
Start: 2024-04-02 | End: 2024-06-10

## 2024-04-02 NOTE — TELEPHONE ENCOUNTER
----- Message from Leonel Downey sent at 4/2/2024  8:14 AM CDT -----  Regarding: appt access  PATIENT CALL    Pt called to schedule an EP appt. Known to Dr Floyd, she states that she's still struggling with dysphagia and intermittent chest pain. Please call back at [phone] to assist w/ scheduling. No appts in Epic

## 2024-04-02 NOTE — TELEPHONE ENCOUNTER
Attempted to return call. Someone picked up phone and then hung up.   Post Operative Cataract Instructions     Left Eye  POST OPERATIVE INSTRUCTIONS  You have received anesthesia today. DO NOT drive, drink alcohol, sign legal documents.   After surgery, your eye will not hurt. It may feel scratchy, sticky or uncomfortable. Your eye will be sensitive to light.  Most people see better 1-3 days after the procedure, but it could take 3 weeks to get the full benefits and reach your visual potential. If your vision is blurry for a few days it is normal, and means you may have swelling outside or inside the eye. For some patients, a bubble is placed and there will be blurriness.   You should receive a post-op kit with tape and an eye shield. Wear the shield for the first 3 nights after surgery to keep you from rubbing the eye.  Most people are able to return to their normal routine 1-3 days after surgery, however, due to the brain adjusting to your new vision you may have trouble judging distances and want to be careful when driving and going up and downstairs.   You can shower and wash your hair the day after surgery. Keep water, shampoo, hair spray and shaving lotion out of the eye, especially for the first week.  During the first week, you should AVOID:   Rubbing or putting pressure on your eye.  Eye make-up, face cream or lotion, hair coloring or perms  Strenuous activities, such as running or lifting weights, as to avoid sweat from getting in the eye. Avoid swimming, hot tubs, fumes or rj conditions.   Keep your head above your heart (no hanging the head down for periods of time).    Some discomfort and blurred vision after surgery are normal, but if you have any unusual pain, swelling, bleeding or sudden decrease in vision, contact our office immediately.     Emergency assistance is available at any time by calling:    Dr.Mark Shannon Ortega  [phone]    If unable to reach call [facility] @  [phone]    You have been prescribed an eye drop to use after surgery, please follow these instructions:    Durezol Shake well before use    USE 1 DROP 4 (FOUR) TIMES A DAY FOR 1 WEEK, WEEK 2: USE 3 (THREE) TIMES A DAY FOR 1 WEEK, WEEK 3: USE 2 (TWO) TIMES A DAY FOR 1 WEEK, WEEK 4: USE 1 (ONE) TIME A DAY FOR 1 WEEK THEN STOP.    PLACE A WENDY IN THE DAY COLUMN EACH TIME YOU USE TO KEEP ON SCHEDULE    WEEK 1-USE 4  (FOUR) TIMES A DAY DAY 1   DAY 2 DAY 3 DAY 4 DAY 5 DAY 6 DAY 7     WEEK 2-USE 3 (THREE)  TIMES A DAY DAY 1 DAY 2 DAY 3 DAY 4 DAY 5 DAY 6 DAY 7     WEEK 3-USE 2 (TWO) TIMES A DAY DAY 1 DAY 2 DAY 3 DAY 4 DAY 5 DAY 6 DAY 7     WEEK 4-USE 1 (ONE)TIME A DAY DAY 1 DAY 2 DAY 3 DAY 4 DAY 5 DAY 6 DAY 7       Please follow all post op instructions and follow up appointment time from your physician's office included in your discharge packet.  .  No pushing, pulling, tugging,  heavy lifting, or strenuous activity.  No major decision making, driving, or drinking alcoholic beverages for 24 hours. ( due to the medications you have  received)  Always use good hand hygiene/washing techniques.  NO driving while taking pain medications.    * if you have an incision:  Check your incision area every day for signs of infection.   Check for:  * more redness, swelling, or pain  *more fluid or blood  *warmth  *pus or bad smell To assist you in voiding:  Drink plenty of fluids  Listen to running water while attempting to void.    If you are unable to urinate and you have an uncomfortable urge to void or it has been   6 hours since you were discharged, return to the Emergency Room

## 2024-04-08 DIAGNOSIS — D3A.090 CARCINOID TUMOR OF RIGHT LUNG: Primary | ICD-10-CM

## 2024-04-08 RX ORDER — CELECOXIB 100 MG/1
100 CAPSULE ORAL 2 TIMES DAILY
Qty: 60 CAPSULE | Refills: 1 | Status: SHIPPED | OUTPATIENT
Start: 2024-04-08 | End: 2024-06-06

## 2024-04-12 RX ORDER — GABAPENTIN 300 MG/1
300 CAPSULE ORAL NIGHTLY
Qty: 30 CAPSULE | Refills: 11 | Status: SHIPPED | OUTPATIENT
Start: 2024-04-12 | End: 2024-06-10

## 2024-05-23 ENCOUNTER — PATIENT MESSAGE (OUTPATIENT)
Dept: CARDIOTHORACIC SURGERY | Facility: CLINIC | Age: 54
End: 2024-05-23
Payer: MEDICAID

## 2024-05-24 DIAGNOSIS — D3A.090 CARCINOID TUMOR OF RIGHT LUNG: ICD-10-CM

## 2024-05-24 DIAGNOSIS — R07.82 INTERCOSTAL PAIN: Primary | ICD-10-CM

## 2024-06-04 ENCOUNTER — PATIENT MESSAGE (OUTPATIENT)
Dept: CARDIOTHORACIC SURGERY | Facility: CLINIC | Age: 54
End: 2024-06-04
Payer: MEDICAID

## 2024-06-06 ENCOUNTER — PATIENT MESSAGE (OUTPATIENT)
Dept: CARDIOTHORACIC SURGERY | Facility: CLINIC | Age: 54
End: 2024-06-06
Payer: MEDICAID

## 2024-06-06 RX ORDER — CELECOXIB 100 MG/1
100 CAPSULE ORAL 2 TIMES DAILY
Qty: 60 CAPSULE | Refills: 1 | Status: SHIPPED | OUTPATIENT
Start: 2024-06-06

## 2024-06-07 DIAGNOSIS — R07.89 RIGHT-SIDED CHEST WALL PAIN: Primary | ICD-10-CM

## 2024-06-07 DIAGNOSIS — D3A.090 CARCINOID TUMOR OF RIGHT LUNG: ICD-10-CM

## 2024-06-10 RX ORDER — CYCLOBENZAPRINE HCL 10 MG
TABLET ORAL
Qty: 45 TABLET | Refills: 1 | Status: SHIPPED | OUTPATIENT
Start: 2024-06-10

## 2024-06-17 ENCOUNTER — TELEPHONE (OUTPATIENT)
Dept: OBSTETRICS AND GYNECOLOGY | Facility: CLINIC | Age: 54
End: 2024-06-17
Payer: MEDICAID

## 2024-06-17 NOTE — PROGRESS NOTES
Subjective:       Patient ID: Gina Coto is a 54 y.o. female.    Chief Complaint: Follow-up    Diagnosis: Carcinoid bronchial adenoma of right lung     Procedure(s) and date(s):   04/22/2019 - right thoracotomy for right lower bilobectomy and resection of carcinoid tumor   09/09/2019 - right thoracotomy with rib plating  01/05/23 - Re-do right thoracotomy and chest wall reconstruction using Goretex mesh   3/11/24- redo thoracotomy with removal of previous mesh, removal of scapula tip, placement of new gortex mesh     Post-operative therapy: surveillance    Follow-up    Gina Coto is a 52 y.o. female s/p right thoracotomy for right lower bilobectomy for resection of carcinoid tumor and s/p rib plating who is presenting to clinic today for post op follow-up from re-do right thoracotomy with removal of previous mesh, removal of scapula tip, placement of new gortex mesh. Today patient reports she is doing well. She reported some should weakness in hospital this has improved; however, she reports significant pain with approx. 45-60 degrees of shoulder abduction. Pain is controlled with current regimen, but she reportedly took last PO dilaudid over the weekend and last oxycodone yesterday evening, 03/26/24.     Interval: Patient has called office multiple times to discuss limited range of motion, continued pain associated with recent surgery and physical therapy referral. She has been counseled to seek PT outside of [facility] that accepts her health insurance on more than one occasion. Today patient reports she was evaluated by outside physical therapist closer to home. Setup for further therapy and would likely benefit from mobilization and strengthening of joint and associated musculature.     Review of Systems   Constitutional: Negative.    HENT: Negative.     Respiratory: Negative.     Cardiovascular: Negative.    Gastrointestinal: Negative.    Genitourinary: Negative.    Musculoskeletal:         Reduced  "right upper extremity range of motion  "clicking "sensation right posterior chest and shoulder   Integumentary:  Negative.   Neurological: Negative.    Hematological: Negative.    Psychiatric/Behavioral: Negative.           Objective:      Pulse 102   Ht 5' 5" (1.651 m)   Wt 83.5 kg (184 lb 1.4 oz)   SpO2 96%   BMI 30.63 kg/m²      Physical Exam  Constitutional:       Appearance: Normal appearance. She is obese.   Eyes:      Extraocular Movements: Extraocular movements intact.      Pupils: Pupils are equal, round, and reactive to light.   Cardiovascular:      Rate and Rhythm: Normal rate and regular rhythm.      Pulses: Normal pulses.   Pulmonary:      Effort: Pulmonary effort is normal.      Breath sounds: Normal breath sounds.      Comments: Right incisions well healed  Abdominal:      General: Abdomen is flat.      Palpations: Abdomen is soft.   Musculoskeletal:      Comments: No chest wall instability, audible or palpable clicking along right posterior chest. Patient able to abduct right arm to 90 degrees on exam.   Patient able to easily place both arms against wall to check for scapular winging  Very mild right scapular winging   Skin:     General: Skin is warm and dry.      Capillary Refill: Capillary refill takes less than 2 seconds.      Comments: Right posterolateral thoracotomy incision well healed   Neurological:      General: No focal deficit present.      Mental Status: She is alert and oriented to person, place, and time. Mental status is at baseline.   Psychiatric:         Mood and Affect: Mood normal.         Behavior: Behavior normal.         Thought Content: Thought content normal.           Diagnostics:     CXR - 01/25/23:   1. Postsurgical changes from right thoracotomy and associated volume loss in the right hemithorax.  No acute cardiopulmonary process.  2. Clearing of the patchy reticulonodular airspace opacity in the left lung base since the previous study.    Chest CT 08/30/23: images " personally reviewed.   Postsurgical changes in right hemithorax  Stable right chest wall plates and mesh    Chest CT 3D recon 2/21/24:  I reviewed the images  Stable postsurgical changes in right hemithorax  Goretex mesh separation from superior rib margin of 5th rib with large interspace between 5th and 6th ribs    CXR 3/27/24: images reviewed.   Stable postoperative changes of the right chest with elevated right hemidiaphragm. Stable cardiomediastinal silhouette noting partial obscuration of the right heart border, similar to prior exam. No focal airspace consolidation, pleural effusion, or pneumothorax. No acute osseous abnormality detected.     CT Chest 05/29/24:  I reviewed the images  Lung windows: Show stable posttraumatic changes and the volume loss in right lung. No acute airspace disease is identified.  Mediastinum: Show shift of mediastinal structures to the right, elevation of right hemidiaphragm and focal pleural thickening right lung base.  The heart size remains normal no mediastinal or hilar lymphadenopathy is seen. Scans of the upper abdomen are unremarkable.  3. Very poor quality 3D reconstruction images  Assessment:       Gina Coto is a 52 y.o. female s/p right thoracotomy for right lower bilobectomy for resection of carcinoid tumor and s/p chest wall reconstruction.   MELIZA  NSAIDs for pain control    Plan:       Continue physical therapy for mobilization and strengthening   Hold off on reoperative surgery

## 2024-06-21 ENCOUNTER — OFFICE VISIT (OUTPATIENT)
Dept: CARDIOTHORACIC SURGERY | Facility: CLINIC | Age: 54
End: 2024-06-21
Payer: MEDICAID

## 2024-06-21 VITALS — HEART RATE: 102 BPM | OXYGEN SATURATION: 96 % | HEIGHT: 65 IN | BODY MASS INDEX: 30.67 KG/M2 | WEIGHT: 184.06 LBS

## 2024-06-21 DIAGNOSIS — C7A.090 CARCINOID BRONCHIAL ADENOMA OF RIGHT LUNG: ICD-10-CM

## 2024-06-21 DIAGNOSIS — G89.18 CHEST WALL PAIN FOLLOWING SURGERY: Primary | ICD-10-CM

## 2024-06-21 DIAGNOSIS — M95.8 WINGED SCAPULA OF RIGHT SIDE: ICD-10-CM

## 2024-06-21 DIAGNOSIS — R07.89 CHEST WALL PAIN FOLLOWING SURGERY: Primary | ICD-10-CM

## 2024-06-21 PROCEDURE — 99213 OFFICE O/P EST LOW 20 MIN: CPT | Mod: PBBFAC | Performed by: THORACIC SURGERY (CARDIOTHORACIC VASCULAR SURGERY)

## 2024-06-21 PROCEDURE — 99999 PR PBB SHADOW E&M-EST. PATIENT-LVL III: CPT | Mod: PBBFAC,,, | Performed by: THORACIC SURGERY (CARDIOTHORACIC VASCULAR SURGERY)

## 2024-08-27 ENCOUNTER — TELEPHONE (OUTPATIENT)
Dept: CARDIOTHORACIC SURGERY | Facility: CLINIC | Age: 54
End: 2024-08-27
Payer: MEDICAID

## 2024-08-27 DIAGNOSIS — C7A.090 CARCINOID BRONCHIAL ADENOMA OF RIGHT LUNG: Primary | ICD-10-CM

## 2025-05-22 ENCOUNTER — CLAIMS CREATED FROM THE CLAIM WINDOW (OUTPATIENT)
Dept: URBAN - METROPOLITAN AREA MEDICAL CENTER 1 | Facility: MEDICAL CENTER | Age: 55
End: 2025-05-22

## 2025-05-22 PROCEDURE — 99254 IP/OBS CNSLTJ NEW/EST MOD 60: CPT | Performed by: INTERNAL MEDICINE

## 2025-05-22 PROCEDURE — 43235 EGD DIAGNOSTIC BRUSH WASH: CPT | Performed by: INTERNAL MEDICINE

## 2025-06-13 NOTE — PROGRESS NOTES
Subjective:       Patient ID: Gina Coto is a 55 y.o. female.    Chief Complaint: No chief complaint on file.    Diagnosis: Carcinoid bronchial adenoma of right lung     Procedure(s) and date(s):   04/22/2019 - right thoracotomy for right lower bilobectomy and resection of carcinoid tumor   09/09/2019 - right thoracotomy with rib plating  01/05/23 - Re-do right thoracotomy and chest wall reconstruction using Goretex mesh   3/11/24- redo thoracotomy with removal of previous mesh, removal of scapula tip, placement of new gortex mesh     Post-operative therapy: surveillance    Follow-up    Gina Coto is a 52 y.o. female s/p right thoracotomy for right lower bilobectomy for resection of carcinoid tumor and s/p rib plating who is presenting to clinic today for post op follow-up from re-do right thoracotomy with removal of previous mesh, removal of scapula tip, placement of new gortex mesh. Today patient reports she is doing well. She reported some should weakness in hospital this has improved; however, she reports significant pain with approx. 45-60 degrees of shoulder abduction. Pain is controlled with current regimen, but she reportedly took last PO dilaudid over the weekend and last oxycodone yesterday evening, 03/26/24.     Interval: Patient has called office multiple times to discuss limited range of motion, continued pain associated with recent surgery and physical therapy referral. She has been counseled to seek PT outside of [facility] that accepts her health insurance on more than one occasion. Today patient reports she was evaluated by outside physical therapist closer to home. Setup for further therapy and would likely benefit from mobilization and strengthening of joint and associated musculature.     Review of Systems   Constitutional: Negative.    HENT: Negative.     Respiratory: Negative.     Cardiovascular: Negative.    Gastrointestinal: Negative.    Genitourinary: Negative.    Musculoskeletal:   "       Reduced right upper extremity range of motion  "clicking "sensation right posterior chest and shoulder   Integumentary:  Negative.   Neurological: Negative.    Hematological: Negative.    Psychiatric/Behavioral: Negative.           Objective:      There were no vitals taken for this visit.     Physical Exam  Constitutional:       Appearance: Normal appearance. She is obese.   Eyes:      Extraocular Movements: Extraocular movements intact.      Pupils: Pupils are equal, round, and reactive to light.   Cardiovascular:      Rate and Rhythm: Normal rate and regular rhythm.      Pulses: Normal pulses.   Pulmonary:      Effort: Pulmonary effort is normal.      Breath sounds: Normal breath sounds.      Comments: Right incisions well healed  Abdominal:      General: Abdomen is flat.      Palpations: Abdomen is soft.   Musculoskeletal:      Comments: No chest wall instability,   No audible or palpable clicking along right posterior chest.   Patient able to abduct right arm to 90 degrees on exam.    Skin:     General: Skin is warm and dry.      Capillary Refill: Capillary refill takes less than 2 seconds.      Comments: Right posterolateral thoracotomy incision well healed   Neurological:      General: No focal deficit present.      Mental Status: She is alert and oriented to person, place, and time. Mental status is at baseline.   Psychiatric:         Mood and Affect: Mood normal.         Behavior: Behavior normal.         Thought Content: Thought content normal.           Diagnostics:     CXR - 01/25/23:   1. Postsurgical changes from right thoracotomy and associated volume loss in the right hemithorax.  No acute cardiopulmonary process.  2. Clearing of the patchy reticulonodular airspace opacity in the left lung base since the previous study.    Chest CT 08/30/23: images personally reviewed.   Postsurgical changes in right hemithorax  Stable right chest wall plates and mesh    Chest CT 3D recon 2/21/24:  I reviewed " the images  Stable postsurgical changes in right hemithorax  Goretex mesh separation from superior rib margin of 5th rib with large interspace between 5th and 6th ribs    CXR 3/27/24: images reviewed.   Stable postoperative changes of the right chest with elevated right hemidiaphragm. Stable cardiomediastinal silhouette noting partial obscuration of the right heart border, similar to prior exam. No focal airspace consolidation, pleural effusion, or pneumothorax. No acute osseous abnormality detected.     CT Chest 05/29/24:  I reviewed the images  Lung windows: Show stable posttraumatic changes and the volume loss in right lung. No acute airspace disease is identified.  Mediastinum: Show shift of mediastinal structures to the right, elevation of right hemidiaphragm and focal pleural thickening right lung base.  The heart size remains normal no mediastinal or hilar lymphadenopathy is seen. Scans of the upper abdomen are unremarkable.  3. Very poor quality 3D reconstruction images    CT chest 6/18/25: I reviewed the images  I reviewed the images  Stable chest wall reconstruction hardware  No new lung nodules or mediastinal/hilar LN    Assessment:       Gina Coto is a 52 y.o. female s/p right thoracotomy for right lower bilobectomy for resection of carcinoid tumor and s/p chest wall reconstruction.   MELIZA  NSAIDs prn for pain control    Plan:       Repeat chest CT in 1 year

## 2025-06-16 ENCOUNTER — TELEPHONE (OUTPATIENT)
Dept: GASTROENTEROLOGY | Facility: CLINIC | Age: 55
End: 2025-06-16
Payer: MEDICAID

## 2025-06-16 PROBLEM — J45.40 MODERATE PERSISTENT ASTHMA WITHOUT COMPLICATION: Status: ACTIVE | Noted: 2025-06-16

## 2025-06-16 NOTE — TELEPHONE ENCOUNTER
----- Message from Med Assistant Sharpe sent at 6/16/2025  4:47 PM CDT -----  dONE  ----- Message -----  From: Ankita Welch MD  Sent: 6/16/2025   4:36 PM CDT  To: Annemarie Webster MA; [facility]    She needs to be seen in clinic, can you open a 5pm virtual visit for me on Friday Aug 15?     team can you schedule her at 5pm Aug 15?  ----- Message -----  From: Annemarie Webster MA  Sent: 6/16/2025   4:25 PM CDT  To: Ankita Welch MD    Please review.  Should this go to surg?  ----- Message -----  From: Keiry Lee PA  Sent: 6/16/2025  10:06 AM CDT  To: Annemarie Webster MA    Hey Lady,   I have a previous patient of Dr Menon who needs to get back in as symptoms are worsening. Can we get her back in? Surgical intervention was previously mentioned and she would like to continue the conversation.   Thanks,   Anu Lee PA-C  
MA called and scheduled the patient for a virtual visit on 8/15 @ 5 per Dr. Welch.  
No

## 2025-06-18 ENCOUNTER — OFFICE VISIT (OUTPATIENT)
Dept: CARDIOTHORACIC SURGERY | Facility: CLINIC | Age: 55
End: 2025-06-18
Payer: MEDICAID

## 2025-06-18 ENCOUNTER — HOSPITAL ENCOUNTER (OUTPATIENT)
Dept: RADIOLOGY | Facility: HOSPITAL | Age: 55
Discharge: HOME | End: 2025-06-18
Attending: THORACIC SURGERY (CARDIOTHORACIC VASCULAR SURGERY)
Payer: MEDICAID

## 2025-06-18 VITALS
WEIGHT: 180.75 LBS | HEART RATE: 71 BPM | OXYGEN SATURATION: 98 % | DIASTOLIC BLOOD PRESSURE: 91 MMHG | BODY MASS INDEX: 29.05 KG/M2 | HEIGHT: 66 IN | SYSTOLIC BLOOD PRESSURE: 162 MMHG

## 2025-06-18 DIAGNOSIS — C7A.090 CARCINOID BRONCHIAL ADENOMA OF RIGHT LUNG: Primary | ICD-10-CM

## 2025-06-18 DIAGNOSIS — C7A.090 CARCINOID BRONCHIAL ADENOMA OF RIGHT LUNG: ICD-10-CM

## 2025-06-18 PROCEDURE — 3077F SYST BP >= 140 MM HG: CPT | Mod: CPTII,,, | Performed by: THORACIC SURGERY (CARDIOTHORACIC VASCULAR SURGERY)

## 2025-06-18 PROCEDURE — 71250 CT THORAX DX C-: CPT | Mod: TC

## 2025-06-18 PROCEDURE — 3008F BODY MASS INDEX DOCD: CPT | Mod: CPTII,,, | Performed by: THORACIC SURGERY (CARDIOTHORACIC VASCULAR SURGERY)

## 2025-06-18 PROCEDURE — 99213 OFFICE O/P EST LOW 20 MIN: CPT | Mod: PBBFAC,25 | Performed by: THORACIC SURGERY (CARDIOTHORACIC VASCULAR SURGERY)

## 2025-06-18 PROCEDURE — 99213 OFFICE O/P EST LOW 20 MIN: CPT | Mod: S$PBB,,, | Performed by: THORACIC SURGERY (CARDIOTHORACIC VASCULAR SURGERY)

## 2025-06-18 PROCEDURE — 99999 PR PBB SHADOW E&M-EST. PATIENT-LVL III: CPT | Mod: PBBFAC,,, | Performed by: THORACIC SURGERY (CARDIOTHORACIC VASCULAR SURGERY)

## 2025-06-18 PROCEDURE — 3080F DIAST BP >= 90 MM HG: CPT | Mod: CPTII,,, | Performed by: THORACIC SURGERY (CARDIOTHORACIC VASCULAR SURGERY)

## 2025-06-18 PROCEDURE — 1159F MED LIST DOCD IN RCRD: CPT | Mod: CPTII,,, | Performed by: THORACIC SURGERY (CARDIOTHORACIC VASCULAR SURGERY)

## 2025-08-15 ENCOUNTER — OFFICE VISIT (OUTPATIENT)
Dept: GASTROENTEROLOGY | Facility: CLINIC | Age: 55
End: 2025-08-15
Payer: MEDICAID

## 2025-08-15 DIAGNOSIS — K22.0 ACHALASIA: Primary | ICD-10-CM

## 2025-08-18 ENCOUNTER — TELEPHONE (OUTPATIENT)
Dept: GASTROENTEROLOGY | Facility: CLINIC | Age: 55
End: 2025-08-18
Payer: MEDICAID

## 2025-08-19 ENCOUNTER — PATIENT MESSAGE (OUTPATIENT)
Dept: GASTROENTEROLOGY | Facility: CLINIC | Age: 55
End: 2025-08-19
Payer: MEDICAID

## 2025-08-20 ENCOUNTER — TELEPHONE (OUTPATIENT)
Dept: ENDOSCOPY | Facility: HOSPITAL | Age: 55
End: 2025-08-20
Payer: MEDICAID

## 2025-08-20 VITALS — WEIGHT: 180.75 LBS | BODY MASS INDEX: 29.05 KG/M2 | HEIGHT: 66 IN

## 2025-08-20 DIAGNOSIS — K22.0 ACHALASIA: ICD-10-CM

## 2025-08-20 DIAGNOSIS — R13.10 DYSPHAGIA, UNSPECIFIED TYPE: Primary | ICD-10-CM

## 2025-08-22 ENCOUNTER — HOSPITAL ENCOUNTER (OUTPATIENT)
Dept: RADIOLOGY | Facility: HOSPITAL | Age: 55
Discharge: HOME | End: 2025-08-22
Attending: INTERNAL MEDICINE
Payer: MEDICAID

## 2025-08-22 DIAGNOSIS — K22.0 ACHALASIA: ICD-10-CM

## 2025-08-22 PROCEDURE — 74220 X-RAY XM ESOPHAGUS 1CNTRST: CPT | Mod: TC,FY

## 2025-08-22 PROCEDURE — 74220 X-RAY XM ESOPHAGUS 1CNTRST: CPT | Mod: 26,,, | Performed by: RADIOLOGY

## 2025-08-22 PROCEDURE — 25500020 PHARM REV CODE 255: Performed by: INTERNAL MEDICINE

## 2025-08-22 PROCEDURE — A9698 NON-RAD CONTRAST MATERIALNOC: HCPCS | Performed by: INTERNAL MEDICINE

## 2025-08-22 RX ADMIN — BARIUM SULFATE 200 ML: 0.81 POWDER, FOR SUSPENSION ORAL at 09:08

## 2025-08-22 RX ADMIN — BARIUM SULFATE 280 ML: 0.6 SUSPENSION ORAL at 09:08

## 2025-08-27 ENCOUNTER — OFFICE VISIT (OUTPATIENT)
Dept: OBSTETRICS AND GYNECOLOGY | Facility: CLINIC | Age: 55
End: 2025-08-27
Payer: MEDICAID

## 2025-08-27 ENCOUNTER — LAB VISIT (OUTPATIENT)
Dept: LAB | Facility: HOSPITAL | Age: 55
End: 2025-08-27
Attending: STUDENT IN AN ORGANIZED HEALTH CARE EDUCATION/TRAINING PROGRAM
Payer: MEDICAID

## 2025-08-27 VITALS
HEIGHT: 66 IN | WEIGHT: 180.69 LBS | BODY MASS INDEX: 29.04 KG/M2 | DIASTOLIC BLOOD PRESSURE: 92 MMHG | SYSTOLIC BLOOD PRESSURE: 144 MMHG | HEART RATE: 86 BPM

## 2025-08-27 DIAGNOSIS — N95.1 MENOPAUSAL SYMPTOM: ICD-10-CM

## 2025-08-27 DIAGNOSIS — Z01.419 ENCNTR FOR GYN EXAM (GENERAL) (ROUTINE) W/O ABN FINDINGS: Primary | ICD-10-CM

## 2025-08-27 DIAGNOSIS — N94.2 VAGINISMUS: ICD-10-CM

## 2025-08-27 DIAGNOSIS — N89.8 VAGINAL DRYNESS: ICD-10-CM

## 2025-08-27 DIAGNOSIS — Z12.31 BREAST CANCER SCREENING BY MAMMOGRAM: ICD-10-CM

## 2025-08-27 PROCEDURE — 36415 COLL VENOUS BLD VENIPUNCTURE: CPT

## 2025-08-27 PROCEDURE — 82670 ASSAY OF TOTAL ESTRADIOL: CPT

## 2025-08-27 PROCEDURE — 83001 ASSAY OF GONADOTROPIN (FSH): CPT

## 2025-08-27 PROCEDURE — 1159F MED LIST DOCD IN RCRD: CPT | Mod: CPTII,,, | Performed by: STUDENT IN AN ORGANIZED HEALTH CARE EDUCATION/TRAINING PROGRAM

## 2025-08-27 PROCEDURE — 99213 OFFICE O/P EST LOW 20 MIN: CPT | Mod: PBBFAC | Performed by: STUDENT IN AN ORGANIZED HEALTH CARE EDUCATION/TRAINING PROGRAM

## 2025-08-27 PROCEDURE — 99396 PREV VISIT EST AGE 40-64: CPT | Mod: S$PBB,,, | Performed by: STUDENT IN AN ORGANIZED HEALTH CARE EDUCATION/TRAINING PROGRAM

## 2025-08-27 PROCEDURE — 3077F SYST BP >= 140 MM HG: CPT | Mod: CPTII,,, | Performed by: STUDENT IN AN ORGANIZED HEALTH CARE EDUCATION/TRAINING PROGRAM

## 2025-08-27 PROCEDURE — 1160F RVW MEDS BY RX/DR IN RCRD: CPT | Mod: CPTII,,, | Performed by: STUDENT IN AN ORGANIZED HEALTH CARE EDUCATION/TRAINING PROGRAM

## 2025-08-27 PROCEDURE — 3008F BODY MASS INDEX DOCD: CPT | Mod: CPTII,,, | Performed by: STUDENT IN AN ORGANIZED HEALTH CARE EDUCATION/TRAINING PROGRAM

## 2025-08-27 PROCEDURE — 3080F DIAST BP >= 90 MM HG: CPT | Mod: CPTII,,, | Performed by: STUDENT IN AN ORGANIZED HEALTH CARE EDUCATION/TRAINING PROGRAM

## 2025-08-27 PROCEDURE — 99999 PR PBB SHADOW E&M-EST. PATIENT-LVL III: CPT | Mod: PBBFAC,,, | Performed by: STUDENT IN AN ORGANIZED HEALTH CARE EDUCATION/TRAINING PROGRAM

## 2025-08-27 RX ORDER — ESTRADIOL 0.1 MG/G
1 CREAM VAGINAL
Qty: 42.5 G | Refills: 1 | Status: SHIPPED | OUTPATIENT
Start: 2025-08-28 | End: 2026-08-28

## 2025-08-28 LAB
ESTRADIOL SERPL HS-MCNC: <10 PG/ML
FSH SERPL-ACNC: 45.09 MIU/ML

## 2025-08-29 DIAGNOSIS — Z12.31 BREAST CANCER SCREENING BY MAMMOGRAM: Primary | ICD-10-CM

## (undated) DEVICE — DRESSING ADHESIVE ISLAND 3 X 6

## (undated) DEVICE — SEE MEDLINE ITEM 152622

## (undated) DEVICE — DRAPE STERI INSTRUMENT 1018

## (undated) DEVICE — SEE MEDLINE ITEM 157144

## (undated) DEVICE — APPLICATOR CHLORAPREP ORN 26ML

## (undated) DEVICE — ELECTRODE REM PLYHSV RETURN 9

## (undated) DEVICE — GOWN SMART IMP BREATHABLE XXLG

## (undated) DEVICE — SEE MEDLINE ITEM 152487

## (undated) DEVICE — DRAPE INCISE IOBAN 2 23X17IN

## (undated) DEVICE — TRAY MINOR GEN SURG OMC

## (undated) DEVICE — DRESSING TRANS 4X4 TEGADERM

## (undated) DEVICE — NDL BOX COUNTER

## (undated) DEVICE — ENDOAPTH VAS RELOAD WHITE 2.5

## (undated) DEVICE — SUT SILK 2-0 STRANDS 30IN

## (undated) DEVICE — SYR IRRIGATION BULB STER 60ML

## (undated) DEVICE — GOWN SURGICAL X-LARGE

## (undated) DEVICE — TAPE SILK 3IN

## (undated) DEVICE — SEE MEDLINE ITEM 157117

## (undated) DEVICE — SUT PROLENE 4-0 RB-1 BL MO

## (undated) DEVICE — DRAPE THREE-QTR REINF 53X77IN

## (undated) DEVICE — BOWL STERILE LARGE 32OZ

## (undated) DEVICE — KIT ENDOKIT COMPLIANCE CUSTOM

## (undated) DEVICE — SPONGE LAP 18X18 PREWASHED

## (undated) DEVICE — SEE MEDLINE ITEM 146347

## (undated) DEVICE — SUT CTD VICRYL CT-1 UND BR

## (undated) DEVICE — SUT ETHIBOND XTRA 1 OS-6

## (undated) DEVICE — SUT SILK 0 BLK BR FSL 18 IN

## (undated) DEVICE — SUT 2/0 30IN SILK BLK BRAI

## (undated) DEVICE — COVER LIGHT HANDLE 80/CA

## (undated) DEVICE — GOWN POLY REINF BRTH SLV XL

## (undated) DEVICE — TAPE MEDIPORE 4IN X 2YDS

## (undated) DEVICE — ADAPTER SWIVEL

## (undated) DEVICE — BOWL CEMENT

## (undated) DEVICE — DRAPE ABDOMINAL TIBURON 14X11

## (undated) DEVICE — CLOSURE SKIN STERI STRIP 1/2X4

## (undated) DEVICE — TIP YANKAUERS BULB NO VENT

## (undated) DEVICE — TOWEL OR DISP STRL BLUE 4/PK

## (undated) DEVICE — CLIP MED TICALL

## (undated) DEVICE — SEE MEDLINE ITEM 157131

## (undated) DEVICE — GAUZE SPONGE PEANUT STRL

## (undated) DEVICE — RELOAD ECHELON ENDOPATH 45MM

## (undated) DEVICE — DRILL

## (undated) DEVICE — SEE MEDLINE ITEM 146416

## (undated) DEVICE — ADHESIVE MASTISOL VIAL 48/BX

## (undated) DEVICE — SUT 2 30IN SILK BLK BRAIDE

## (undated) DEVICE — SEE MEDLINE ITEM 146417

## (undated) DEVICE — SUT 2 27IN COATED VICRYL V

## (undated) DEVICE — BLADE ELECTRO EDGE INSULATED

## (undated) DEVICE — CONTAINER SPECIMEN STRL 4OZ

## (undated) DEVICE — DRESSING TELFA N ADH 3X8

## (undated) DEVICE — STAPLER ECHELON FLEX GST 45MM

## (undated) DEVICE — SUT LIGACLIP SMALL XTRA

## (undated) DEVICE — SUT VICRYL BR 1 GEN 27 CT-1

## (undated) DEVICE — ELECTRODE BLADE INSULATED 1 IN

## (undated) DEVICE — ECHELON FLEX POWERED VAS STPLR

## (undated) DEVICE — PACK SET UP CONVERTORS

## (undated) DEVICE — SYR SLIP TIP 20CC

## (undated) DEVICE — CATH URETHRAL RED RUBBER 18FR

## (undated) DEVICE — PACK DRAPE UNIVERSAL CONVERTOR

## (undated) DEVICE — SUT STRATAFIX PDO 1 OS-6

## (undated) DEVICE — APPLIER LIGACLIP MED 11IN

## (undated) DEVICE — TRAY CATH FOL SIL URIMTR 16FR

## (undated) DEVICE — TRAY MINOR GEN SURG

## (undated) DEVICE — GLOVE BIOGEL SKINSENSE PI 7.5

## (undated) DEVICE — SEE MEDLINE ITEM 146313

## (undated) DEVICE — SYS LABEL CORRECT MED

## (undated) DEVICE — TRAY FOLEY 16FR INFECTION CONT

## (undated) DEVICE — COVER MAYO STND XL 30X57IN

## (undated) DEVICE — STAPLER SKIN PROXIMATE WIDE

## (undated) DEVICE — SUT PROLENE 4-0 SH BLU 36IN

## (undated) DEVICE — Device

## (undated) DEVICE — DRESSING ADH ISLAND 3.6 X 14

## (undated) DEVICE — BLADE HALL STERILE

## (undated) DEVICE — DRESSING TEGADERM 4.4X5IN

## (undated) DEVICE — SPONGE COTTON TRAY 4X4IN

## (undated) DEVICE — GAUZE SPONGE 4X4 12PLY

## (undated) DEVICE — DRESSING TRANS 2X2 TEGADERM

## (undated) DEVICE — STRIP MEDI WND CLSR 1/2X4IN

## (undated) DEVICE — CLOSURE SKIN 1X5 STERI-STRIP